# Patient Record
Sex: MALE | Race: BLACK OR AFRICAN AMERICAN | NOT HISPANIC OR LATINO | Employment: OTHER | URBAN - METROPOLITAN AREA
[De-identification: names, ages, dates, MRNs, and addresses within clinical notes are randomized per-mention and may not be internally consistent; named-entity substitution may affect disease eponyms.]

---

## 2017-03-09 ENCOUNTER — HOSPITAL ENCOUNTER (EMERGENCY)
Facility: OTHER | Age: 72
Discharge: HOME | End: 2017-03-09
Attending: EMERGENCY MEDICINE
Payer: MEDICARE

## 2017-03-09 VITALS
SYSTOLIC BLOOD PRESSURE: 113 MMHG | RESPIRATION RATE: 15 BRPM | DIASTOLIC BLOOD PRESSURE: 81 MMHG | TEMPERATURE: 98 F | HEIGHT: 68 IN | OXYGEN SATURATION: 98 % | BODY MASS INDEX: 31.83 KG/M2 | WEIGHT: 210 LBS | HEART RATE: 98 BPM

## 2017-03-09 DIAGNOSIS — N17.9 ACUTE KIDNEY INJURY: Primary | ICD-10-CM

## 2017-03-09 DIAGNOSIS — N30.90 CYSTITIS: ICD-10-CM

## 2017-03-09 LAB
ALBUMIN SERPL BCP-MCNC: 3.7 G/DL
ALP SERPL-CCNC: 108 U/L
ALT SERPL W/O P-5'-P-CCNC: 24 U/L
ANION GAP SERPL CALC-SCNC: 12 MMOL/L
AST SERPL-CCNC: 18 U/L
BACTERIA #/AREA URNS HPF: ABNORMAL /HPF
BASOPHILS # BLD AUTO: 0.02 K/UL
BASOPHILS NFR BLD: 0.2 %
BILIRUB SERPL-MCNC: 0.4 MG/DL
BILIRUB UR QL STRIP: ABNORMAL
BUN SERPL-MCNC: 29 MG/DL
CALCIUM SERPL-MCNC: 9.8 MG/DL
CHLORIDE SERPL-SCNC: 103 MMOL/L
CLARITY UR: ABNORMAL
CO2 SERPL-SCNC: 24 MMOL/L
COLOR UR: YELLOW
CREAT SERPL-MCNC: 1.7 MG/DL
DIFFERENTIAL METHOD: ABNORMAL
EOSINOPHIL # BLD AUTO: 0.2 K/UL
EOSINOPHIL NFR BLD: 2.5 %
ERYTHROCYTE [DISTWIDTH] IN BLOOD BY AUTOMATED COUNT: 14.1 %
EST. GFR  (AFRICAN AMERICAN): 47 ML/MIN/1.73 M^2
EST. GFR  (NON AFRICAN AMERICAN): 40 ML/MIN/1.73 M^2
GLUCOSE SERPL-MCNC: 74 MG/DL
GLUCOSE UR QL STRIP: NEGATIVE
HCT VFR BLD AUTO: 35.8 %
HGB BLD-MCNC: 11.7 G/DL
HGB UR QL STRIP: ABNORMAL
HYALINE CASTS #/AREA URNS LPF: 8 /LPF
KETONES UR QL STRIP: ABNORMAL
LEUKOCYTE ESTERASE UR QL STRIP: ABNORMAL
LYMPHOCYTES # BLD AUTO: 2.2 K/UL
LYMPHOCYTES NFR BLD: 27.2 %
MCH RBC QN AUTO: 28.3 PG
MCHC RBC AUTO-ENTMCNC: 32.7 %
MCV RBC AUTO: 87 FL
MICROSCOPIC COMMENT: ABNORMAL
MONOCYTES # BLD AUTO: 0.5 K/UL
MONOCYTES NFR BLD: 6.3 %
NEUTROPHILS # BLD AUTO: 5.2 K/UL
NEUTROPHILS NFR BLD: 63.3 %
NITRITE UR QL STRIP: NEGATIVE
PH UR STRIP: 5 [PH] (ref 5–8)
PLATELET # BLD AUTO: 293 K/UL
PMV BLD AUTO: 9 FL
POCT GLUCOSE: 80 MG/DL (ref 70–110)
POTASSIUM SERPL-SCNC: 4.3 MMOL/L
PROT SERPL-MCNC: 7.5 G/DL
PROT UR QL STRIP: ABNORMAL
RBC # BLD AUTO: 4.14 M/UL
RBC #/AREA URNS HPF: >100 /HPF (ref 0–4)
SODIUM SERPL-SCNC: 139 MMOL/L
SP GR UR STRIP: >=1.03 (ref 1–1.03)
SQUAMOUS #/AREA URNS HPF: 8 /HPF
URN SPEC COLLECT METH UR: ABNORMAL
UROBILINOGEN UR STRIP-ACNC: 1 EU/DL
WBC # BLD AUTO: 8.13 K/UL
WBC #/AREA URNS HPF: 10 /HPF (ref 0–5)

## 2017-03-09 PROCEDURE — 96360 HYDRATION IV INFUSION INIT: CPT

## 2017-03-09 PROCEDURE — 87086 URINE CULTURE/COLONY COUNT: CPT

## 2017-03-09 PROCEDURE — 93010 ELECTROCARDIOGRAM REPORT: CPT | Mod: ,,, | Performed by: INTERNAL MEDICINE

## 2017-03-09 PROCEDURE — 81000 URINALYSIS NONAUTO W/SCOPE: CPT

## 2017-03-09 PROCEDURE — 93005 ELECTROCARDIOGRAM TRACING: CPT

## 2017-03-09 PROCEDURE — 99284 EMERGENCY DEPT VISIT MOD MDM: CPT | Mod: 25

## 2017-03-09 PROCEDURE — 87077 CULTURE AEROBIC IDENTIFY: CPT

## 2017-03-09 PROCEDURE — 87088 URINE BACTERIA CULTURE: CPT

## 2017-03-09 PROCEDURE — 82962 GLUCOSE BLOOD TEST: CPT

## 2017-03-09 PROCEDURE — 96361 HYDRATE IV INFUSION ADD-ON: CPT

## 2017-03-09 PROCEDURE — 80053 COMPREHEN METABOLIC PANEL: CPT

## 2017-03-09 PROCEDURE — 85025 COMPLETE CBC W/AUTO DIFF WBC: CPT

## 2017-03-09 PROCEDURE — 87186 SC STD MICRODIL/AGAR DIL: CPT

## 2017-03-09 PROCEDURE — 25000003 PHARM REV CODE 250: Performed by: EMERGENCY MEDICINE

## 2017-03-09 RX ORDER — SODIUM CHLORIDE 9 MG/ML
1000 INJECTION, SOLUTION INTRAVENOUS
Status: COMPLETED | OUTPATIENT
Start: 2017-03-09 | End: 2017-03-09

## 2017-03-09 RX ORDER — SULFAMETHOXAZOLE AND TRIMETHOPRIM 800; 160 MG/1; MG/1
1 TABLET ORAL 2 TIMES DAILY
Qty: 20 TABLET | Refills: 0 | Status: SHIPPED | OUTPATIENT
Start: 2017-03-09 | End: 2017-03-19

## 2017-03-09 RX ORDER — SULFAMETHOXAZOLE AND TRIMETHOPRIM 800; 160 MG/1; MG/1
1 TABLET ORAL 2 TIMES DAILY
Qty: 20 TABLET | Refills: 0 | Status: SHIPPED | OUTPATIENT
Start: 2017-03-09 | End: 2017-03-09

## 2017-03-09 RX ADMIN — SODIUM CHLORIDE 1000 ML: 0.9 INJECTION, SOLUTION INTRAVENOUS at 03:03

## 2017-03-09 NOTE — ED PROVIDER NOTES
"Encounter Date: 3/9/2017    SCRIBE #1 NOTE: I, Jordi Melody, am scribing for, and in the presence of, Dr. Alex.       History     Chief Complaint   Patient presents with    Dizziness     Patient c/o dizziness and photophobia for "awhile".  Patient denies fatigue, nausea, vomiting, SOB and CP.      Review of patient's allergies indicates:  No Known Allergies  HPI Comments: Time seen by provider: 2:48 PM    This is a 71 y.o. male who presents to the ED with a chief complaint of dizziness. The patient reports intermittent episode over the past two months. He reports it is worse with cough and bending over. He states that he feels well lying in bed currently. The patient denies any CP or SOB. He reports that he drank two shots of vodka today. Hx of DM and HTN reported. He states that his blood glucose has been well controlled. He states that he sees Dr. Juarez for his prostate cancer. The patient reports he is a former smoker. No known allergies reported.     The history is provided by the patient.     Past Medical History:   Diagnosis Date    Allergy     Cancer     prostate    Diabetes mellitus     Hypertension      Past Surgical History:   Procedure Laterality Date    EYE SURGERY Right     x9     History reviewed. No pertinent family history.  Social History   Substance Use Topics    Smoking status: Current Every Day Smoker     Packs/day: 1.50     Types: Cigarettes    Smokeless tobacco: None    Alcohol use 4.8 oz/week     8 Standard drinks or equivalent per week     Review of Systems   Constitutional: Negative for fever.   HENT: Negative for sore throat.    Respiratory: Negative for shortness of breath.    Cardiovascular: Negative for chest pain.   Gastrointestinal: Negative for nausea.   Genitourinary: Negative for dysuria.   Musculoskeletal: Negative for back pain.   Skin: Negative for rash.   Neurological: Positive for dizziness. Negative for weakness.   Hematological: Does not bruise/bleed easily. "       Physical Exam   Initial Vitals   BP Pulse Resp Temp SpO2   03/09/17 1348 03/09/17 1348 03/09/17 1348 03/09/17 1348 03/09/17 1348   91/54 107 15 98.1 °F (36.7 °C) 95 %     Physical Exam    Nursing note and vitals reviewed.  Constitutional: He appears well-developed and well-nourished. He is not diaphoretic. No distress.   HENT:   Head: Normocephalic and atraumatic.   Right Ear: External ear normal.   Left Ear: External ear normal.   Eyes: EOM are normal. Pupils are equal, round, and reactive to light. Right eye exhibits no discharge. Left eye exhibits no discharge.   Neck: Normal range of motion.   Cardiovascular: Regular rhythm and normal heart sounds. Tachycardia present.  Exam reveals no gallop and no friction rub.    No murmur heard.  Pulmonary/Chest: Breath sounds normal. No respiratory distress. He has no wheezes. He has no rhonchi. He has no rales.   Abdominal: Soft. There is no tenderness. There is no rebound and no guarding.   Musculoskeletal: Normal range of motion. He exhibits no edema or tenderness.   No lower extremity edema.   Neurological: He is alert and oriented to person, place, and time.   No focal neurological deficits. CN's intact.    Skin: Skin is warm and dry. No rash and no abscess noted. No erythema. No pallor.   Psychiatric: He has a normal mood and affect. His behavior is normal. Judgment and thought content normal.         ED Course   Procedures  Labs Reviewed   CBC W/ AUTO DIFFERENTIAL - Abnormal; Notable for the following:        Result Value    RBC 4.14 (*)     Hemoglobin 11.7 (*)     Hematocrit 35.8 (*)     MPV 9.0 (*)     All other components within normal limits   COMPREHENSIVE METABOLIC PANEL - Abnormal; Notable for the following:     BUN, Bld 29 (*)     Creatinine 1.7 (*)     eGFR if  47 (*)     eGFR if non  40 (*)     All other components within normal limits   URINALYSIS - Abnormal; Notable for the following:     Appearance, UA Cloudy (*)      Specific Gravity, UA >=1.030 (*)     Protein, UA 1+ (*)     Ketones, UA Trace (*)     Bilirubin (UA) 1+ (*)     Occult Blood UA 3+ (*)     Leukocytes, UA Trace (*)     All other components within normal limits   URINALYSIS MICROSCOPIC - Abnormal; Notable for the following:     RBC, UA >100 (*)     WBC, UA 10 (*)     Bacteria, UA Few (*)     Hyaline Casts, UA 8 (*)     All other components within normal limits   CULTURE, URINE   POCT GLUCOSE   POCT GLUCOSE MONITORING CONTINUOUS     EKG Readings: (Independently Interpreted)   Sinus tachycardia. Rate of 101. Normal QRS. No acute ST or T wave abnormalities.          Medical Decision Making:   ED Management:  71-year-old gentleman with dizziness.  Triage blood pressure was 90/60 but when he came to the room it was 117/80.  Mildly tachycardic.  We'll start IV fluids and concern of dehydration.  We'll check a urine to rule out urinary tract infection.  The patient otherwise has no symptoms.  His symptoms of dizziness have been going on intermittently for months he states likely due to his vodka use.  Patient is currently asymptomatic.    4:50 PM blood work reviewed.  Creatinine is 1.7.  His baseline is about 1.1.  Urinalysis is concerning for acute cystitis.  We'll start on antibiotics.  Patient's vital signs stabilized.  He is asymptomatic and I feel optimal that he can be discharged safely home.  He is receiving 2 L normal saline here in the emergency department.  He is instructed to follow-up with his urologist Dr. Linton for further evaluation and management.    Patient discharged home in stable condition. Diagnosis and treatment plan explained to patient. I have answered all questions and the patient is satisfied with the plan of care.            Scribe Attestation:   Scribe #1: I performed the above scribed service and the documentation accurately describes the services I performed. I attest to the accuracy of the note.    Attending Attestation:           Physician  Attestation for Scribe:  Physician Attestation Statement for Scribe #1: I, Dr. Alex, reviewed documentation, as scribed by Jordi Oliver in my presence, and it is both accurate and complete.                 ED Course     Clinical Impression:     1. Acute kidney injury    2. Cystitis                Prashant Alex, DO  03/09/17 9963

## 2017-03-09 NOTE — ED TRIAGE NOTES
"Pt reports to ED c/o intermittent dizziness x mult months with change of position, while on left side and admits episode of "vision changes" after taking a few shots of vodka. PMH of DM, HTN, high cholesterol. Pt admits compliance with all medications. Denies N/V/D, chest pain, SOB, and pain, numbness/tingling to lower extremities.   "

## 2017-03-09 NOTE — ED AVS SNAPSHOT
[facility]  [address]  [facility] [address]               Shady Prakash Jr.   3/9/2017  2:45 PM   ED    Description:  Male : 1945   Department:  [facility]           Your Care was Coordinated By:     Provider Role From To    Prashant Alex DO Attending Provider 17 1446 --      Reason for Visit     Dizziness           Diagnoses this Visit        Comments    Acute kidney injury    -  Primary     Cystitis           ED Disposition     None           To Do List           Follow-up Information     Follow up with Matteo Juarez MD In 1 week.    Specialty:  Urology    Contact information:    [address] [facility] [address]  [facility] [address]  [phone]         These Medications        Disp Refills Start End    sulfamethoxazole-trimethoprim 800-160mg (BACTRIM DS) 800-160 mg Tab 20 tablet 0 3/9/2017 3/19/2017    Take 1 tablet by mouth 2 (two) times daily. - Oral    Pharmacy: [facility] [address] [place] [address] Ph #: [phone]         [facility] On Call     [facility] -  Assistance  Registered nurses in the [facility] provide clinical advisement, health education, appointment booking, and other advisory services.  Call for this free service at [phone].             Medications           Message regarding Medications     Verify the changes and/or additions to your medication regime listed below are the same as discussed with your clinician today.  If any of these changes or additions are incorrect, please notify your healthcare provider.        START taking these NEW medications        Refills    sulfamethoxazole-trimethoprim 800-160mg (BACTRIM DS) 800-160 mg Tab 0    Sig: Take 1 tablet by mouth 2 (two) times daily.    Class: Print    Route: Oral      These medications were administered today        Dose Freq    0.9%  NaCl infusion 1,000 mL ED 1 Time    Sig:  "Inject 1,000 mLs into the vein ED 1 Time.    Class: Normal    Route: Intravenous           Verify that the below list of medications is an accurate representation of the medications you are currently taking.  If none reported, the list may be blank. If incorrect, please contact your healthcare provider. Carry this list with you in case of emergency.           Current Medications     aspirin (ECOTRIN) 81 MG EC tablet Take 81 mg by mouth once daily. States not taken in over 1 month as of 9/2/15    glimepiride (AMARYL) 2 MG tablet     lisinopril-hydrochlorothiazide (PRINZIDE,ZESTORETIC) 20-25 mg Tab     lovastatin (MEVACOR) 10 MG tablet     metformin (GLUCOPHAGE) 1000 MG tablet 1,000 mg daily with breakfast.     leuprolide (6 month) (ELIGARD) injection 45 mg Inject 45 mg into the skin every 6 (six) months.    leuprolide (ELIGARD) injection 45 mg Inject 45 mg into the skin every 6 (six) months.    LORATADINE/PSEUDOEPHEDRINE (ALLERGY RELIEF & NASAL DECONGE ORAL) Take by mouth.    naproxen sodium (ANAPROX) 220 MG tablet Take 220 mg by mouth as needed.    sulfamethoxazole-trimethoprim 800-160mg (BACTRIM DS) 800-160 mg Tab Take 1 tablet by mouth 2 (two) times daily.           Clinical Reference Information           Your Vitals Were     BP Pulse Temp Resp Height Weight    117/83 100 98.1 °F (36.7 °C) (Oral) 15 5' 8" (1.727 m) 95.3 kg (210 lb)    SpO2 BMI             98% 31.93 kg/m2         Allergies as of 3/9/2017     No Known Allergies      Immunizations Administered on Date of Encounter - 3/9/2017     None      ED Micro, Lab, POCT     Start Ordered       Status Ordering Provider    03/09/17 1656 03/09/17 1655  Urine culture  Add-on      Completed     03/09/17 1512 03/09/17 1511  CBC auto differential  STAT      Final result     03/09/17 1512 03/09/17 1511  Comprehensive metabolic panel  STAT      Final result     03/09/17 1512 03/09/17 1512  Urinalysis  STAT      Final result     03/09/17 1512 03/09/17 1512  Urinalysis " Microscopic  Once      Final result     03/09/17 1436 03/09/17 1436  POCT glucose  Once      Final result     03/09/17 1436 03/09/17 1435  POCT glucose  Once      Acknowledged       ED Imaging Orders     None      Discharge References/Attachments     KIDNEY INJURY, ACUTE, DISCHARGE INSTRUCTIONS FOR (ENGLISH)    BLADDER INFECTION, MALE (ADULT) (ENGLISH)      Your Scheduled Appointments     Apr 13, 2017  9:30 AM CDT   Established Patient Visit with Matteo Juarez MD   [facility] ([facility])    [address] [place] [id number]   [phone]              [facility] Sign-Up     Activating your [facility] account is as easy as 1-2-3!     1) Visit [email or web address], select Sign Up Now, enter this activation code and your date of birth, then select Next.  [email or web address]  Expires: 4/23/2017  5:00 PM      2) Create a username and password to use when you visit [facility] in the future and select a security question in case you lose your password and select Next.    3) Enter your e-mail address and click Sign Up!    Additional Information  If you have questions, please e-mail [email or web address] or call [phone] to talk to our [facility] staff. Remember, [facility] is NOT to be used for urgent needs. For medical emergencies, dial 911.          [facility] complies with applicable Federal civil rights laws and does not discriminate on the basis of race, color, national origin, age, disability, or sex.        Language Assistance Services     ATTENTION: Language assistance services are available, free of charge. Please call [phone].      ATENCIÓN: Si habla español, tiene a mott disposición servicios gratuitos de asistencia lingüística. Llame al [phone].     CHÚ Ý: N?u b?n nói Ti?ng Vi?t, có các d?ch v? h? tr? ngôn ng? mi?n phí dành cho b?n. G?i s? [phone].

## 2017-03-10 ENCOUNTER — TELEPHONE (OUTPATIENT)
Dept: UROLOGY | Facility: CLINIC | Age: 72
End: 2017-03-10

## 2017-03-10 NOTE — TELEPHONE ENCOUNTER
Spoke with pt he states that he was seen in the E.D on 3/9/17 because he had some blood in his urine. Pt was offer an earlier appointment but he stated that he wanted to keep his schedule appointment. Pt states that he just wanted to inform you of such and that he was treated with an abx.

## 2017-03-10 NOTE — TELEPHONE ENCOUNTER
----- Message from Mala Urbano sent at 3/10/2017  8:29 AM CST -----  Contact: Laura Wilson (Spouse)  _x  1st Request  _  2nd Request  _  3rd Request        Who: Laura Wilson (Spouse)    Why: Patient spouse would like a call back says patient was seen in the er for blood in his urine and she would like to discuss with clinical staff. Please give patient's spouse a call back at your earliest convenience. Thanks!    What Number to Call Back:  [phone]    When to Expect a call back: (Before the end of the day)   -- if the call is after 12:00, the call back will be tomorrow.

## 2017-03-11 LAB — BACTERIA UR CULT: NORMAL

## 2017-04-13 ENCOUNTER — OFFICE VISIT (OUTPATIENT)
Dept: UROLOGY | Facility: CLINIC | Age: 72
End: 2017-04-13
Attending: UROLOGY
Payer: MEDICARE

## 2017-04-13 ENCOUNTER — LAB VISIT (OUTPATIENT)
Dept: LAB | Facility: OTHER | Age: 72
End: 2017-04-13
Attending: UROLOGY
Payer: MEDICARE

## 2017-04-13 VITALS
DIASTOLIC BLOOD PRESSURE: 77 MMHG | HEART RATE: 75 BPM | HEIGHT: 68 IN | SYSTOLIC BLOOD PRESSURE: 132 MMHG | WEIGHT: 216.06 LBS | BODY MASS INDEX: 32.74 KG/M2 | RESPIRATION RATE: 18 BRPM

## 2017-04-13 DIAGNOSIS — C61 PROSTATE CANCER: ICD-10-CM

## 2017-04-13 DIAGNOSIS — C61 PROSTATE CANCER: Primary | ICD-10-CM

## 2017-04-13 LAB — TESTOST SERPL-MCNC: 13 NG/DL

## 2017-04-13 PROCEDURE — 99999 PR PBB SHADOW E&M-EST. PATIENT-LVL III: CPT | Mod: PBBFAC,,, | Performed by: UROLOGY

## 2017-04-13 PROCEDURE — 1159F MED LIST DOCD IN RCRD: CPT | Mod: S$GLB,,, | Performed by: UROLOGY

## 2017-04-13 PROCEDURE — 36415 COLL VENOUS BLD VENIPUNCTURE: CPT

## 2017-04-13 PROCEDURE — 84403 ASSAY OF TOTAL TESTOSTERONE: CPT

## 2017-04-13 PROCEDURE — 81002 URINALYSIS NONAUTO W/O SCOPE: CPT | Mod: S$GLB,,, | Performed by: UROLOGY

## 2017-04-13 PROCEDURE — 1160F RVW MEDS BY RX/DR IN RCRD: CPT | Mod: S$GLB,,, | Performed by: UROLOGY

## 2017-04-13 PROCEDURE — 1126F AMNT PAIN NOTED NONE PRSNT: CPT | Mod: S$GLB,,, | Performed by: UROLOGY

## 2017-04-13 PROCEDURE — 96402 CHEMO HORMON ANTINEOPL SQ/IM: CPT | Mod: S$GLB,,, | Performed by: UROLOGY

## 2017-04-13 PROCEDURE — 1157F ADVNC CARE PLAN IN RCRD: CPT | Mod: S$GLB,,, | Performed by: UROLOGY

## 2017-04-13 PROCEDURE — 99213 OFFICE O/P EST LOW 20 MIN: CPT | Mod: 25,S$GLB,, | Performed by: UROLOGY

## 2017-04-13 NOTE — PROGRESS NOTES
"Subjective:      Shady Prakash Jr. is a 72 y.o. male who returns today regarding his metastatic prostate cancer.  He is s/p TRUS/bx on 8/7/15 w/ high volume marta 9 (5+4), PSA 25.  He had negative metastatic workup.  At time of planned RALP on 9/10/15 he was noted to have BN invasion on cysto and therefore only PLND was done, which confirmed metastatic disease.  He was started on ADT 9/17/15 w/ firmagon followed by Eligard on 10/15/15.      Here today for FU and Eligard. No c/o. Denies bone pain and weight changes. No voiding concerns.    Labs drawn this AM - results pending.    The following portions of the patient's history were reviewed and updated as appropriate: allergies, current medications, past family history, past medical history, past social history, past surgical history and problem list.    Review of Systems  A comprehensive multipoint review of systems was negative except as otherwise stated in the HPI.     Objective:   Vitals:   /77 (BP Location: Left arm, Patient Position: Sitting, BP Method: Automatic)  Pulse 75  Resp 18  Ht 5' 8" (1.727 m)  Wt 98 kg (216 lb 0.8 oz)  BMI 32.85 kg/m2    Physical Exam   General: alert and oriented, no acute distress  Respiratory: Symmetric expansion, non-labored breathing  Neuro: no gross deficits  Psych: normal judgment and insight, normal mood/affect and non-anxious    Lab Review   Urinalysis demonstrates negative for all components    Component PSA DIAGNOSTIC Testosterone, Total   Latest Ref Rng 0.00-4.00 ng/mL 195.0-1138.0 ng/dL   10/7/2016 0.89    4/8/2016 0.73 13 (L)   1/4/2016 1.5 16 (L)   10/9/2015 6.6 (H)    7/7/2015 25.2 (H)        Assessment and Plan:   Prostate cancer 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

## 2017-04-17 LAB
BILIRUB SERPL-MCNC: ABNORMAL MG/DL
BLOOD URINE, POC: 250
COLOR, POC UA: YELLOW
GLUCOSE UR QL STRIP: ABNORMAL
KETONES UR QL STRIP: ABNORMAL
LEUKOCYTE ESTERASE URINE, POC: ABNORMAL
NITRITE, POC UA: ABNORMAL
PH, POC UA: 6
PROTEIN, POC: ABNORMAL
SPECIFIC GRAVITY, POC UA: 1
UROBILINOGEN, POC UA: ABNORMAL

## 2017-06-28 ENCOUNTER — TELEPHONE (OUTPATIENT)
Dept: UROLOGY | Facility: CLINIC | Age: 72
End: 2017-06-28

## 2017-06-28 ENCOUNTER — HOSPITAL ENCOUNTER (EMERGENCY)
Facility: OTHER | Age: 72
Discharge: HOME | End: 2017-06-28
Attending: EMERGENCY MEDICINE
Payer: MEDICARE

## 2017-06-28 VITALS
WEIGHT: 201 LBS | RESPIRATION RATE: 16 BRPM | TEMPERATURE: 99 F | SYSTOLIC BLOOD PRESSURE: 126 MMHG | DIASTOLIC BLOOD PRESSURE: 75 MMHG | BODY MASS INDEX: 30.46 KG/M2 | OXYGEN SATURATION: 98 % | HEART RATE: 82 BPM | HEIGHT: 68 IN

## 2017-06-28 DIAGNOSIS — R31.9 HEMATURIA: Primary | ICD-10-CM

## 2017-06-28 LAB
ALBUMIN SERPL BCP-MCNC: 3.9 G/DL
ALP SERPL-CCNC: 189 U/L
ALT SERPL W/O P-5'-P-CCNC: 14 U/L
ANION GAP SERPL CALC-SCNC: 11 MMOL/L
AST SERPL-CCNC: 18 U/L
BACTERIA #/AREA URNS HPF: ABNORMAL /HPF
BASOPHILS # BLD AUTO: 0.01 K/UL
BASOPHILS NFR BLD: 0.2 %
BILIRUB SERPL-MCNC: 0.2 MG/DL
BILIRUB UR QL STRIP: ABNORMAL
BUN SERPL-MCNC: 31 MG/DL
CALCIUM SERPL-MCNC: 9.4 MG/DL
CHLORIDE SERPL-SCNC: 103 MMOL/L
CK SERPL-CCNC: 208 U/L
CLARITY UR: ABNORMAL
CO2 SERPL-SCNC: 24 MMOL/L
COLOR UR: ABNORMAL
CREAT SERPL-MCNC: 1.5 MG/DL
DIFFERENTIAL METHOD: ABNORMAL
EOSINOPHIL # BLD AUTO: 0.2 K/UL
EOSINOPHIL NFR BLD: 2.7 %
ERYTHROCYTE [DISTWIDTH] IN BLOOD BY AUTOMATED COUNT: 13.6 %
EST. GFR  (AFRICAN AMERICAN): 54 ML/MIN/1.73 M^2
EST. GFR  (NON AFRICAN AMERICAN): 46 ML/MIN/1.73 M^2
GLUCOSE SERPL-MCNC: 77 MG/DL
GLUCOSE UR QL STRIP: NEGATIVE
HCT VFR BLD AUTO: 36.5 %
HGB BLD-MCNC: 11.8 G/DL
HGB UR QL STRIP: ABNORMAL
HYALINE CASTS #/AREA URNS LPF: 1 /LPF
KETONES UR QL STRIP: ABNORMAL
LEUKOCYTE ESTERASE UR QL STRIP: NEGATIVE
LYMPHOCYTES # BLD AUTO: 1.7 K/UL
LYMPHOCYTES NFR BLD: 29.9 %
MCH RBC QN AUTO: 28.3 PG
MCHC RBC AUTO-ENTMCNC: 32.3 %
MCV RBC AUTO: 88 FL
MICROSCOPIC COMMENT: ABNORMAL
MONOCYTES # BLD AUTO: 0.3 K/UL
MONOCYTES NFR BLD: 6 %
NEUTROPHILS # BLD AUTO: 3.4 K/UL
NEUTROPHILS NFR BLD: 61 %
NITRITE UR QL STRIP: NEGATIVE
PH UR STRIP: 5 [PH] (ref 5–8)
PLATELET # BLD AUTO: 240 K/UL
PMV BLD AUTO: 9 FL
POTASSIUM SERPL-SCNC: 5 MMOL/L
PROT SERPL-MCNC: 7.6 G/DL
PROT UR QL STRIP: ABNORMAL
RBC # BLD AUTO: 4.17 M/UL
RBC #/AREA URNS HPF: >100 /HPF (ref 0–4)
SODIUM SERPL-SCNC: 138 MMOL/L
SP GR UR STRIP: >=1.03 (ref 1–1.03)
SQUAMOUS #/AREA URNS HPF: 2 /HPF
URN SPEC COLLECT METH UR: ABNORMAL
UROBILINOGEN UR STRIP-ACNC: 1 EU/DL
WBC # BLD AUTO: 5.62 K/UL
WBC #/AREA URNS HPF: 5 /HPF (ref 0–5)

## 2017-06-28 PROCEDURE — 96360 HYDRATION IV INFUSION INIT: CPT

## 2017-06-28 PROCEDURE — 80053 COMPREHEN METABOLIC PANEL: CPT

## 2017-06-28 PROCEDURE — 81000 URINALYSIS NONAUTO W/SCOPE: CPT

## 2017-06-28 PROCEDURE — 25000003 PHARM REV CODE 250: Performed by: PHYSICIAN ASSISTANT

## 2017-06-28 PROCEDURE — 99284 EMERGENCY DEPT VISIT MOD MDM: CPT | Mod: 25

## 2017-06-28 PROCEDURE — 82550 ASSAY OF CK (CPK): CPT

## 2017-06-28 PROCEDURE — 85025 COMPLETE CBC W/AUTO DIFF WBC: CPT

## 2017-06-28 PROCEDURE — 87086 URINE CULTURE/COLONY COUNT: CPT

## 2017-06-28 RX ORDER — SODIUM CHLORIDE 9 MG/ML
1000 INJECTION, SOLUTION INTRAVENOUS
Status: COMPLETED | OUTPATIENT
Start: 2017-06-28 | End: 2017-06-28

## 2017-06-28 RX ADMIN — SODIUM CHLORIDE 1000 ML: 0.9 INJECTION, SOLUTION INTRAVENOUS at 02:06

## 2017-06-28 NOTE — TELEPHONE ENCOUNTER
----- Message from Mala Gama sent at 6/28/2017  4:11 PM CDT -----  Contact: Vanessa Elias daughter  x_  1st Request  _  2nd Request  _  3rd Request        Who: Vanessa Elias daughter    Why: Patient daughter is calling to schedule her father a follow up appt from the ED today as soon as possible.     What Number to Call Back: [phone]    When to Expect a call back: (Before the end of the day)   -- if call after 3:00 call back will be tomorrow.

## 2017-06-28 NOTE — ED PROVIDER NOTES
Encounter Date: 6/28/2017       History     Chief Complaint   Patient presents with    Hematuria     Pt had one episode of bloody urine yesterday and then today pt describes urine as tea colored. Denies pain. Hx prostate CA.      Patient is a 72 y.o. male with a past medical history of prostate cancer, hypertension, diabetes, presenting to the emergency department with complaints of hematuria.  The patient reports that yesterday afternoon he was doing a lot of work in the yard.  He states that when he used the restroom he has noticed a large amount of hematuria.  He states that later that evening the hematuria had resolved.  He denies any dysuria, fever, chills, or pain.  He reports that today after doing more yard work, he noticed his urine was brown in color.  He states he has not had any pain, but is concerned that his symptoms may be due to his cancer.  He admits that he has seen Dr. Juarez in the past, and is due to follow up in approximately 6 months. He denies pain, shortness of breath, chest pain, abdominal pain, nausea, vomiting or diarrhea.       The history is provided by the patient.     Review of patient's allergies indicates:  No Known Allergies  Past Medical History:   Diagnosis Date    Allergy     Cancer     prostate    Diabetes mellitus     Hypertension      Past Surgical History:   Procedure Laterality Date    EYE SURGERY Right     x9     History reviewed. No pertinent family history.  Social History   Substance Use Topics    Smoking status: Former Smoker     Packs/day: 1.50     Types: Cigarettes     Quit date: 11/13/2016    Smokeless tobacco: Former User    Alcohol use Yes      Comment: a shot of vodka sometimes     Review of Systems   Constitutional: Negative for activity change, chills, fatigue and fever.   HENT: Negative for congestion, rhinorrhea and sore throat.    Eyes: Negative for photophobia and visual disturbance.   Respiratory: Negative for cough and shortness of breath.     Cardiovascular: Negative for chest pain.   Gastrointestinal: Negative for abdominal pain, diarrhea, nausea and vomiting.   Genitourinary: Positive for hematuria. Negative for dysuria and urgency.   Musculoskeletal: Negative for back pain, myalgias and neck pain.   Skin: Negative for color change and wound.   Neurological: Negative for weakness and headaches.   Psychiatric/Behavioral: Negative for agitation and confusion.       Physical Exam     Initial Vitals [06/28/17 1318]   BP Pulse Resp Temp SpO2   108/61 106 16 97.6 °F (36.4 °C) 97 %      MAP       76.67         Physical Exam    Nursing note and vitals reviewed.  Constitutional: Vital signs are normal. He appears well-developed and well-nourished. He is not diaphoretic. He is cooperative.  Non-toxic appearance. He does not have a sickly appearance. He does not appear ill. No distress.   Well appearing, -American male accompanied by his wife in the emergency department.  He speaking clear and full sentences.  He is in no acute distress.   HENT:   Head: Normocephalic and atraumatic.   Right Ear: External ear normal.   Left Ear: External ear normal.   Nose: Nose normal.   Mouth/Throat: Oropharynx is clear and moist.   Eyes: Conjunctivae and EOM are normal.   Neck: Normal range of motion. Neck supple.   Cardiovascular: Regular rhythm and normal heart sounds. Tachycardia present.    Pulmonary/Chest: Breath sounds normal. No respiratory distress. He has no wheezes.   Abdominal: Soft. Bowel sounds are normal. He exhibits no distension. There is no tenderness. There is no rebound, no guarding and no CVA tenderness.   Musculoskeletal: Normal range of motion.   Neurological: He is alert and oriented to person, place, and time. No cranial nerve deficit or sensory deficit. GCS eye subscore is 4. GCS verbal subscore is 5. GCS motor subscore is 6.   Skin: Skin is warm.   Psychiatric: He has a normal mood and affect. His behavior is normal. Judgment and thought  content normal.         ED Course   Procedures  Labs Reviewed   URINALYSIS - Abnormal; Notable for the following:        Result Value    Color, UA Brown (*)     Appearance, UA Cloudy (*)     Specific Gravity, UA >=1.030 (*)     Protein, UA 2+ (*)     Ketones, UA Trace (*)     Bilirubin (UA) 1+ (*)     Occult Blood UA 3+ (*)     All other components within normal limits   URINALYSIS MICROSCOPIC - Abnormal; Notable for the following:     RBC, UA >100 (*)     Bacteria, UA Moderate (*)     All other components within normal limits   CBC W/ AUTO DIFFERENTIAL - Abnormal; Notable for the following:     RBC 4.17 (*)     Hemoglobin 11.8 (*)     Hematocrit 36.5 (*)     MPV 9.0 (*)     All other components within normal limits   COMPREHENSIVE METABOLIC PANEL - Abnormal; Notable for the following:     BUN, Bld 31 (*)     Creatinine 1.5 (*)     Alkaline Phosphatase 189 (*)     eGFR if  54 (*)     eGFR if non  46 (*)     All other components within normal limits   CK - Abnormal; Notable for the following:      (*)     All other components within normal limits             Medical Decision Making:   History:   Old Medical Records: I decided to obtain old medical records.  Old Records Summarized: other records.       <> Summary of Records: Reviewed medical record in Epic including recent office visit with Dr. Juarez on 4/13/17.  Patient has known metastatic prostate cancer with TRUS/bx on 8/7/15   Initial Assessment:   Urgent evaluation of a 9-year-old male past medical history of hypertension, diabetes, prostate cancer, presenting to the emergency department with complaints of hematuria.  Patient is afebrile, nontoxic appearing, hemodynamically stable.  Physical exam reveals mild tachycardia, lungs are clear to auscultation bilaterally.  Abdomen is soft and nontender. Reviewed medical record as noted above.  Will plan to obtain UA, basic blood work including CPK, administer fluids and  reassess.  Clinical Tests:   Lab Tests: Ordered and Reviewed  The following lab test(s) were unremarkable: CBC, CMP, UPT and Urinalysis       <> Summary of Lab: CPK  ED Management:  UA shows 2+ protein, trace ketone, 3+ blood, over 100 RBC, moderate bacteria. BC shows no leukocytosis, H&H is 11.8/36.5.  CMP shows no acute electrolyte abnormality, BUN/creatinine are 31/1.5. This is increased from most recent values on 3/9/17 at 29/1.7. CPK is minimally elevated at 208.  Will plan to discuss the patient with Dr. Juarez, who is his primary urologist.  3:44 PM discussed the case with Dr. Juarez.  He advises no further treatment or evaluation is warranted at this time.  Recommend close follow-up in clinic.  Patient is stable for discharge home. The patient was instructed to follow up with a primary care provider in 2 days or to return to the emergency department for worsening symptoms. The treatment plan was discussed with the patient who demonstrated understanding and comfort with plan. The patient's history, physical exam, and plan of care was discussed with and agreed upon with my supervising physician.    Other:   I have discussed this case with another health care provider.       <> Summary of the Discussion: Dr. Thompson, Dr. Juarez  This note was created using Dragon Medical Dictation. There may be typographical errors secondary to dictation.                    ED Course     Clinical Impression:     1. Hematuria         Disposition:   Disposition: Discharged  Condition: Stable                        Chanda Swan PA-C  06/28/17 155

## 2017-06-28 NOTE — ED TRIAGE NOTES
Pt reports once instance of hematuria yesterday, dark red blood, no clots, no more instances since.  Reports straining and heavy work the day before.  Denies dysuria, polyuria.  Denies any pain at this time.

## 2017-06-28 NOTE — TELEPHONE ENCOUNTER
----- Message from Rick Sanchez sent at 6/28/2017  3:22 PM CDT -----  Contact: David with [facility] ED  x_ 1st Request   _ 2nd Request   _ 3rd Request     Who: AJIT LAUGHLIN JR. [[id number]]    Why: Sarah PINTO is requesting a call back in reference an ED consult.  Patient is in RWR1 at [facility] ED.  Patient has blood in urine.    What Number to Call Back: [phone]    When to Expect a call back: (Before the end of the day)   -- if call after 3:00 call back will be tomorrow.

## 2017-06-29 LAB — BACTERIA UR CULT: NO GROWTH

## 2017-06-29 NOTE — TELEPHONE ENCOUNTER
----- Message from Matteo Juarez MD sent at 6/29/2017  8:48 AM CDT -----  Contact: David with [facility] ED  I spoke to ED yesterday. Can you make sure he has an appointment to see me next week? Thanks.      ----- Message -----  From: Sarah Landin MA  Sent: 6/28/2017   3:34 PM  To: Matteo Juarez MD    PLEASE ADVISE.  ----- Message -----  From: Rick Sanchez  Sent: 6/28/2017   3:22 PM  To: Larry Felipe Staff    x_ 1st Request   _ 2nd Request   _ 3rd Request     Who: AJIT LAUGHLIN JR. [[id number]]    Why: Sarah PINTO is requesting a call back in reference an ED consult.  Patient is in RWR1 at [facility] ED.  Patient has blood in urine.    What Number to Call Back: [phone]    When to Expect a call back: (Before the end of the day)   -- if call after 3:00 call back will be tomorrow.

## 2017-07-03 ENCOUNTER — LAB VISIT (OUTPATIENT)
Dept: LAB | Facility: OTHER | Age: 72
End: 2017-07-03
Attending: UROLOGY
Payer: MEDICARE

## 2017-07-03 DIAGNOSIS — C61 PROSTATE CANCER: ICD-10-CM

## 2017-07-03 LAB
COMPLEXED PSA SERPL-MCNC: 5 NG/ML
TESTOST SERPL-MCNC: 20 NG/DL

## 2017-07-03 PROCEDURE — 84403 ASSAY OF TOTAL TESTOSTERONE: CPT

## 2017-07-03 PROCEDURE — 84153 ASSAY OF PSA TOTAL: CPT

## 2017-07-03 PROCEDURE — 36415 COLL VENOUS BLD VENIPUNCTURE: CPT

## 2017-07-20 ENCOUNTER — OFFICE VISIT (OUTPATIENT)
Dept: UROLOGY | Facility: CLINIC | Age: 72
End: 2017-07-20
Attending: UROLOGY
Payer: MEDICARE

## 2017-07-20 VITALS
HEART RATE: 94 BPM | WEIGHT: 198 LBS | BODY MASS INDEX: 30.1 KG/M2 | DIASTOLIC BLOOD PRESSURE: 75 MMHG | SYSTOLIC BLOOD PRESSURE: 130 MMHG

## 2017-07-20 DIAGNOSIS — R97.20 ELEVATED PSA: ICD-10-CM

## 2017-07-20 DIAGNOSIS — R31.0 GROSS HEMATURIA: ICD-10-CM

## 2017-07-20 DIAGNOSIS — C77.5 PROSTATE CANCER METASTATIC TO INTRAPELVIC LYMPH NODE: Primary | ICD-10-CM

## 2017-07-20 DIAGNOSIS — C61 PROSTATE CANCER METASTATIC TO INTRAPELVIC LYMPH NODE: Primary | ICD-10-CM

## 2017-07-20 LAB
BILIRUB SERPL-MCNC: ABNORMAL MG/DL
BLOOD URINE, POC: ABNORMAL
COLOR, POC UA: ABNORMAL
GLUCOSE UR QL STRIP: NORMAL
KETONES UR QL STRIP: ABNORMAL
LEUKOCYTE ESTERASE URINE, POC: ABNORMAL
NITRITE, POC UA: ABNORMAL
PH, POC UA: 5
PROTEIN, POC: ABNORMAL
SPECIFIC GRAVITY, POC UA: 1
UROBILINOGEN, POC UA: NORMAL

## 2017-07-20 PROCEDURE — 99214 OFFICE O/P EST MOD 30 MIN: CPT | Mod: 25,S$GLB,, | Performed by: UROLOGY

## 2017-07-20 PROCEDURE — 81002 URINALYSIS NONAUTO W/O SCOPE: CPT | Mod: S$GLB,,, | Performed by: UROLOGY

## 2017-07-20 PROCEDURE — 1159F MED LIST DOCD IN RCRD: CPT | Mod: S$GLB,,, | Performed by: UROLOGY

## 2017-07-20 PROCEDURE — 99999 PR PBB SHADOW E&M-EST. PATIENT-LVL III: CPT | Mod: PBBFAC,,, | Performed by: UROLOGY

## 2017-07-20 PROCEDURE — 1126F AMNT PAIN NOTED NONE PRSNT: CPT | Mod: S$GLB,,, | Performed by: UROLOGY

## 2017-07-20 RX ORDER — BICALUTAMIDE 50 MG/1
50 TABLET, FILM COATED ORAL DAILY
Qty: 30 TABLET | Refills: 11 | Status: SHIPPED | OUTPATIENT
Start: 2017-07-20 | End: 2017-11-16

## 2017-07-20 RX ORDER — LOVASTATIN 20 MG/1
TABLET ORAL
COMMUNITY
Start: 2017-05-30

## 2017-07-20 NOTE — PROGRESS NOTES
Subjective:      Shady Prakash Jr. is a 72 y.o. male who returns today regarding his metastatic prostate cancer.      He is s/p TRUS/bx on 8/7/15 w/ high volume marta 9 (5+4), PSA 25.  He had negative metastatic workup.  At time of planned RALP on 9/10/15 he was noted to have BN invasion on cysto and therefore only PLND was done, which confirmed metastatic disease.  He was started on ADT 9/17/15 w/ firmagon followed by Eligard on 10/15/15.      Received last Eligard 4/13/17.    Seen in ER earlier this month with gross hematuria, which has been intermittent in interim. Negative UTI workup per ER. No other c/o today.    The following portions of the patient's history were reviewed and updated as appropriate: allergies, current medications, past family history, past medical history, past social history, past surgical history and problem list.    Review of Systems  A comprehensive multipoint review of systems was negative except as otherwise stated in the HPI.     Objective:   Vitals:   /75   Pulse 94   Wt 89.8 kg (197 lb 15.6 oz)   BMI 30.10 kg/m²     Physical Exam   General: alert and oriented, no acute distress  Respiratory: Symmetric expansion, non-labored breathing  Neuro: no gross deficits  Psych: normal judgment and insight, normal mood/affect and non-anxious    Lab Review   Urinalysis demonstrates + for RBCs    Component PSA DIAGNOSTIC Testosterone, Total   Latest Ref Rng 0.00-4.00 ng/mL 195.0-1138.0 ng/dL   7/3/2017 5.0 20 (L)   10/7/2016 0.89    4/8/2016 0.73 13 (L)   1/4/2016 1.5 16 (L)   10/9/2015 6.6 (H)    7/7/2015 25.2 (H)        Assessment and Plan:   Prostate cancer metastatic to intrapelvic lymph node  -- 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

## 2017-07-25 ENCOUNTER — HOSPITAL ENCOUNTER (OUTPATIENT)
Dept: RADIOLOGY | Facility: OTHER | Age: 72
Discharge: HOME | End: 2017-07-25
Attending: UROLOGY
Payer: MEDICARE

## 2017-07-25 DIAGNOSIS — C61 PROSTATE CANCER METASTATIC TO INTRAPELVIC LYMPH NODE: ICD-10-CM

## 2017-07-25 DIAGNOSIS — R31.0 GROSS HEMATURIA: ICD-10-CM

## 2017-07-25 DIAGNOSIS — R97.20 ELEVATED PSA: ICD-10-CM

## 2017-07-25 DIAGNOSIS — C77.5 PROSTATE CANCER METASTATIC TO INTRAPELVIC LYMPH NODE: ICD-10-CM

## 2017-07-25 PROCEDURE — 25500020 PHARM REV CODE 255: Performed by: UROLOGY

## 2017-07-25 PROCEDURE — 74178 CT ABD&PLV WO CNTR FLWD CNTR: CPT | Mod: TC

## 2017-07-25 PROCEDURE — 78306 BONE IMAGING WHOLE BODY: CPT | Mod: 26,,, | Performed by: RADIOLOGY

## 2017-07-25 PROCEDURE — 74178 CT ABD&PLV WO CNTR FLWD CNTR: CPT | Mod: 26,,, | Performed by: RADIOLOGY

## 2017-07-25 PROCEDURE — A9503 TC99M MEDRONATE: HCPCS

## 2017-07-25 RX ADMIN — IOHEXOL 125 ML: 350 INJECTION, SOLUTION INTRAVENOUS at 08:07

## 2017-08-01 ENCOUNTER — TELEPHONE (OUTPATIENT)
Dept: UROLOGY | Facility: CLINIC | Age: 72
End: 2017-08-01

## 2017-08-01 NOTE — TELEPHONE ENCOUNTER
Attempt to contact  staff on today unable to contact anyone per call center at [facility]. A message was left by [facility] call  that an call attempt was made by I in regarding pts refl that was sent by . Pt isn't schedule to be seen as of yet.

## 2017-08-01 NOTE — TELEPHONE ENCOUNTER
----- Message from Matteo Juarez MD sent at 8/1/2017 11:07 AM CDT -----  Regarding: Oncology referral  I placed a referral on 7/20 to med-onc for this patient but it does not look like they have scheduled it. Can we FU with them and try to get something set up? Thanks.

## 2017-08-02 ENCOUNTER — TELEPHONE (OUTPATIENT)
Dept: HEMATOLOGY/ONCOLOGY | Facility: CLINIC | Age: 72
End: 2017-08-02

## 2017-08-02 NOTE — TELEPHONE ENCOUNTER
----- Message from Sarah Fallon RN sent at 8/2/2017  2:50 PM CDT -----  Contact: [facility]   Good afternoon,    This patient is scheduled for Dr. Melendez on 9/1 @ [facility] (1st available). I also pt the patient on the waitlist if an earlier appt is available and will speak to Dr. Melendez once she returns next week.    Sarah Fallon  [phone]  ----- Message -----  From: Terell Kern  Sent: 8/1/2017   4:21 PM  To: Sarah Fallon RN    Would like a call regarding status of patient being scheduled for Prostate cancer metastatic to intrapelvic lymph node    Referral is in the system. Please call Dr. Juarez staff regarding this matter as soon as possible.     Call ext [phone]

## 2017-08-02 NOTE — TELEPHONE ENCOUNTER
Spoke with pt in regarding to get pt to be scheduled for 8/3 for cysto. Pt refused and stated he would like to keep his scheduled appointment date and time.

## 2017-08-07 ENCOUNTER — PROCEDURE VISIT (OUTPATIENT)
Dept: UROLOGY | Facility: CLINIC | Age: 72
End: 2017-08-07
Attending: UROLOGY
Payer: MEDICARE

## 2017-08-07 VITALS
WEIGHT: 198.5 LBS | DIASTOLIC BLOOD PRESSURE: 65 MMHG | HEIGHT: 68 IN | HEART RATE: 94 BPM | SYSTOLIC BLOOD PRESSURE: 122 MMHG | BODY MASS INDEX: 30.08 KG/M2

## 2017-08-07 DIAGNOSIS — R31.0 GROSS HEMATURIA: ICD-10-CM

## 2017-08-07 LAB
BILIRUB SERPL-MCNC: ABNORMAL MG/DL
BLOOD URINE, POC: ABNORMAL
COLOR, POC UA: ABNORMAL
GLUCOSE UR QL STRIP: NORMAL
KETONES UR QL STRIP: ABNORMAL
LEUKOCYTE ESTERASE URINE, POC: ABNORMAL
NITRITE, POC UA: ABNORMAL
PH, POC UA: 5
PROTEIN, POC: 30
SPECIFIC GRAVITY, POC UA: 1.02
UROBILINOGEN, POC UA: NORMAL

## 2017-08-07 PROCEDURE — 81002 URINALYSIS NONAUTO W/O SCOPE: CPT | Mod: S$GLB,,, | Performed by: UROLOGY

## 2017-08-07 PROCEDURE — 52000 CYSTOURETHROSCOPY: CPT | Mod: S$GLB,,, | Performed by: UROLOGY

## 2017-08-07 RX ORDER — LIDOCAINE HYDROCHLORIDE 20 MG/ML
JELLY TOPICAL
Status: COMPLETED | OUTPATIENT
Start: 2017-08-07 | End: 2017-08-07

## 2017-08-07 RX ORDER — CIPROFLOXACIN 500 MG/1
500 TABLET ORAL
Status: COMPLETED | OUTPATIENT
Start: 2017-08-07 | End: 2017-08-07

## 2017-08-07 RX ADMIN — CIPROFLOXACIN 500 MG: 500 TABLET ORAL at 11:08

## 2017-08-07 RX ADMIN — LIDOCAINE HYDROCHLORIDE 5 ML: 20 JELLY TOPICAL at 11:08

## 2017-08-07 NOTE — PROCEDURES
Cystoscopy  Date/Time: 8/7/2017 10:55 AM  Performed by: MARTHA MIRELES  Authorized by: MARTHA MIRELES     Consent Done?:  Yes (Written)  Indications: hematuria    Indications comment:  Prostate cancer  Position:  Supine  Anesthesia:  Lidocaine jelly  Preparation: Patient was prepped and draped in usual sterile fashion      Scope type:  Flexible cystoscope  External exam normal: Yes    Urethra normal: No         Urethral Internal Findings:  Stricture (bulbar urethra - easily dilated with scope)  Prostate normal: No (Irregular nodular intravesical prostatic growth c/w known prostate cancer)  Bladder neck normal: Bladder neck normal   Bladder normal: Yes (No tumors, lesions, or stones noted.  Normal bilateral UOs.)      Patient tolerance:  Patient tolerated the procedure well with no immediate complications      Imaging  CT Urogram and bone scan reviewed. No other tumors or stones on CT. New metastatic bone lesions identified on both CT and bone scan (ilium, rib, scapula).    Impression:   Gross hematuria, likely 2/2 prostate cancer  Metastatic prostate cancer, castration resistant, with new bone metastasis    Plan:  -- Keep consult with Dr. Melendez scheduled for 9/1  -- Continue Eligard and casodex  -- FU w/ me as scheduled in October

## 2017-09-01 ENCOUNTER — HOSPITAL ENCOUNTER (OUTPATIENT)
Dept: RADIOLOGY | Facility: OTHER | Age: 72
Discharge: HOME | End: 2017-09-01
Attending: INTERNAL MEDICINE
Payer: MEDICARE

## 2017-09-01 ENCOUNTER — INITIAL CONSULT (OUTPATIENT)
Dept: HEMATOLOGY/ONCOLOGY | Facility: CLINIC | Age: 72
End: 2017-09-01
Attending: UROLOGY
Payer: MEDICARE

## 2017-09-01 VITALS
DIASTOLIC BLOOD PRESSURE: 80 MMHG | OXYGEN SATURATION: 96 % | SYSTOLIC BLOOD PRESSURE: 126 MMHG | WEIGHT: 201.75 LBS | RESPIRATION RATE: 20 BRPM | BODY MASS INDEX: 31.66 KG/M2 | HEIGHT: 67 IN | TEMPERATURE: 99 F | HEART RATE: 94 BPM

## 2017-09-01 DIAGNOSIS — C61 PROSTATE CANCER METASTATIC TO INTRAPELVIC LYMPH NODE: ICD-10-CM

## 2017-09-01 DIAGNOSIS — C77.5 PROSTATE CANCER METASTATIC TO INTRAPELVIC LYMPH NODE: Primary | ICD-10-CM

## 2017-09-01 DIAGNOSIS — C61 PROSTATE CANCER METASTATIC TO INTRAPELVIC LYMPH NODE: Primary | ICD-10-CM

## 2017-09-01 DIAGNOSIS — C77.5 PROSTATE CANCER METASTATIC TO INTRAPELVIC LYMPH NODE: ICD-10-CM

## 2017-09-01 PROCEDURE — 1126F AMNT PAIN NOTED NONE PRSNT: CPT | Mod: S$GLB,,, | Performed by: INTERNAL MEDICINE

## 2017-09-01 PROCEDURE — 73521 X-RAY EXAM HIPS BI 2 VIEWS: CPT | Mod: 26,,, | Performed by: RADIOLOGY

## 2017-09-01 PROCEDURE — 73521 X-RAY EXAM HIPS BI 2 VIEWS: CPT | Mod: TC

## 2017-09-01 PROCEDURE — 73552 X-RAY EXAM OF FEMUR 2/>: CPT | Mod: 26,RT,, | Performed by: RADIOLOGY

## 2017-09-01 PROCEDURE — 73552 X-RAY EXAM OF FEMUR 2/>: CPT | Mod: TC,RT

## 2017-09-01 PROCEDURE — 3008F BODY MASS INDEX DOCD: CPT | Mod: S$GLB,,, | Performed by: INTERNAL MEDICINE

## 2017-09-01 PROCEDURE — 99205 OFFICE O/P NEW HI 60 MIN: CPT | Mod: S$GLB,,, | Performed by: INTERNAL MEDICINE

## 2017-09-01 PROCEDURE — 1159F MED LIST DOCD IN RCRD: CPT | Mod: S$GLB,,, | Performed by: INTERNAL MEDICINE

## 2017-09-01 PROCEDURE — 99999 PR PBB SHADOW E&M-EST. PATIENT-LVL III: CPT | Mod: PBBFAC,,, | Performed by: INTERNAL MEDICINE

## 2017-09-01 RX ORDER — FERROUS SULFATE, DRIED 160(50) MG
1 TABLET, EXTENDED RELEASE ORAL 2 TIMES DAILY
Qty: 180 TABLET | Refills: 3 | Status: SHIPPED | OUTPATIENT
Start: 2017-09-01 | End: 2019-12-09

## 2017-09-01 NOTE — PROGRESS NOTES
Subjective:       Patient ID: Shady Prakash Jr. is a 72 y.o. male.    Chief Complaint: Consult and Prostate Cancer    HPI     Shady Prakash Jr. is a 72 y.o. male who returns today regarding his metastatic prostate cancer.       He is s/p TRUS/bx on 8/7/15 w/ high volume marta 9 (5+4), PSA 25.  He had negative metastatic workup.  At time of planned RALP on 9/10/15 he was noted to have BN invasion on cysto and therefore only PLND was done, which confirmed metastatic disease.  He was started on ADT 9/17/15 w/ firmagon followed by Eligard on 10/15/15.    Received last Eligard 4/13/17.  Started Casodex 7/20/17     Patient states doing well and denies pain.  Daughter and wife disagree regarding pain- right upper leg pain they report though patient denies.    He denies urinary symptoms.  Seen in ER earlier this month with gross hematuria, which has been intermittent in interim. Negative UTI workup per ER. No other c/o today.     7/25/17 Bone scan:  Increased region of radiopharmaceutical uptake focally within the right posterior ilium, right approximate seventh rib as well the inferior left scapula which are concerning for enlarging osseous metastases. There is additional area of focal uptake involving the superior medial margin of the left scapula and left pubic tubercle region concerning for additional osseous metastases. Clinical correlation advised    7/25/17 CT urogram:  Interval development of sclerotic foci involving the right iliac bone and left superior pubic ramus likely representing sclerotic bone metastases in this patient with history of prostate carcinoma.  Mildly enlarged prostate gland.  Right lower pole renal cyst.  Persistent pleural thickening within the base of the right hemithorax with associated atelectatic changes.  Mild bronchiectatic changes are also noted.  These are stable when compared to the prior examination.  Small foci of early arterial enhancement within the liver likely representing  benign hepatic hemangiomas.  These are unchanged when compared to the prior examination.  Small hiatal hernia.  Colonic diverticulosis without evidence for acute diverticulitis.    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   HENT: Positive for dental problem. Negative for congestion, hearing loss, mouth sores, nosebleeds, postnasal drip, sinus pressure, sore throat and trouble swallowing.    Eyes: Positive for visual disturbance.   Respiratory: Negative for cough (rare dry), shortness of breath and wheezing.    Cardiovascular: Negative for chest pain, palpitations and leg swelling.   Gastrointestinal: Negative for abdominal distention, abdominal pain, blood in stool, constipation, diarrhea, nausea and vomiting.        No GERD   Endocrine:        Hot flashes   Genitourinary: Positive for hematuria (rare, noted last month). Negative for decreased urine volume, difficulty urinating, frequency and urgency.   Musculoskeletal: Positive for arthralgias (chronic arthritis- right knee). Negative for back pain, gait problem, joint swelling, neck pain and neck stiffness.   Neurological: Negative for dizziness, weakness, light-headedness, numbness and headaches.   Hematological: Negative for adenopathy. Does not bruise/bleed easily.   Psychiatric/Behavioral: Negative for dysphoric mood and sleep disturbance. The patient is not nervous/anxious.        Objective:      Physical Exam   Constitutional: He is oriented to person, place, and time. He appears well-developed and  well-nourished.   HENT:   Head: Normocephalic and atraumatic.   Right Ear: External ear normal.   Left Ear: External ear normal.   Nose: Nose normal.   Mouth/Throat: Oropharynx is clear and moist. No oropharyngeal exudate.   Eyes: Conjunctivae and EOM are normal. Pupils are equal, round, and reactive to light. Left eye exhibits no discharge. No scleral icterus.   Neck: Normal range of motion. Neck supple. No tracheal deviation present. No thyromegaly present.   Cardiovascular: Normal rate, regular rhythm, normal heart sounds and intact distal pulses.  Exam reveals no gallop and no friction rub.    No murmur heard.  Pulmonary/Chest: Effort normal and breath sounds normal. No respiratory distress. He has no wheezes. He has no rales. He exhibits no tenderness.   Abdominal: Soft. Bowel sounds are normal. He exhibits no distension and no mass. There is no tenderness. There is no rebound and no guarding.   Musculoskeletal: He exhibits no edema or tenderness.   Lymphadenopathy:     He has no cervical adenopathy.   Neurological: He is oriented to person, place, and time. He has normal reflexes. He displays normal reflexes. No cranial nerve deficit. He exhibits normal muscle tone. Coordination normal.   Skin: Skin is warm and dry. No rash noted. He is not diaphoretic. No erythema. No pallor.   Psychiatric: He has a normal mood and affect. His behavior is normal. Judgment and thought content normal.   Nursing note and vitals reviewed.      Assessment:       1. Prostate cancer metastatic to intrapelvic lymph node        Plan:     1. Recommend repeat PSA today to determine impact of Casodex addition  We discussed hormone sensitive versus refractory disease    Right femur xrays today with family report of pain  Discussed role of Xgeva and potentially XRT         Distress Screening Results: Psychosocial Distress screening score of Distress Score: 0 noted and reviewed. No intervention indicated.     Addendum  PSA up- we discussed  recheck in 4 weeks and if remains elevated we will review Taxotere data  xrays reviewed with family

## 2017-09-27 ENCOUNTER — LAB VISIT (OUTPATIENT)
Dept: LAB | Facility: OTHER | Age: 72
End: 2017-09-27
Attending: INTERNAL MEDICINE
Payer: MEDICARE

## 2017-09-27 DIAGNOSIS — C77.5 PROSTATE CANCER METASTATIC TO INTRAPELVIC LYMPH NODE: ICD-10-CM

## 2017-09-27 DIAGNOSIS — C61 PROSTATE CANCER METASTATIC TO INTRAPELVIC LYMPH NODE: ICD-10-CM

## 2017-09-27 LAB — COMPLEXED PSA SERPL-MCNC: 13.7 NG/ML

## 2017-09-27 PROCEDURE — 36415 COLL VENOUS BLD VENIPUNCTURE: CPT

## 2017-09-27 PROCEDURE — 84153 ASSAY OF PSA TOTAL: CPT

## 2017-09-29 ENCOUNTER — OFFICE VISIT (OUTPATIENT)
Dept: HEMATOLOGY/ONCOLOGY | Facility: CLINIC | Age: 72
End: 2017-09-29
Attending: INTERNAL MEDICINE
Payer: MEDICARE

## 2017-09-29 VITALS
HEART RATE: 105 BPM | BODY MASS INDEX: 31.97 KG/M2 | OXYGEN SATURATION: 95 % | WEIGHT: 201.25 LBS | RESPIRATION RATE: 20 BRPM | SYSTOLIC BLOOD PRESSURE: 136 MMHG | TEMPERATURE: 98 F | DIASTOLIC BLOOD PRESSURE: 72 MMHG

## 2017-09-29 DIAGNOSIS — C61 PROSTATE CANCER METASTATIC TO BONE: ICD-10-CM

## 2017-09-29 DIAGNOSIS — C61 PROSTATE CANCER METASTATIC TO INTRAPELVIC LYMPH NODE: Primary | ICD-10-CM

## 2017-09-29 DIAGNOSIS — C79.51 PROSTATE CANCER METASTATIC TO BONE: ICD-10-CM

## 2017-09-29 DIAGNOSIS — C77.5 PROSTATE CANCER METASTATIC TO INTRAPELVIC LYMPH NODE: Primary | ICD-10-CM

## 2017-09-29 PROCEDURE — 99999 PR PBB SHADOW E&M-EST. PATIENT-LVL III: CPT | Mod: PBBFAC,,, | Performed by: INTERNAL MEDICINE

## 2017-09-29 PROCEDURE — 1126F AMNT PAIN NOTED NONE PRSNT: CPT | Mod: S$GLB,,, | Performed by: INTERNAL MEDICINE

## 2017-09-29 PROCEDURE — 3008F BODY MASS INDEX DOCD: CPT | Mod: S$GLB,,, | Performed by: INTERNAL MEDICINE

## 2017-09-29 PROCEDURE — 99214 OFFICE O/P EST MOD 30 MIN: CPT | Mod: S$GLB,,, | Performed by: INTERNAL MEDICINE

## 2017-09-29 PROCEDURE — 1159F MED LIST DOCD IN RCRD: CPT | Mod: S$GLB,,, | Performed by: INTERNAL MEDICINE

## 2017-09-29 RX ORDER — DEXAMETHASONE 4 MG/1
TABLET ORAL
Qty: 30 TABLET | Refills: 0 | Status: SHIPPED | OUTPATIENT
Start: 2017-09-29 | End: 2018-02-21

## 2017-09-29 NOTE — Clinical Note
- start Taxotere next week (he has paper info to review this weekend- can consent with me or Dr. Echavarria depending on when he comes in for treatment) - RTC 3 weeks later with labs EP and chemo and Xgeva

## 2017-09-29 NOTE — PROGRESS NOTES
Shady Prakash Jr. is a 72 y.o. male who returns today regarding his metastatic prostate cancer.    In the interval he had plain xrays of bone pain areas and a repeat PSA  He had been started on Casodex in 7/2017 but we now have 2 confirmed PSA rises since initiation supporting that he is hormonally resistant    We discussed with him and his family the ECO[employer] trial of early Taxotere exposure- we described that in this study ADT and chemotherapy were initiated together- his hormonal therapy has already started but we plan to use 3-6 cycles and extrapolate from this data.    Oncology history:  He is s/p TRUS/bx on 8/7/15 w/ high volume marta 9 (5+4), PSA 25.  He had negative metastatic workup.  At time of planned RALP on 9/10/15 he was noted to have BN invasion on cysto and therefore only PLND was done, which confirmed metastatic disease.  He was started on ADT 9/17/15 w/ firmagon followed by Eligard on 10/15/15.    Received last Eligard 4/13/17.  Started Casodex 7/20/17 7/25/17 Bone scan:  Increased region of radiopharmaceutical uptake focally within the right posterior ilium, right approximate seventh rib as well the inferior left scapula which are concerning for enlarging osseous metastases. There is additional area of focal uptake involving the superior medial margin of the left scapula and left pubic tubercle region concerning for additional osseous metastases. Clinical correlation advised     7/25/17 CT urogram:  Interval development of sclerotic foci involving the right iliac bone and left superior pubic ramus likely representing sclerotic bone metastases in this patient with history of prostate carcinoma.  Mildly enlarged prostate gland.  Right lower pole renal cyst.  Persistent pleural thickening within the base of the right hemithorax with associated atelectatic changes.  Mild bronchiectatic changes are also noted.  These are stable when compared to the prior examination.  Small foci of early arterial  enhancement within the liver likely representing benign hepatic hemangiomas.  These are unchanged when compared to the prior examination.  Small hiatal hernia.  Colonic diverticulosis without evidence for acute diverticulitis.    9/1/17 xrays:  Right femur 2 views.  Compared to recent bone scan July 2017.  There is sclerotic increased density in the right superior acetabulum extending into the ileum.  Femur appears intact.  Vascular calcifications are noted.  Impression sclerotic focus right acetabulum and ilium consistent with metastatic disease.    Bilateral hips to include pelvis.  A sclerotic increased density in the right superior acetabulum and ilium again noted.  Subtle increased sclerosis in the left symphysis certainly better seen on bone scan.  Degenerative changes at the hips and lower lumbar spine.  No acute fracture seen.  Impression sclerotic foci as above consistent with known metastatic disease.    Plans:  Initiate Taxotere  Referral to Rad Onc for right femur    25 minutes total  Information provided on Taxotere- side effects reviewed and need for premeds with steroid discussed  We will initiate Xgeva post 1st cycle    We discussed that his disease is not curable and treatment options available at this time to potentially extend life

## 2017-09-29 NOTE — PROGRESS NOTES
Subjective:       Patient ID: Shady Prakash Jr. is a 72 y.o. male.    Chief Complaint: Follow-up    HPI         Review of Systems    Objective:      Physical Exam    Assessment:       1. Prostate cancer metastatic to intrapelvic lymph node        Plan:           1. Recommend repeat PSA today to determine impact of Casodex addition  We discussed hormone sensitive versus refractory disease     Right femur xrays today with family report of pain  Discussed role of Xgeva and potentially XRT           Distress Screening Results: Psychosocial Distress screening score of Distress Score: 0 noted and reviewed. No intervention indicated.      Addendum  PSA up- we discussed recheck in 4 weeks and if remains elevated we will review Taxotere data  xrays reviewed with family    ***

## 2017-10-02 ENCOUNTER — APPOINTMENT (OUTPATIENT)
Dept: RADIATION THERAPY | Facility: HOSPITAL | Age: 72
End: 2017-10-02
Attending: RADIOLOGY
Payer: MEDICARE

## 2017-10-05 ENCOUNTER — TELEPHONE (OUTPATIENT)
Dept: HEMATOLOGY/ONCOLOGY | Facility: CLINIC | Age: 72
End: 2017-10-05

## 2017-10-05 NOTE — TELEPHONE ENCOUNTER
Returned call to pt daughter.   Daughter calling to see how father should take decadron for chemo.   Pt daughter instructed.   Daughter verbalized understanding.       ----- Message from Milady Hamilton sent at 10/5/2017  2:44 PM CDT -----  Contact: Vanessa Wilson (Daughter)  X   1st Request  _  2nd Request  _  3rd Request        Who: Vanessa Wilson (Daughter)    Why: Requesting a call back in regards to medication the pt was advised to take a pill prior to starting chemo. Pt's daughter needs clarity on when to start the pill.    What Number to Call Back: [phone]    When to Expect a call back: (Within 24 hours)    Please return the call at earliest convenience. Thanks!

## 2017-10-06 ENCOUNTER — INFUSION (OUTPATIENT)
Dept: INFUSION THERAPY | Facility: HOSPITAL | Age: 72
End: 2017-10-06
Attending: INTERNAL MEDICINE
Payer: MEDICARE

## 2017-10-06 ENCOUNTER — OFFICE VISIT (OUTPATIENT)
Dept: HEMATOLOGY/ONCOLOGY | Facility: CLINIC | Age: 72
End: 2017-10-06
Attending: INTERNAL MEDICINE
Payer: MEDICARE

## 2017-10-06 VITALS
WEIGHT: 201.25 LBS | HEART RATE: 88 BPM | BODY MASS INDEX: 30.5 KG/M2 | TEMPERATURE: 98 F | DIASTOLIC BLOOD PRESSURE: 80 MMHG | HEIGHT: 68 IN | RESPIRATION RATE: 20 BRPM | SYSTOLIC BLOOD PRESSURE: 130 MMHG

## 2017-10-06 VITALS
OXYGEN SATURATION: 97 % | RESPIRATION RATE: 16 BRPM | TEMPERATURE: 97 F | SYSTOLIC BLOOD PRESSURE: 129 MMHG | WEIGHT: 201.25 LBS | BODY MASS INDEX: 30.5 KG/M2 | HEART RATE: 92 BPM | DIASTOLIC BLOOD PRESSURE: 70 MMHG | HEIGHT: 68 IN

## 2017-10-06 DIAGNOSIS — C61 PROSTATE CANCER METASTATIC TO INTRAPELVIC LYMPH NODE: ICD-10-CM

## 2017-10-06 DIAGNOSIS — C79.51 PROSTATE CANCER METASTATIC TO BONE: Primary | ICD-10-CM

## 2017-10-06 DIAGNOSIS — C61 PROSTATE CANCER METASTATIC TO BONE: Primary | ICD-10-CM

## 2017-10-06 DIAGNOSIS — C77.5 PROSTATE CANCER METASTATIC TO INTRAPELVIC LYMPH NODE: ICD-10-CM

## 2017-10-06 PROCEDURE — 96367 TX/PROPH/DG ADDL SEQ IV INF: CPT

## 2017-10-06 PROCEDURE — 96413 CHEMO IV INFUSION 1 HR: CPT

## 2017-10-06 PROCEDURE — 99214 OFFICE O/P EST MOD 30 MIN: CPT | Mod: 25,S$GLB,, | Performed by: INTERNAL MEDICINE

## 2017-10-06 PROCEDURE — 63600175 PHARM REV CODE 636 W HCPCS: Performed by: INTERNAL MEDICINE

## 2017-10-06 PROCEDURE — 25000003 PHARM REV CODE 250: Performed by: INTERNAL MEDICINE

## 2017-10-06 PROCEDURE — 99999 PR PBB SHADOW E&M-EST. PATIENT-LVL III: CPT | Mod: PBBFAC,,, | Performed by: INTERNAL MEDICINE

## 2017-10-06 RX ORDER — HEPARIN 100 UNIT/ML
500 SYRINGE INTRAVENOUS
Status: CANCELLED | OUTPATIENT
Start: 2017-10-06

## 2017-10-06 RX ORDER — SODIUM CHLORIDE 0.9 % (FLUSH) 0.9 %
10 SYRINGE (ML) INJECTION
Status: CANCELLED | OUTPATIENT
Start: 2017-10-06

## 2017-10-06 RX ADMIN — SODIUM CHLORIDE: 0.9 INJECTION, SOLUTION INTRAVENOUS at 09:10

## 2017-10-06 RX ADMIN — DOCETAXEL 155 MG: 10 INJECTION, SOLUTION INTRAVENOUS at 10:10

## 2017-10-06 RX ADMIN — Medication 20 MG: at 10:10

## 2017-10-06 NOTE — PLAN OF CARE
Problem: Chemotherapy Effects (Adult)  Goal: Signs and Symptoms of Listed Potential Problems Will be Absent, Minimized or Managed (Chemotherapy Effects)  Signs and symptoms of listed potential problems will be absent, minimized or managed by discharge/transition of care (reference Chemotherapy Effects (Adult) CPG).   Outcome: Ongoing (interventions implemented as appropriate)  Patient ambulated to area Independently aaox3 to receive Day 1 cycle 1 Taxotere infusion today, with NADN, accompanied by wife, chemo education provided, chemo packet given, plan of care discussed, answered questions and concerns, provided comfort . Assessment per documentation

## 2017-10-06 NOTE — PROGRESS NOTES
Presents with wife and daughter to consent for chemotherapy.   They have reviewed paperwork on Taxotere and we discussed drug, goals of therapy and potential side effects.  He has taken his steroid pre-medication and is monitoring his blood glucose.  He is aware to call us for any questions and issues following chemotherapy    25 minutes total reviewing and answering questions.

## 2017-10-06 NOTE — PLAN OF CARE
Problem: Patient Care Overview  Goal: Plan of Care Review  Outcome: Ongoing (interventions implemented as appropriate)  Patient tolerated taxotere infusion well today without reactions or complaints noted, discharged to home, ambulated from area independently accompanied by wife, AVS given, RTC in 3 weeks

## 2017-10-11 ENCOUNTER — INITIAL CONSULT (OUTPATIENT)
Dept: RADIATION ONCOLOGY | Facility: CLINIC | Age: 72
End: 2017-10-11
Attending: INTERNAL MEDICINE
Payer: MEDICARE

## 2017-10-11 VITALS
DIASTOLIC BLOOD PRESSURE: 71 MMHG | HEIGHT: 68 IN | BODY MASS INDEX: 30.23 KG/M2 | RESPIRATION RATE: 16 BRPM | WEIGHT: 199.5 LBS | SYSTOLIC BLOOD PRESSURE: 105 MMHG | HEART RATE: 107 BPM

## 2017-10-11 DIAGNOSIS — C79.51 PROSTATE CANCER METASTATIC TO BONE: Primary | ICD-10-CM

## 2017-10-11 DIAGNOSIS — C61 PROSTATE CANCER METASTATIC TO BONE: Primary | ICD-10-CM

## 2017-10-11 PROCEDURE — 99203 OFFICE O/P NEW LOW 30 MIN: CPT | Mod: S$GLB,,, | Performed by: RADIOLOGY

## 2017-10-11 PROCEDURE — 99999 PR PBB SHADOW E&M-EST. PATIENT-LVL III: CPT | Mod: PBBFAC,,, | Performed by: RADIOLOGY

## 2017-10-11 NOTE — PROGRESS NOTES
HISTORY OF PRESENT ILLNESS:   This patient presents for discussion of palliative radiotherapy for metastatic prostate cancer.     Mr. Prakash's history dates back to August of 2015 when biopsies of his prostate revealed high volume [place] 9 (5+4) disease.  His PSA at that time was 25.  Metastatic work up was negative and the patient was planned for RALP but cysto revealed bladder neck invasion.  The patient began hormonal therapy at that time.  He has continued on Eligard since that time.  Casodex was added in July of this year due to his PSA increasing to 5.0 ng/ml from 0.89 ng/ml in October of 2016.  Unfortunately his PSA has continued to increase.  His most recently PSA on 9/27/17 returned at 13.7 ng/ml.  Bone scan in July of 2017 revealed Increased region of uptake focally within the right posterior ilium, right approximate seventh rib as well the inferior left scapula which are concerning for enlarging osseous metastases. There is additional area of focal uptake involving the superior medial margin of the left scapula and left pubic tubercle region consistent with metastatic disease.  The patient began to note increased pain in the Rt. pelvic region.  He began chemotherapy with Taxotere last week.  Currently referred to our department for consideration of palliative radiotherapy.  Today, the patient states he continues to note pain in the Rt. pelvic region with radiation distally to the Rt. thigh.     REVIEW OF SYSTEMS:   Review of Systems   Constitutional: Negative for chills, diaphoresis, fever and weight loss.   Respiratory: Negative for cough, hemoptysis and shortness of breath.    Cardiovascular: Negative for chest pain and leg swelling.   Genitourinary: Negative for dysuria, frequency and urgency.   Musculoskeletal: Negative for back pain, falls and joint pain.         PAST MEDICAL HISTORY:  Past Medical History:   Diagnosis Date    Allergy     Arthritis     Cancer     prostate    Diabetes mellitus  "    Hyperlipidemia     Hypertension     Prostate cancer        PAST SURGICAL HISTORY:  Past Surgical History:   Procedure Laterality Date    EYE SURGERY Right     x9    LYMPH NODE DISSECTION         ALLERGIES:   Review of patient's allergies indicates:  No Known Allergies    MEDICATIONS:  Current Outpatient Prescriptions   Medication Sig    ACETAMINOPHEN (TYLENOL ARTHRITIS ORAL) Take by mouth.    aspirin (ECOTRIN) 81 MG EC tablet Take 81 mg by mouth once daily. States not taken in over 1 month as of 9/2/15    bicalutamide (CASODEX) 50 MG Tab Take 1 tablet (50 mg total) by mouth once daily.    calcium-vitamin D3 (OYSTER SHELL CALCIUM-VIT D3) 500 mg(1,250mg) -200 unit per tablet Take 1 tablet by mouth 2 (two) times daily.    dexamethasone (DECADRON) 4 MG Tab Take 1 pill at lunch and 1 pill at dinner the day before chemotherapy. Take 1 pill at breakfast the morning of chemotherapy.    glimepiride (AMARYL) 2 MG tablet Take 2 mg by mouth 2 (two) times daily.     lisinopril-hydrochlorothiazide (PRINZIDE,ZESTORETIC) 20-25 mg Tab Take 1 tablet by mouth once daily.     lovastatin (MEVACOR) 20 MG tablet     metformin (GLUCOPHAGE) 1000 MG tablet Take 1,000 mg by mouth 2 (two) times daily.      Current Facility-Administered Medications   Medication    leuprolide (6 month) (ELIGARD) injection 45 mg    leuprolide (ELIGARD) injection 45 mg       SOCIAL HISTORY:  Social History     Social History    Marital status:      Spouse name: N/A    Number of children: N/A    Years of education: N/A     Occupational History    Not on file.     Social History Main Topics    Smoking status: Former Smoker     Packs/day: 4.00     Years: 60.00     Types: Cigarettes, Cigars     Start date: 9/1/1957     Quit date: 11/13/2016    Smokeless tobacco: Never Used    Alcohol use Yes      Comment: Highballs every other day     Drug use:      Types: "Crack" cocaine      Comment: Used to use cocaine in past     Sexual " activity: Not Currently     Partners: Female     Other Topics Concern    Not on file     Social History Narrative    No narrative on file       FAMILY HISTORY:  Family History   Problem Relation Age of Onset    Hypertension Father     Hypertension Mother     Diabetes Sister     Heart disease Brother     Diabetes Maternal Aunt     Cancer Maternal Uncle     Cancer Maternal Grandfather     No Known Problems Paternal Grandmother     No Known Problems Paternal Grandfather     Prostate cancer Brother     Diabetes Sister     Diabetes Sister     Diabetes Sister     Heart disease Brother          PHYSICAL EXAMINATION:  Vitals:    10/11/17 1445   BP: 105/71   Pulse: 107   Resp:      Physical Exam   Constitutional: He is well-developed, well-nourished, and in no distress.   HENT:   Head: Normocephalic and atraumatic.   Eyes: EOM are normal. Pupils are equal, round, and reactive to light.   Neck: Normal range of motion. Neck supple.   Abdominal: Soft. He exhibits no distension.   Musculoskeletal:        Right hip: He exhibits bony tenderness.     Radiology - I personally reviewed the images from his most recent bone scan with the patient and his family.     ASSESSMENT/PLAN:  Stage IV adenocarcinoma of the prostate.    ECO    I discussed the rational for consideration of palliative radiotherapy to the sclerotic lesion in the Rt. pelvis.  Discussed the procedures, risks and benefits of therapy.  Discussed the acute and long term side effects.  Recommended 30 Gy in 10 fractions to the pelvis.  The patient was agreeable to proceed with therapy.  Will plan simulation with treatment to begin thereafter.     Psychosocial Distress screening score of Distress Score: 0 noted and reviewed. No intervention indicated.    I spent approximately 30 minutes reviewing the available records and evaluating the patient, out of which over 50% of the time was spent face to face with the patient in counseling and coordinating this  patient's care.

## 2017-10-17 ENCOUNTER — HOSPITAL ENCOUNTER (OUTPATIENT)
Dept: RADIATION THERAPY | Facility: HOSPITAL | Age: 72
Discharge: HOME | End: 2017-10-17
Attending: RADIOLOGY
Payer: MEDICARE

## 2017-10-17 ENCOUNTER — OFFICE VISIT (OUTPATIENT)
Dept: UROLOGY | Facility: CLINIC | Age: 72
End: 2017-10-17
Attending: UROLOGY
Payer: MEDICARE

## 2017-10-17 VITALS
SYSTOLIC BLOOD PRESSURE: 113 MMHG | HEIGHT: 68 IN | DIASTOLIC BLOOD PRESSURE: 71 MMHG | WEIGHT: 196.13 LBS | BODY MASS INDEX: 29.72 KG/M2 | HEART RATE: 89 BPM

## 2017-10-17 DIAGNOSIS — C79.51 PROSTATE CANCER METASTATIC TO BONE: ICD-10-CM

## 2017-10-17 DIAGNOSIS — N35.914 ANTERIOR URETHRAL STRICTURE: ICD-10-CM

## 2017-10-17 DIAGNOSIS — C61 PROSTATE CANCER METASTATIC TO BONE: ICD-10-CM

## 2017-10-17 DIAGNOSIS — C61 PROSTATE CANCER METASTATIC TO INTRAPELVIC LYMPH NODE: Primary | ICD-10-CM

## 2017-10-17 DIAGNOSIS — C77.5 PROSTATE CANCER METASTATIC TO INTRAPELVIC LYMPH NODE: Primary | ICD-10-CM

## 2017-10-17 DIAGNOSIS — R97.20 ELEVATED PSA: ICD-10-CM

## 2017-10-17 PROCEDURE — 77290 THER RAD SIMULAJ FIELD CPLX: CPT | Mod: TC | Performed by: RADIOLOGY

## 2017-10-17 PROCEDURE — 77290 THER RAD SIMULAJ FIELD CPLX: CPT | Mod: 26,,, | Performed by: RADIOLOGY

## 2017-10-17 PROCEDURE — 77014 HC CT GUIDANCE RADIATION THERAPY FLDS PLACEMENT: CPT | Mod: TC | Performed by: RADIOLOGY

## 2017-10-17 PROCEDURE — 99214 OFFICE O/P EST MOD 30 MIN: CPT | Mod: S$GLB,,, | Performed by: UROLOGY

## 2017-10-17 PROCEDURE — 77263 THER RADIOLOGY TX PLNG CPLX: CPT | Mod: ,,, | Performed by: RADIOLOGY

## 2017-10-17 PROCEDURE — 77334 RADIATION TREATMENT AID(S): CPT | Mod: TC | Performed by: RADIOLOGY

## 2017-10-17 PROCEDURE — 77334 RADIATION TREATMENT AID(S): CPT | Mod: 26,,, | Performed by: RADIOLOGY

## 2017-10-17 NOTE — PROGRESS NOTES
"Subjective:      Shady Prakash Jr. is a 72 y.o. male who returns today regarding his metastatic prostate cancer.      He is here today for 6 mos FU and Eligard. Since last injection he has had persistent rise in PSA despite addition of Casodex. He started chemo per Dr. Melendez (Taxotere) earlier this month and is also scheduled for palliative radiation to the pelvis later this month.    Today he states he is doing well. Pain in his BLE improved since 1st round of chemo, which was well tolerated.    He has no voiding c/o today    Prostate Cancer History  He is s/p TRUS/bx on 8/7/15 w/ high volume mart[place] 9 (5+4), PSA 25.  He had negative metastatic workup.  At time of planned RALP on 9/10/15 he was noted to have BN invasion on cysto and therefore only PLND was done, which confirmed metastatic disease.  He was started on ADT 9/17/15 w/ firmagon followed by Eligard on 10/15/15.  Received last Eligard 4/13/17.    The following portions of the patient's history were reviewed and updated as appropriate: allergies, current medications, past family history, past medical history, past social history, past surgical history and problem list.    Review of Systems  A comprehensive multipoint review of systems was negative except as otherwise stated in the HPI.     Objective:   Vitals:   /71 (BP Location: Right arm, Patient Position: Sitting, BP Method: Large (Automatic))   Pulse 89   Ht 5' 8" (1.727 m)   Wt 89 kg (196 lb 1.6 oz)   BMI 29.82 kg/m²     Physical Exam   General: alert and oriented, no acute distress  Respiratory: Symmetric expansion, non-labored breathing  Neuro: no gross deficits  Psych: normal judgment and insight, normal mood/affect and non-anxious    Lab Review   Urinalysis demonstrates + for RBCs    Component PSA DIAGNOSTIC Testosterone, Total   Latest Ref Rng 0.00-4.00 ng/mL 195.0-1138.0 ng/dL   9/27/2017 13.7    9/1/2017 9.4    7/3/2017 5.0 20 (L)   10/7/2016 0.89    4/8/2016 0.73 13 (L)   1/4/2016 " 1.5 16 (L)   10/9/2015 6.6 (H)    7/7/2015 25.2 (H)        Assessment and Plan:   Prostate cancer metastatic to intrapelvic lymph node and bones  -- 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 Melendez  -- Palliative XRT per Dr. Jain  -- FU PSA per Dr. Melendez  -- FU 6 mos for next Eligard    Anterior urethral stricture, unspecified stricture type  -- S/p cysto dilation in OR at time of PLND in September 2015  -- Voiding well now - will monitor

## 2017-10-19 PROCEDURE — 77334 RADIATION TREATMENT AID(S): CPT | Mod: 26,,, | Performed by: RADIOLOGY

## 2017-10-19 PROCEDURE — 77295 3-D RADIOTHERAPY PLAN: CPT | Mod: TC | Performed by: RADIOLOGY

## 2017-10-19 PROCEDURE — 77334 RADIATION TREATMENT AID(S): CPT | Mod: TC | Performed by: RADIOLOGY

## 2017-10-19 PROCEDURE — 77295 3-D RADIOTHERAPY PLAN: CPT | Mod: 26,,, | Performed by: RADIOLOGY

## 2017-10-19 PROCEDURE — 77300 RADIATION THERAPY DOSE PLAN: CPT | Mod: TC | Performed by: RADIOLOGY

## 2017-10-19 PROCEDURE — 77300 RADIATION THERAPY DOSE PLAN: CPT | Mod: 26,,, | Performed by: RADIOLOGY

## 2017-10-23 PROCEDURE — 77412 RADIATION TX DELIVERY LVL 3: CPT | Performed by: RADIOLOGY

## 2017-10-23 PROCEDURE — 77417 THER RADIOLOGY PORT IMAGE(S): CPT | Performed by: RADIOLOGY

## 2017-10-24 ENCOUNTER — OFFICE VISIT (OUTPATIENT)
Dept: HEMATOLOGY/ONCOLOGY | Facility: CLINIC | Age: 72
End: 2017-10-24
Attending: INTERNAL MEDICINE
Payer: MEDICARE

## 2017-10-24 VITALS
OXYGEN SATURATION: 97 % | HEART RATE: 100 BPM | RESPIRATION RATE: 20 BRPM | WEIGHT: 200.19 LBS | BODY MASS INDEX: 30.44 KG/M2 | TEMPERATURE: 99 F | SYSTOLIC BLOOD PRESSURE: 103 MMHG | DIASTOLIC BLOOD PRESSURE: 67 MMHG

## 2017-10-24 DIAGNOSIS — C79.51 PROSTATE CANCER METASTATIC TO BONE: Primary | ICD-10-CM

## 2017-10-24 DIAGNOSIS — C61 PROSTATE CANCER METASTATIC TO BONE: Primary | ICD-10-CM

## 2017-10-24 PROCEDURE — 77412 RADIATION TX DELIVERY LVL 3: CPT | Performed by: RADIOLOGY

## 2017-10-24 PROCEDURE — 99214 OFFICE O/P EST MOD 30 MIN: CPT | Mod: S$GLB,,, | Performed by: INTERNAL MEDICINE

## 2017-10-24 PROCEDURE — 99999 PR PBB SHADOW E&M-EST. PATIENT-LVL III: CPT | Mod: PBBFAC,,, | Performed by: INTERNAL MEDICINE

## 2017-10-25 ENCOUNTER — DOCUMENTATION ONLY (OUTPATIENT)
Dept: RADIATION ONCOLOGY | Facility: CLINIC | Age: 72
End: 2017-10-25

## 2017-10-25 PROCEDURE — 77412 RADIATION TX DELIVERY LVL 3: CPT | Performed by: RADIOLOGY

## 2017-10-26 PROCEDURE — 77412 RADIATION TX DELIVERY LVL 3: CPT | Performed by: RADIOLOGY

## 2017-10-26 RX ORDER — SODIUM CHLORIDE 0.9 % (FLUSH) 0.9 %
10 SYRINGE (ML) INJECTION
Status: CANCELLED | OUTPATIENT
Start: 2017-10-26

## 2017-10-26 RX ORDER — HEPARIN 100 UNIT/ML
500 SYRINGE INTRAVENOUS
Status: CANCELLED | OUTPATIENT
Start: 2017-10-26

## 2017-10-26 NOTE — PROGRESS NOTES
Subjective:       Patient ID: Shady Prakash Jr. is a 72 y.o. male.    Chief Complaint: Follow-up    HPI     Returns for follow-up prior to cycle # 2 of Taxotere  States doing well  Reports tolerated cycle # 1 well and denies side effects. No nausea or vomiting. Appetite normal. Good energy level.  Reports pain better.    Shady Prakash Jr. is a 72 y.o. male who returns today regarding his metastatic prostate cancer.    He had been started on Casodex in 7/2017 and we had 2 confirmed PSA rises since initiation supporting that he is hormonally resistant  We discussed with him and his family the ECOG trial of early Taxotere exposure- we described that in this study ADT and chemotherapy were initiated together- his hormonal therapy has already started but we plan to use 3-6 cycles and extrapolate from this data.     Oncology History:  He is s/p TRUS/bx on 8/7/15 w/ high volume marta 9 (5+4), PSA 25.  He had negative metastatic workup.  At time of planned RALP on 9/10/15 he was noted to have BN invasion on cysto and therefore only PLND was done, which confirmed metastatic disease.  He was started on ADT 9/17/15 w/ firmagon followed by Eligard on 10/15/15.    Received last Eligard 4/13/17.  Started Casodex 7/20/17 7/25/17 Bone scan:  Increased region of radiopharmaceutical uptake focally within the right posterior ilium, right approximate seventh rib as well the inferior left scapula which are concerning for enlarging osseous metastases. There is additional area of focal uptake involving the superior medial margin of the left scapula and left pubic tubercle region concerning for additional osseous metastases. Clinical correlation advised     7/25/17 CT urogram:  Interval development of sclerotic foci involving the right iliac bone and left superior pubic ramus likely representing sclerotic bone metastases in this patient with history of prostate carcinoma.  Mildly enlarged prostate gland.  Right lower pole renal  cyst.  Persistent pleural thickening within the base of the right hemithorax with associated atelectatic changes.  Mild bronchiectatic changes are also noted.  These are stable when compared to the prior examination.  Small foci of early arterial enhancement within the liver likely representing benign hepatic hemangiomas.  These are unchanged when compared to the prior examination.  Small hiatal hernia.  Colonic diverticulosis without evidence for acute diverticulitis.     9/1/17 xrays:  Right femur 2 views.  Compared to recent bone scan July 2017.  There is sclerotic increased density in the right superior acetabulum extending into the ileum.  Femur appears intact.  Vascular calcifications are noted.  Impression sclerotic focus right acetabulum and ilium consistent with metastatic disease.     Bilateral hips to include pelvis.  A sclerotic increased density in the right superior acetabulum and ilium again noted.  Subtle increased sclerosis in the left symphysis certainly better seen on bone scan.  Degenerative changes at the hips and lower lumbar spine.  No acute fracture seen.  Impression sclerotic foci as above consistent with known metastatic disease.    Review of Systems   Constitutional: Negative for activity change, appetite change, chills, fatigue (wife states he gets tired sometimes, but he denies), fever and unexpected weight change.   HENT: Positive for dental problem. Negative for congestion, hearing loss, mouth sores, nosebleeds, postnasal drip, sinus pressure, sore throat and trouble swallowing.    Eyes: Negative for visual disturbance.   Respiratory: Negative for cough (rare dry), shortness of breath and wheezing.    Cardiovascular: Negative for chest pain, palpitations and leg swelling.   Gastrointestinal: Negative for abdominal distention, abdominal pain, blood in stool, constipation, diarrhea, nausea and vomiting.        No GERD   Endocrine:        Hot flashes   Genitourinary: Negative for  decreased urine volume, difficulty urinating, frequency, hematuria and urgency.   Musculoskeletal: Negative for arthralgias (better), back pain, gait problem, joint swelling, neck pain and neck stiffness.   Neurological: Negative for dizziness, weakness, light-headedness, numbness and headaches.   Hematological: Negative for adenopathy. Does not bruise/bleed easily.   Psychiatric/Behavioral: Negative for dysphoric mood and sleep disturbance. The patient is not nervous/anxious.        Objective:      Physical Exam   Constitutional: He is oriented to person, place, and time. He appears well-developed and well-nourished. No distress.   Presents with wife, 2 daughters, and 2 of his grandchildren   HENT:   Head: Normocephalic and atraumatic.   Right Ear: External ear normal.   Left Ear: External ear normal.   Nose: Nose normal.   Mouth/Throat: Oropharynx is clear and moist. No oropharyngeal exudate.   Eyes: Conjunctivae and EOM are normal. Pupils are equal, round, and reactive to light. Left eye exhibits no discharge. No scleral icterus.   Neck: Normal range of motion. Neck supple. No tracheal deviation present. No thyromegaly present.   Cardiovascular: Normal rate, regular rhythm, normal heart sounds and intact distal pulses.  Exam reveals no gallop and no friction rub.    No murmur heard.  Pulmonary/Chest: Effort normal and breath sounds normal. No respiratory distress. He has no wheezes. He has no rales. He exhibits no tenderness.   Abdominal: Soft. Bowel sounds are normal. He exhibits no distension and no mass. There is no tenderness. There is no rebound and no guarding.   Musculoskeletal: Normal range of motion. He exhibits no edema, tenderness or deformity.   Lymphadenopathy:     He has no cervical adenopathy.   Neurological: He is alert and oriented to person, place, and time. He has normal reflexes. He displays normal reflexes. No cranial nerve deficit. He exhibits normal muscle tone. Coordination normal.   Skin:  Skin is warm and dry. No rash noted. He is not diaphoretic. No erythema. No pallor.   Psychiatric: He has a normal mood and affect. His behavior is normal. Judgment and thought content normal.   Nursing note and vitals reviewed.    Labs- reviewed  Assessment:       1. Prostate cancer metastatic to bone        Plan:     1. Continue Taxotere  Labs adequate to proceed  Knows to call for any issues  RTC 3 weeks with labs, EP, and chemotherapy.

## 2017-10-26 NOTE — PLAN OF CARE
Problem: Patient Care Overview  Goal: Plan of Care Review  Outcome: Ongoing (interventions implemented as appropriate)  Day 3 of radiation to the rt hip denies pain

## 2017-10-27 ENCOUNTER — INFUSION (OUTPATIENT)
Dept: INFUSION THERAPY | Facility: HOSPITAL | Age: 72
End: 2017-10-27
Attending: INTERNAL MEDICINE
Payer: MEDICARE

## 2017-10-27 VITALS
HEIGHT: 69 IN | BODY MASS INDEX: 29.65 KG/M2 | TEMPERATURE: 98 F | OXYGEN SATURATION: 98 % | RESPIRATION RATE: 17 BRPM | SYSTOLIC BLOOD PRESSURE: 139 MMHG | DIASTOLIC BLOOD PRESSURE: 81 MMHG | HEART RATE: 81 BPM | WEIGHT: 200.19 LBS

## 2017-10-27 DIAGNOSIS — C77.5 PROSTATE CANCER METASTATIC TO INTRAPELVIC LYMPH NODE: ICD-10-CM

## 2017-10-27 DIAGNOSIS — C61 PROSTATE CANCER METASTATIC TO INTRAPELVIC LYMPH NODE: ICD-10-CM

## 2017-10-27 DIAGNOSIS — C61 PROSTATE CANCER METASTATIC TO BONE: Primary | ICD-10-CM

## 2017-10-27 DIAGNOSIS — C79.51 PROSTATE CANCER METASTATIC TO BONE: Primary | ICD-10-CM

## 2017-10-27 PROCEDURE — 77336 RADIATION PHYSICS CONSULT: CPT | Performed by: RADIOLOGY

## 2017-10-27 PROCEDURE — 77412 RADIATION TX DELIVERY LVL 3: CPT | Performed by: RADIOLOGY

## 2017-10-27 PROCEDURE — 96367 TX/PROPH/DG ADDL SEQ IV INF: CPT

## 2017-10-27 PROCEDURE — 25000003 PHARM REV CODE 250: Performed by: INTERNAL MEDICINE

## 2017-10-27 PROCEDURE — 63600175 PHARM REV CODE 636 W HCPCS: Performed by: INTERNAL MEDICINE

## 2017-10-27 PROCEDURE — 96413 CHEMO IV INFUSION 1 HR: CPT

## 2017-10-27 RX ADMIN — Medication 20 MG: at 09:10

## 2017-10-27 RX ADMIN — DOCETAXEL 155 MG: 10 INJECTION, SOLUTION INTRAVENOUS at 09:10

## 2017-10-27 RX ADMIN — SODIUM CHLORIDE: 0.9 INJECTION, SOLUTION INTRAVENOUS at 09:10

## 2017-10-27 NOTE — PLAN OF CARE
Problem: Patient Care Overview  Goal: Plan of Care Review  Patient tolerated day 1 cycle 2 of Taxotere infusion well today without reactions or complaints noted, denies any problems at this time, AVS given, discharged , ambulated from area independently, accompanied by 2 family members

## 2017-10-29 ENCOUNTER — NURSE TRIAGE (OUTPATIENT)
Dept: ADMINISTRATIVE | Facility: CLINIC | Age: 72
End: 2017-10-29

## 2017-10-29 NOTE — TELEPHONE ENCOUNTER
"    Answer Assessment - Initial Assessment Questions  1. DESCRIPTION: "Describe your dizziness."      Light headed   2. LIGHTHEADED: "Do you feel lightheaded?" (e.g., somewhat faint, woozy, weak upon standing)      When standing  3. VERTIGO: "Do you feel like either you or the room is spinning or tilting?" (i.e. vertigo)      no  4. SEVERITY: "How bad is it?"  "Do you feel like you are going to faint?" "Can you stand and walk?"    - MILD - walking normally    - MODERATE - interferes with normal activities (e.g., work, school)     - SEVERE - unable to stand, requires support to walk, feels like passing out now.       Mild-moderate  5. ONSET:  "When did the dizziness begin?"      This afternoon  6. AGGRAVATING FACTORS: "Does anything make it worse?" (e.g., standing, change in head position)      standing  7. HEART RATE: "Can you tell me your heart rate?" "How many beats in 15 seconds?"  (Note: not all patients can do this)        115  8. CAUSE: "What do you think is causing the dizziness?"      Didn't drink much water today  9. RECURRENT SYMPTOM: "Have you had dizziness before?" If so, ask: "When was the last time?" "What happened that time?"      no  10. OTHER SYMPTOMS: "Do you have any other symptoms?" (e.g., fever, chest pain, vomiting, diarrhea, bleeding)      no  11. PREGNANCY: "Is there any chance you are pregnant?" "When was your last menstrual period?"      n/a    Protocols used: [facility]    BP 95/64 . Hadn't drank much fluid this morning. Accepts care advice and he is lying on the sofa, feet elevated and drinking water now. BS is 167. Daughter advised to monitor and call back if she has any other questions. She was instructed to call his pcp to see if they want to adjust dosage of bp medication. Patient's daughter verbalized understanding.   "

## 2017-10-29 NOTE — TELEPHONE ENCOUNTER
Reason for Disposition   [1] MODERATE dizziness (e.g., interferes with normal activities) AND [2] has NOT been evaluated by physician for this  (EXCEPTION: dizziness caused by heat exposure, sudden standing, or poor fluid intake)    Protocols used: [facility] DIZZINESS-[facility]

## 2017-10-30 ENCOUNTER — TELEPHONE (OUTPATIENT)
Dept: HEMATOLOGY/ONCOLOGY | Facility: CLINIC | Age: 72
End: 2017-10-30

## 2017-10-30 PROCEDURE — 77412 RADIATION TX DELIVERY LVL 3: CPT | Performed by: RADIOLOGY

## 2017-10-30 PROCEDURE — 77417 THER RADIOLOGY PORT IMAGE(S): CPT | Performed by: RADIOLOGY

## 2017-10-30 NOTE — TELEPHONE ENCOUNTER
Returned call to pt wife.   Pt wife following up from earlier phone call to inform nurse that pt is having symptomatic low blood pressure.   Pt advised to go to ER.   Pt wife verbalized understanding.       ----- Message from Mallory Max sent at 10/30/2017  2:28 PM CDT -----  Contact: Pt wife Laura  Pt wife calling stating that pt has been dizzy and his pressure has been low.     Pt call back number [phone]

## 2017-10-30 NOTE — TELEPHONE ENCOUNTER
Returned call to pt daughter.   Daughter stated that pt's BP was 95/64 and complained of dizzness yesterday and they spoke with on call nurse who advised to stay hydrated and recheck in an hour.   BP was rechecked 1 hr later and was 103/75.   Pt rechecked BP this morning and was 96/70 pain level a 1 or 2 (pain level was higher through the night and pain pill helped this morning).   BP was 89/62 and heart rate 109---pt will continue to drink fluids as not complaining of dizziness at this time.    Pt will call back at 2pm and inform nurse of BP.           ----- Message from David Munoz sent at 10/30/2017 11:10 AM CDT -----  Contact: DaughterLupillo  Pt's blood pressure is low and now experiencing pain in both shoulders and arms.    Contact::[phone] or [phone]

## 2017-10-31 PROCEDURE — 77412 RADIATION TX DELIVERY LVL 3: CPT | Performed by: RADIOLOGY

## 2017-11-01 ENCOUNTER — APPOINTMENT (OUTPATIENT)
Dept: RADIATION THERAPY | Facility: HOSPITAL | Age: 72
End: 2017-11-01
Attending: RADIOLOGY
Payer: MEDICARE

## 2017-11-01 ENCOUNTER — HOSPITAL ENCOUNTER (OUTPATIENT)
Dept: RADIATION THERAPY | Facility: HOSPITAL | Age: 72
Discharge: HOME | End: 2017-11-01
Attending: RADIOLOGY
Payer: MEDICARE

## 2017-11-01 PROCEDURE — 77412 RADIATION TX DELIVERY LVL 3: CPT | Performed by: RADIOLOGY

## 2017-11-02 ENCOUNTER — DOCUMENTATION ONLY (OUTPATIENT)
Dept: RADIATION ONCOLOGY | Facility: CLINIC | Age: 72
End: 2017-11-02

## 2017-11-02 PROCEDURE — 77412 RADIATION TX DELIVERY LVL 3: CPT | Performed by: RADIOLOGY

## 2017-11-02 NOTE — PLAN OF CARE
Problem: Patient Care Overview  Goal: Plan of Care Review  Outcome: Ongoing (interventions implemented as appropriate)  Day 9 of radiation treatment. State he has loose bowels at times. Down one pound , /61, HR 89. States he is ready for treatment to be over. Denies N&V. Denies Pain.

## 2017-11-03 ENCOUNTER — DOCUMENTATION ONLY (OUTPATIENT)
Dept: RADIATION ONCOLOGY | Facility: CLINIC | Age: 72
End: 2017-11-03

## 2017-11-03 PROCEDURE — 77336 RADIATION PHYSICS CONSULT: CPT | Performed by: RADIOLOGY

## 2017-11-03 PROCEDURE — 77412 RADIATION TX DELIVERY LVL 3: CPT | Performed by: RADIOLOGY

## 2017-11-09 NOTE — PLAN OF CARE
Problem: Patient Care Overview  Goal: Plan of Care Review  Outcome: Outcome(s) achieved Date Met: 11/09/17  Radiation to the pelvis completed on 11/03/17  F/u appt made

## 2017-11-15 ENCOUNTER — LAB VISIT (OUTPATIENT)
Dept: LAB | Facility: OTHER | Age: 72
End: 2017-11-15
Attending: INTERNAL MEDICINE
Payer: MEDICARE

## 2017-11-15 DIAGNOSIS — C61 PROSTATE CANCER METASTATIC TO BONE: ICD-10-CM

## 2017-11-15 DIAGNOSIS — C79.51 PROSTATE CANCER METASTATIC TO BONE: ICD-10-CM

## 2017-11-15 LAB
ALBUMIN SERPL BCP-MCNC: 3.2 G/DL
ALP SERPL-CCNC: 79 U/L
ALT SERPL W/O P-5'-P-CCNC: 13 U/L
ANION GAP SERPL CALC-SCNC: 7 MMOL/L
AST SERPL-CCNC: 14 U/L
BASOPHILS # BLD AUTO: 0.01 K/UL
BASOPHILS NFR BLD: 0.2 %
BILIRUB SERPL-MCNC: 0.3 MG/DL
BUN SERPL-MCNC: 10 MG/DL
CALCIUM SERPL-MCNC: 8.7 MG/DL
CHLORIDE SERPL-SCNC: 109 MMOL/L
CO2 SERPL-SCNC: 25 MMOL/L
CREAT SERPL-MCNC: 1.3 MG/DL
DIFFERENTIAL METHOD: ABNORMAL
EOSINOPHIL # BLD AUTO: 0 K/UL
EOSINOPHIL NFR BLD: 0.2 %
ERYTHROCYTE [DISTWIDTH] IN BLOOD BY AUTOMATED COUNT: 15.3 %
EST. GFR  (AFRICAN AMERICAN): >60 ML/MIN/1.73 M^2
EST. GFR  (NON AFRICAN AMERICAN): 55 ML/MIN/1.73 M^2
GLUCOSE SERPL-MCNC: 205 MG/DL
HCT VFR BLD AUTO: 29.5 %
HGB BLD-MCNC: 9.1 G/DL
LYMPHOCYTES # BLD AUTO: 1.1 K/UL
LYMPHOCYTES NFR BLD: 24.1 %
MCH RBC QN AUTO: 27 PG
MCHC RBC AUTO-ENTMCNC: 30.8 G/DL
MCV RBC AUTO: 88 FL
MONOCYTES # BLD AUTO: 0.2 K/UL
MONOCYTES NFR BLD: 4.5 %
NEUTROPHILS # BLD AUTO: 3.3 K/UL
NEUTROPHILS NFR BLD: 71 %
PLATELET # BLD AUTO: 184 K/UL
PMV BLD AUTO: 8.4 FL
POTASSIUM SERPL-SCNC: 4.2 MMOL/L
PROT SERPL-MCNC: 6.2 G/DL
RBC # BLD AUTO: 3.37 M/UL
SODIUM SERPL-SCNC: 141 MMOL/L
WBC # BLD AUTO: 4.64 K/UL

## 2017-11-15 PROCEDURE — 80053 COMPREHEN METABOLIC PANEL: CPT

## 2017-11-15 PROCEDURE — 85025 COMPLETE CBC W/AUTO DIFF WBC: CPT

## 2017-11-15 PROCEDURE — 36415 COLL VENOUS BLD VENIPUNCTURE: CPT

## 2017-11-16 ENCOUNTER — OFFICE VISIT (OUTPATIENT)
Dept: HEMATOLOGY/ONCOLOGY | Facility: CLINIC | Age: 72
End: 2017-11-16
Payer: MEDICARE

## 2017-11-16 VITALS
DIASTOLIC BLOOD PRESSURE: 73 MMHG | HEIGHT: 68 IN | TEMPERATURE: 98 F | SYSTOLIC BLOOD PRESSURE: 147 MMHG | WEIGHT: 212.75 LBS | HEART RATE: 71 BPM | RESPIRATION RATE: 18 BRPM | BODY MASS INDEX: 32.24 KG/M2

## 2017-11-16 DIAGNOSIS — Z51.11 ENCOUNTER FOR ANTINEOPLASTIC CHEMOTHERAPY: ICD-10-CM

## 2017-11-16 DIAGNOSIS — I10 ESSENTIAL HYPERTENSION: ICD-10-CM

## 2017-11-16 DIAGNOSIS — C61 PROSTATE CANCER METASTATIC TO INTRAPELVIC LYMPH NODE: ICD-10-CM

## 2017-11-16 DIAGNOSIS — C77.5 PROSTATE CANCER METASTATIC TO INTRAPELVIC LYMPH NODE: ICD-10-CM

## 2017-11-16 DIAGNOSIS — C61 PROSTATE CANCER METASTATIC TO BONE: Primary | ICD-10-CM

## 2017-11-16 DIAGNOSIS — E11.8 TYPE 2 DIABETES MELLITUS WITH COMPLICATION, WITHOUT LONG-TERM CURRENT USE OF INSULIN: ICD-10-CM

## 2017-11-16 DIAGNOSIS — C79.51 PROSTATE CANCER METASTATIC TO BONE: Primary | ICD-10-CM

## 2017-11-16 PROBLEM — E11.9 TYPE 2 DIABETES MELLITUS: Status: ACTIVE | Noted: 2017-11-16

## 2017-11-16 PROCEDURE — 99999 PR PBB SHADOW E&M-EST. PATIENT-LVL III: CPT | Mod: PBBFAC,,, | Performed by: INTERNAL MEDICINE

## 2017-11-16 PROCEDURE — 99215 OFFICE O/P EST HI 40 MIN: CPT | Mod: S$GLB,,, | Performed by: INTERNAL MEDICINE

## 2017-11-16 RX ORDER — HEPARIN 100 UNIT/ML
500 SYRINGE INTRAVENOUS
Status: CANCELLED | OUTPATIENT
Start: 2017-11-16

## 2017-11-16 RX ORDER — SODIUM CHLORIDE 0.9 % (FLUSH) 0.9 %
10 SYRINGE (ML) INJECTION
Status: CANCELLED | OUTPATIENT
Start: 2017-11-16

## 2017-11-16 NOTE — PATIENT INSTRUCTIONS
1. Please see a dentist and let him know that we plan to give you Xgeva. We need a clearance from your dentist before we can give you the medication.   2. Continue with Lupron every 6 months.   3. Stop Casodex.

## 2017-11-16 NOTE — PROGRESS NOTES
PROGRESS NOTE    Subjective:       Patient ID: Shady Prakash Jr. is a 72 y.o. male.    Chief Complaint: follow up for metastatic castrate resistant prostate cancer    Interval History:    Mr. Prakash returns for follow up prior to cycle 3 of docetaxel. Tolerated the previous 2 doses well with no significant side effects. He completed a 10-day radiation to the right pelvis last Friday. He presented to ER with dehydration on 10/31. His lisinopril-HCTZ was stopped at that time. He noticed b/l LE swelling two nights ago, which improved after elevating his legs. He restarted lisinopril-HCTZ last night and notices improvement in his LE edema. Denies other complaints. No pain.     He had been started on Casodex in 7/2017 and we had 2 confirmed PSA rises since initiation supporting that he is hormonally resistant  We discussed with him and his family the ECOG trial of early Taxotere exposure- we described that in this study ADT and chemotherapy were initiated together- his hormonal therapy has already started but we plan to use 3-6 cycles and extrapolate from this data.     Oncology History:  He is s/p TRUS/bx on 8/7/15 w/ high volume marta 9 (5+4), PSA 25.  He had negative metastatic workup.  At time of planned RALP on 9/10/15 he was noted to have BN invasion on cysto and therefore only PLND was done, which confirmed metastatic disease.  He was started on ADT 9/17/15 w/ firmagon followed by Eligard on 10/15/15.    Received last Eligard 4/13/17.  Started Casodex 7/20/17  Started docetaxel 10/6/17  Last Lupron 10/17/17     7/25/17 Bone scan:  Increased region of radiopharmaceutical uptake focally within the right posterior ilium, right approximate seventh rib as well the inferior left scapula which are concerning for enlarging osseous metastases. There is additional area of focal uptake involving the superior medial margin of the left scapula and left pubic tubercle  region concerning for additional osseous metastases. Clinical correlation advised     7/25/17 CT urogram:  Interval development of sclerotic foci involving the right iliac bone and left superior pubic ramus likely representing sclerotic bone metastases in this patient with history of prostate carcinoma.  Mildly enlarged prostate gland.  Right lower pole renal cyst.  Persistent pleural thickening within the base of the right hemithorax with associated atelectatic changes.  Mild bronchiectatic changes are also noted.  These are stable when compared to the prior examination.  Small foci of early arterial enhancement within the liver likely representing benign hepatic hemangiomas.  These are unchanged when compared to the prior examination.  Small hiatal hernia.  Colonic diverticulosis without evidence for acute diverticulitis.     9/1/17 xrays:  Right femur 2 views.  Compared to recent bone scan July 2017.  There is sclerotic increased density in the right superior acetabulum extending into the ileum.  Femur appears intact.  Vascular calcifications are noted.  Impression sclerotic focus right acetabulum and ilium consistent with metastatic disease.     Bilateral hips to include pelvis.  A sclerotic increased density in the right superior acetabulum and ilium again noted.  Subtle increased sclerosis in the left symphysis certainly better seen on bone scan.  Degenerative changes at the hips and lower lumbar spine.  No acute fracture seen.  Impression sclerotic foci as above consistent with known metastatic disease.       ROS:   A ten-point system review is obtained and negative for what was stated in the Interval History    Physical Examination:   Vital signs reviewed.   Gen: well hydrated, well developed, in no acute distress.  HEENT: normocephalic, anicteric sclerae, PERRLA, EOMI, oropharynx clear  Neck: supple, no JVD, thyromegaly, cervical or supraclavicular LAD  Lungs: CTAB, no wheezes or rales  Heart: RRR, no  M/R/G  Abdomen: soft, no tenderness, non-distended, no hepatosplenomegaly, mass, or hernia. BS present  Ext: no clubbing, cyanosis, or edema  Neuro: alert and oriented x 4, no focal neuro deficit  Skin: no rash, erythema, open wound or ulcers  Psych: pleasant and appropriate mood and affect      Objective:     Laboratory Data:  CBC, CMP reviewed. Unremarkable.     Assessment/Plan:     1. Prostate cancer metastatic to bone    2. Prostate cancer metastatic to intrapelvic lymph node    3. Encounter for antineoplastic chemotherapy    4. Type 2 diabetes mellitus with complication, without long-term current use of insulin    5. Essential hypertension      - Tolerating chemo well. cycle 3 of docetaxel tomorrow.   - Stop casodex for now since he is already getting chemo. Can restart after he completes chemo  - Discussed with patient that we would like to use Xgeva for bony metastases in castrate resistant prostate cancer. He needs to see a dentist and get clearance before we start. He said he is going to make an appointment with a dentist.   -  today. Continue with lisinopril-HCTZ. If b/l leg swelling doesn't improve after resuming HCTZ can consider dex for docetaxel-related edema. Although his b/l leg swelling now is most likely from cessation of HCTZ.  - RTC in 3 weeks for office visit and cycle 4 of docetaxel.     Electronically signed by Marlene Echavarria

## 2017-11-17 ENCOUNTER — INFUSION (OUTPATIENT)
Dept: INFUSION THERAPY | Facility: HOSPITAL | Age: 72
End: 2017-11-17
Attending: INTERNAL MEDICINE
Payer: MEDICARE

## 2017-11-17 VITALS
TEMPERATURE: 98 F | BODY MASS INDEX: 31.5 KG/M2 | WEIGHT: 207.88 LBS | HEART RATE: 57 BPM | DIASTOLIC BLOOD PRESSURE: 84 MMHG | OXYGEN SATURATION: 97 % | HEIGHT: 68 IN | RESPIRATION RATE: 17 BRPM | SYSTOLIC BLOOD PRESSURE: 152 MMHG

## 2017-11-17 DIAGNOSIS — C61 PROSTATE CANCER METASTATIC TO BONE: Primary | ICD-10-CM

## 2017-11-17 DIAGNOSIS — C79.51 PROSTATE CANCER METASTATIC TO BONE: Primary | ICD-10-CM

## 2017-11-17 DIAGNOSIS — C77.5 PROSTATE CANCER METASTATIC TO INTRAPELVIC LYMPH NODE: ICD-10-CM

## 2017-11-17 DIAGNOSIS — C61 PROSTATE CANCER METASTATIC TO INTRAPELVIC LYMPH NODE: ICD-10-CM

## 2017-11-17 PROCEDURE — 25000003 PHARM REV CODE 250: Performed by: INTERNAL MEDICINE

## 2017-11-17 PROCEDURE — 63600175 PHARM REV CODE 636 W HCPCS: Performed by: INTERNAL MEDICINE

## 2017-11-17 PROCEDURE — 96413 CHEMO IV INFUSION 1 HR: CPT

## 2017-11-17 PROCEDURE — 96367 TX/PROPH/DG ADDL SEQ IV INF: CPT

## 2017-11-17 RX ORDER — HEPARIN 100 UNIT/ML
500 SYRINGE INTRAVENOUS
Status: DISCONTINUED | OUTPATIENT
Start: 2017-11-17 | End: 2017-11-17 | Stop reason: HOSPADM

## 2017-11-17 RX ADMIN — DOCETAXEL 160 MG: 10 INJECTION, SOLUTION INTRAVENOUS at 09:11

## 2017-11-17 RX ADMIN — SODIUM CHLORIDE: 0.9 INJECTION, SOLUTION INTRAVENOUS at 09:11

## 2017-11-17 RX ADMIN — Medication 20 MG: at 09:11

## 2017-11-17 NOTE — NURSING
1100- patient tolerated Taxotere infusion well today without reactions or complaints noted,   Discharged instructions explained, AVS given, RTC in 3 weeks, ambulated from area without distress noted accompanied by wife

## 2017-12-05 ENCOUNTER — LAB VISIT (OUTPATIENT)
Dept: LAB | Facility: OTHER | Age: 72
End: 2017-12-05
Attending: INTERNAL MEDICINE
Payer: MEDICARE

## 2017-12-05 ENCOUNTER — OFFICE VISIT (OUTPATIENT)
Dept: HEMATOLOGY/ONCOLOGY | Facility: CLINIC | Age: 72
End: 2017-12-05
Attending: INTERNAL MEDICINE
Payer: MEDICARE

## 2017-12-05 VITALS
RESPIRATION RATE: 18 BRPM | HEART RATE: 100 BPM | BODY MASS INDEX: 30.03 KG/M2 | OXYGEN SATURATION: 100 % | WEIGHT: 197.56 LBS | DIASTOLIC BLOOD PRESSURE: 84 MMHG | TEMPERATURE: 96 F | SYSTOLIC BLOOD PRESSURE: 132 MMHG

## 2017-12-05 DIAGNOSIS — Z51.11 ENCOUNTER FOR ANTINEOPLASTIC CHEMOTHERAPY: ICD-10-CM

## 2017-12-05 DIAGNOSIS — E11.8 TYPE 2 DIABETES MELLITUS WITH COMPLICATION, WITHOUT LONG-TERM CURRENT USE OF INSULIN: ICD-10-CM

## 2017-12-05 DIAGNOSIS — C61 PROSTATE CANCER METASTATIC TO BONE: Primary | ICD-10-CM

## 2017-12-05 DIAGNOSIS — C79.51 PROSTATE CANCER METASTATIC TO BONE: ICD-10-CM

## 2017-12-05 DIAGNOSIS — C61 PROSTATE CANCER METASTATIC TO BONE: ICD-10-CM

## 2017-12-05 DIAGNOSIS — C79.51 PROSTATE CANCER METASTATIC TO BONE: Primary | ICD-10-CM

## 2017-12-05 DIAGNOSIS — I10 ESSENTIAL HYPERTENSION: ICD-10-CM

## 2017-12-05 LAB
ALBUMIN SERPL BCP-MCNC: 3.6 G/DL
ALP SERPL-CCNC: 66 U/L
ALT SERPL W/O P-5'-P-CCNC: 14 U/L
ANION GAP SERPL CALC-SCNC: 5 MMOL/L
AST SERPL-CCNC: 18 U/L
BASOPHILS # BLD AUTO: 0.02 K/UL
BASOPHILS NFR BLD: 0.3 %
BILIRUB SERPL-MCNC: 0.3 MG/DL
BUN SERPL-MCNC: 19 MG/DL
CALCIUM SERPL-MCNC: 9.3 MG/DL
CHLORIDE SERPL-SCNC: 104 MMOL/L
CO2 SERPL-SCNC: 28 MMOL/L
COMPLEXED PSA SERPL-MCNC: 1.3 NG/ML
CREAT SERPL-MCNC: 1.1 MG/DL
DIFFERENTIAL METHOD: ABNORMAL
EOSINOPHIL # BLD AUTO: 0 K/UL
EOSINOPHIL NFR BLD: 0.2 %
ERYTHROCYTE [DISTWIDTH] IN BLOOD BY AUTOMATED COUNT: 15.8 %
EST. GFR  (AFRICAN AMERICAN): >60 ML/MIN/1.73 M^2
EST. GFR  (NON AFRICAN AMERICAN): >60 ML/MIN/1.73 M^2
GLUCOSE SERPL-MCNC: 97 MG/DL
HCT VFR BLD AUTO: 32.1 %
HGB BLD-MCNC: 9.9 G/DL
LYMPHOCYTES # BLD AUTO: 1.5 K/UL
LYMPHOCYTES NFR BLD: 23.5 %
MCH RBC QN AUTO: 26.2 PG
MCHC RBC AUTO-ENTMCNC: 30.8 G/DL
MCV RBC AUTO: 85 FL
MONOCYTES # BLD AUTO: 0.4 K/UL
MONOCYTES NFR BLD: 6.6 %
NEUTROPHILS # BLD AUTO: 4.3 K/UL
NEUTROPHILS NFR BLD: 68.8 %
PLATELET # BLD AUTO: 293 K/UL
PMV BLD AUTO: 8.8 FL
POTASSIUM SERPL-SCNC: 4.3 MMOL/L
PROT SERPL-MCNC: 7 G/DL
RBC # BLD AUTO: 3.78 M/UL
SODIUM SERPL-SCNC: 137 MMOL/L
WBC # BLD AUTO: 6.2 K/UL

## 2017-12-05 PROCEDURE — 85025 COMPLETE CBC W/AUTO DIFF WBC: CPT

## 2017-12-05 PROCEDURE — 99214 OFFICE O/P EST MOD 30 MIN: CPT | Mod: 25,S$GLB,, | Performed by: INTERNAL MEDICINE

## 2017-12-05 PROCEDURE — 36415 COLL VENOUS BLD VENIPUNCTURE: CPT

## 2017-12-05 PROCEDURE — 99999 PR PBB SHADOW E&M-EST. PATIENT-LVL III: CPT | Mod: PBBFAC,,, | Performed by: INTERNAL MEDICINE

## 2017-12-05 PROCEDURE — 84153 ASSAY OF PSA TOTAL: CPT

## 2017-12-05 PROCEDURE — 80053 COMPREHEN METABOLIC PANEL: CPT

## 2017-12-05 RX ORDER — LISINOPRIL 20 MG/1
20 TABLET ORAL DAILY
COMMUNITY
End: 2018-02-21 | Stop reason: SDUPTHER

## 2017-12-05 NOTE — PROGRESS NOTES
Subjective:       Patient ID: Shady Prakash Jr. is a 72 y.o. male.    Chief Complaint: No chief complaint on file.    HPI     Mr. Prakash returns for follow up prior to cycle 4 of docetaxel. PSA pending.  He had been started on Casodex in 7/2017 and we had 2 confirmed PSA rises since initiation supporting that he was hormonally resistant.  We discussed with him and his family the ECOG trial of early Taxotere exposure- we described that in this study ADT and chemotherapy were initiated together- his hormonal therapy has already started but we plan to use 3-6 cycles and extrapolate from this data.     Reports tolerating chemotherapy well.  Good energy level.  Eating well.  Denies pain complaints.  No current neuropathy.    Oncology History:  He is s/p TRUS/bx on 8/7/15 w/ high volume marta 9 (5+4), PSA 25.  He had negative metastatic workup.  At time of planned RALP on 9/10/15 he was noted to have BN invasion on cysto and therefore only PLND was done, which confirmed metastatic disease.  He was started on ADT 9/17/15 w/ firmagon followed by Eligard on 10/15/15.    Received last Eligard 4/13/17.  Started Casodex 7/20/17  Started docetaxel 10/6/17  Last Lupron 10/17/17     7/25/17 Bone scan:  Increased region of radiopharmaceutical uptake focally within the right posterior ilium, right approximate seventh rib as well the inferior left scapula which are concerning for enlarging osseous metastases. There is additional area of focal uptake involving the superior medial margin of the left scapula and left pubic tubercle region concerning for additional osseous metastases. Clinical correlation advised     7/25/17 CT urogram:  Interval development of sclerotic foci involving the right iliac bone and left superior pubic ramus likely representing sclerotic bone metastases in this patient with history of prostate carcinoma.  Mildly enlarged prostate gland.  Right lower pole renal cyst.  Persistent pleural thickening within the  base of the right hemithorax with associated atelectatic changes.  Mild bronchiectatic changes are also noted.  These are stable when compared to the prior examination.  Small foci of early arterial enhancement within the liver likely representing benign hepatic hemangiomas.  These are unchanged when compared to the prior examination.  Small hiatal hernia.  Colonic diverticulosis without evidence for acute diverticulitis.     9/1/17 xrays:  Right femur 2 views.  Compared to recent bone scan July 2017.  There is sclerotic increased density in the right superior acetabulum extending into the ileum.  Femur appears intact.  Vascular calcifications are noted.  Impression sclerotic focus right acetabulum and ilium consistent with metastatic disease.     Bilateral hips to include pelvis.  A sclerotic increased density in the right superior acetabulum and ilium again noted.  Subtle increased sclerosis in the left symphysis certainly better seen on bone scan.  Degenerative changes at the hips and lower lumbar spine.  No acute fracture seen.  Impression sclerotic foci as above consistent with known metastatic disease    Review of Systems   Constitutional: Negative for activity change, appetite change, chills, fatigue (wife states he gets tired sometimes, but he denies), fever and unexpected weight change.   HENT: Positive for dental problem. Negative for congestion, hearing loss, mouth sores, nosebleeds, postnasal drip, sinus pressure, sore throat and trouble swallowing.    Eyes: Negative for visual disturbance.   Respiratory: Negative for cough, chest tightness, shortness of breath and wheezing.    Cardiovascular: Negative for chest pain, palpitations and leg swelling.   Gastrointestinal: Negative for abdominal distention, abdominal pain, blood in stool, constipation, diarrhea, nausea and vomiting.        No GERD   Endocrine:        Hot flashes   Genitourinary: Negative for decreased urine volume, difficulty urinating,  frequency, hematuria and urgency.   Musculoskeletal: Negative for arthralgias (better), back pain, gait problem, joint swelling, neck pain and neck stiffness.   Neurological: Negative for dizziness, weakness, light-headedness, numbness and headaches.   Hematological: Negative for adenopathy. Does not bruise/bleed easily.   Psychiatric/Behavioral: Negative for dysphoric mood and sleep disturbance. The patient is not nervous/anxious.        Objective:       ECOG SCORE    0 - Fully active-able to carry on all pre-disease performance without restriction       Physical Exam   Constitutional: He is oriented to person, place, and time. He appears well-developed and well-nourished. No distress.   Presents with wife   HENT:   Head: Normocephalic and atraumatic.   Right Ear: External ear normal.   Left Ear: External ear normal.   Nose: Nose normal.   Mouth/Throat: Oropharynx is clear and moist. No oropharyngeal exudate.   Eyes: Conjunctivae and EOM are normal. Pupils are equal, round, and reactive to light. Left eye exhibits no discharge. No scleral icterus.   Neck: Normal range of motion. Neck supple. No tracheal deviation present. No thyromegaly present.   Cardiovascular: Normal rate, regular rhythm, normal heart sounds and intact distal pulses.  Exam reveals no gallop and no friction rub.    No murmur heard.  Pulmonary/Chest: Effort normal and breath sounds normal. No respiratory distress. He has no wheezes. He has no rales. He exhibits no tenderness.   Abdominal: Soft. Bowel sounds are normal. He exhibits no distension and no mass. There is no tenderness. There is no rebound and no guarding.   Musculoskeletal: Normal range of motion. He exhibits no edema, tenderness or deformity.   Lymphadenopathy:     He has no cervical adenopathy.   Neurological: He is alert and oriented to person, place, and time. He has normal reflexes. He displays normal reflexes. No cranial nerve deficit. He exhibits normal muscle tone. Coordination  normal.   Skin: Skin is warm and dry. No rash noted. He is not diaphoretic. No erythema. No pallor.   Psychiatric: He has a normal mood and affect. His behavior is normal. Judgment and thought content normal.   Nursing note and vitals reviewed.    lABS- REVIEWED  Assessment:       1. Prostate cancer metastatic to bone    2. Encounter for antineoplastic chemotherapy    3. Essential hypertension    4. Type 2 diabetes mellitus with complication, without long-term current use of insulin        Plan:     1-2. Proceed with cycle # 4 - with response we will hold and re-attempt hormonal therapy (see above)  Xgeva Friday as well  RTC 6-8 weeks with PSA  3. Improved control  4. Monitoring        Distress Screening Results: Psychosocial Distress screening score of Distress Score: 0 noted and reviewed. No intervention indicated.

## 2017-12-07 RX ORDER — HEPARIN 100 UNIT/ML
500 SYRINGE INTRAVENOUS
Status: CANCELLED | OUTPATIENT
Start: 2017-12-07

## 2017-12-07 RX ORDER — SODIUM CHLORIDE 0.9 % (FLUSH) 0.9 %
10 SYRINGE (ML) INJECTION
Status: CANCELLED | OUTPATIENT
Start: 2017-12-07

## 2017-12-08 ENCOUNTER — INFUSION (OUTPATIENT)
Dept: INFUSION THERAPY | Facility: HOSPITAL | Age: 72
End: 2017-12-08
Attending: INTERNAL MEDICINE
Payer: MEDICARE

## 2017-12-08 VITALS
TEMPERATURE: 97 F | WEIGHT: 202.19 LBS | HEIGHT: 68 IN | RESPIRATION RATE: 18 BRPM | HEART RATE: 89 BPM | BODY MASS INDEX: 30.64 KG/M2 | OXYGEN SATURATION: 98 % | DIASTOLIC BLOOD PRESSURE: 79 MMHG | SYSTOLIC BLOOD PRESSURE: 138 MMHG

## 2017-12-08 DIAGNOSIS — C61 PROSTATE CANCER METASTATIC TO BONE: Primary | ICD-10-CM

## 2017-12-08 DIAGNOSIS — C61 PROSTATE CANCER METASTATIC TO INTRAPELVIC LYMPH NODE: ICD-10-CM

## 2017-12-08 DIAGNOSIS — C79.51 PROSTATE CANCER METASTATIC TO BONE: Primary | ICD-10-CM

## 2017-12-08 DIAGNOSIS — C77.5 PROSTATE CANCER METASTATIC TO INTRAPELVIC LYMPH NODE: ICD-10-CM

## 2017-12-08 DIAGNOSIS — C61 PROSTATE CA: Primary | ICD-10-CM

## 2017-12-08 PROCEDURE — 25000003 PHARM REV CODE 250: Performed by: INTERNAL MEDICINE

## 2017-12-08 PROCEDURE — 96413 CHEMO IV INFUSION 1 HR: CPT

## 2017-12-08 PROCEDURE — 96401 CHEMO ANTI-NEOPL SQ/IM: CPT

## 2017-12-08 PROCEDURE — 63600175 PHARM REV CODE 636 W HCPCS: Performed by: INTERNAL MEDICINE

## 2017-12-08 PROCEDURE — 96367 TX/PROPH/DG ADDL SEQ IV INF: CPT

## 2017-12-08 RX ORDER — HEPARIN 100 UNIT/ML
500 SYRINGE INTRAVENOUS
Status: DISCONTINUED | OUTPATIENT
Start: 2017-12-08 | End: 2017-12-08 | Stop reason: HOSPADM

## 2017-12-08 RX ORDER — SODIUM CHLORIDE 0.9 % (FLUSH) 0.9 %
10 SYRINGE (ML) INJECTION
Status: DISCONTINUED | OUTPATIENT
Start: 2017-12-08 | End: 2017-12-08 | Stop reason: HOSPADM

## 2017-12-08 RX ADMIN — DOCETAXEL 160 MG: 10 INJECTION, SOLUTION INTRAVENOUS at 10:12

## 2017-12-08 RX ADMIN — SODIUM CHLORIDE: 0.9 INJECTION, SOLUTION INTRAVENOUS at 10:12

## 2017-12-08 RX ADMIN — DENOSUMAB 120 MG: 120 INJECTION SUBCUTANEOUS at 11:12

## 2017-12-08 RX ADMIN — Medication 20 MG: at 10:12

## 2017-12-08 NOTE — PLAN OF CARE
Problem: Patient Care Overview  Goal: Plan of Care Review  Outcome: Ongoing (interventions implemented as appropriate)  taxotere and xgeva administered, tolerated well. Patient and wife educated regarding importance of taking home medications. D/C'd home.

## 2017-12-11 DIAGNOSIS — Z51.11 ENCOUNTER FOR CHEMOTHERAPY MANAGEMENT: Primary | ICD-10-CM

## 2017-12-27 ENCOUNTER — LAB VISIT (OUTPATIENT)
Dept: LAB | Facility: OTHER | Age: 72
End: 2017-12-27
Attending: INTERNAL MEDICINE
Payer: MEDICARE

## 2017-12-27 DIAGNOSIS — C79.51 PROSTATE CANCER METASTATIC TO BONE: ICD-10-CM

## 2017-12-27 DIAGNOSIS — C77.5 PROSTATE CANCER METASTATIC TO INTRAPELVIC LYMPH NODE: ICD-10-CM

## 2017-12-27 DIAGNOSIS — C61 PROSTATE CANCER METASTATIC TO BONE: ICD-10-CM

## 2017-12-27 DIAGNOSIS — C61 PROSTATE CANCER METASTATIC TO INTRAPELVIC LYMPH NODE: ICD-10-CM

## 2017-12-27 LAB
ALBUMIN SERPL BCP-MCNC: 3.4 G/DL
ALP SERPL-CCNC: 59 U/L
ALT SERPL W/O P-5'-P-CCNC: 13 U/L
ANION GAP SERPL CALC-SCNC: 11 MMOL/L
AST SERPL-CCNC: 17 U/L
BASOPHILS # BLD AUTO: 0.02 K/UL
BASOPHILS NFR BLD: 0.4 %
BILIRUB SERPL-MCNC: 0.4 MG/DL
BUN SERPL-MCNC: 13 MG/DL
CALCIUM SERPL-MCNC: 9.3 MG/DL
CHLORIDE SERPL-SCNC: 108 MMOL/L
CO2 SERPL-SCNC: 25 MMOL/L
CREAT SERPL-MCNC: 1 MG/DL
DIFFERENTIAL METHOD: ABNORMAL
EOSINOPHIL # BLD AUTO: 0 K/UL
EOSINOPHIL NFR BLD: 0.2 %
ERYTHROCYTE [DISTWIDTH] IN BLOOD BY AUTOMATED COUNT: 17.9 %
EST. GFR  (AFRICAN AMERICAN): >60 ML/MIN/1.73 M^2
EST. GFR  (NON AFRICAN AMERICAN): >60 ML/MIN/1.73 M^2
GLUCOSE SERPL-MCNC: 93 MG/DL
HCT VFR BLD AUTO: 31.1 %
HGB BLD-MCNC: 9.6 G/DL
LYMPHOCYTES # BLD AUTO: 1.1 K/UL
LYMPHOCYTES NFR BLD: 20.7 %
MCH RBC QN AUTO: 25.7 PG
MCHC RBC AUTO-ENTMCNC: 30.9 G/DL
MCV RBC AUTO: 83 FL
MONOCYTES # BLD AUTO: 0.4 K/UL
MONOCYTES NFR BLD: 7.6 %
NEUTROPHILS # BLD AUTO: 3.6 K/UL
NEUTROPHILS NFR BLD: 70.9 %
PLATELET # BLD AUTO: 297 K/UL
PMV BLD AUTO: 8.5 FL
POTASSIUM SERPL-SCNC: 3.9 MMOL/L
PROT SERPL-MCNC: 6.8 G/DL
RBC # BLD AUTO: 3.73 M/UL
SODIUM SERPL-SCNC: 144 MMOL/L
WBC # BLD AUTO: 5.12 K/UL

## 2017-12-27 PROCEDURE — 85025 COMPLETE CBC W/AUTO DIFF WBC: CPT

## 2017-12-27 PROCEDURE — 36415 COLL VENOUS BLD VENIPUNCTURE: CPT

## 2017-12-27 PROCEDURE — 80053 COMPREHEN METABOLIC PANEL: CPT

## 2018-01-08 ENCOUNTER — LAB VISIT (OUTPATIENT)
Dept: LAB | Facility: OTHER | Age: 73
End: 2018-01-08
Attending: INTERNAL MEDICINE
Payer: MEDICARE

## 2018-01-08 DIAGNOSIS — C61 PROSTATE CA: ICD-10-CM

## 2018-01-08 LAB
PROSTATE SPECIFIC ANTIGEN, TOTAL: 0.92 NG/ML
PSA FREE MFR SERPL: 22.83 %
PSA FREE SERPL-MCNC: 0.21 NG/ML

## 2018-01-08 PROCEDURE — 84154 ASSAY OF PSA FREE: CPT

## 2018-01-08 PROCEDURE — 36415 COLL VENOUS BLD VENIPUNCTURE: CPT

## 2018-01-09 ENCOUNTER — OFFICE VISIT (OUTPATIENT)
Dept: HEMATOLOGY/ONCOLOGY | Facility: CLINIC | Age: 73
End: 2018-01-09
Attending: INTERNAL MEDICINE
Payer: MEDICARE

## 2018-01-09 VITALS
RESPIRATION RATE: 18 BRPM | BODY MASS INDEX: 32.05 KG/M2 | SYSTOLIC BLOOD PRESSURE: 173 MMHG | OXYGEN SATURATION: 98 % | DIASTOLIC BLOOD PRESSURE: 87 MMHG | TEMPERATURE: 96 F | HEART RATE: 88 BPM | WEIGHT: 210.75 LBS

## 2018-01-09 DIAGNOSIS — C61 PROSTATE CANCER METASTATIC TO BONE: Primary | ICD-10-CM

## 2018-01-09 DIAGNOSIS — C61 PROSTATE CANCER METASTATIC TO INTRAPELVIC LYMPH NODE: ICD-10-CM

## 2018-01-09 DIAGNOSIS — C79.51 PROSTATE CANCER METASTATIC TO BONE: Primary | ICD-10-CM

## 2018-01-09 DIAGNOSIS — I10 ESSENTIAL HYPERTENSION: ICD-10-CM

## 2018-01-09 DIAGNOSIS — C77.5 PROSTATE CANCER METASTATIC TO INTRAPELVIC LYMPH NODE: ICD-10-CM

## 2018-01-09 PROCEDURE — 99999 PR PBB SHADOW E&M-EST. PATIENT-LVL III: CPT | Mod: PBBFAC,,, | Performed by: INTERNAL MEDICINE

## 2018-01-09 PROCEDURE — 99214 OFFICE O/P EST MOD 30 MIN: CPT | Mod: S$GLB,,, | Performed by: INTERNAL MEDICINE

## 2018-01-09 RX ORDER — BICALUTAMIDE 50 MG/1
50 TABLET, FILM COATED ORAL DAILY
Qty: 30 TABLET | Refills: 2 | Status: SHIPPED | OUTPATIENT
Start: 2018-01-09 | End: 2018-07-17

## 2018-01-09 RX ORDER — LISINOPRIL 10 MG/1
TABLET ORAL
COMMUNITY
Start: 2017-12-22 | End: 2018-02-21 | Stop reason: SDUPTHER

## 2018-01-09 NOTE — PROGRESS NOTES
Subjective:       Patient ID: Shady Prakash Jr. is a 72 y.o. male.    Chief Complaint: No chief complaint on file.    HPI     Returns for follow-up back on hormonal therapy.   He completed Docetaxel as below amd had a PSA response    He had been started on Casodex in 7/2017 and we had 2 confirmed PSA rises since initiation supporting that he was hormonally resistant by 10/17.  We discussed with him and his family the ECOG trial of early Taxotere exposure- we described that in this study ADT and chemotherapy were initiated together- his hormonal therapy has already started but we plan to use 3-6 cycles and extrapolate from this data.     Doing well- only concern is sexual dysfunction.  Good energy level.  Eating well.  Denies pain complaints.  No current neuropathy.     Oncology History:  He is s/p TRUS/bx on 8/7/15 w/ high volume marta 9 (5+4), PSA 25.  He had negative metastatic workup.  At time of planned RALP on 9/10/15 he was noted to have BN invasion on cysto and therefore only PLND was done, which confirmed metastatic disease.  He was started on ADT 9/17/15 w/ firmagon followed by Eligard on 10/15/15.    Received last Eligard 4/13/17.  Started Casodex 7/20/17  Started docetaxel 10/6/17  Last Lupron 10/17/17     7/25/17 Bone scan:  Increased region of radiopharmaceutical uptake focally within the right posterior ilium, right approximate seventh rib as well the inferior left scapula which are concerning for enlarging osseous metastases. There is additional area of focal uptake involving the superior medial margin of the left scapula and left pubic tubercle region concerning for additional osseous metastases. Clinical correlation advised     7/25/17 CT urogram:  Interval development of sclerotic foci involving the right iliac bone and left superior pubic ramus likely representing sclerotic bone metastases in this patient with history of prostate carcinoma.  Mildly enlarged prostate gland.  Right lower pole  renal cyst.  Persistent pleural thickening within the base of the right hemithorax with associated atelectatic changes.  Mild bronchiectatic changes are also noted.  These are stable when compared to the prior examination.  Small foci of early arterial enhancement within the liver likely representing benign hepatic hemangiomas.  These are unchanged when compared to the prior examination.  Small hiatal hernia.  Colonic diverticulosis without evidence for acute diverticulitis.     9/1/17 xrays:  Right femur 2 views.  Compared to recent bone scan July 2017.  There is sclerotic increased density in the right superior acetabulum extending into the ileum.  Femur appears intact.  Vascular calcifications are noted.  Impression sclerotic focus right acetabulum and ilium consistent with metastatic disease.     Bilateral hips to include pelvis.  A sclerotic increased density in the right superior acetabulum and ilium again noted.  Subtle increased sclerosis in the left symphysis certainly better seen on bone scan.  Degenerative changes at the hips and lower lumbar spine.  No acute fracture seen.  Impression sclerotic foci as above consistent with known metastatic disease    Review of Systems   Constitutional: Negative for activity change, appetite change, chills, fatigue (wife states he gets tired sometimes, but he denies), fever and unexpected weight change.   HENT: Positive for dental problem. Negative for congestion, hearing loss, mouth sores, nosebleeds, postnasal drip, sinus pressure, sore throat and trouble swallowing.    Eyes: Negative for visual disturbance.   Respiratory: Negative for cough, chest tightness, shortness of breath and wheezing.    Cardiovascular: Negative for chest pain, palpitations and leg swelling.   Gastrointestinal: Negative for abdominal distention, abdominal pain, blood in stool, constipation, diarrhea, nausea and vomiting.        No GERD   Endocrine:        Hot flashes   Genitourinary: Negative  for decreased urine volume, difficulty urinating, frequency, hematuria and urgency.        Sexual dysfunction   Musculoskeletal: Negative for arthralgias (better), back pain, gait problem, joint swelling, neck pain and neck stiffness.   Neurological: Negative for dizziness, weakness, light-headedness, numbness and headaches.   Hematological: Negative for adenopathy. Does not bruise/bleed easily.   Psychiatric/Behavioral: Negative for dysphoric mood and sleep disturbance. The patient is not nervous/anxious.        Objective:      Physical Exam   Constitutional: He is oriented to person, place, and time. He appears well-developed and well-nourished. No distress.   Presents with wife   HENT:   Head: Normocephalic and atraumatic.   Right Ear: External ear normal.   Left Ear: External ear normal.   Nose: Nose normal.   Mouth/Throat: Oropharynx is clear and moist. No oropharyngeal exudate.   Eyes: Conjunctivae and EOM are normal. Pupils are equal, round, and reactive to light. Left eye exhibits no discharge. No scleral icterus.   Neck: Normal range of motion. Neck supple. No tracheal deviation present. No thyromegaly present.   Cardiovascular: Normal rate, regular rhythm, normal heart sounds and intact distal pulses.  Exam reveals no gallop and no friction rub.    No murmur heard.  Pulmonary/Chest: Effort normal and breath sounds normal. No respiratory distress. He has no wheezes. He has no rales. He exhibits no tenderness.   Abdominal: Soft. Bowel sounds are normal. He exhibits no distension and no mass. There is no tenderness. There is no rebound and no guarding.   Musculoskeletal: Normal range of motion. He exhibits no edema, tenderness or deformity.   Lymphadenopathy:     He has no cervical adenopathy.   Neurological: He is alert and oriented to person, place, and time. He has normal reflexes. He displays normal reflexes. No cranial nerve deficit. He exhibits normal muscle tone. Coordination normal.   Skin: Skin is  warm and dry. No rash noted. He is not diaphoretic. No erythema. No pallor.   Psychiatric: He has a normal mood and affect. His behavior is normal. Judgment and thought content normal.   Nursing note and vitals reviewed.    Labs- reviewed  Assessment:       1. Prostate cancer metastatic to bone    2. Prostate cancer metastatic to intrapelvic lymph node    3. Essential hypertension        Plan:     1-2. PSA response with chemotherapy  Will reatempt hormonal therapy as only brief exposure and change to Xtandi if PSA rises  Eligard due in March  Continue Xgeva  PSA continues to decline  3. Will follow-up with PCP    Referral made for sexual dysfunction        Distress Screening Results: Psychosocial Distress screening score of Distress Score: 0 noted and reviewed. No intervention indicated.

## 2018-01-09 NOTE — Clinical Note
- Xgeva due  - Repeat Xgeva in 4 weeks, cmp prior - RTC 8 weeks, cbc,cmp, psa prior and EP and Xgeva and Eligard - referral to Fran Umaña- urology- sexual dysfunction

## 2018-02-16 ENCOUNTER — INFUSION (OUTPATIENT)
Dept: INFUSION THERAPY | Facility: OTHER | Age: 73
End: 2018-02-16
Attending: INTERNAL MEDICINE
Payer: MEDICARE

## 2018-02-16 VITALS
WEIGHT: 191.13 LBS | HEART RATE: 80 BPM | TEMPERATURE: 96 F | HEIGHT: 68 IN | SYSTOLIC BLOOD PRESSURE: 116 MMHG | RESPIRATION RATE: 18 BRPM | BODY MASS INDEX: 28.97 KG/M2 | DIASTOLIC BLOOD PRESSURE: 61 MMHG | OXYGEN SATURATION: 99 %

## 2018-02-16 DIAGNOSIS — C61 PROSTATE CANCER METASTATIC TO BONE: Primary | ICD-10-CM

## 2018-02-16 DIAGNOSIS — C79.51 PROSTATE CANCER METASTATIC TO BONE: Primary | ICD-10-CM

## 2018-02-16 PROCEDURE — 96401 CHEMO ANTI-NEOPL SQ/IM: CPT

## 2018-02-16 PROCEDURE — 63600175 PHARM REV CODE 636 W HCPCS: Mod: JG | Performed by: INTERNAL MEDICINE

## 2018-02-16 PROCEDURE — 96372 THER/PROPH/DIAG INJ SC/IM: CPT

## 2018-02-16 RX ADMIN — DENOSUMAB 120 MG: 120 INJECTION SUBCUTANEOUS at 10:02

## 2018-02-21 ENCOUNTER — OFFICE VISIT (OUTPATIENT)
Dept: UROLOGY | Facility: CLINIC | Age: 73
End: 2018-02-21
Payer: MEDICARE

## 2018-02-21 VITALS
HEART RATE: 103 BPM | BODY MASS INDEX: 28.8 KG/M2 | DIASTOLIC BLOOD PRESSURE: 74 MMHG | SYSTOLIC BLOOD PRESSURE: 107 MMHG | WEIGHT: 190.06 LBS | HEIGHT: 68 IN

## 2018-02-21 DIAGNOSIS — C79.51 PROSTATE CANCER METASTATIC TO BONE: Primary | ICD-10-CM

## 2018-02-21 DIAGNOSIS — C61 PROSTATE CANCER METASTATIC TO BONE: Primary | ICD-10-CM

## 2018-02-21 DIAGNOSIS — N52.01 ERECTILE DYSFUNCTION DUE TO ARTERIAL INSUFFICIENCY: ICD-10-CM

## 2018-02-21 PROCEDURE — 1159F MED LIST DOCD IN RCRD: CPT | Mod: S$GLB,,, | Performed by: UROLOGY

## 2018-02-21 PROCEDURE — 99999 PR PBB SHADOW E&M-EST. PATIENT-LVL III: CPT | Mod: PBBFAC,,, | Performed by: UROLOGY

## 2018-02-21 PROCEDURE — 3008F BODY MASS INDEX DOCD: CPT | Mod: S$GLB,,, | Performed by: UROLOGY

## 2018-02-21 PROCEDURE — 1126F AMNT PAIN NOTED NONE PRSNT: CPT | Mod: S$GLB,,, | Performed by: UROLOGY

## 2018-02-21 PROCEDURE — 99214 OFFICE O/P EST MOD 30 MIN: CPT | Mod: S$GLB,,, | Performed by: UROLOGY

## 2018-02-21 RX ORDER — PAPAVERINE HYDROCHLORIDE 30 MG/ML
INJECTION INTRAMUSCULAR; INTRAVENOUS
Qty: 5 ML | Refills: 0 | Status: SHIPPED | OUTPATIENT
Start: 2018-02-21

## 2018-02-21 NOTE — PATIENT INSTRUCTIONS
Penile Self-Injection Procedure  Self-injection is a good option for many men with erectile dysfunction (ED). A tiny needle is used to inject medication into the penis. This helps your penis get hard and stay that way long enough for sex. And sex and orgasm will feel as good as always. You may be nervous about doing self-injection at first. But with practice, it will get easier. Your healthcare provider will show you how to do self-injection the first time. The simple steps are outlined on this sheet.  Preparing for Injection  · Wash your hands well with soap and water.  · Prepare the medication (if needed).  · Sit or  a comfortable position in a warm, well-lit room. If you need to, sit or  front of a mirror.  · Find an injection site on one side of your penis, in a place with no visible veins. (Dont inject into the top, bottom, or head of the penis.)  · Clean the injection site with an alcohol swab. Grasp the head of your penis firmly with your thumb and forefinger (dont just pinch the skin). Stretch the penis so the skin on the shaft is taut.  Injecting the Medication  · Rest your penis against your inner thigh and pull it gently toward your knee. Dont twist or rotate it. This way youll be sure to inject the medication into the spot you chose and cleaned before.  · Hold the syringe between your thumb and fingers, like youre holding a pen. Rest your forearm on your thigh for support.  · Insert the needle at a 90-degree angle (perpendicular) to the shaft. Do this quickly to reduce discomfort. (The needle should go in easily. If it doesnt, stop right away.)  · Move your thumb to the plunger. Press down to inject the medication, counting to 5.  · Remove the needle and dispose of it safely.     The injection site can be in any part of the shaded area.     Insert the needle straight into the penis.    Gaining an Erection  · Apply pressure to the injection site for a few minutes. This prevents  swelling and bruising and helps spread the medication.   · Stand up. This may help your erection develop. Foreplay often helps, too.  · Your penis should become firm within 10-20 minutes. The erection will last long enough for sex, and maybe longer.  Call Your Doctor If You Have:   · An erection that lasts longer than 3-4  hours  · Bleeding or bruising  · Severe pain  · Scarring or curvature of the penis       © 9878-9523 [facility], [address] [place] [address], [place] [address]. All rights reserved. This information is not intended as a substitute for professional medical care. Always follow your healthcare professional's instructions.

## 2018-02-21 NOTE — PROGRESS NOTES
CHIEF COMPLAINT:    Mr. Prakash is a 72 y.o. male presenting for a consultation at the request of Dr. Melendez. Patient presents with ED.    PRESENTING ILLNESS:    Shady Prakash Jr. is a 72 y.o. male with a history of metastatic prostate cancer that is hormone resistant.  Currently on maximal androgen blockade and is s/p taxotere.  He c/o ED.  This has been present for > 1 year.        REVIEW OF SYSTEMS:    Shady Prakash Jr. denies headache, blurred vision, fever, nausea, vomiting, chills, abdominal pain, bleeding per rectum, cough, SOB, recent loss of consciousness, recent mental status changes, seizures, dizziness, or upper or lower extremity weakness.    JEAN  1. 1  2. 1  3. 1  4. 1  5. 3      PATIENT HISTORY:    Past Medical History:   Diagnosis Date    Allergy     Arthritis     Cancer     prostate    Diabetes mellitus     Hyperlipidemia     Hypertension     Prostate cancer        Past Surgical History:   Procedure Laterality Date    EYE SURGERY Right     x9    LYMPH NODE DISSECTION         Family History   Problem Relation Age of Onset    Hypertension Father     Hypertension Mother     Diabetes Sister     Heart disease Brother     Diabetes Maternal Aunt     Cancer Maternal Uncle     Cancer Maternal Grandfather     No Known Problems Paternal Grandmother     No Known Problems Paternal Grandfather     Prostate cancer Brother     Diabetes Sister     Diabetes Sister     Diabetes Sister     Heart disease Brother        Social History     Social History    Marital status:      Spouse name: N/A    Number of children: N/A    Years of education: N/A     Occupational History    Not on file.     Social History Main Topics    Smoking status: Former Smoker     Packs/day: 4.00     Years: 60.00     Types: Cigarettes, Cigars     Start date: 9/1/1957     Quit date: 11/13/2016    Smokeless tobacco: Never Used    Alcohol use 1.8 oz/week     3 Shots of liquor per week      Comment: daily    Drug  "use: Yes     Types: "Crack" cocaine      Comment: Used to use cocaine in past     Sexual activity: Not Currently     Partners: Female     Other Topics Concern    Not on file     Social History Narrative    No narrative on file       Allergies:  Patient has no known allergies.    Medications:    Current Outpatient Prescriptions:     ACETAMINOPHEN (TYLENOL ARTHRITIS ORAL), Take by mouth., Disp: , Rfl:     aspirin (ECOTRIN) 81 MG EC tablet, Take 81 mg by mouth once daily. States not taken in over 1 month as of 9/2/15, Disp: , Rfl:     bicalutamide (CASODEX) 50 MG Tab, Take 1 tablet (50 mg total) by mouth once daily., Disp: 30 tablet, Rfl: 2    calcium-vitamin D3 (OYSTER SHELL CALCIUM-VIT D3) 500 mg(1,250mg) -200 unit per tablet, Take 1 tablet by mouth 2 (two) times daily., Disp: 180 tablet, Rfl: 3    glimepiride (AMARYL) 2 MG tablet, Take 2 mg by mouth 2 (two) times daily. , Disp: , Rfl:     lisinopril-hydrochlorothiazide (PRINZIDE,ZESTORETIC) 20-25 mg Tab, Take 1 tablet by mouth once daily. , Disp: , Rfl:     lovastatin (MEVACOR) 20 MG tablet, , Disp: , Rfl:     metformin (GLUCOPHAGE) 1000 MG tablet, Take 1,000 mg by mouth 2 (two) times daily. , Disp: , Rfl:     Current Facility-Administered Medications:     leuprolide (6 month) (ELIGARD) injection 45 mg, 45 mg, Subcutaneous, Q6 Months, Matteo Juarez MD, 45 mg at 04/13/17 1035    leuprolide (ELIGARD) injection 45 mg, 45 mg, Subcutaneous, Q6 Months, Matteo Juarez MD, 45 mg at 04/14/16 0907    PHYSICAL EXAMINATION:    The patient generally appears in good health, is appropriately interactive, and is in no apparent distress.     Eyes: anicteric sclerae, moist conjunctivae; no lid-lag; PERRLA     HENT: Atraumatic; oropharynx clear with moist mucous membranes and no mucosal ulcerations;normal hard and soft palate.  No evidence of lymphadenopathy.    Neck: Trachea midline.  No thyromegaly.    Musculoskeletal: No abnormal gait.    Skin: No " lesions.    Mental: Cooperative with normal affect.  Is oriented to time, place, and person.    Neuro: Grossly intact.    Chest: Normal inspiratory effort.   No accessory muscles.  No audible wheezes.  Respirations symmetric on inspiration and expiration.    Heart: Regular rhythm.      Abdomen:  Soft, non-tender. No masses or organomegaly. Bladder is not palpable. No evidence of flank discomfort. No evidence of inguinal hernia.    Extremities: No clubbing, cyanosis, or edema      LABS:      Lab Results   Component Value Date    PSADIAG 1.3 12/05/2017    PSADIAG 13.7 (H) 09/27/2017    PSADIAG 9.4 (H) 09/01/2017    PSATOTAL 0.92 01/08/2018    PSAFREE 0.21 01/08/2018    PSAFREEPCT 22.83 01/08/2018       IMPRESSION:    Encounter Diagnoses   Name Primary?    Prostate cancer metastatic to bone Yes         PLAN:    1. Discussed options for his ED.  He'd like [facility]. Side effects discussed.  A new Rx was given.  Will set up for teaching.  2. I spent 25 minutes with the patient of which more than half was spent in direct consultation with the patient in regards to our treatment and plan.     3. RTC 3 months to see an CHARLEEN.    Copy to:

## 2018-03-05 NOTE — PROGRESS NOTES
Subjective:       Patient ID: Shady Prakash Jr. is a 72 y.o. male.    Chief Complaint: No chief complaint on file.    HPI     Returns for follow-up back on hormonal therapy.   He completed Docetaxel as below amd had a PSA response     He had been started on Casodex in 7/2017 and we had 2 confirmed PSA rises since initiation supporting that he was hormonally resistant by 10/17.  We discussed with him and his family the ECOG trial of early Taxotere exposure- we described that in this study ADT and chemotherapy were initiated together- his hormonal therapy has already started but we plan to use 3-6 cycles and extrapolate from this data.     Doing well- only concern is sexual dysfunction. However declined recommendations offered by Dr. Umaña.  Good energy level.  Eating well.  Denies pain complaints.  No current neuropathy.     Oncology History:  He is s/p TRUS/bx on 8/7/15 w/ high volume marta 9 (5+4), PSA 25.  He had negative metastatic workup.  At time of planned RALP on 9/10/15 he was noted to have BN invasion on cysto and therefore only PLND was done, which confirmed metastatic disease.  He was started on ADT 9/17/15 w/ firmagon followed by Eligard on 10/15/15.    Received last Eligard 4/13/17.  Started Casodex 7/20/17  Started docetaxel 10/6/17  Last Lupron 10/17/17     7/25/17 Bone scan:  Increased region of radiopharmaceutical uptake focally within the right posterior ilium, right approximate seventh rib as well the inferior left scapula which are concerning for enlarging osseous metastases. There is additional area of focal uptake involving the superior medial margin of the left scapula and left pubic tubercle region concerning for additional osseous metastases. Clinical correlation advised     7/25/17 CT urogram:  Interval development of sclerotic foci involving the right iliac bone and left superior pubic ramus likely representing sclerotic bone metastases in this patient with history of prostate  carcinoma.  Mildly enlarged prostate gland.  Right lower pole renal cyst.  Persistent pleural thickening within the base of the right hemithorax with associated atelectatic changes.  Mild bronchiectatic changes are also noted.  These are stable when compared to the prior examination.  Small foci of early arterial enhancement within the liver likely representing benign hepatic hemangiomas.  These are unchanged when compared to the prior examination.  Small hiatal hernia.  Colonic diverticulosis without evidence for acute diverticulitis.     9/1/17 xrays:  Right femur 2 views.  Compared to recent bone scan July 2017.  There is sclerotic increased density in the right superior acetabulum extending into the ileum.  Femur appears intact.  Vascular calcifications are noted.  Impression sclerotic focus right acetabulum and ilium consistent with metastatic disease.     Bilateral hips to include pelvis.  A sclerotic increased density in the right superior acetabulum and ilium again noted.  Subtle increased sclerosis in the left symphysis certainly better seen on bone scan.  Degenerative changes at the hips and lower lumbar spine.  No acute fracture seen.  Impression sclerotic foci as above consistent with known metastatic disease     Review of Systems   Constitutional: Negative for activity change, appetite change, chills, fatigue (wife states he gets tired sometimes, but he denies), fever and unexpected weight change.   HENT: Positive for dental problem. Negative for congestion, hearing loss, mouth sores, nosebleeds, postnasal drip, sinus pressure, sore throat and trouble swallowing.    Eyes: Negative for visual disturbance.   Respiratory: Negative for cough, chest tightness, shortness of breath and wheezing.    Cardiovascular: Negative for chest pain, palpitations and leg swelling.   Gastrointestinal: Negative for abdominal distention, abdominal pain, blood in stool, constipation, diarrhea, nausea and vomiting.        No  GERD   Endocrine:        Hot flashes   Genitourinary: Negative for decreased urine volume, difficulty urinating, frequency, hematuria and urgency.        Sexual dysfunction   Musculoskeletal: Negative for arthralgias (better), back pain, gait problem, joint swelling, neck pain and neck stiffness.   Neurological: Negative for dizziness, weakness, light-headedness, numbness and headaches.   Hematological: Negative for adenopathy. Does not bruise/bleed easily.   Psychiatric/Behavioral: Negative for dysphoric mood and sleep disturbance. The patient is not nervous/anxious.        Objective:      Physical Exam   Constitutional: He is oriented to person, place, and time. He appears well-developed and well-nourished. No distress.   Presents with wife   HENT:   Head: Normocephalic and atraumatic.   Right Ear: External ear normal.   Left Ear: External ear normal.   Nose: Nose normal.   Mouth/Throat: Oropharynx is clear and moist. No oropharyngeal exudate.   Eyes: Conjunctivae and EOM are normal. Pupils are equal, round, and reactive to light. Left eye exhibits no discharge. No scleral icterus.   Neck: Normal range of motion. Neck supple. No tracheal deviation present. No thyromegaly present.   Cardiovascular: Normal rate, regular rhythm, normal heart sounds and intact distal pulses.  Exam reveals no gallop and no friction rub.    No murmur heard.  Pulmonary/Chest: Effort normal and breath sounds normal. No respiratory distress. He has no wheezes. He has no rales. He exhibits no tenderness.   Abdominal: Soft. Bowel sounds are normal. He exhibits no distension and no mass. There is no tenderness. There is no rebound and no guarding.   Musculoskeletal: Normal range of motion. He exhibits no edema, tenderness or deformity.   Lymphadenopathy:     He has no cervical adenopathy.   Neurological: He is alert and oriented to person, place, and time. He has normal reflexes. He displays normal reflexes. No cranial nerve deficit. He  exhibits normal muscle tone. Coordination normal.   Skin: Skin is warm and dry. No rash noted. He is not diaphoretic. No erythema. No pallor.   Psychiatric: He has a normal mood and affect. His behavior is normal. Judgment and thought content normal.   Nursing note and vitals reviewed.      Assessment:       1. Prostate cancer metastatic to intrapelvic lymph node    2. Prostate cancer metastatic to bone        Plan:       1-2. PSA response with chemotherapy  Back on hormonal therapy as only brief exposure and change to Xtandi if PSA continues to rise  Eligard due in March  PSA slightly up but not meeting doubling criteria

## 2018-03-06 ENCOUNTER — LAB VISIT (OUTPATIENT)
Dept: LAB | Facility: OTHER | Age: 73
End: 2018-03-06
Attending: INTERNAL MEDICINE
Payer: MEDICARE

## 2018-03-06 ENCOUNTER — OFFICE VISIT (OUTPATIENT)
Dept: HEMATOLOGY/ONCOLOGY | Facility: CLINIC | Age: 73
End: 2018-03-06
Attending: INTERNAL MEDICINE
Payer: MEDICARE

## 2018-03-06 VITALS
WEIGHT: 193.56 LBS | DIASTOLIC BLOOD PRESSURE: 75 MMHG | HEART RATE: 84 BPM | HEIGHT: 69 IN | OXYGEN SATURATION: 100 % | SYSTOLIC BLOOD PRESSURE: 125 MMHG | TEMPERATURE: 98 F | RESPIRATION RATE: 16 BRPM | BODY MASS INDEX: 28.67 KG/M2

## 2018-03-06 DIAGNOSIS — C61 PROSTATE CANCER METASTATIC TO INTRAPELVIC LYMPH NODE: Primary | ICD-10-CM

## 2018-03-06 DIAGNOSIS — C61 PROSTATE CANCER METASTATIC TO BONE: ICD-10-CM

## 2018-03-06 DIAGNOSIS — C79.51 PROSTATE CANCER METASTATIC TO BONE: ICD-10-CM

## 2018-03-06 DIAGNOSIS — C77.5 PROSTATE CANCER METASTATIC TO INTRAPELVIC LYMPH NODE: Primary | ICD-10-CM

## 2018-03-06 DIAGNOSIS — Z51.11 ENCOUNTER FOR CHEMOTHERAPY MANAGEMENT: ICD-10-CM

## 2018-03-06 LAB
ALBUMIN SERPL BCP-MCNC: 3.9 G/DL
ALP SERPL-CCNC: 50 U/L
ALT SERPL W/O P-5'-P-CCNC: 13 U/L
ANION GAP SERPL CALC-SCNC: 10 MMOL/L
AST SERPL-CCNC: 14 U/L
BILIRUB SERPL-MCNC: 0.2 MG/DL
BUN SERPL-MCNC: 23 MG/DL
CALCIUM SERPL-MCNC: 9.3 MG/DL
CHLORIDE SERPL-SCNC: 102 MMOL/L
CO2 SERPL-SCNC: 26 MMOL/L
COMPLEXED PSA SERPL-MCNC: 2.7 NG/ML
CREAT SERPL-MCNC: 1.3 MG/DL
ERYTHROCYTE [DISTWIDTH] IN BLOOD BY AUTOMATED COUNT: 19.4 %
EST. GFR  (AFRICAN AMERICAN): >60 ML/MIN/1.73 M^2
EST. GFR  (NON AFRICAN AMERICAN): 55 ML/MIN/1.73 M^2
GLUCOSE SERPL-MCNC: 100 MG/DL
HCT VFR BLD AUTO: 33.8 %
HGB BLD-MCNC: 10.5 G/DL
MCH RBC QN AUTO: 25.4 PG
MCHC RBC AUTO-ENTMCNC: 31.1 G/DL
MCV RBC AUTO: 82 FL
NEUTROPHILS # BLD AUTO: 3.6 K/UL
PLATELET # BLD AUTO: 204 K/UL
PMV BLD AUTO: 8.6 FL
POTASSIUM SERPL-SCNC: 4.3 MMOL/L
PROT SERPL-MCNC: 7.2 G/DL
RBC # BLD AUTO: 4.13 M/UL
SODIUM SERPL-SCNC: 138 MMOL/L
WBC # BLD AUTO: 5.28 K/UL

## 2018-03-06 PROCEDURE — 99214 OFFICE O/P EST MOD 30 MIN: CPT | Mod: S$GLB,,, | Performed by: INTERNAL MEDICINE

## 2018-03-06 PROCEDURE — 99999 PR PBB SHADOW E&M-EST. PATIENT-LVL III: CPT | Mod: PBBFAC,,, | Performed by: INTERNAL MEDICINE

## 2018-03-06 PROCEDURE — 3078F DIAST BP <80 MM HG: CPT | Mod: S$GLB,,, | Performed by: INTERNAL MEDICINE

## 2018-03-06 PROCEDURE — 3074F SYST BP LT 130 MM HG: CPT | Mod: S$GLB,,, | Performed by: INTERNAL MEDICINE

## 2018-03-06 PROCEDURE — 84153 ASSAY OF PSA TOTAL: CPT

## 2018-03-06 PROCEDURE — 36415 COLL VENOUS BLD VENIPUNCTURE: CPT

## 2018-03-06 PROCEDURE — 85027 COMPLETE CBC AUTOMATED: CPT

## 2018-03-06 PROCEDURE — 80053 COMPREHEN METABOLIC PANEL: CPT

## 2018-03-16 ENCOUNTER — INFUSION (OUTPATIENT)
Dept: INFUSION THERAPY | Facility: OTHER | Age: 73
End: 2018-03-16
Attending: INTERNAL MEDICINE
Payer: MEDICARE

## 2018-03-16 VITALS
DIASTOLIC BLOOD PRESSURE: 61 MMHG | HEIGHT: 69 IN | SYSTOLIC BLOOD PRESSURE: 120 MMHG | BODY MASS INDEX: 28.24 KG/M2 | OXYGEN SATURATION: 98 % | RESPIRATION RATE: 16 BRPM | TEMPERATURE: 99 F | HEART RATE: 97 BPM | WEIGHT: 190.69 LBS

## 2018-03-16 DIAGNOSIS — C79.51 PROSTATE CANCER METASTATIC TO BONE: Primary | ICD-10-CM

## 2018-03-16 DIAGNOSIS — C61 PROSTATE CANCER METASTATIC TO BONE: Primary | ICD-10-CM

## 2018-03-16 PROCEDURE — 63600175 PHARM REV CODE 636 W HCPCS: Mod: JG | Performed by: INTERNAL MEDICINE

## 2018-03-16 PROCEDURE — 96372 THER/PROPH/DIAG INJ SC/IM: CPT

## 2018-03-16 RX ADMIN — DENOSUMAB 120 MG: 120 INJECTION SUBCUTANEOUS at 09:03

## 2018-03-16 NOTE — PLAN OF CARE
Problem: Patient Care Overview  Goal: Plan of Care Review  Outcome: Ongoing (interventions implemented as appropriate)  Ca 9.3. Patient states taking Ca and Vit. D supplements. Patient received Xgeva injection today. Tolerated well, no complaints. VSS. AVS given.

## 2018-04-25 NOTE — PLAN OF CARE
Assessment:  1  Sprain of hand, unspecified laterality, initial encounter       There is no problem list on file for this patient  Plan       activity as tolerated follow up on a as needed basis if he has any increase in pain he is going to come back and we will get a repeat x-ray sometimes we can miss occult fractures on the initial x-ray and then a week later that fracture will show up            Subjective:     Patient ID:    Chief Complaint:Judi Mishra 23 y o  male      HPI    Patient comes in today with regards to Left wrist   The patient reports that the pain is in the  Dorsal aspect of the hand and has been going on for  3 days  The pain is rated at2 at its best and8 at its worst   The pain is described as  throbbing  It is worsened with  Squeezing or making a fist, and is made better with  Not sure  The patient has taken  nothing for treatment  The following portions of the patient's history were reviewed and updated as appropriate: allergies, current medications, past family history, past social history, past surgical history and problem list         Social History     Social History    Marital status: Single     Spouse name: N/A    Number of children: N/A    Years of education: N/A     Occupational History    Not on file  Social History Main Topics    Smoking status: Never Smoker    Smokeless tobacco: Never Used    Alcohol use No    Drug use: No    Sexual activity: Not on file     Other Topics Concern    Not on file     Social History Narrative    No narrative on file     History reviewed  No pertinent past medical history  Past Surgical History:   Procedure Laterality Date    HAND FRACTURE REPAIR Right      No Known Allergies  No current outpatient prescriptions on file prior to visit  No current facility-administered medications on file prior to visit  Objective:    Review of Systems   Constitutional: Negative  HENT: Negative  Eyes: Negative  Problem: Patient Care Overview  Goal: Plan of Care Review  Outcome: Ongoing (interventions implemented as appropriate)  Pt. Arrived @ 10:15 for 2nd Xgeva injection, VSS, Ambulated to unit unassisted, accompanied by wife, discharged from unit @ 10:25, plan of care reviewed, Pt instructed to contact MD with any questions or concerns. He verbalized understanding        Respiratory: Negative  Cardiovascular: Negative  Gastrointestinal: Negative  Negative for vomiting  Genitourinary: Negative  Musculoskeletal:        Please refer to HPI   Skin: Negative  Neurological: Negative  Hematological: Negative  Psychiatric/Behavioral: Negative  All other systems reviewed and are negative  Right Hand Exam   Right hand exam is normal     Range of Motion   The patient has normal right wrist ROM  Left Hand Exam     Tenderness   The patient is experiencing tenderness in the dorsal area  Range of Motion   The patient has normal left wrist ROM  Muscle Strength   Wrist Extension: 5/5   Wrist Flexion: 5/5   :  5/5     Other   Erythema: absent  Sensation: normal  Pulse: present    Comments:   Patient is pinpoint tender in between the middle metacarpal and the ring metacarpal no pain with palpation at the base of the metacarpals nor over the capitate lunate or scaphoid  There is no tenderness over the distal radius either no ecchymosis at this time mild swelling over the dorsum of the hand            Physical Exam   Constitutional: He is oriented to person, place, and time  He appears well-developed  HENT:   Head: Normocephalic  Eyes: Pupils are equal, round, and reactive to light  Neck: Neck supple  Cardiovascular: Intact distal pulses  Pulmonary/Chest: Effort normal    Abdominal: He exhibits no distension  Neurological: He is alert and oriented to person, place, and time  Skin: Skin is warm  Psychiatric: He has a normal mood and affect  Nursing note and vitals reviewed  Procedures  No Procedures performed today    I have personally reviewed pertinent films in PACS and my interpretation is There is a small avulsion fracture off the radial styloid of which the patient is not symptomatic        Portions of the record may have been created with voice recognition software   Occasional wrong word or "sound a like" substitutions may have occurred due to the inherent limitations of voice recognition software   Read the chart carefully and recognize, using context, where substitutions have occurred

## 2018-05-03 ENCOUNTER — HOSPITAL ENCOUNTER (EMERGENCY)
Facility: OTHER | Age: 73
Discharge: HOME | End: 2018-05-04
Attending: EMERGENCY MEDICINE
Payer: MEDICARE

## 2018-05-03 DIAGNOSIS — N18.9 CHRONIC RENAL IMPAIRMENT, UNSPECIFIED CKD STAGE: ICD-10-CM

## 2018-05-03 DIAGNOSIS — I95.89 OTHER SPECIFIED HYPOTENSION: Primary | ICD-10-CM

## 2018-05-03 DIAGNOSIS — I95.9 HYPOTENSION: ICD-10-CM

## 2018-05-03 DIAGNOSIS — R55 NEAR SYNCOPE: ICD-10-CM

## 2018-05-03 DIAGNOSIS — R00.0 TACHYCARDIA: ICD-10-CM

## 2018-05-03 DIAGNOSIS — D64.9 ANEMIA, UNSPECIFIED TYPE: ICD-10-CM

## 2018-05-03 LAB
ALBUMIN SERPL BCP-MCNC: 4.1 G/DL
ALP SERPL-CCNC: 56 U/L
ALT SERPL W/O P-5'-P-CCNC: 12 U/L
ANION GAP SERPL CALC-SCNC: 16 MMOL/L
AST SERPL-CCNC: 14 U/L
BACTERIA #/AREA URNS HPF: ABNORMAL /HPF
BASOPHILS # BLD AUTO: 0.02 K/UL
BASOPHILS NFR BLD: 0.3 %
BILIRUB SERPL-MCNC: 0.2 MG/DL
BILIRUB UR QL STRIP: ABNORMAL
BUN SERPL-MCNC: 21 MG/DL
CALCIUM SERPL-MCNC: 10.2 MG/DL
CHLORIDE SERPL-SCNC: 103 MMOL/L
CLARITY UR: CLEAR
CO2 SERPL-SCNC: 22 MMOL/L
COLOR UR: YELLOW
CREAT SERPL-MCNC: 1.7 MG/DL
D DIMER PPP IA.FEU-MCNC: 0.89 MG/L FEU
DIFFERENTIAL METHOD: ABNORMAL
EOSINOPHIL # BLD AUTO: 0.2 K/UL
EOSINOPHIL NFR BLD: 3.7 %
ERYTHROCYTE [DISTWIDTH] IN BLOOD BY AUTOMATED COUNT: 19 %
EST. GFR  (AFRICAN AMERICAN): 46 ML/MIN/1.73 M^2
EST. GFR  (NON AFRICAN AMERICAN): 40 ML/MIN/1.73 M^2
GLUCOSE SERPL-MCNC: 147 MG/DL
GLUCOSE UR QL STRIP: NEGATIVE
HCT VFR BLD AUTO: 37.8 %
HGB BLD-MCNC: 11.8 G/DL
HGB UR QL STRIP: ABNORMAL
HYALINE CASTS #/AREA URNS LPF: 7 /LPF
KETONES UR QL STRIP: ABNORMAL
LEUKOCYTE ESTERASE UR QL STRIP: NEGATIVE
LYMPHOCYTES # BLD AUTO: 1.5 K/UL
LYMPHOCYTES NFR BLD: 26.3 %
MCH RBC QN AUTO: 26.8 PG
MCHC RBC AUTO-ENTMCNC: 31.2 G/DL
MCV RBC AUTO: 86 FL
MICROSCOPIC COMMENT: ABNORMAL
MONOCYTES # BLD AUTO: 0.3 K/UL
MONOCYTES NFR BLD: 5.9 %
NEUTROPHILS # BLD AUTO: 3.7 K/UL
NEUTROPHILS NFR BLD: 63.8 %
NITRITE UR QL STRIP: NEGATIVE
PH UR STRIP: 5 [PH] (ref 5–8)
PLATELET # BLD AUTO: 264 K/UL
PMV BLD AUTO: 8.4 FL
POTASSIUM SERPL-SCNC: 4 MMOL/L
PROT SERPL-MCNC: 7.8 G/DL
PROT UR QL STRIP: ABNORMAL
RBC # BLD AUTO: 4.4 M/UL
RBC #/AREA URNS HPF: 0 /HPF (ref 0–4)
SODIUM SERPL-SCNC: 141 MMOL/L
SP GR UR STRIP: >=1.03 (ref 1–1.03)
TROPONIN I SERPL DL<=0.01 NG/ML-MCNC: <0.006 NG/ML
URN SPEC COLLECT METH UR: ABNORMAL
UROBILINOGEN UR STRIP-ACNC: 1 EU/DL
WBC # BLD AUTO: 5.75 K/UL
WBC #/AREA URNS HPF: 2 /HPF (ref 0–5)

## 2018-05-03 PROCEDURE — 84484 ASSAY OF TROPONIN QUANT: CPT

## 2018-05-03 PROCEDURE — 96361 HYDRATE IV INFUSION ADD-ON: CPT

## 2018-05-03 PROCEDURE — 96360 HYDRATION IV INFUSION INIT: CPT | Mod: 59

## 2018-05-03 PROCEDURE — 85379 FIBRIN DEGRADATION QUANT: CPT

## 2018-05-03 PROCEDURE — 99284 EMERGENCY DEPT VISIT MOD MDM: CPT | Mod: 25

## 2018-05-03 PROCEDURE — 81000 URINALYSIS NONAUTO W/SCOPE: CPT

## 2018-05-03 PROCEDURE — 80053 COMPREHEN METABOLIC PANEL: CPT

## 2018-05-03 PROCEDURE — 25000003 PHARM REV CODE 250: Performed by: EMERGENCY MEDICINE

## 2018-05-03 PROCEDURE — 93010 ELECTROCARDIOGRAM REPORT: CPT | Mod: ,,, | Performed by: INTERNAL MEDICINE

## 2018-05-03 PROCEDURE — 85025 COMPLETE CBC W/AUTO DIFF WBC: CPT

## 2018-05-03 RX ADMIN — SODIUM CHLORIDE 1000 ML: 0.9 INJECTION, SOLUTION INTRAVENOUS at 08:05

## 2018-05-04 VITALS
HEIGHT: 68 IN | TEMPERATURE: 98 F | WEIGHT: 191 LBS | OXYGEN SATURATION: 98 % | BODY MASS INDEX: 28.95 KG/M2 | RESPIRATION RATE: 18 BRPM | DIASTOLIC BLOOD PRESSURE: 68 MMHG | SYSTOLIC BLOOD PRESSURE: 137 MMHG | HEART RATE: 90 BPM

## 2018-05-04 PROCEDURE — 25000003 PHARM REV CODE 250: Performed by: EMERGENCY MEDICINE

## 2018-05-04 RX ADMIN — SODIUM CHLORIDE 1000 ML: 0.9 INJECTION, SOLUTION INTRAVENOUS at 12:05

## 2018-05-04 NOTE — ED PROVIDER NOTES
"Encounter Date: 5/3/2018    SCRIBE #1 NOTE: I, Tamica Wilkins, am scribing for, and in the presence of, Dr. Thompson.       History     Chief Complaint   Patient presents with    Hypotension     low bp reading at home, possible syncope after standing     Time seen by provider: 8:13 PM    This is a 73 y.o. male, with history including DM, HTN, HLD, and prostate CA, who presents with complaint of near-syncopal episode which occurred prior to arrival. The patient reports sudden onset of feeling weak and light-headed when he stood up while inside his house. He states that "I just went down." Per spouse, she states that she saw the patient fall to the ground and that he was sitting up by the time she walked over to him. Patient denies LOC or head injury during fall. He denies any chest pain, chest pressure, shortness of breath, nausea, vomiting, or visual disturbance prior to near-syncopal episode. He reports eating breakfast around 10AM this morning. Patient also admits to chronic alcohol use and states that he consumed about eight shots of vodka today. In review of systems, he reports intermittent abdominal pain at baseline. He currently states that he feels "alright." He reports compliance to daily medications. He also notes that completed chemotherapy and radiation treatments two months ago.       The history is provided by the patient and the spouse.     Review of patient's allergies indicates:  No Known Allergies  Past Medical History:   Diagnosis Date    Allergy     Arthritis     Cancer     prostate    Diabetes mellitus     Hyperlipidemia     Hypertension     Prostate cancer      Past Surgical History:   Procedure Laterality Date    EYE SURGERY Right     x9    LYMPH NODE DISSECTION       Family History   Problem Relation Age of Onset    Hypertension Father     Hypertension Mother     Diabetes Sister     Heart disease Brother     Diabetes Maternal Aunt     Cancer Maternal Uncle     Cancer Maternal " Grandfather     No Known Problems Paternal Grandmother     No Known Problems Paternal Grandfather     Prostate cancer Brother     Diabetes Sister     Diabetes Sister     Diabetes Sister     Heart disease Brother      Social History   Substance Use Topics    Smoking status: Former Smoker     Packs/day: 4.00     Years: 60.00     Types: Cigarettes, Cigars     Start date: 9/1/1957     Quit date: 11/13/2016    Smokeless tobacco: Never Used    Alcohol use 1.8 oz/week     3 Shots of liquor per week      Comment: daily     Review of Systems   Constitutional: Negative for chills and fever.   HENT: Negative for sore throat.    Eyes: Negative for visual disturbance.   Respiratory: Negative for chest tightness and shortness of breath.    Cardiovascular: Negative for chest pain.   Gastrointestinal: Positive for abdominal pain (intermittent, chronic). Negative for constipation, diarrhea, nausea and vomiting.   Genitourinary: Negative for dysuria.   Musculoskeletal: Negative for back pain.   Skin: Negative for rash and wound.   Neurological: Positive for weakness and light-headedness. Negative for syncope and numbness.        Positive for near-syncopal episode.   Hematological: Does not bruise/bleed easily.       Physical Exam     Initial Vitals [05/03/18 1838]   BP Pulse Resp Temp SpO2   (!) 97/56 106 18 98.3 °F (36.8 °C) 97 %      MAP       69.67         Physical Exam    Nursing note and vitals reviewed.  Constitutional: He appears well-developed and well-nourished. He is not diaphoretic. No distress.   HENT:   Head: Normocephalic and atraumatic.   Mouth/Throat: Oropharynx is clear and moist.   Oropharynx is clear and intact.  Moist mucous membranes.   Eyes: EOM are normal. Pupils are equal, round, and reactive to light. No scleral icterus.   Neck: Normal range of motion. Neck supple.   Cardiovascular: Normal rate, regular rhythm and normal heart sounds. Exam reveals no gallop and no friction rub.    No murmur  heard.  Normal S1, S2. No murmurs, no rubs, no gallops.    Pulmonary/Chest: Breath sounds normal. No respiratory distress. He has no wheezes. He has no rhonchi. He has no rales.   Clear to ascultation bilaterally.   Abdominal: Soft. Bowel sounds are normal. He exhibits no distension. There is no tenderness. There is no rebound and no guarding.   Musculoskeletal: Normal range of motion. He exhibits no edema or tenderness.   Neurological: He is alert and oriented to person, place, and time. He has normal strength. No cranial nerve deficit or sensory deficit.   Skin: Skin is warm and dry. Capillary refill takes less than 2 seconds. No rash and no abscess noted. No erythema. No pallor.   Warm and dry. No skin tenting, rashes, or lesions.    Psychiatric: He has a normal mood and affect. His behavior is normal. Judgment and thought content normal.         ED Course   Procedures  Labs Reviewed   CBC W/ AUTO DIFFERENTIAL - Abnormal; Notable for the following:        Result Value    RBC 4.40 (*)     Hemoglobin 11.8 (*)     Hematocrit 37.8 (*)     MCH 26.8 (*)     MCHC 31.2 (*)     RDW 19.0 (*)     MPV 8.4 (*)     All other components within normal limits   COMPREHENSIVE METABOLIC PANEL - Abnormal; Notable for the following:     CO2 22 (*)     Glucose 147 (*)     Creatinine 1.7 (*)     eGFR if  46 (*)     eGFR if non  40 (*)     All other components within normal limits   URINALYSIS - Abnormal; Notable for the following:     Specific Gravity, UA >=1.030 (*)     Protein, UA 1+ (*)     Ketones, UA Trace (*)     Bilirubin (UA) 1+ (*)     Occult Blood UA Trace (*)     All other components within normal limits   D DIMER, QUANTITATIVE - Abnormal; Notable for the following:     D-Dimer 0.89 (*)     All other components within normal limits   URINALYSIS MICROSCOPIC - Abnormal; Notable for the following:     Hyaline Casts, UA 7 (*)     All other components within normal limits   TROPONIN I     EKG  Readings: (Independently Interpreted)   Initial Reading: No STEMI.   Normal sinus rhythm at a rate of 99 bpm. No ischemic changes. No STEMI.     Imaging Results          NM Lung Ventilation Perfusion Imaging (Final result)  Result time 05/04/18 00:22:18    Final result by Jose Kirby MD (05/04/18 00:22:18)                 Impression:      Low probability V/Q scan.      Electronically signed by: Jose Kirby MD  Date:    05/04/2018  Time:    00:22             Narrative:    EXAMINATION:  NM LUNG VENTILATION AND PERFUSION IMAGING    CLINICAL HISTORY:  Ca history, hypotension, tachycardic, elevated ddimer;    TECHNIQUE:  31.3 mCi of Tc-99m-DTPA were placed in the nebulizer. Following the inhalation Tc-99m-DTPA in aerosol and the subsequent IV administration of 5.0 mCi of Tc-99m-MAA, multiple images of the thorax were obtained in various projections.    COMPARISON:  Chest x-ray dated 05/03/2018    FINDINGS:  There is retention of radiotracer activity within the left central airways.  However, there remains normal ventilation of both lung fields.    There is normal perfusion to both lung fields.  No ventilation perfusion mismatch is identified.  There is an area of photopenia involving the right lateral hemithorax in keeping with prior areas of chronic changes.                               X-Ray Chest PA And Lateral (Final result)  Result time 05/03/18 21:15:00    Final result by Rolan Brennan MD (05/03/18 21:15:00)                 Impression:      Stable chronic findings in the right rubio thorax.  No detrimental change when compared with 10/30/2017.      Electronically signed by: Rolan Brennan MD  Date:    05/03/2018  Time:    21:15             Narrative:    EXAMINATION:  XR CHEST PA AND LATERAL    CLINICAL HISTORY:  Near Syncope;    TECHNIQUE:  Frontal and lateral views of the chest were performed.    COMPARISON:  10/30/2017.    FINDINGS:  Cardiac silhouette is not enlarged.  Mildly tortuous thoracic aorta.   Calcified hilar lymph nodes suggestive of prior granulomatous disease.  Stable pleural thickening in the right rubio thorax.  Stable patchy opacity at the right lung base, as seen on prior chest radiograph and chest CT in 2015.  No left pleural effusion.  No new focal consolidation.  No pneumothorax.  No detrimental change in lung aeration.                                X-Rays:   Independently Interpreted Readings:   Chest X-Ray: No acute findings visualized.     Medical Decision Making:   Independently Interpreted Test(s):   I have ordered and independently interpreted X-rays - see prior notes.  I have ordered and independently interpreted EKG Reading(s) - see prior notes  Clinical Tests:   Lab Tests: Reviewed and Ordered  Radiological Study: Ordered and Reviewed  Medical Tests: Ordered and Reviewed            Scribe Attestation:   Scribe #1: I performed the above scribed service and the documentation accurately describes the services I performed. I attest to the accuracy of the note.    Attending Attestation:           Physician Attestation for Scribe:  Physician Attestation Statement for Scribe #1: I, Dr. Thompson, reviewed documentation, as scribed by Tamica Wilkins in my presence, and it is both accurate and complete.         Attending ED Notes:   Emergent evaluation of 73-year-old male with complaint of a near syncopal episode after drinking 8 shots of vodka today.  Patient is afebrile, nontoxic-appearing with stable vital signs.  Patient no vastly intact without focal neurologic deficits.PERRLA, EOMI.  Strength 5 of 5 throughout.  Two point discrimination is intact throughout.  Sensation intact to light touch throughout.  Reflexes 2+ throughout.  Good finger to nose task ability. Negative pronator drift.  No papilledema.  No meningeal signs.  No elevation of white blood cell count.  H&H 11.8 and 37.8.  No acute findings on CMP except for creatinine 1.7.  D-dimer 0.89.  Urinary analysis reveals protein, ketones,  bilirubin and trace blood.  Ventilation/perfusion scan reveals low probability for pulmonary embolus.  No acute findings on chest x-ray.  EKG reveals sinus rhythm with rate of 99.  The patient is extensively counseled on his diagnosis and treatment including all diagnostic, laboratory and physical exam findings.  The patient discharged good condition and directed follow-up with his PCP and cardiology in the next 24-48 hours.             Clinical Impression:     1. Other specified hypotension    2. Hypotension    3. Near syncope    4. Tachycardia    5. Anemia, unspecified type    6. Chronic renal impairment, unspecified CKD stage                               Aman Gorman MD  05/12/18 1287

## 2018-05-23 ENCOUNTER — TELEPHONE (OUTPATIENT)
Dept: UROLOGY | Facility: CLINIC | Age: 73
End: 2018-05-23

## 2018-05-23 NOTE — TELEPHONE ENCOUNTER
I spoke to wife and reviewed ADT log.  Pt has not had Eligard 45mg since 10/17/17.  It doesn't look like a follow appt was scheduled.  He was due on in April, so he is one month late.  Can I schedule a nurse visit, and is it okay to give Eligard a month late?

## 2018-05-23 NOTE — TELEPHONE ENCOUNTER
----- Message from Margarita Lao sent at 5/23/2018 10:32 AM CDT -----  Contact: pt's wife andra [phone]            Name of Who is Calling: pt's wife andra [phone]      What is the request in detail: pt wants to know why the doctor quit giving him his shots for cancer. Call the wife      Can the clinic reply by [facility]: no      What Number to [facility] Back [facility]: pt's wife andra [phone]

## 2018-05-24 NOTE — TELEPHONE ENCOUNTER
I spoke to pt.  They do not want to come in until Tuesday.  I scheduled appt with you, since you had not seen him in over 6 months.  If you want me to turn him into a nurse visit and just give Eligard, let me know.  Thanks.

## 2018-05-29 ENCOUNTER — OFFICE VISIT (OUTPATIENT)
Dept: UROLOGY | Facility: CLINIC | Age: 73
End: 2018-05-29
Attending: UROLOGY
Payer: MEDICARE

## 2018-05-29 VITALS
SYSTOLIC BLOOD PRESSURE: 99 MMHG | DIASTOLIC BLOOD PRESSURE: 69 MMHG | WEIGHT: 190.94 LBS | HEART RATE: 91 BPM | HEIGHT: 68 IN | BODY MASS INDEX: 28.94 KG/M2

## 2018-05-29 DIAGNOSIS — C61 PROSTATE CANCER: Primary | ICD-10-CM

## 2018-05-29 PROCEDURE — 3074F SYST BP LT 130 MM HG: CPT | Mod: CPTII,S$GLB,, | Performed by: UROLOGY

## 2018-05-29 PROCEDURE — 96402 CHEMO HORMON ANTINEOPL SQ/IM: CPT | Mod: S$GLB,,, | Performed by: UROLOGY

## 2018-05-29 PROCEDURE — 99214 OFFICE O/P EST MOD 30 MIN: CPT | Mod: 25,S$GLB,, | Performed by: UROLOGY

## 2018-05-29 PROCEDURE — 3078F DIAST BP <80 MM HG: CPT | Mod: CPTII,S$GLB,, | Performed by: UROLOGY

## 2018-05-29 RX ORDER — SILDENAFIL CITRATE 20 MG/1
20 TABLET ORAL SEE ADMIN INSTRUCTIONS
Qty: 40 TABLET | Refills: 11 | Status: SHIPPED | OUTPATIENT
Start: 2018-05-29 | End: 2019-05-29

## 2018-05-29 NOTE — PROGRESS NOTES
"Subjective:      Shady Prakash Jr. is a 73 y.o. male who returns today regarding his metastatic prostate cancer.      He is here today for 6 mos FU and Eligard. Full history below.    Completed taxotere for rising PSA despite complete androgen blockade. PSA responded well; has increased some but, per Dr. Melendez, soes not meet doubling criteria. He also completed palliative pelvic radiation in November.    Saw Dr. Umaña for ED in February but opted against [facility]. He has never tried oral ED medication.    He has no voiding c/o today    Prostate Cancer History  He is s/p TRUS/bx on 8/7/15 w/ high volume marta 9 (5+4), PSA 25.  He had negative metastatic workup.  At time of planned RALP on 9/10/15 he was noted to have BN invasion on cysto and therefore only PLND was done, which confirmed metastatic disease.  He was started on ADT 9/17/15 w/ firmagon followed by Eligard on 10/15/15.  Received last Eligard 10/17/17. Casodex added for rising PSA, which persisted. Completed pelvic radiation November 2017. Completed taxotere     The following portions of the patient's history were reviewed and updated as appropriate: allergies, current medications, past family history, past medical history, past social history, past surgical history and problem list.    Review of Systems  A comprehensive multipoint review of systems was negative except as otherwise stated in the HPI.     Objective:   Vitals:   BP 99/69 (BP Location: Left arm, Patient Position: Sitting, BP Method: Large (Automatic))   Pulse 91   Ht 5' 8" (1.727 m)   Wt 86.6 kg (190 lb 14.7 oz)   BMI 29.03 kg/m²     Physical Exam   General: alert and oriented, no acute distress  Respiratory: Symmetric expansion, non-labored breathing  Neuro: no gross deficits  Psych: normal judgment and insight, normal mood/affect and non-anxious    Lab Review   Urinalysis demonstrates + for RBCs    Component PSA DIAGNOSTIC   Latest Ref Rng & Units 0.00 - 4.00 ng/mL   3/6/2018 2.7 "   12/5/2017 1.3   1/8/2018 0.92   9/27/2017 13.7 (H)   9/1/2017 9.4 (H)   7/3/2017 5.0 (H)   10/7/2016 0.89   4/8/2016 0.73   1/4/2016 1.5   10/9/2015 6.6 (H)   7/7/2015 25.2 (H)     Assessment and Plan:   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 Melendez as scheduled next month  -- FU PSA per Dr. Melendez  -- FU 6 mos for next Eligard    ED  -- Trial generic sildenafil    Anterior urethral stricture, unspecified stricture type  -- S/p cysto dilation in OR at time of PLND in September 2015  -- Voiding well now - will monitor

## 2018-06-18 ENCOUNTER — LAB VISIT (OUTPATIENT)
Dept: LAB | Facility: OTHER | Age: 73
End: 2018-06-18
Attending: INTERNAL MEDICINE
Payer: MEDICARE

## 2018-06-18 DIAGNOSIS — C79.51 PROSTATE CANCER METASTATIC TO BONE: ICD-10-CM

## 2018-06-18 DIAGNOSIS — C61 PROSTATE CANCER METASTATIC TO INTRAPELVIC LYMPH NODE: ICD-10-CM

## 2018-06-18 DIAGNOSIS — C77.5 PROSTATE CANCER METASTATIC TO INTRAPELVIC LYMPH NODE: ICD-10-CM

## 2018-06-18 DIAGNOSIS — C61 PROSTATE CANCER METASTATIC TO BONE: ICD-10-CM

## 2018-06-18 LAB
ALBUMIN SERPL BCP-MCNC: 3.8 G/DL
ALBUMIN SERPL BCP-MCNC: 3.8 G/DL
ALP SERPL-CCNC: 68 U/L
ALP SERPL-CCNC: 68 U/L
ALT SERPL W/O P-5'-P-CCNC: 13 U/L
ALT SERPL W/O P-5'-P-CCNC: 13 U/L
ANION GAP SERPL CALC-SCNC: 10 MMOL/L
ANION GAP SERPL CALC-SCNC: 10 MMOL/L
AST SERPL-CCNC: 12 U/L
AST SERPL-CCNC: 12 U/L
BILIRUB SERPL-MCNC: 0.2 MG/DL
BILIRUB SERPL-MCNC: 0.2 MG/DL
BUN SERPL-MCNC: 13 MG/DL
BUN SERPL-MCNC: 13 MG/DL
CALCIUM SERPL-MCNC: 10 MG/DL
CALCIUM SERPL-MCNC: 10 MG/DL
CHLORIDE SERPL-SCNC: 105 MMOL/L
CHLORIDE SERPL-SCNC: 105 MMOL/L
CO2 SERPL-SCNC: 25 MMOL/L
CO2 SERPL-SCNC: 25 MMOL/L
COMPLEXED PSA SERPL-MCNC: 7.3 NG/ML
CREAT SERPL-MCNC: 1.2 MG/DL
CREAT SERPL-MCNC: 1.2 MG/DL
ERYTHROCYTE [DISTWIDTH] IN BLOOD BY AUTOMATED COUNT: 14.4 %
EST. GFR  (AFRICAN AMERICAN): >60 ML/MIN/1.73 M^2
EST. GFR  (AFRICAN AMERICAN): >60 ML/MIN/1.73 M^2
EST. GFR  (NON AFRICAN AMERICAN): >60 ML/MIN/1.73 M^2
EST. GFR  (NON AFRICAN AMERICAN): >60 ML/MIN/1.73 M^2
GLUCOSE SERPL-MCNC: 192 MG/DL
GLUCOSE SERPL-MCNC: 192 MG/DL
HCT VFR BLD AUTO: 34.2 %
HGB BLD-MCNC: 10.8 G/DL
MCH RBC QN AUTO: 28 PG
MCHC RBC AUTO-ENTMCNC: 31.6 G/DL
MCV RBC AUTO: 89 FL
NEUTROPHILS # BLD AUTO: 3.2 K/UL
PLATELET # BLD AUTO: 252 K/UL
PMV BLD AUTO: 8.6 FL
POTASSIUM SERPL-SCNC: 4 MMOL/L
POTASSIUM SERPL-SCNC: 4 MMOL/L
PROT SERPL-MCNC: 7.3 G/DL
PROT SERPL-MCNC: 7.3 G/DL
RBC # BLD AUTO: 3.86 M/UL
SODIUM SERPL-SCNC: 140 MMOL/L
SODIUM SERPL-SCNC: 140 MMOL/L
WBC # BLD AUTO: 4.57 K/UL

## 2018-06-18 PROCEDURE — 84153 ASSAY OF PSA TOTAL: CPT

## 2018-06-18 PROCEDURE — 80053 COMPREHEN METABOLIC PANEL: CPT

## 2018-06-18 PROCEDURE — 85027 COMPLETE CBC AUTOMATED: CPT

## 2018-06-18 PROCEDURE — 36415 COLL VENOUS BLD VENIPUNCTURE: CPT

## 2018-06-19 ENCOUNTER — OFFICE VISIT (OUTPATIENT)
Dept: HEMATOLOGY/ONCOLOGY | Facility: CLINIC | Age: 73
End: 2018-06-19
Attending: INTERNAL MEDICINE
Payer: MEDICARE

## 2018-06-19 VITALS
OXYGEN SATURATION: 97 % | RESPIRATION RATE: 16 BRPM | HEART RATE: 94 BPM | DIASTOLIC BLOOD PRESSURE: 70 MMHG | WEIGHT: 188.69 LBS | SYSTOLIC BLOOD PRESSURE: 115 MMHG | HEIGHT: 68 IN | TEMPERATURE: 98 F | BODY MASS INDEX: 28.6 KG/M2

## 2018-06-19 DIAGNOSIS — C61 PROSTATE CANCER METASTATIC TO INTRAPELVIC LYMPH NODE: Primary | ICD-10-CM

## 2018-06-19 DIAGNOSIS — C77.5 PROSTATE CANCER METASTATIC TO INTRAPELVIC LYMPH NODE: Primary | ICD-10-CM

## 2018-06-19 DIAGNOSIS — C79.51 PROSTATE CANCER METASTATIC TO BONE: ICD-10-CM

## 2018-06-19 DIAGNOSIS — R11.15 NON-INTRACTABLE CYCLICAL VOMITING WITH NAUSEA: ICD-10-CM

## 2018-06-19 DIAGNOSIS — C61 PROSTATE CANCER METASTATIC TO BONE: ICD-10-CM

## 2018-06-19 PROCEDURE — 3074F SYST BP LT 130 MM HG: CPT | Mod: CPTII,S$GLB,, | Performed by: INTERNAL MEDICINE

## 2018-06-19 PROCEDURE — 99999 PR PBB SHADOW E&M-EST. PATIENT-LVL III: CPT | Mod: PBBFAC,,, | Performed by: INTERNAL MEDICINE

## 2018-06-19 PROCEDURE — 99214 OFFICE O/P EST MOD 30 MIN: CPT | Mod: S$GLB,,, | Performed by: INTERNAL MEDICINE

## 2018-06-19 PROCEDURE — 3078F DIAST BP <80 MM HG: CPT | Mod: CPTII,S$GLB,, | Performed by: INTERNAL MEDICINE

## 2018-06-19 RX ORDER — PROMETHAZINE HYDROCHLORIDE 25 MG/1
25 TABLET ORAL EVERY 6 HOURS PRN
Qty: 30 TABLET | Refills: 0 | Status: SHIPPED | OUTPATIENT
Start: 2018-06-19

## 2018-06-19 NOTE — PROGRESS NOTES
Subjective:       Patient ID: Shady Prakash Jr. is a 73 y.o. male.    Chief Complaint: No chief complaint on file.    HPI     Of note he went to the ER in the interval for a syncopal episode and fall- he attributed to taking his medications including diabetic medications on an empty stomach and taking a diuretic despite being out in heat and not hydrating well.  BP was low and he resoonded to IVFs.    Returns for follow-up back on hormonal therapy.   Notes today n/v and abdominal pain- states pain near known umbilical hernia area  + stools   Declines ER  He completed Docetaxel as below and had a PSA response     He had been started on Casodex in 7/2017 and we had 2 confirmed PSA rises since initiation supporting that he was hormonally resistant by 10/17.  We discussed with him and his family the ECO[employer] trial of early Taxotere exposure- we described that in this study ADT and chemotherapy were initiated together- his hormonal therapy has already started but we plan to use 3-6 cycles and extrapolate from this data.     Doing well- only concern is sexual dysfunction. However declined recommendations offered by Dr. Umaña.  Good energy level.  Eating well.  Denies pain complaints.  No current neuropathy.     Oncology History:  He is s/p TRUS/bx on 8/7/15 w/ high volume marta 9 (5+4), PSA 25.  He had negative metastatic workup.  At time of planned RALP on 9/10/15 he was noted to have BN invasion on cysto and therefore only PLND was done, which confirmed metastatic disease.  He was started on ADT 9/17/15 w/ firmagon followed by Eligard on 10/15/15.    Received last Eligard 4/13/17.  Started Casodex 7/20/17  Started docetaxel 10/6/17  Last Lupron 10/17/17     7/25/17 Bone scan:  Increased region of radiopharmaceutical uptake focally within the right posterior ilium, right approximate seventh rib as well the inferior left scapula which are concerning for enlarging osseous metastases. There is additional area of focal  uptake involving the superior medial margin of the left scapula and left pubic tubercle region concerning for additional osseous metastases. Clinical correlation advised     7/25/17 CT urogram:  Interval development of sclerotic foci involving the right iliac bone and left superior pubic ramus likely representing sclerotic bone metastases in this patient with history of prostate carcinoma.  Mildly enlarged prostate gland.  Right lower pole renal cyst.  Persistent pleural thickening within the base of the right hemithorax with associated atelectatic changes.  Mild bronchiectatic changes are also noted.  These are stable when compared to the prior examination.  Small foci of early arterial enhancement within the liver likely representing benign hepatic hemangiomas.  These are unchanged when compared to the prior examination.  Small hiatal hernia.  Colonic diverticulosis without evidence for acute diverticulitis.     9/1/17 xrays:  Right femur 2 views.  Compared to recent bone scan July 2017.  There is sclerotic increased density in the right superior acetabulum extending into the ileum.  Femur appears intact.  Vascular calcifications are noted.  Impression sclerotic focus right acetabulum and ilium consistent with metastatic disease.     Bilateral hips to include pelvis.  A sclerotic increased density in the right superior acetabulum and ilium again noted.  Subtle increased sclerosis in the left symphysis certainly better seen on bone scan.  Degenerative changes at the hips and lower lumbar spine.  No acute fracture seen.  Impression sclerotic foci as above consistent with known metastatic disease    Review of Systems   Constitutional: Negative for activity change, appetite change, chills, fatigue (wife states he gets tired sometimes, but he denies), fever and unexpected weight change.   HENT: Positive for dental problem. Negative for congestion, hearing loss, mouth sores, nosebleeds, postnasal drip, sinus pressure,  sore throat and trouble swallowing.    Eyes: Negative for visual disturbance.   Respiratory: Negative for cough, chest tightness, shortness of breath and wheezing.    Cardiovascular: Negative for chest pain, palpitations and leg swelling.   Gastrointestinal: Positive for abdominal pain and nausea. Negative for abdominal distention, blood in stool, constipation, diarrhea and vomiting.        No GERD   Endocrine:        Hot flashes   Genitourinary: Negative for decreased urine volume, difficulty urinating, frequency, hematuria and urgency.        Sexual dysfunction   Musculoskeletal: Positive for arthralgias (better). Negative for back pain, gait problem, joint swelling, neck pain and neck stiffness.   Neurological: Negative for dizziness, weakness, light-headedness, numbness and headaches.   Hematological: Negative for adenopathy. Does not bruise/bleed easily.   Psychiatric/Behavioral: Negative for dysphoric mood and sleep disturbance. The patient is not nervous/anxious.        Objective:      Physical Exam   Constitutional: He is oriented to person, place, and time. He appears well-developed and well-nourished. No distress.   Presents with wife   HENT:   Head: Normocephalic and atraumatic.   Right Ear: External ear normal.   Left Ear: External ear normal.   Nose: Nose normal.   Mouth/Throat: Oropharynx is clear and moist. No oropharyngeal exudate.   Eyes: Conjunctivae and EOM are normal. Pupils are equal, round, and reactive to light. Left eye exhibits no discharge. No scleral icterus.   Neck: Normal range of motion. Neck supple. No tracheal deviation present. No thyromegaly present.   Cardiovascular: Normal rate, regular rhythm, normal heart sounds and intact distal pulses.  Exam reveals no gallop and no friction rub.    No murmur heard.  Pulmonary/Chest: Effort normal and breath sounds normal. No respiratory distress. He has no wheezes. He has no rales. He exhibits no tenderness.   Abdominal: Soft. Bowel sounds  are normal. He exhibits no distension and no mass. There is no tenderness. There is no rebound and no guarding.   Musculoskeletal: Normal range of motion. He exhibits no edema, tenderness or deformity.   Lymphadenopathy:     He has no cervical adenopathy.   Neurological: He is alert and oriented to person, place, and time. He has normal reflexes. He displays normal reflexes. No cranial nerve deficit. He exhibits normal muscle tone. Coordination normal.   Skin: Skin is warm and dry. No rash noted. He is not diaphoretic. No erythema. No pallor.   Psychiatric: He has a normal mood and affect. His behavior is normal. Judgment and thought content normal.   Nursing note and vitals reviewed.    Labs- reviewed  Assessment:       1. Prostate cancer metastatic to intrapelvic lymph node    2. Prostate cancer metastatic to bone        Plan:     1-2. Scan asap  CT scans and bone scan  Declined ER  RTC post

## 2018-06-20 ENCOUNTER — HOSPITAL ENCOUNTER (OUTPATIENT)
Dept: RADIOLOGY | Facility: OTHER | Age: 73
Discharge: HOME | End: 2018-06-20
Attending: INTERNAL MEDICINE
Payer: MEDICARE

## 2018-06-20 DIAGNOSIS — C79.51 PROSTATE CANCER METASTATIC TO BONE: ICD-10-CM

## 2018-06-20 DIAGNOSIS — C61 PROSTATE CANCER METASTATIC TO BONE: ICD-10-CM

## 2018-06-20 PROCEDURE — 78306 BONE IMAGING WHOLE BODY: CPT | Mod: 26,,, | Performed by: RADIOLOGY

## 2018-06-20 PROCEDURE — A9503 TC99M MEDRONATE: HCPCS

## 2018-06-21 ENCOUNTER — HOSPITAL ENCOUNTER (OUTPATIENT)
Dept: RADIOLOGY | Facility: OTHER | Age: 73
Discharge: HOME | End: 2018-06-21
Attending: INTERNAL MEDICINE
Payer: MEDICARE

## 2018-06-21 DIAGNOSIS — C77.5 PROSTATE CANCER METASTATIC TO INTRAPELVIC LYMPH NODE: ICD-10-CM

## 2018-06-21 DIAGNOSIS — C61 PROSTATE CANCER METASTATIC TO INTRAPELVIC LYMPH NODE: ICD-10-CM

## 2018-06-21 PROCEDURE — 74177 CT ABD & PELVIS W/CONTRAST: CPT | Mod: 26,,, | Performed by: RADIOLOGY

## 2018-06-21 PROCEDURE — 74177 CT ABD & PELVIS W/CONTRAST: CPT | Mod: TC

## 2018-06-21 PROCEDURE — 25500020 PHARM REV CODE 255: Performed by: INTERNAL MEDICINE

## 2018-06-21 RX ADMIN — IOHEXOL 30 ML: 350 INJECTION, SOLUTION INTRAVENOUS at 09:06

## 2018-06-21 RX ADMIN — IOHEXOL 100 ML: 350 INJECTION, SOLUTION INTRAVENOUS at 09:06

## 2018-06-22 ENCOUNTER — NURSE TRIAGE (OUTPATIENT)
Dept: ADMINISTRATIVE | Facility: CLINIC | Age: 73
End: 2018-06-22

## 2018-06-22 NOTE — TELEPHONE ENCOUNTER
Reason for Disposition   Blood glucose  mg/dl (3.5 -13 mmol/l)    Protocols used:  DIABETES - HIGH BLOOD SUGAR-A-OH    Daughter is calling to report a blood sugar of 235 this AM.  States is off Metformin due to recent CT Scan. No other symptoms to report.  Please contact  Shady to advise.

## 2018-06-26 ENCOUNTER — LAB VISIT (OUTPATIENT)
Dept: LAB | Facility: OTHER | Age: 73
End: 2018-06-26
Attending: INTERNAL MEDICINE
Payer: MEDICARE

## 2018-06-26 ENCOUNTER — OFFICE VISIT (OUTPATIENT)
Dept: HEMATOLOGY/ONCOLOGY | Facility: CLINIC | Age: 73
End: 2018-06-26
Attending: INTERNAL MEDICINE
Payer: MEDICARE

## 2018-06-26 VITALS
RESPIRATION RATE: 18 BRPM | WEIGHT: 183.88 LBS | HEART RATE: 97 BPM | DIASTOLIC BLOOD PRESSURE: 76 MMHG | BODY MASS INDEX: 27.96 KG/M2 | SYSTOLIC BLOOD PRESSURE: 110 MMHG | OXYGEN SATURATION: 100 % | TEMPERATURE: 97 F

## 2018-06-26 DIAGNOSIS — R97.8 OTHER ABNORMAL TUMOR MARKERS: ICD-10-CM

## 2018-06-26 DIAGNOSIS — J69.0 ASPIRATION PNEUMONIA OF RIGHT LUNG, UNSPECIFIED ASPIRATION PNEUMONIA TYPE, UNSPECIFIED PART OF LUNG: ICD-10-CM

## 2018-06-26 DIAGNOSIS — K86.9 PANCREATIC LESION: Primary | ICD-10-CM

## 2018-06-26 DIAGNOSIS — K86.9 PANCREATIC LESION: ICD-10-CM

## 2018-06-26 LAB — CANCER AG19-9 SERPL-ACNC: <2 U/ML

## 2018-06-26 PROCEDURE — 36415 COLL VENOUS BLD VENIPUNCTURE: CPT

## 2018-06-26 PROCEDURE — 99214 OFFICE O/P EST MOD 30 MIN: CPT | Mod: S$GLB,,, | Performed by: INTERNAL MEDICINE

## 2018-06-26 PROCEDURE — 99999 PR PBB SHADOW E&M-EST. PATIENT-LVL III: CPT | Mod: PBBFAC,,, | Performed by: INTERNAL MEDICINE

## 2018-06-26 PROCEDURE — 86301 IMMUNOASSAY TUMOR CA 19-9: CPT

## 2018-06-26 PROCEDURE — 3074F SYST BP LT 130 MM HG: CPT | Mod: CPTII,S$GLB,, | Performed by: INTERNAL MEDICINE

## 2018-06-26 PROCEDURE — 3078F DIAST BP <80 MM HG: CPT | Mod: CPTII,S$GLB,, | Performed by: INTERNAL MEDICINE

## 2018-06-26 RX ORDER — AMOXICILLIN AND CLAVULANATE POTASSIUM 875; 125 MG/1; MG/1
1 TABLET, FILM COATED ORAL 2 TIMES DAILY
Qty: 14 TABLET | Refills: 0 | Status: SHIPPED | OUTPATIENT
Start: 2018-06-26 | End: 2018-07-17 | Stop reason: ALTCHOICE

## 2018-06-26 RX ORDER — PREDNISONE 5 MG/1
5 TABLET ORAL DAILY
Qty: 10 TABLET | Refills: 0 | Status: SHIPPED | OUTPATIENT
Start: 2018-06-26 | End: 2018-07-06

## 2018-06-26 NOTE — PROGRESS NOTES
Presents to discuss scan results  We discussed below results at length  He has progression of his prostate cancer confirmed radiographically and by PSA  With recent concern for aspiration with nausea vomiting events we will start an antibiotic with RLL consolidation finding- thise area was previously smaller and felt to be part of a chronic process per radiology- we will pursue further work-up if needed  He has a pancreatic lesion which is an unlikely area for prostate cancer spread- I will send a CA 19-9 and discuss area with Dr. Boyd  We discussed prostate cancer progression and options including Zytiga and Prednisone vs Taxotere again     > 25 minute discussion with patient and family  We will follow-up on further procedures and imaging and lab results    Scan results:  6/20/18 Bone scan:  FINDINGS:  Two lesions in the left scapula, stable.  Elongated lesion in the right mid rib posteriorly, stable.  Prior exam demonstrated a large right iliac lesion, which no longer exhibits increased uptake.  Left pubic lesion, stable.  There is also now a small lesion in the left iliac bone. Two additional new small foci in the mid thoracic vertebra.  Both kidneys and the bladder appear unremarkable.  Impression:  Findings consistent with osseous metastatic disease as above, noting 3 new small lesions.    6/21/18 CT scan abdomen and pelvis:  FINDINGS:  Lower chest: There has been interval increase in size in the focal consolidation which is pleural based in the posterior aspect of the right lower lobe.  Today's exam measures 5.1 cm maximum diameter.  Previous exam is measure approximately 3.3 cm.  This is associated with pleural thickening that extends laterally.  There is a small focus of cylinder all coal bronchiectasis in the medial left lower lobe.  The remaining aspects of the lung parenchyma that are visualized show no abnormalities.  The visualized heart and pericardium are normal  Liver: There are 3 hyperenhancing  foci along the periphery of the right and left lobes of the liver which are stable from 2015.  The largest measures 1.1 cm in maximum diameter.  Although not characteristic on this exam, previous exam in 2015 shows findings most characteristic of hepatic hemangioma.  The remaining liver parenchyma as well as the hepatic and portal veins are patent.  Gallbladder: Normal.  Pancreas: There is been interval development of an ill-defined hypodense focus within the proximal body of the pancreas measuring 2.1 cm x 1.8 cm.  Spleen: Calcified hemangiomas present.  Otherwise normal.  Adrenal glands: Normal.  Genitourinary: Stable simple cyst in the lower pole of the right kidney measuring 1.6 cm.  The left kidney and bilateral ureters are normal.  The bladder is partially distended and shows wall thickening on the non dependent surface of the bladder.  This is not significantly changed in appearance.  Gastrointestinal: Stomach and small intestines are normal.  Colonic diverticulosis is present without evidence of diverticulitis.  Prostate: Enlarged and nodular causing mass effect on the bladder.  Miscellaneous: There is no intra-abdominal or pelvic free fluid, free air or lymphadenopathy.  The abdominal aorta tapers normally.  Bilateral fat containing inguinal hernias present.  Osseous and soft tissue structures: Sclerotic metastases involving the right iliac bone and left superior pubic ramus are identified.  The small focus in the left iliac bone seen on yesterday's bone scan is not well seen on this exam.  The soft tissues are within normal limits.  Impression:  1. Interval increase in size in the pleural base, right lower lobe consolidation.  2. Hyperenhancing foci likely represent hemangiomas.  They have been stable since 2015.  Is on the 2015 exam where they demonstrate characteristics most compatible with hemangioma.  3. Bladder wall thickening is not significantly changed.  This may be related to outlet obstruction  from the nodular prostate.  4. Interval development of ill-defined hypodense focus measuring 2.1 cm within the proximal body of the pancreas.  This may also be a focus of metastatic disease although is uncommon for the prostate to metastasized to the pancreas.  A dedicated pancreatic CT may be beneficial in further characterizing this lesion.

## 2018-06-27 ENCOUNTER — TELEPHONE (OUTPATIENT)
Dept: GASTROENTEROLOGY | Facility: CLINIC | Age: 73
End: 2018-06-27

## 2018-06-27 DIAGNOSIS — K86.89 PANCREATIC MASS: Primary | ICD-10-CM

## 2018-06-27 NOTE — TELEPHONE ENCOUNTER
----- Message from Yvon Boyd MD sent at 6/27/2018 10:03 AM CDT -----  Please schedule an EUS for pancreas lesion and let Dr Melendez office know the date.  Thanks  Yvon Boyd MD  ----- Message -----  From: Maday Melendez MD  Sent: 6/26/2018   5:13 PM  To: Yvon Boyd MD    Can you look at recent CT of abdomen? He has a pancreatic body mass  He has known metastatic prostate cancer but this is an atypical area of spread.  I sent a  - do you recommend any further testing or work-up?  Thanks  Maday

## 2018-06-28 ENCOUNTER — TELEPHONE (OUTPATIENT)
Dept: ENDOSCOPY | Facility: HOSPITAL | Age: 73
End: 2018-06-28

## 2018-06-28 ENCOUNTER — TELEPHONE (OUTPATIENT)
Dept: GASTROENTEROLOGY | Facility: CLINIC | Age: 73
End: 2018-06-28

## 2018-06-28 ENCOUNTER — TELEPHONE (OUTPATIENT)
Dept: HEMATOLOGY/ONCOLOGY | Facility: CLINIC | Age: 73
End: 2018-06-28

## 2018-06-28 NOTE — TELEPHONE ENCOUNTER
----- Message from Yuni Lopez sent at 6/28/2018 11:36 AM CDT -----  Contact: kameron                 Name of Who is Calling: daughter       What is the request in detail:  Calling to discuss with staff  in regards to his appointment       Can the clinic reply by [facility]: no       What Number to Call Back if not in [facility]: [phone]

## 2018-06-28 NOTE — TELEPHONE ENCOUNTER
Message fwd to MD.         ----- Message from Sherrie Joyce sent at 6/28/2018  8:18 AM CDT -----  Contact: pt            Name of Who is Calling: pt      What is the request in detail: test results      Can the clinic reply by [facility]: no      What Number to Call Back if not in [facility]: [phone]

## 2018-06-28 NOTE — TELEPHONE ENCOUNTER
Spoke with patient's wife. EUS scheduled for 7/5 at 10:30a. Reviewed prep instructions. Ms Prakash verbalized understanding.

## 2018-07-05 ENCOUNTER — HOSPITAL ENCOUNTER (OUTPATIENT)
Facility: HOSPITAL | Age: 73
Discharge: HOME | End: 2018-07-05
Attending: INTERNAL MEDICINE | Admitting: INTERNAL MEDICINE
Payer: MEDICARE

## 2018-07-05 ENCOUNTER — ANESTHESIA (OUTPATIENT)
Dept: ENDOSCOPY | Facility: HOSPITAL | Age: 73
End: 2018-07-05
Payer: MEDICARE

## 2018-07-05 ENCOUNTER — ANESTHESIA EVENT (OUTPATIENT)
Dept: ENDOSCOPY | Facility: HOSPITAL | Age: 73
End: 2018-07-05
Payer: MEDICARE

## 2018-07-05 ENCOUNTER — NURSE TRIAGE (OUTPATIENT)
Dept: ADMINISTRATIVE | Facility: CLINIC | Age: 73
End: 2018-07-05

## 2018-07-05 ENCOUNTER — SURGERY (OUTPATIENT)
Age: 73
End: 2018-07-05

## 2018-07-05 VITALS
BODY MASS INDEX: 29.55 KG/M2 | RESPIRATION RATE: 18 BRPM | TEMPERATURE: 99 F | HEART RATE: 92 BPM | DIASTOLIC BLOOD PRESSURE: 71 MMHG | WEIGHT: 195 LBS | HEIGHT: 68 IN | SYSTOLIC BLOOD PRESSURE: 107 MMHG | OXYGEN SATURATION: 98 %

## 2018-07-05 DIAGNOSIS — K86.89 MASS OF PANCREAS: ICD-10-CM

## 2018-07-05 LAB
GLUCOSE SERPL-MCNC: 158 MG/DL (ref 70–110)
POCT GLUCOSE: 130 MG/DL (ref 70–110)
POCT GLUCOSE: 158 MG/DL (ref 70–110)

## 2018-07-05 PROCEDURE — 88305 TISSUE EXAM BY PATHOLOGIST: CPT | Performed by: PATHOLOGY

## 2018-07-05 PROCEDURE — 82962 GLUCOSE BLOOD TEST: CPT | Performed by: INTERNAL MEDICINE

## 2018-07-05 PROCEDURE — 37000009 HC ANESTHESIA EA ADD 15 MINS: Performed by: INTERNAL MEDICINE

## 2018-07-05 PROCEDURE — 43242 EGD US FINE NEEDLE BX/ASPIR: CPT | Mod: ,,, | Performed by: INTERNAL MEDICINE

## 2018-07-05 PROCEDURE — 88172 CYTP DX EVAL FNA 1ST EA SITE: CPT | Mod: 26,,, | Performed by: PATHOLOGY

## 2018-07-05 PROCEDURE — 37000008 HC ANESTHESIA 1ST 15 MINUTES: Performed by: INTERNAL MEDICINE

## 2018-07-05 PROCEDURE — 88173 CYTOPATH EVAL FNA REPORT: CPT | Mod: 26,,, | Performed by: PATHOLOGY

## 2018-07-05 PROCEDURE — D9220A PRA ANESTHESIA: Mod: CRNA,,, | Performed by: NURSE ANESTHETIST, CERTIFIED REGISTERED

## 2018-07-05 PROCEDURE — D9220A PRA ANESTHESIA: Mod: ANES,,, | Performed by: ANESTHESIOLOGY

## 2018-07-05 PROCEDURE — 25000003 PHARM REV CODE 250: Performed by: INTERNAL MEDICINE

## 2018-07-05 PROCEDURE — 43242 EGD US FINE NEEDLE BX/ASPIR: CPT | Performed by: INTERNAL MEDICINE

## 2018-07-05 PROCEDURE — 88342 IMHCHEM/IMCYTCHM 1ST ANTB: CPT | Mod: 26,,, | Performed by: PATHOLOGY

## 2018-07-05 PROCEDURE — 27202059 HC NEEDLE, FNA (ANY): Performed by: INTERNAL MEDICINE

## 2018-07-05 PROCEDURE — 63600175 PHARM REV CODE 636 W HCPCS: Performed by: NURSE ANESTHETIST, CERTIFIED REGISTERED

## 2018-07-05 PROCEDURE — 88341 IMHCHEM/IMCYTCHM EA ADD ANTB: CPT | Performed by: PATHOLOGY

## 2018-07-05 RX ORDER — ONDANSETRON 2 MG/ML
INJECTION INTRAMUSCULAR; INTRAVENOUS
Status: DISCONTINUED | OUTPATIENT
Start: 2018-07-05 | End: 2018-07-05

## 2018-07-05 RX ORDER — PROPOFOL 10 MG/ML
VIAL (ML) INTRAVENOUS
Status: DISCONTINUED | OUTPATIENT
Start: 2018-07-05 | End: 2018-07-05

## 2018-07-05 RX ORDER — PROPOFOL 10 MG/ML
VIAL (ML) INTRAVENOUS CONTINUOUS PRN
Status: DISCONTINUED | OUTPATIENT
Start: 2018-07-05 | End: 2018-07-05

## 2018-07-05 RX ORDER — LIDOCAINE HCL/PF 100 MG/5ML
SYRINGE (ML) INTRAVENOUS
Status: DISCONTINUED | OUTPATIENT
Start: 2018-07-05 | End: 2018-07-05

## 2018-07-05 RX ORDER — SODIUM CHLORIDE 9 MG/ML
INJECTION, SOLUTION INTRAVENOUS CONTINUOUS
Status: DISCONTINUED | OUTPATIENT
Start: 2018-07-05 | End: 2018-07-05 | Stop reason: HOSPADM

## 2018-07-05 RX ORDER — FENTANYL CITRATE 50 UG/ML
INJECTION, SOLUTION INTRAMUSCULAR; INTRAVENOUS
Status: DISCONTINUED | OUTPATIENT
Start: 2018-07-05 | End: 2018-07-05

## 2018-07-05 RX ADMIN — SODIUM CHLORIDE: 0.9 INJECTION, SOLUTION INTRAVENOUS at 11:07

## 2018-07-05 RX ADMIN — SODIUM CHLORIDE: 0.9 INJECTION, SOLUTION INTRAVENOUS at 10:07

## 2018-07-05 RX ADMIN — FENTANYL CITRATE 50 MCG: 50 INJECTION, SOLUTION INTRAMUSCULAR; INTRAVENOUS at 10:07

## 2018-07-05 RX ADMIN — FENTANYL CITRATE 25 MCG: 50 INJECTION, SOLUTION INTRAMUSCULAR; INTRAVENOUS at 10:07

## 2018-07-05 RX ADMIN — PROPOFOL 40 MG: 10 INJECTION, EMULSION INTRAVENOUS at 10:07

## 2018-07-05 RX ADMIN — LIDOCAINE HYDROCHLORIDE 50 MG: 20 INJECTION, SOLUTION INTRAVENOUS at 10:07

## 2018-07-05 RX ADMIN — PROPOFOL 200 MCG/KG/MIN: 10 INJECTION, EMULSION INTRAVENOUS at 10:07

## 2018-07-05 RX ADMIN — ONDANSETRON 4 MG: 2 INJECTION INTRAMUSCULAR; INTRAVENOUS at 10:07

## 2018-07-05 NOTE — ANESTHESIA RELEASE NOTE
"Anesthesia Release from PACU Note    Patient: Shady Prakash Jr.    Procedure(s) Performed: Procedure(s) (LRB):  ULTRASOUND, ENDOSCOPIC, UPPER GI TRACT (N/A)    Anesthesia type: general    Post pain: Adequate analgesia    Post assessment: no apparent anesthetic complications, tolerated procedure well and no evidence of recall    Last Vitals:   Visit Vitals  BP (!) 97/55   Pulse 91   Temp 36.8 °C (98.3 °F) (Skin)   Resp 16   Ht 5' 8" (1.727 m)   Wt 88.5 kg (195 lb)   SpO2 97%   BMI 29.65 kg/m²       Post vital signs: stable    Level of consciousness: awake, alert  and oriented    Nausea/Vomiting: no nausea/no vomiting    Complications: none    Airway Patency: patent    Respiratory: unassisted    Cardiovascular: stable and blood pressure at baseline    Hydration: euvolemic  "

## 2018-07-05 NOTE — H&P
"    Short Stay Endoscopy History and Physical    PCP - Hiram Coronado MD  Referring Physician - Maday Melendez MD  [address] [place] [address]    Procedure - eus  ASA - per anesthesia  Mallampati - per anesthesia  History of Anesthesia problems - no  Family history Anesthesia problems -  no   Plan of anesthesia - General    HPI:  This is a 73 y.o. male here for evaluation of: pancreas lesion    Reflux - no  Dysphagia - no  Abdominal pain - no  Diarrhea - no    ROS:  Constitutional: No fevers, chills, No weight loss  CV: No chest pain  Pulm: No cough, No shortness of breath  Ophtho: No vision changes  GI: see HPI  Derm: No rash    Medical History:  has a past medical history of Allergy; Arthritis; Cancer; Diabetes mellitus; Hyperlipidemia; Hypertension; and Prostate cancer.    Surgical History:  has a past surgical history that includes Eye surgery (Right) and Lymph node dissection.    Family History: family history includes Cancer in his maternal grandfather and maternal uncle; Diabetes in his maternal aunt, sister, sister, sister, and sister; Heart disease in his brother and brother; Hypertension in his father and mother; No Known Problems in his paternal grandfather and paternal grandmother; Prostate cancer in his brother..    Social History:  reports that he quit smoking about 19 months ago. His smoking use included Cigarettes and Cigars. He started smoking about 60 years ago. He has a 240.00 pack-year smoking history. He has never used smokeless tobacco. He reports that he drinks about 1.8 oz of alcohol per week . He reports that he uses drugs, including "Crack" cocaine.    Review of patient's allergies indicates:  No Known Allergies    Medications:   Facility-Administered Medications Prior to Admission   Medication Dose Route Frequency Provider Last Rate Last Dose    leuprolide (6 month) (ELIGARD) injection 45 mg  45 mg Subcutaneous Q6 Months Matteo Juarez MD   45 mg at 05/29/18 0913 "    [START ON 11/7/2018] leuprolide (6 month) (ELIGARD) injection 45 mg  45 mg Subcutaneous Q6 Months Matteo Juarez MD        leuprolide (ELIGARD) injection 45 mg  45 mg Subcutaneous Q6 Months Matteo Juarez MD   45 mg at 04/14/16 0907     Prescriptions Prior to Admission   Medication Sig Dispense Refill Last Dose    ACETAMINOPHEN (TYLENOL ARTHRITIS ORAL) Take by mouth.   7/4/2018 at Unknown time    amoxicillin-clavulanate 875-125mg (AUGMENTIN) 875-125 mg per tablet Take 1 tablet by mouth 2 (two) times daily. 14 tablet 0 7/4/2018 at Unknown time    aspirin (ECOTRIN) 81 MG EC tablet Take 81 mg by mouth once daily. States not taken in over 1 month as of 9/2/15   7/4/2018 at Unknown time    bicalutamide (CASODEX) 50 MG Tab Take 1 tablet (50 mg total) by mouth once daily. 30 tablet 2 7/4/2018 at Unknown time    calcium-vitamin D3 (OYSTER SHELL CALCIUM-VIT D3) 500 mg(1,250mg) -200 unit per tablet Take 1 tablet by mouth 2 (two) times daily. 180 tablet 3 7/4/2018 at Unknown time    glimepiride (AMARYL) 2 MG tablet Take 2 mg by mouth 2 (two) times daily.    7/4/2018 at Unknown time    lisinopril-hydrochlorothiazide (PRINZIDE,ZESTORETIC) 20-25 mg Tab Take 1 tablet by mouth once daily.    7/4/2018 at Unknown time    lovastatin (MEVACOR) 20 MG tablet    7/4/2018 at Unknown time    metformin (GLUCOPHAGE) 1000 MG tablet Take 1,000 mg by mouth 2 (two) times daily.    7/4/2018 at Unknown time    papaverine 30 mg/mL injection Add Phentolamine 1 mg/cc  Add PGE1 10 mcg/cc    SIG:  Bring to office for test dose in physician office 5 mL 0 Past Week at Unknown time    predniSONE (DELTASONE) 5 MG tablet Take 1 tablet (5 mg total) by mouth once daily. for 10 days 10 tablet 0 Past Week at Unknown time    promethazine (PHENERGAN) 25 MG tablet Take 1 tablet (25 mg total) by mouth every 6 (six) hours as needed for Nausea. 30 tablet 0 Past Week at Unknown time    sildenafil (REVATIO) 20 mg Tab Take 1 tablet (20 mg total)  by mouth As instructed. Take 2-5 tablets as needed. 40 tablet 11 7/4/2018 at Unknown time       Physical Exam:    Vital Signs:   Vitals:    07/05/18 1012   BP: 113/76   Pulse: 94   Resp: 16   Temp: 97 °F (36.1 °C)       General Appearance: Well appearing in no acute distress  Eyes:    No scleral icterus  ENT: Neck supple  Lungs: CTA anteriorly  Heart:  Regular rate  Abdomen: Soft, non tender, non distended with normal bowel sounds.  Extremities: No edema  Skin: No rash    Labs:  Lab Results   Component Value Date    WBC 4.57 06/18/2018    HGB 10.8 (L) 06/18/2018    HCT 34.2 (L) 06/18/2018     06/18/2018    ALT 13 06/18/2018    ALT 13 06/18/2018    AST 12 06/18/2018    AST 12 06/18/2018     06/18/2018     06/18/2018    K 4.0 06/18/2018    K 4.0 06/18/2018     06/18/2018     06/18/2018    CREATININE 1.2 06/18/2018    CREATININE 1.2 06/18/2018    BUN 13 06/18/2018    BUN 13 06/18/2018    CO2 25 06/18/2018    CO2 25 06/18/2018    HGBA1C 6.3 (H) 09/10/2015       I have explained the risks and benefits of this endoscopic procedure to the patient including but not limited to bleeding, inflammation, infection, perforation, and death.      Wiliam Ayoub MD

## 2018-07-05 NOTE — TRANSFER OF CARE
"Anesthesia Transfer of Care Note    Patient: Shady Prakash Jr.    Procedure(s) Performed: Procedure(s) (LRB):  ULTRASOUND, ENDOSCOPIC, UPPER GI TRACT (N/A)    Patient location: PACU    Anesthesia Type: general    Transport from OR: Transported from OR on room air with adequate spontaneous ventilation    Post pain: adequate analgesia    Post assessment: no apparent anesthetic complications    Post vital signs: stable    Level of consciousness: awake, alert and oriented    Complications: none    Transfer of care protocol was followed      Last vitals:   Visit Vitals  /76 (BP Location: Left arm, Patient Position: Lying)   Pulse 94   Temp 36.1 °C (97 °F) (Temporal)   Resp 16   Ht 5' 8" (1.727 m)   Wt 88.5 kg (195 lb)   SpO2 99%   BMI 29.65 kg/m²     "

## 2018-07-05 NOTE — DISCHARGE INSTRUCTIONS
Endoscopic Ultrasound (EUS)    An endoscopic ultrasound (EUS) is a test to look at the inside of your gastrointestinal (GI) tract. It's commonly used to look for cancers or growths in the esophagus, stomach, pancreas, liver, and rectum. It can help to stage cancer (see how advanced a cancer is). EUS may also be used to help diagnose certain diseases or to drain cysts or abscesses.  What is EUS?  EUS shows both ultrasound images and live video of the GI tract. During the test, a flexible tube called an endoscope (scope) is used. At the end of the scope is a tiny video camera and light. The video camera sends live images to a monitor. The scope also contains a very small ultrasound device. This uses sound waves to create images and send them to a monitor.  A needle is passed through the scope. The needle can be used take a small sample of tissue for testing. This is called a biopsy. The needle can be used to take a sample of fluid. This is called fine-needle aspiration (FNA).  Risks and possible complications of EUS  Risks and possible complications include the following:  · Bleeding  · Infection  · A perforation (hole) in the digestive tract   · Risks of sedation or anesthesia   Before the test  Be prepared prior to the test:  · Tell your healthcare provider what medicine you take. This includes vitamins, herbs, and over-the-counter medicine. It also includes any blood thinners, such as warfarin, clopidogrel, ibuprofen, or daily aspirin. Ask your healthcare provider if you need to stop taking some or all of them before the test.  · You may be prescribed antibiotics to take before or after the test. This depends on the area being studied and what is done during the test. These medicines help prevent infection.  · Carefully follow the instructions for preparing for the test to make sure results are accurate. Instructions may include:  ¨ If youre having an EUS of the upper GI tract (esophagus, stomach, duodenum,  pancreas, liver):  § Do not eat or drink for 6 hours before the test.  ¨ If youre having an EUS of the lower GI tract (rectum):  § Before the test, do bowel prep as instructed to clean your rectum of stool. This may involve a clear liquid diet and using a laxative (liquid or pills) the night before the test. Or it may mean doing one or more enemas the morning of the test.  § Do not eat or drink for 6 hours before the test.  · Be sure to arrive on time at the facility. Bring your identification and health insurance card. Leave valuables at home. If you have them, bring X-rays or other test results with you.  Let the healthcare provider know  For your safety, tell the healthcare provider if you:  · Take insulin. Your dose may need to be changed on the day of your test.  · Are allergic to latex.  · Have any other allergies.  · Are taking blood thinners.   During the test  An endoscopic ultrasound usually takes place in a hospital. The procedure itself may take 1 to 2 hours. You will likely go home soon afterward. During the test:  · You lie on your left side on an exam table.  · An intravenous (IV) line will be put into a vein in your arm or hand. This line supplies fluids and medicines. To keep you comfortable during the test, you will be given a sedative medicine. This medicine prevents discomfort and will make you sleepy.  · If you are having an EUS of the upper GI tract, local anesthetic may be sprayed in your throat. This will help you be more comfortable as the healthcare provider inserts the scope. The healthcare provider then gently puts the flexible scope into your mouth or nose and down your throat.  · If youre having an EUS of the lower GI tract, the healthcare provider gently puts the flexible scope into your anus.  · During the test, the scope sends live video and ultrasound images from inside your body to nearby monitors. These are used to examine your GI tract. Specialized procedures, such as drainage,  are done as needed.  · The healthcare provider may discuss the results with you soon after the test. Biopsy results take several  days.  · In most cases, you can go home within a few hours of the test. When you leave the facility, have an adult family member or friend drive you, even if you don't feel that sleepy.  After the test  Here is what to expect after the test:  · You may feel tired from the sedative. This should wear off by the end of the day.  · If you had an upper digestive endoscopy, your throat may feel sore for a day or two. Over-the-counter sore throat lozenges and spray should help.  · You can eat and drink normally as soon as the test is done.  When to call the healthcare provider  Call your healthcare provider if you notice any of the following:  · Fever of 100.4°F (38.0°C) or higher, or as advised by your healthcare provider  · Shortness of breath  · Vomiting blood, blood in stool, or black stools  · Coughing or hoarse voice that wont go away   Date Last Reviewed: 7/1/2016 © 2000-2017 [employer] [address] [place] [address]. All rights reserved. This information is not intended as a substitute for professional medical care. Always follow your healthcare professional's instructions.        Recovery After Procedural Sedation (Adult)  You have been given medicine by vein to make you sleep during your surgery. This may have included both a pain medicine and sleeping medicine. Most of the effects have worn off. But you may still have some drowsiness for the next 6 to 8 hours.  Home care  Follow these guidelines when you get home:  · For the next 8 hours, you should be watched by a responsible adult. This person should make sure your condition is not getting worse.  · Don't drink any alcohol for the next 24 hours.  · Don't drive, operate dangerous machinery, or make important business or personal decisions during the next 24 hours.  Note: Your healthcare provider may tell  you not to take any medicine by mouth for pain or sleep in the next 4 hours. These medicines may react with the medicines you were given in the hospital. This could cause a much stronger response than usual.  Follow-up care  Follow up with your healthcare provider if you are not alert and back to your usual level of activity within 12 hours.  When to seek medical advice  Call your healthcare provider right away if any of these occur:  · Drowsiness gets worse  · Weakness or dizziness gets worse  · Repeated vomiting  · You can't be awakened   Date Last Reviewed: 10/18/2016  © 7921-5369 [employer] [address] [place] [address]. All rights reserved. This information is not intended as a substitute for professional medical care. Always follow your healthcare professional's instructions.    PATIENT INSTRUCTIONS  POST-ANESTHESIA    IMMEDIATELY FOLLOWING SURGERY:  Do not drive or operate machinery for the first twenty four hours after surgery.  Do not make any important decisions for twenty four hours after surgery or while taking narcotic pain medications or sedatives.  If you develop intractable nausea and vomiting or a severe headache please notify your doctor immediately.    FOLLOW-UP:  Please make an appointment with your surgeon as instructed. You do not need to follow up with anesthesia unless specifically instructed to do so.    WOUND CARE INSTRUCTIONS (if applicable):  Keep a dry clean dressing on the anesthesia/puncture wound site if there is drainage.  Once the wound has quit draining you may leave it open to air.  Generally you should leave the bandage intact for twenty four hours unless there is drainage.  If the epidural site drains for more than 36-48 hours please call the anesthesia department.    QUESTIONS?:  Please feel free to call your physician or the hospital  if you have any questions, and they will be happy to assist you.       [facility] Anesthesia  [facility]  [address] [facility]  [phone]

## 2018-07-05 NOTE — ANESTHESIA PREPROCEDURE EVALUATION
07/05/2018  Shady Prakash Jr. is a 73 y.o., male.    Anesthesia Evaluation         Review of Systems      Physical Exam  General:  Well nourished    Airway/Jaw/Neck:  Airway Findings: Mouth Opening: Normal Tongue: Normal  General Airway Assessment: Adult  Mallampati: II  Improves to II with phonation.  TM Distance: Normal, at least 6 cm     Eyes/Ears/Nose:  Eyes/Ears/Nose Findings:    Dental:  Dental Findings: Periodontal disease, Severe   Chest/Lungs:  Chest/Lungs Findings: Clear to auscultation     Heart/Vascular:  Heart Findings: Rate: Normal  Rhythm: Regular Rhythm        Mental Status:  Mental Status Findings:  Cooperative         Anesthesia Plan  Type of Anesthesia, risks & benefits discussed:  Anesthesia Type:  general  Patient's Preference:   Intra-op Monitoring Plan: standard ASA monitors  Intra-op Monitoring Plan Comments:   Post Op Pain Control Plan: multimodal analgesia  Post Op Pain Control Plan Comments:   Induction:   IV  Beta Blocker:  Patient is not currently on a Beta-Blocker (No further documentation required).       Informed Consent: Patient understands risks and agrees with Anesthesia plan.  Questions answered. Anesthesia consent signed with patient.  ASA Score: 3     Day of Surgery Review of History & Physical:    H&P update referred to the surgeon.         Ready For Surgery From Anesthesia Perspective.

## 2018-07-05 NOTE — PLAN OF CARE
Patient and family state they are ready to be discharged. Instructions and report given to patient and family. Both verbalize understanding. Patient tolerating po liquids with no difficulty. Patient states pain is at a tolerable level for them. Anesthesia consent and surgical consent in chart upon patient's discharge from [facility].

## 2018-07-05 NOTE — ANESTHESIA POSTPROCEDURE EVALUATION
"Anesthesia Post Evaluation    Patient: Shady Prakash Jr.    Procedure(s) Performed: Procedure(s) (LRB):  ULTRASOUND, ENDOSCOPIC, UPPER GI TRACT (N/A)    Final Anesthesia Type: general  Patient location during evaluation: [facility]  Patient participation: Yes- Able to Participate  Level of consciousness: awake and alert and oriented  Post-procedure vital signs: reviewed and stable  Pain management: adequate  Airway patency: patent  PONV status at discharge: No PONV  Anesthetic complications: no      Cardiovascular status: blood pressure returned to baseline and hemodynamically stable  Respiratory status: unassisted and spontaneous ventilation  Hydration status: euvolemic  Follow-up not needed.        Visit Vitals  BP (!) 97/55   Pulse 91   Temp 36.8 °C (98.3 °F) (Skin)   Resp 16   Ht 5' 8" (1.727 m)   Wt 88.5 kg (195 lb)   SpO2 97%   BMI 29.65 kg/m²       Pain/Patricia Score: Pain Assessment Performed: Yes (7/5/2018 11:00 AM)  Presence of Pain: denies (7/5/2018 11:00 AM)      "

## 2018-07-06 NOTE — TELEPHONE ENCOUNTER
"    Reason for Disposition   Chest pain   [1] Chest pain lasts > 5 minutes AND [2] age > 50    Answer Assessment - Initial Assessment Questions  1. SYMPTOM: "What's the main symptom you're concerned about?" (e.g., pain, fever, vomiting)      Chest pain, right in the center, right underneath his breast bone, and epigastric area.  2. ONSET: "When did ________  start?"      Before he left the hospital   3. SURGERY: "What surgery was performed?"      EGD with biopsy of pancrease  4. DATE of SURGERY: "When was surgery performed?"       This am  5. ANESTHESIA: " What type of anesthesia did you have?" (e.g., general, spinal, epidural, local)      Conscious sedation  6. PAIN: "Is there any pain?" If so, ask: "How bad is it?"  (Scale 1-10; or mild, moderate, severe)      Intermittent chest pain, rates it 1/10  7. FEVER: "Do you have a fever?" If so, ask: "What is your temperature, how was it measured, and when did it start?"      no  8. VOMITING: "Is there any vomiting?" If yes, ask: "How many times?"      Vomited x 1, just a tiny amount several hours ago  9. BLEEDING: "Is there any bleeding?" If so, ask: "How much?" and "Where?"      no  10. OTHER SYMPTOMS: "Do you have any other symptoms?" (e.g., drainage from wound, painful urination, constipation)        *No Answer*  Current /74, HR 99    Answer Assessment - Initial Assessment Questions  1. LOCATION: "Where does it hurt?"        Middle of the chest, under the sternum, and epigastric area  2. RADIATION: "Does the pain go anywhere else?" (e.g., into neck, jaw, arms, back)      no  3. ONSET: "When did the chest pain begin?" (Minutes, hours or days)       Just after EGd this am  4. PATTERN "Does the pain come and go, or has it been constant since it started?"  "Does it get worse with exertion?"       intermittent  5. DURATION: "How long does it last" (e.g., seconds, minutes, hours)      Several minutes  6. SEVERITY: "How bad is the pain?"  (e.g., Scale 1-10; mild, " "moderate, or severe)     - MILD (1-3): doesn't interfere with normal activities      - MODERATE (4-7): interferes with normal activities or awakens from sleep     - SEVERE (8-10): excruciating pain, unable to do any normal activities        Pt had a difficult time with this question, but eventually rated it 1/10 ( im unsure if he truly understood the question)  7. CARDIAC RISK FACTORS: "Do you have any history of heart problems or risk factors for heart disease?" (e.g., prior heart attack, angina; high blood pressure, diabetes, being overweight, high cholesterol, smoking, or strong family history of heart disease)      Htn, diabetes, hyperlipidemia  8. PULMONARY RISK FACTORS: "Do you have any history of lung disease?"  (e.g., blood clots in lung, asthma, emphysema, birth control pills)      no  9. CAUSE: "What do you think is causing the chest pain?"      Did not assess  10. OTHER SYMPTOMS: "Do you have any other symptoms?" (e.g., dizziness, nausea, vomiting, sweating, fever, difficulty breathing, cough)        no  11. PREGNANCY: "Is there any chance you are pregnant?" "When was your last menstrual period?"        Na  Heart rate 99, bp 147/74    Protocols used: ST POST-OP SYMPTOMS AND [id number], ST CHEST PAIN-A-AH      "

## 2018-07-10 ENCOUNTER — OFFICE VISIT (OUTPATIENT)
Dept: HEMATOLOGY/ONCOLOGY | Facility: CLINIC | Age: 73
End: 2018-07-10
Payer: MEDICARE

## 2018-07-10 ENCOUNTER — INFUSION (OUTPATIENT)
Dept: INFUSION THERAPY | Facility: OTHER | Age: 73
End: 2018-07-10
Attending: INTERNAL MEDICINE
Payer: MEDICARE

## 2018-07-10 VITALS
OXYGEN SATURATION: 97 % | TEMPERATURE: 99 F | SYSTOLIC BLOOD PRESSURE: 97 MMHG | BODY MASS INDEX: 27.33 KG/M2 | DIASTOLIC BLOOD PRESSURE: 58 MMHG | RESPIRATION RATE: 18 BRPM | WEIGHT: 180.31 LBS | HEART RATE: 104 BPM | HEIGHT: 68 IN

## 2018-07-10 DIAGNOSIS — C79.51 PROSTATE CANCER METASTATIC TO BONE: Primary | ICD-10-CM

## 2018-07-10 DIAGNOSIS — C61 PROSTATE CANCER METASTATIC TO BONE: Primary | ICD-10-CM

## 2018-07-10 DIAGNOSIS — I95.9 HYPOTENSION, UNSPECIFIED HYPOTENSION TYPE: ICD-10-CM

## 2018-07-10 DIAGNOSIS — C77.5 PROSTATE CANCER METASTATIC TO INTRAPELVIC LYMPH NODE: ICD-10-CM

## 2018-07-10 DIAGNOSIS — R11.2 INTRACTABLE VOMITING WITH NAUSEA, UNSPECIFIED VOMITING TYPE: ICD-10-CM

## 2018-07-10 DIAGNOSIS — N17.9 AKI (ACUTE KIDNEY INJURY): ICD-10-CM

## 2018-07-10 DIAGNOSIS — K86.9 PANCREATIC LESION: ICD-10-CM

## 2018-07-10 DIAGNOSIS — C61 PROSTATE CANCER METASTATIC TO INTRAPELVIC LYMPH NODE: ICD-10-CM

## 2018-07-10 PROCEDURE — 96360 HYDRATION IV INFUSION INIT: CPT

## 2018-07-10 PROCEDURE — 3078F DIAST BP <80 MM HG: CPT | Mod: CPTII,S$GLB,, | Performed by: INTERNAL MEDICINE

## 2018-07-10 PROCEDURE — 99999 PR PBB SHADOW E&M-EST. PATIENT-LVL III: CPT | Mod: PBBFAC,,, | Performed by: INTERNAL MEDICINE

## 2018-07-10 PROCEDURE — 3074F SYST BP LT 130 MM HG: CPT | Mod: CPTII,S$GLB,, | Performed by: INTERNAL MEDICINE

## 2018-07-10 PROCEDURE — 25000003 PHARM REV CODE 250: Performed by: INTERNAL MEDICINE

## 2018-07-10 PROCEDURE — 99214 OFFICE O/P EST MOD 30 MIN: CPT | Mod: S$GLB,,, | Performed by: INTERNAL MEDICINE

## 2018-07-10 RX ORDER — DRONABINOL 2.5 MG/1
2.5 CAPSULE ORAL
Qty: 60 CAPSULE | Refills: 2 | Status: SHIPPED | OUTPATIENT
Start: 2018-07-10 | End: 2018-07-10 | Stop reason: SDUPTHER

## 2018-07-10 RX ORDER — DRONABINOL 2.5 MG/1
2.5 CAPSULE ORAL
Qty: 60 CAPSULE | Refills: 2 | Status: SHIPPED | OUTPATIENT
Start: 2018-07-10

## 2018-07-10 RX ADMIN — SODIUM CHLORIDE: 0.9 INJECTION, SOLUTION INTRAVENOUS at 02:07

## 2018-07-10 NOTE — PLAN OF CARE
Problem: Patient Care Overview  Goal: Plan of Care Review  Outcome: Ongoing (interventions implemented as appropriate)  IVF administered, patient tolerated well. VSS, NAD. D/C'd home with family.

## 2018-07-10 NOTE — PROGRESS NOTES
"Subjective:       Patient ID: Shady Prakash Jr. is a 73 y.o. male.     Chief Complaint: follow up for prostate cancer and pancreatic lesion     HPI      Mr. Prakash returns today for follow up. 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 Has been progressing recently with rising PSA and 3 new bone mets on bone scan. CT abdomen showed a new 2.1 cm pancreatic mass. He underwent EUS biopsy on 7/5/18:  "A round mass was identified in the pancreatic body. The mass was isoechoic. The mass measured 25 mm by 25 mm in maximal cross-sectional diameter. The endosonographic borders were poorly-defined. There was sonographic evidence suggesting invasion into the splenic artery (manifested by significant compression of vessel). An intact interface was seen between the mass and the adjacent structures suggesting a lack of invasion."    This mass was biopsied. Pathology pending.    CA 19-9<2    He then developed N/V and went to the ER. Given zofran and phenergan, which are not helping much. Felt lightheaded this morning when cutting the grass. +anorexia. Lost 10 lbs over 2 weeks.        Oncology History:  He is s/p TRUS/bx on 8/7/15 w/ high volume marta 9 (5+4), PSA 25.  He had negative metastatic workup.  At time of planned RALP on 9/10/15 he was noted to have BN invasion on cysto and therefore only PLND was done, which confirmed metastatic disease.  He was started on ADT 9/17/15 w/ firmagon followed by Eligard on 10/15/15.    Received last Eligard 45 mg 5/29/18.  Started Casodex 7/20/17  Started docetaxel 10/6/17-Dec 2017, s/p 4 cycles  Last Lupron 10/17/17        6/21/18 CT scan abdomen and pelvis:  FINDINGS:  Lower chest: There has been interval increase in size in the focal consolidation which is pleural based in the posterior aspect of the right lower lobe.  Today's exam measures 5.1 cm maximum diameter.  Previous exam is measure approximately 3.3 cm.  This is associated with pleural thickening that " extends laterally.  There is a small focus of cylinder all coal bronchiectasis in the medial left lower lobe.  The remaining aspects of the lung parenchyma that are visualized show no abnormalities.  The visualized heart and pericardium are normal  Liver: There are 3 hyperenhancing foci along the periphery of the right and left lobes of the liver which are stable from 2015.  The largest measures 1.1 cm in maximum diameter.  Although not characteristic on this exam, previous exam in 2015 shows findings most characteristic of hepatic hemangioma.  The remaining liver parenchyma as well as the hepatic and portal veins are patent.  Gallbladder: Normal.  Pancreas: There is been interval development of an ill-defined hypodense focus within the proximal body of the pancreas measuring 2.1 cm x 1.8 cm.  Spleen: Calcified hemangiomas present.  Otherwise normal.  Adrenal glands: Normal.  Genitourinary: Stable simple cyst in the lower pole of the right kidney measuring 1.6 cm.  The left kidney and bilateral ureters are normal.  The bladder is partially distended and shows wall thickening on the non dependent surface of the bladder.  This is not significantly changed in appearance.  Gastrointestinal: Stomach and small intestines are normal.  Colonic diverticulosis is present without evidence of diverticulitis.  Prostate: Enlarged and nodular causing mass effect on the bladder.  Miscellaneous: There is no intra-abdominal or pelvic free fluid, free air or lymphadenopathy.  The abdominal aorta tapers normally.  Bilateral fat containing inguinal hernias present.  Osseous and soft tissue structures: Sclerotic metastases involving the right iliac bone and left superior pubic ramus are identified.  The small focus in the left iliac bone seen on yesterday's bone scan is not well seen on this exam.  The soft tissues are within normal limits.  Impression:  1. Interval increase in size in the pleural base, right lower lobe  consolidation.  2. Hyperenhancing foci likely represent hemangiomas.  They have been stable since 2015.  Is on the 2015 exam where they demonstrate characteristics most compatible with hemangioma.  3. Bladder wall thickening is not significantly changed.  This may be related to outlet obstruction from the nodular prostate.  4. Interval development of ill-defined hypodense focus measuring 2.1 cm within the proximal body of the pancreas.  This may also be a focus of metastatic disease although is uncommon for the prostate to metastasized to the pancreas.  A dedicated pancreatic CT may be beneficial in further characterizing this lesion.    6/20/18 Bone scan:  FINDINGS:  Two lesions in the left scapula, stable.  Elongated lesion in the right mid rib posteriorly, stable.  Prior exam demonstrated a large right iliac lesion, which no longer exhibits increased uptake.  Left pubic lesion, stable.  There is also now a small lesion in the left iliac bone. Two additional new small foci in the mid thoracic vertebra.  Both kidneys and the bladder appear unremarkable.  Impression:  Findings consistent with osseous metastatic disease as above, noting 3 new small lesions.       Review of Systems   Constitutional: +10-lb weight loss over 2 weeks. Negative for activity change, appetite change, chills, fatigue, fever.   HENT: Positive for dental problem. Negative for congestion, hearing loss, mouth sores, nosebleeds, postnasal drip, sinus pressure, sore throat and trouble swallowing.    Eyes: Negative for visual disturbance.   Respiratory: Negative for cough, chest tightness, shortness of breath and wheezing.    Cardiovascular: Negative for chest pain, palpitations and leg swelling.   Gastrointestinal: +anorexia. Positive for abdominal pain and nausea, vomiting. Negative for abdominal distention, blood in stool, constipation, diarrhea and vomiting.        No GERD   Endocrine:        Hot flashes   Genitourinary: Negative for decreased  urine volume, difficulty urinating, frequency, hematuria and urgency.        Sexual dysfunction   Musculoskeletal: Negative for back pain, gait problem, joint swelling, neck pain and neck stiffness.   Neurological: +dizziness this morning. Negative for weakness, light-headedness, numbness and headaches.   Hematological: Negative for adenopathy. Does not bruise/bleed easily.   Psychiatric/Behavioral: Negative for dysphoric mood and sleep disturbance. The patient is not nervous/anxious.        Objective:   Physical Exam   Constitutional: He is oriented to person, place, and time. He appears well-developed and well-nourished. No distress.   Presents with wife   HENT:   Head: Normocephalic and atraumatic.   Right Ear: External ear normal.   Left Ear: External ear normal.   Nose: Nose normal.   Mouth/Throat: Oropharynx is clear and moist. No oropharyngeal exudate.   Eyes: Conjunctivae and EOM are normal. Pupils are equal, round, and reactive to light. Left eye exhibits no discharge. No scleral icterus.   Neck: Normal range of motion. Neck supple. No tracheal deviation present. No thyromegaly present.   Cardiovascular: Normal rate, regular rhythm, normal heart sounds and intact distal pulses.  Exam reveals no gallop and no friction rub.    No murmur heard.  Pulmonary/Chest: Effort normal and breath sounds normal. No respiratory distress. He has no wheezes. He has no rales. He exhibits no tenderness.   Abdominal: Soft. Bowel sounds are normal. He exhibits no distension and no mass. There is no tenderness. There is no rebound and no guarding.   Musculoskeletal: Normal range of motion. He exhibits no edema, tenderness or deformity.   Lymphadenopathy:     He has no cervical adenopathy.   Neurological: He is alert and oriented to person, place, and time. He has normal reflexes. He displays normal reflexes. No cranial nerve deficit. He exhibits normal muscle tone. Coordination normal.   Skin: Skin is warm and dry. No rash  noted. He is not diaphoretic. No erythema. No pallor.   Psychiatric: He has a normal mood and affect. His behavior is normal. Judgment and thought content normal.   Nursing note and vitals reviewed.    Labs- reviewed  Assessment/Plan:     1. Prostate cancer metastatic to bone    2. Prostate cancer metastatic to intrapelvic lymph node    3. Pancreatic lesion    4. Intractable vomiting with nausea, unspecified vomiting type    5. ISA (acute kidney injury)    6. Hypotension, unspecified hypotension type      1.2  - We need to wait on pancreatic mass biopsy result. If it shows prostate cancer, treatment options include enzalutamide, abiraterone, or docetaxel. He previously had good response to docetaxel and tolerated it well.     3.  - concerning. EUS showed a 2.5 cm mass with invasion into the splenic artery. If +pancreatic adenocarcinoma, will need pancreas protocol CT scan, CT chest, referral to surgery.     4.  - refractory to zofran and phenergan  - marinol 2.5 mg bid    5.6  - IVF today    F/U:  RTC next week to discuss biopsy result.

## 2018-07-13 ENCOUNTER — TELEPHONE (OUTPATIENT)
Dept: GASTROENTEROLOGY | Facility: HOSPITAL | Age: 73
End: 2018-07-13

## 2018-07-13 NOTE — TELEPHONE ENCOUNTER
The FNA revealed adenocarcinoma.  He has an appt with Dr Echavarria of hem/onc.  Next week. I will refer him on to surg onc here.

## 2018-07-15 ENCOUNTER — NURSE TRIAGE (OUTPATIENT)
Dept: ADMINISTRATIVE | Facility: CLINIC | Age: 73
End: 2018-07-15

## 2018-07-17 ENCOUNTER — OFFICE VISIT (OUTPATIENT)
Dept: HEMATOLOGY/ONCOLOGY | Facility: CLINIC | Age: 73
DRG: 330 | End: 2018-07-17
Payer: MEDICARE

## 2018-07-17 ENCOUNTER — INITIAL CONSULT (OUTPATIENT)
Dept: SURGERY | Facility: CLINIC | Age: 73
DRG: 330 | End: 2018-07-17
Payer: MEDICARE

## 2018-07-17 VITALS
SYSTOLIC BLOOD PRESSURE: 106 MMHG | BODY MASS INDEX: 27.45 KG/M2 | RESPIRATION RATE: 18 BRPM | WEIGHT: 180.56 LBS | HEART RATE: 103 BPM | TEMPERATURE: 97 F | DIASTOLIC BLOOD PRESSURE: 71 MMHG

## 2018-07-17 VITALS
HEIGHT: 68 IN | DIASTOLIC BLOOD PRESSURE: 70 MMHG | TEMPERATURE: 100 F | WEIGHT: 180.75 LBS | SYSTOLIC BLOOD PRESSURE: 114 MMHG | BODY MASS INDEX: 27.39 KG/M2 | RESPIRATION RATE: 18 BRPM | HEART RATE: 100 BPM | OXYGEN SATURATION: 96 %

## 2018-07-17 DIAGNOSIS — Z01.818 PRE-OP TESTING: ICD-10-CM

## 2018-07-17 DIAGNOSIS — C25.1 MALIGNANT NEOPLASM OF BODY OF PANCREAS: Primary | ICD-10-CM

## 2018-07-17 DIAGNOSIS — Z23 NEED FOR VACCINATION: ICD-10-CM

## 2018-07-17 DIAGNOSIS — C77.5 PROSTATE CANCER METASTATIC TO INTRAPELVIC LYMPH NODE: ICD-10-CM

## 2018-07-17 DIAGNOSIS — C25.9 MALIGNANT NEOPLASM OF PANCREAS, UNSPECIFIED LOCATION OF MALIGNANCY: ICD-10-CM

## 2018-07-17 DIAGNOSIS — G89.3 CANCER RELATED PAIN: ICD-10-CM

## 2018-07-17 DIAGNOSIS — C61 PROSTATE CANCER METASTATIC TO BONE: Primary | ICD-10-CM

## 2018-07-17 DIAGNOSIS — C61 PROSTATE CANCER METASTATIC TO INTRAPELVIC LYMPH NODE: ICD-10-CM

## 2018-07-17 DIAGNOSIS — Z23 NEED FOR VACCINE FOR DT (DIPHTHERIA-TETANUS): ICD-10-CM

## 2018-07-17 DIAGNOSIS — C79.51 PROSTATE CANCER METASTATIC TO BONE: Primary | ICD-10-CM

## 2018-07-17 DIAGNOSIS — Z23 VACCINE FOR DIPHTHERIA-TETANUS-PERTUSSIS, COMBINED: ICD-10-CM

## 2018-07-17 PROCEDURE — 99205 OFFICE O/P NEW HI 60 MIN: CPT | Mod: 57,S$GLB,, | Performed by: NURSE PRACTITIONER

## 2018-07-17 PROCEDURE — 99999 PR PBB SHADOW E&M-EST. PATIENT-LVL III: CPT | Mod: PBBFAC,,, | Performed by: INTERNAL MEDICINE

## 2018-07-17 PROCEDURE — 3074F SYST BP LT 130 MM HG: CPT | Mod: CPTII,S$GLB,, | Performed by: INTERNAL MEDICINE

## 2018-07-17 PROCEDURE — 99999 PR PBB SHADOW E&M-EST. PATIENT-LVL V: CPT | Mod: PBBFAC,,, | Performed by: NURSE PRACTITIONER

## 2018-07-17 PROCEDURE — 3078F DIAST BP <80 MM HG: CPT | Mod: CPTII,S$GLB,, | Performed by: NURSE PRACTITIONER

## 2018-07-17 PROCEDURE — 3074F SYST BP LT 130 MM HG: CPT | Mod: CPTII,S$GLB,, | Performed by: NURSE PRACTITIONER

## 2018-07-17 PROCEDURE — 99214 OFFICE O/P EST MOD 30 MIN: CPT | Mod: S$GLB,,, | Performed by: INTERNAL MEDICINE

## 2018-07-17 PROCEDURE — 3078F DIAST BP <80 MM HG: CPT | Mod: CPTII,S$GLB,, | Performed by: INTERNAL MEDICINE

## 2018-07-17 RX ORDER — OXYCODONE HYDROCHLORIDE 15 MG/1
15 TABLET ORAL EVERY 4 HOURS PRN
Qty: 90 TABLET | Refills: 0 | Status: ON HOLD | OUTPATIENT
Start: 2018-07-17 | End: 2018-07-25 | Stop reason: HOSPADM

## 2018-07-17 NOTE — PROGRESS NOTES
"Subjective:       Patient ID: Shady Prakash Jr. is a 73 y.o. male.     Chief Complaint: follow up for prostate cancer and pancreatic cancer     HPI      Mr. Prakash returns today for follow up. 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 Has been progressing recently with rising PSA and 3 new bone mets on bone scan. CT abdomen showed a new 2.1 cm pancreatic mass. He underwent EUS biopsy on 7/5/18:  "A round mass was identified in the pancreatic body. The mass was isoechoic. The mass measured 25 mm by 25 mm in maximal cross-sectional diameter. The endosonographic borders were poorly-defined. There was sonographic evidence suggesting invasion into the splenic artery (manifested by significant compression of vessel). An intact interface was seen between the mass and the adjacent structures suggesting a lack of invasion."     This mass was biopsied. Pathology showed adenocarcinoma, negative for PSA.     CA 19-9<2     He saw Dr. Fregoso this morning, who felt it is resectable pancreatic cancer. He is scheduled for CT C/A/P tomorrow and surgery on 8/8. He continues to have pain in the abdomen. Was given 8 tab of norco in the ED yesterday. Norco helps but only for a while.         Oncology History:  He is s/p TRUS/bx on 8/7/15 w/ high volume marta 9 (5+4), PSA 25.  He had negative metastatic workup.  At time of planned RALP on 9/10/15 he was noted to have BN invasion on cysto and therefore only PLND was done, which confirmed metastatic disease.  He was started on ADT 9/17/15 w/ firmagon followed by Eligard on 10/15/15.    Received last Eligard 45 mg 5/29/18. Next due in Nov 2018  Started Casodex 7/20/17  Started docetaxel 10/6/17-Dec 2017, s/p 4 cycles  Last Lupron 10/17/17        6/21/18 CT scan abdomen and pelvis:  FINDINGS:  Lower chest: There has been interval increase in size in the focal consolidation which is pleural based in the posterior aspect of the right lower lobe.  Today's exam measures " 5.1 cm maximum diameter.  Previous exam is measure approximately 3.3 cm.  This is associated with pleural thickening that extends laterally.  There is a small focus of cylinder all coal bronchiectasis in the medial left lower lobe.  The remaining aspects of the lung parenchyma that are visualized show no abnormalities.  The visualized heart and pericardium are normal  Liver: There are 3 hyperenhancing foci along the periphery of the right and left lobes of the liver which are stable from 2015.  The largest measures 1.1 cm in maximum diameter.  Although not characteristic on this exam, previous exam in 2015 shows findings most characteristic of hepatic hemangioma.  The remaining liver parenchyma as well as the hepatic and portal veins are patent.  Gallbladder: Normal.  Pancreas: There is been interval development of an ill-defined hypodense focus within the proximal body of the pancreas measuring 2.1 cm x 1.8 cm.  Spleen: Calcified hemangiomas present.  Otherwise normal.  Adrenal glands: Normal.  Genitourinary: Stable simple cyst in the lower pole of the right kidney measuring 1.6 cm.  The left kidney and bilateral ureters are normal.  The bladder is partially distended and shows wall thickening on the non dependent surface of the bladder.  This is not significantly changed in appearance.  Gastrointestinal: Stomach and small intestines are normal.  Colonic diverticulosis is present without evidence of diverticulitis.  Prostate: Enlarged and nodular causing mass effect on the bladder.  Miscellaneous: There is no intra-abdominal or pelvic free fluid, free air or lymphadenopathy.  The abdominal aorta tapers normally.  Bilateral fat containing inguinal hernias present.  Osseous and soft tissue structures: Sclerotic metastases involving the right iliac bone and left superior pubic ramus are identified.  The small focus in the left iliac bone seen on yesterday's bone scan is not well seen on this exam.  The soft tissues  are within normal limits.  Impression:  1. Interval increase in size in the pleural base, right lower lobe consolidation.  2. Hyperenhancing foci likely represent hemangiomas.  They have been stable since 2015.  Is on the 2015 exam where they demonstrate characteristics most compatible with hemangioma.  3. Bladder wall thickening is not significantly changed.  This may be related to outlet obstruction from the nodular prostate.  4. Interval development of ill-defined hypodense focus measuring 2.1 cm within the proximal body of the pancreas.  This may also be a focus of metastatic disease although is uncommon for the prostate to metastasized to the pancreas.  A dedicated pancreatic CT may be beneficial in further characterizing this lesion.     6/20/18 Bone scan:  FINDINGS:  Two lesions in the left scapula, stable.  Elongated lesion in the right mid rib posteriorly, stable.  Prior exam demonstrated a large right iliac lesion, which no longer exhibits increased uptake.  Left pubic lesion, stable.  There is also now a small lesion in the left iliac bone. Two additional new small foci in the mid thoracic vertebra.  Both kidneys and the bladder appear unremarkable.  Impression:  Findings consistent with osseous metastatic disease as above, noting 3 new small lesions.        Review of Systems   Constitutional: +10-lb weight loss over 2 weeks. Negative for activity change, appetite change, chills, fatigue, fever.   HENT: Positive for dental problem. Negative for congestion, hearing loss, mouth sores, nosebleeds, postnasal drip, sinus pressure, sore throat and trouble swallowing.    Eyes: Negative for visual disturbance.   Respiratory: Negative for cough, chest tightness, shortness of breath and wheezing.    Cardiovascular: Negative for chest pain, palpitations and leg swelling.   Gastrointestinal: +anorexia. Positive for abdominal pain. Negative for abdominal distention, blood in stool, constipation, diarrhea and vomiting.         No GERD   Endocrine:        Hot flashes   Genitourinary: Negative for decreased urine volume, difficulty urinating, frequency, hematuria and urgency.        Sexual dysfunction   Musculoskeletal: Negative for back pain, gait problem, joint swelling, neck pain and neck stiffness.   Neurological: +dizziness this morning. Negative for weakness, light-headedness, numbness and headaches.   Hematological: Negative for adenopathy. Does not bruise/bleed easily.   Psychiatric/Behavioral: Negative for dysphoric mood and sleep disturbance. The patient is not nervous/anxious.       Objective:   Physical Exam   Constitutional: He is oriented to person, place, and time. He appears well-developed and well-nourished. No distress.   Presents with wife   HENT:   Head: Normocephalic and atraumatic.   Right Ear: External ear normal.   Left Ear: External ear normal.   Nose: Nose normal.   Mouth/Throat: Oropharynx is clear and moist. No oropharyngeal exudate.   Eyes: Conjunctivae and EOM are normal. Pupils are equal, round, and reactive to light. Left eye exhibits no discharge. No scleral icterus.   Neck: Normal range of motion. Neck supple. No tracheal deviation present. No thyromegaly present.   Cardiovascular: Normal rate, regular rhythm, normal heart sounds and intact distal pulses.  Exam reveals no gallop and no friction rub.    No murmur heard.  Pulmonary/Chest: Effort normal and breath sounds normal. No respiratory distress. He has no wheezes. He has no rales. He exhibits no tenderness.   Abdominal: Soft. Bowel sounds are normal. He exhibits no distension and no mass. Mild tenderness in the right lower quadrant. There is no rebound and no guarding.   Musculoskeletal: Normal range of motion. He exhibits no edema, tenderness or deformity.   Lymphadenopathy:     He has no cervical adenopathy.   Neurological: He is alert and oriented to person, place, and time. He has normal reflexes. He displays normal reflexes. No cranial  nerve deficit. He exhibits normal muscle tone. Coordination normal.   Skin: Skin is warm and dry. No rash noted. He is not diaphoretic. No erythema. No pallor.   Psychiatric: He has a normal mood and affect. His behavior is normal. Judgment and thought content normal.   Nursing note and vitals reviewed.    Labs- reviewed  Assessment/Plan:      1. Prostate cancer metastatic to bone    2. Prostate cancer metastatic to intrapelvic lymph node    3. Malignant neoplasm of pancreas, unspecified location of malignancy    4. Cancer related pain        1.2  - Mr. Prakash is a 74 yo man with castration resistant prostate cancer with bone mets, and newly diagnosed resectable pancreatic cancer  - will start enzalutamide for prostate cancer. It is a hormone therapy and should not impede wound healing. Side effects discussed with patient including fatigue, muscle aches, peripheral edema, back pain, dizziness, headaches. He understands and would like to start the pills. Prescriptions sent to specialty pharmacy. Hand-outs given to patient  - after resection of pancreatic cancer, he may benefit [facility] testing on the pancreatic cancer, or [place] [id number] test from peripheral blood, to see if he carries BRCA or ALEXUS mutations. He has two cancers at the same time, and a strong family history of prostate cancer and breast cancer. If he does have BRCA or ALEXUS mutation, PARP inhibitors through compassionate use may be a treatment option for him     3.  - looks resectable on EUS and CT abd. Discussed with Dr. Fregoso. Will proceed with resection first followed by adjuvant chemotherapy.   - discussed with patient re the diagnosis again. We discussed that pancreatic cancer is an aggressive cancer that usually progresses very fast    4.  - oxycodone 15 mg q4h prn     F/U:  Depending on when his enzalutamide is approved, Dr. Melendez or I may see him prior to surgery (8/8). If not, he should return to office one month after surgery for  reevaluation.

## 2018-07-17 NOTE — PROGRESS NOTES
"History & Physical    SUBJECTIVE:     History of Present Illness:  Mr. Prakash is a 73 year old male referred by Dr. Echavarria for pancreatic body cancer.     He is being treated for metastatic prostate cancer which was diagnosed in 9/2015:    Attempted prostatectomy on 9/10/15:  "There was immediately noted to be recurrence of a nail caliber stricture within the penile urethra.  I was unable to bypass the stricture with the cystoscope.  The lumen was cannulated through   the scope with a guidewire.  The stricture was then dilated in sterile fashion using Cesar style dilators up to 18 Samoan.  The cystoscope was then able to bepassed through the stricture alongside the wire into the bladder.  The prostatic urethra  and bladder neck were carefully examined with positive invasion of the prostate cancer into the bladder neck identified.  This invasion approached very proximal to the ureteral orifices bilaterally.  After careful examination   and discussion, we determined that proceeding with prostatectomy would result in both high-risk of positive margins as well as damage to the ureteral orifices.    We therefore elected not to proceed with prostatectomy due to this high-risk."    Per Dr. Echavarria's note:  "Oncology History:  He is s/p TRUS/bx on 8/7/15 w/ high volume marta 9 (5+4), PSA 25.  He had negative metastatic workup.  At time of planned RALP on 9/10/15 he was noted to have BN invasion on cysto and therefore only PLND was done, which confirmed metastatic disease.  He was started on ADT 9/17/15 w/ firmagon followed by Eligard on 10/15/15.    Received last Eligard 45 mg 5/29/18. Next due in Nov 2018  Started Casodex 7/20/17  Started docetaxel 10/6/17-Dec 2017, s/p 4 cycles  Last Lupron 10/17/17        6/21/18 CT scan abdomen and pelvis:  FINDINGS:  Lower chest: There has been interval increase in size in the focal consolidation which is pleural based in the posterior aspect of the right lower lobe.  Today's exam measures " 5.1 cm maximum diameter.  Previous exam is measure approximately 3.3 cm.  This is associated with pleural thickening that extends laterally.  There is a small focus of cylinder all coal bronchiectasis in the medial left lower lobe.  The remaining aspects of the lung parenchyma that are visualized show no abnormalities.  The visualized heart and pericardium are normal  Liver: There are 3 hyperenhancing foci along the periphery of the right and left lobes of the liver which are stable from 2015.  The largest measures 1.1 cm in maximum diameter.  Although not characteristic on this exam, previous exam in 2015 shows findings most characteristic of hepatic hemangioma.  The remaining liver parenchyma as well as the hepatic and portal veins are patent.  Gallbladder: Normal.  Pancreas: There is been interval development of an ill-defined hypodense focus within the proximal body of the pancreas measuring 2.1 cm x 1.8 cm.  Spleen: Calcified hemangiomas present.  Otherwise normal.  Adrenal glands: Normal.  Genitourinary: Stable simple cyst in the lower pole of the right kidney measuring 1.6 cm.  The left kidney and bilateral ureters are normal.  The bladder is partially distended and shows wall thickening on the non dependent surface of the bladder.  This is not significantly changed in appearance.  Gastrointestinal: Stomach and small intestines are normal.  Colonic diverticulosis is present without evidence of diverticulitis.  Prostate: Enlarged and nodular causing mass effect on the bladder.  Miscellaneous: There is no intra-abdominal or pelvic free fluid, free air or lymphadenopathy.  The abdominal aorta tapers normally.  Bilateral fat containing inguinal hernias present.  Osseous and soft tissue structures: Sclerotic metastases involving the right iliac bone and left superior pubic ramus are identified.  The small focus in the left iliac bone seen on yesterday's bone scan is not well seen on this exam.  The soft tissues  are within normal limits.  Impression:  1. Interval increase in size in the pleural base, right lower lobe consolidation.  2. Hyperenhancing foci likely represent hemangiomas.  They have been stable since 2015.  Is on the 2015 exam where they demonstrate characteristics most compatible with hemangioma.  3. Bladder wall thickening is not significantly changed.  This may be related to outlet obstruction from the nodular prostate.  4. Interval development of ill-defined hypodense focus measuring 2.1 cm within the proximal body of the pancreas.  This may also be a focus of metastatic disease although is uncommon for the prostate to metastasized to the pancreas.  A dedicated pancreatic CT may be beneficial in further characterizing this lesion.     6/20/18 Bone scan:  FINDINGS:  Two lesions in the left scapula, stable.  Elongated lesion in the right mid rib posteriorly, stable.  Prior exam demonstrated a large right iliac lesion, which no longer exhibits increased uptake.  Left pubic lesion, stable.  There is also now a small lesion in the left iliac bone. Two additional new small foci in the mid thoracic vertebra.  Both kidneys and the bladder appear unremarkable.  Impression:  Findings consistent with osseous metastatic disease as above, noting 3 new small lesions.    7/5/18 EUS:  A round mass was identified in the pancreatic body. The mass was        isoechoic. The mass measured 25 mm by 25 mm in maximal        cross-sectional diameter. The endosonographic borders were        poorly-defined. There was sonographic evidence suggesting invasion        into the splenic artery (manifested by significant compression of        vessel).     FINAL PATHOLOGIC DIAGNOSIS  Pancreas, body, mass, EUS guided fine-needle aspiration:  - Positive for malignant cells, adenocarcinoma, see comment.  COMMENT: This case is reviewed by DrsOrlando Soto, YULY Ulrich, NIKKI Perales, and WILFRED Siu who agree with the above  diagnosis. Immunostains for  PSA and PSAP are negative. Appropriate positive controls are examined.     < 2.      He is feeling well today.  He does c/o RLQ intermittent pain which is worse with coughing.  No N/V/F/C.   He does report a decreased appetite and a 45 lb wt loss   He denies fatigue or epigastric abdominal pain or back pain.     Feels well otherwise.  Stays active.  ECOG 0.  Does a lot of yard work, etc.       Review of patient's allergies indicates:  No Known Allergies    Current Outpatient Prescriptions   Medication Sig Dispense Refill    aspirin (ECOTRIN) 81 MG EC tablet Take 81 mg by mouth once daily. States not taken in over 1 month as of 9/2/15      bicalutamide (CASODEX) 50 MG Tab Take 1 tablet (50 mg total) by mouth once daily. 30 tablet 2    calcium-vitamin D3 (OYSTER SHELL CALCIUM-VIT D3) 500 mg(1,250mg) -200 unit per tablet Take 1 tablet by mouth 2 (two) times daily. 180 tablet 3    docusate sodium (COLACE) 100 MG capsule Take 1 capsule (100 mg total) by mouth 2 (two) times daily. 30 capsule 0    glimepiride (AMARYL) 2 MG tablet Take 2 mg by mouth 2 (two) times daily.       HYDROcodone-acetaminophen (NORCO) 5-325 mg per tablet Take 1 tablet by mouth every 4 (four) hours as needed for Pain. 8 tablet 0    lovastatin (MEVACOR) 20 MG tablet       metformin (GLUCOPHAGE) 1000 MG tablet Take 1,000 mg by mouth 2 (two) times daily.       ondansetron (ZOFRAN-ODT) 4 MG TbDL Take 1 tablet (4 mg total) by mouth every 6 (six) hours as needed (nausea). 12 tablet 0    promethazine (PHENERGAN) 25 MG tablet Take 1 tablet (25 mg total) by mouth every 6 (six) hours as needed for Nausea. 30 tablet 0    ACETAMINOPHEN (TYLENOL ARTHRITIS ORAL) Take by mouth.      dronabinol (MARINOL) 2.5 MG capsule Take 1 capsule (2.5 mg total) by mouth 2 (two) times daily before meals. 60 capsule 2    lisinopril-hydrochlorothiazide (PRINZIDE,ZESTORETIC) 20-25 mg Tab Take 1 tablet by mouth once daily.       papaverine 30 mg/mL injection Add  Phentolamine 1 mg/cc  Add PGE1 10 mcg/cc    SIG:  Bring to office for test dose in physician office 5 mL 0    sildenafil (REVATIO) 20 mg Tab Take 1 tablet (20 mg total) by mouth As instructed. Take 2-5 tablets as needed. 40 tablet 11     Current Facility-Administered Medications   Medication Dose Route Frequency Provider Last Rate Last Dose    leuprolide (6 month) (ELIGARD) injection 45 mg  45 mg Subcutaneous Q6 Months Matteo Juarez MD   45 mg at 05/29/18 0913    [START ON 11/7/2018] leuprolide (6 month) (ELIGARD) injection 45 mg  45 mg Subcutaneous Q6 Months Matteo Juarez MD        leuprolide (ELIGARD) injection 45 mg  45 mg Subcutaneous Q6 Months Matteo Juarez MD   45 mg at 04/14/16 0907       Past Medical History:   Diagnosis Date    Allergy     Arthritis     Cancer     prostate    Diabetes mellitus     Hyperlipidemia     Hypertension     Prostate cancer      Past Surgical History:   Procedure Laterality Date    ENDOSCOPIC ULTRASOUND OF UPPER GASTROINTESTINAL TRACT N/A 7/5/2018    Procedure: ULTRASOUND, ENDOSCOPIC, UPPER GI TRACT;  Surgeon: Wiliam Ayoub MD;  Location: [facility] ([address] [place] [address]);  Service: Endoscopy;  Laterality: N/A;    EYE SURGERY Right     x9    LYMPH NODE DISSECTION      UPPER GASTROINTESTINAL ENDOSCOPY       Family History   Problem Relation Age of Onset    Hypertension Father     Hypertension Mother     Diabetes Sister     Heart disease Brother     Diabetes Maternal Aunt     Cancer Maternal Uncle     Cancer Maternal Grandfather     No Known Problems Paternal Grandmother     No Known Problems Paternal Grandfather     Prostate cancer Brother     Diabetes Sister     Diabetes Sister     Diabetes Sister     Heart disease Brother      Social History   Substance Use Topics    Smoking status: Former Smoker     Packs/day: 4.00     Years: 60.00     Types: Cigarettes, Cigars     Start date: 9/1/1957     Quit date: 11/13/2016    Smokeless tobacco: Never Used     Alcohol use No      Comment: pt stopped drinking 05/2018        Review of Systems:  Review of Systems   Constitutional: Positive for appetite change and unexpected weight change. Negative for activity change, chills, fatigue and fever.   HENT: Negative.    Eyes: Negative.    Respiratory: Negative.    Cardiovascular: Negative.    Gastrointestinal: Positive for abdominal pain.        Intermittent RLQ.   Genitourinary: Negative.    Musculoskeletal: Negative.    Skin: Negative.    Neurological: Negative.    Psychiatric/Behavioral: Negative.        OBJECTIVE:     Vital Signs (Most Recent)  Temp: 97.4 °F (36.3 °C) (07/17/18 0832)  Pulse: 103 (07/17/18 0832)  Resp: 18 (07/17/18 0832)  BP: 106/71 (07/17/18 0832)     81.9 kg (180 lb 8.9 oz)     Physical Exam:  Physical Exam   Constitutional: He is oriented to person, place, and time. He appears well-developed and well-nourished.   HENT:   Head: Normocephalic and atraumatic.   Eyes: Conjunctivae are normal.   Neck: Normal range of motion. Neck supple.   Cardiovascular: Normal rate, regular rhythm and normal heart sounds.    Pulmonary/Chest: Effort normal and breath sounds normal.   Abdominal: Soft. Bowel sounds are normal.   Musculoskeletal: Normal range of motion. He exhibits no edema.   Neurological: He is alert and oriented to person, place, and time.   Skin: Skin is warm and dry.   Psychiatric: He has a normal mood and affect.         ASSESSMENT/PLAN:     Pancreatic body cancer as above.  + splenic artery involvement only.  No LAD.  Resectable.  No definitive evidence of mets.   ? Lung mass.  Inflammatory?   Stable liver lesions most consistent with hemangiomas.   Prostate cancer as above with bone mets.  Followed by Dr. Echavarria.  Seeing her later today.     PLAN:  Tentatively plan a distal panc/splenectomy with Dr. Fregoso.  Pre op vaccines.    See Dr. Echavarria later today.  CT chest to eval lung lesion better, PPCT abd/pel.     Dr. Fregoso was available to personally meet and evaluate  today and did personally review his images and diagnostic test results.   He did personally speak with Dr. Echavarria who feels the prostate cancer can be well managed with medication.    Therefore she feels that proceeding with a potentially curative approach for the pancreas to include resection and adj tx is advised.  Her preference would be to resect as first stage of treatment.    Therefore since it appears resectable we have made plans for that.  Details of surgery, expected PO course and risks were discussed.    He will RTC after his updated images and if no evidence of mets at that point will obtain informed consent at that time.

## 2018-07-17 NOTE — LETTER
July 18, 2018      Wiliam Ayoub MD  [address] [place] [address]  [address] [place] [address]           [facility] - Gen Surg/Surg Onc  [facility]  [place] [address]  Phone: [phone]          Patient: Shady Prakash Jr.   MR Number: [id number]   YOB: 1945   Date of Visit: 7/17/2018       Dear Dr. Wiliam Ayoub:    Thank you for referring Shady Prakash to me for evaluation. Attached you will find relevant portions of my assessment and plan of care.    If you have questions, please do not hesitate to call me. I look forward to following Shady Prakash along with you.    Sincerely,    Ev North, NP    Enclosure  CC:  No Recipients    If you would like to receive this communication electronically, please contact [email or web address] or [phone] to request more information on [facility] access.    For providers and/or their staff who would like to refer a patient to [facility], please contact us through our one-stop-shop provider referral line, [facility], at [phone].    If you feel you have received this communication in error or would no longer like to receive these types of communications, please e-mail [email or web address]

## 2018-07-18 ENCOUNTER — SURGERY (OUTPATIENT)
Age: 73
End: 2018-07-18

## 2018-07-18 ENCOUNTER — HOSPITAL ENCOUNTER (INPATIENT)
Facility: HOSPITAL | Age: 73
LOS: 7 days | Discharge: HOME | DRG: 330 | End: 2018-07-25
Attending: EMERGENCY MEDICINE | Admitting: SURGERY
Payer: MEDICARE

## 2018-07-18 ENCOUNTER — ANESTHESIA (OUTPATIENT)
Dept: SURGERY | Facility: HOSPITAL | Age: 73
DRG: 330 | End: 2018-07-18
Payer: MEDICARE

## 2018-07-18 ENCOUNTER — ANESTHESIA EVENT (OUTPATIENT)
Dept: SURGERY | Facility: HOSPITAL | Age: 73
DRG: 330 | End: 2018-07-18
Payer: MEDICARE

## 2018-07-18 DIAGNOSIS — R10.9 ABDOMINAL PAIN: ICD-10-CM

## 2018-07-18 DIAGNOSIS — K35.209 ACUTE APPENDICITIS WITH GENERALIZED PERITONITIS: Primary | ICD-10-CM

## 2018-07-18 DIAGNOSIS — K37 APPENDICITIS: ICD-10-CM

## 2018-07-18 PROBLEM — K35.80 ACUTE APPENDICITIS: Status: ACTIVE | Noted: 2018-07-18

## 2018-07-18 LAB
ALBUMIN SERPL BCP-MCNC: 2.5 G/DL
ALBUMIN SERPL BCP-MCNC: 3.2 G/DL
ALP SERPL-CCNC: 112 U/L
ALP SERPL-CCNC: 90 U/L
ALT SERPL W/O P-5'-P-CCNC: 8 U/L
ALT SERPL W/O P-5'-P-CCNC: 9 U/L
AMYLASE SERPL-CCNC: 77 U/L
ANION GAP SERPL CALC-SCNC: 12 MMOL/L
ANION GAP SERPL CALC-SCNC: 8 MMOL/L
ANISOCYTOSIS BLD QL SMEAR: SLIGHT
AST SERPL-CCNC: 8 U/L
AST SERPL-CCNC: 9 U/L
BASOPHILS # BLD AUTO: 0.02 K/UL
BASOPHILS # BLD AUTO: 0.02 K/UL
BASOPHILS NFR BLD: 0.1 %
BASOPHILS NFR BLD: 0.3 %
BILIRUB SERPL-MCNC: 0.6 MG/DL
BILIRUB SERPL-MCNC: 1.2 MG/DL
BUN SERPL-MCNC: 14 MG/DL
BUN SERPL-MCNC: 14 MG/DL
CALCIUM SERPL-MCNC: 8 MG/DL
CALCIUM SERPL-MCNC: 9.3 MG/DL
CHLORIDE SERPL-SCNC: 102 MMOL/L
CHLORIDE SERPL-SCNC: 99 MMOL/L
CO2 SERPL-SCNC: 22 MMOL/L
CO2 SERPL-SCNC: 23 MMOL/L
CREAT SERPL-MCNC: 1 MG/DL
CREAT SERPL-MCNC: 1.1 MG/DL
DACRYOCYTES BLD QL SMEAR: ABNORMAL
DIFFERENTIAL METHOD: ABNORMAL
DIFFERENTIAL METHOD: ABNORMAL
EOSINOPHIL # BLD AUTO: 0 K/UL
EOSINOPHIL # BLD AUTO: 0.1 K/UL
EOSINOPHIL NFR BLD: 0 %
EOSINOPHIL NFR BLD: 0.5 %
ERYTHROCYTE [DISTWIDTH] IN BLOOD BY AUTOMATED COUNT: 13.4 %
ERYTHROCYTE [DISTWIDTH] IN BLOOD BY AUTOMATED COUNT: 13.8 %
EST. GFR  (AFRICAN AMERICAN): >60 ML/MIN/1.73 M^2
EST. GFR  (AFRICAN AMERICAN): >60 ML/MIN/1.73 M^2
EST. GFR  (NON AFRICAN AMERICAN): >60 ML/MIN/1.73 M^2
EST. GFR  (NON AFRICAN AMERICAN): >60 ML/MIN/1.73 M^2
ESTIMATED AVG GLUCOSE: 169 MG/DL
GIANT PLATELETS BLD QL SMEAR: PRESENT
GLUCOSE SERPL-MCNC: 200 MG/DL
GLUCOSE SERPL-MCNC: 239 MG/DL
HBA1C MFR BLD HPLC: 7.5 %
HCT VFR BLD AUTO: 32.2 %
HCT VFR BLD AUTO: 32.3 %
HGB BLD-MCNC: 10 G/DL
HGB BLD-MCNC: 9.7 G/DL
HYPOCHROMIA BLD QL SMEAR: ABNORMAL
IMM GRANULOCYTES # BLD AUTO: 0.01 K/UL
IMM GRANULOCYTES # BLD AUTO: 0.07 K/UL
IMM GRANULOCYTES NFR BLD AUTO: 0.1 %
IMM GRANULOCYTES NFR BLD AUTO: 0.5 %
LIPASE SERPL-CCNC: 103 U/L
LYMPHOCYTES # BLD AUTO: 0.4 K/UL
LYMPHOCYTES # BLD AUTO: 0.7 K/UL
LYMPHOCYTES NFR BLD: 4.6 %
LYMPHOCYTES NFR BLD: 5 %
MCH RBC QN AUTO: 26.7 PG
MCH RBC QN AUTO: 27.6 PG
MCHC RBC AUTO-ENTMCNC: 30 G/DL
MCHC RBC AUTO-ENTMCNC: 31.1 G/DL
MCV RBC AUTO: 89 FL
MCV RBC AUTO: 89 FL
MONOCYTES # BLD AUTO: 0.4 K/UL
MONOCYTES # BLD AUTO: 0.7 K/UL
MONOCYTES NFR BLD: 5.3 %
MONOCYTES NFR BLD: 5.5 %
NEUTROPHILS # BLD AUTO: 11.8 K/UL
NEUTROPHILS # BLD AUTO: 6.8 K/UL
NEUTROPHILS NFR BLD: 88.6 %
NEUTROPHILS NFR BLD: 89.5 %
NRBC BLD-RTO: 0 /100 WBC
NRBC BLD-RTO: 0 /100 WBC
OVALOCYTES BLD QL SMEAR: ABNORMAL
PLATELET # BLD AUTO: 403 K/UL
PLATELET # BLD AUTO: 442 K/UL
PLATELET BLD QL SMEAR: ABNORMAL
PMV BLD AUTO: 8.5 FL
PMV BLD AUTO: 8.6 FL
POCT GLUCOSE: 181 MG/DL (ref 70–110)
POCT GLUCOSE: 189 MG/DL (ref 70–110)
POCT GLUCOSE: 213 MG/DL (ref 70–110)
POIKILOCYTOSIS BLD QL SMEAR: SLIGHT
POLYCHROMASIA BLD QL SMEAR: ABNORMAL
POTASSIUM SERPL-SCNC: 4.4 MMOL/L
POTASSIUM SERPL-SCNC: 4.8 MMOL/L
PROT SERPL-MCNC: 6.2 G/DL
PROT SERPL-MCNC: 7.7 G/DL
RBC # BLD AUTO: 3.62 M/UL
RBC # BLD AUTO: 3.63 M/UL
SODIUM SERPL-SCNC: 132 MMOL/L
SODIUM SERPL-SCNC: 134 MMOL/L
WBC # BLD AUTO: 13.34 K/UL
WBC # BLD AUTO: 7.61 K/UL

## 2018-07-18 PROCEDURE — 83690 ASSAY OF LIPASE: CPT

## 2018-07-18 PROCEDURE — 11000001 HC ACUTE MED/SURG PRIVATE ROOM

## 2018-07-18 PROCEDURE — 0DBH0ZZ EXCISION OF CECUM, OPEN APPROACH: ICD-10-PCS | Performed by: SURGERY

## 2018-07-18 PROCEDURE — 88342 IMHCHEM/IMCYTCHM 1ST ANTB: CPT | Mod: 26,,, | Performed by: PATHOLOGY

## 2018-07-18 PROCEDURE — 88341 IMHCHEM/IMCYTCHM EA ADD ANTB: CPT | Mod: 26,,, | Performed by: PATHOLOGY

## 2018-07-18 PROCEDURE — 37000009 HC ANESTHESIA EA ADD 15 MINS: Performed by: SURGERY

## 2018-07-18 PROCEDURE — 0DTJ0ZZ RESECTION OF APPENDIX, OPEN APPROACH: ICD-10-PCS | Performed by: SURGERY

## 2018-07-18 PROCEDURE — 63600175 PHARM REV CODE 636 W HCPCS: Performed by: NURSE ANESTHETIST, CERTIFIED REGISTERED

## 2018-07-18 PROCEDURE — 96374 THER/PROPH/DIAG INJ IV PUSH: CPT

## 2018-07-18 PROCEDURE — 71000039 HC RECOVERY, EACH ADD'L HOUR: Performed by: SURGERY

## 2018-07-18 PROCEDURE — 37000008 HC ANESTHESIA 1ST 15 MINUTES: Performed by: SURGERY

## 2018-07-18 PROCEDURE — 49999 UNLISTED PX ABD PERTM&OMN: CPT | Mod: ,,, | Performed by: SURGERY

## 2018-07-18 PROCEDURE — 88305 TISSUE EXAM BY PATHOLOGIST: CPT | Mod: 26,,, | Performed by: PATHOLOGY

## 2018-07-18 PROCEDURE — 82150 ASSAY OF AMYLASE: CPT

## 2018-07-18 PROCEDURE — 27201423 OPTIME MED/SURG SUP & DEVICES STERILE SUPPLY: Performed by: SURGERY

## 2018-07-18 PROCEDURE — D9220A PRA ANESTHESIA: Mod: ANES,,, | Performed by: ANESTHESIOLOGY

## 2018-07-18 PROCEDURE — 63600175 PHARM REV CODE 636 W HCPCS: Performed by: EMERGENCY MEDICINE

## 2018-07-18 PROCEDURE — 80053 COMPREHEN METABOLIC PANEL: CPT | Mod: 91

## 2018-07-18 PROCEDURE — 25000003 PHARM REV CODE 250: Performed by: NURSE ANESTHETIST, CERTIFIED REGISTERED

## 2018-07-18 PROCEDURE — 63600175 PHARM REV CODE 636 W HCPCS

## 2018-07-18 PROCEDURE — 82962 GLUCOSE BLOOD TEST: CPT

## 2018-07-18 PROCEDURE — D9220A PRA ANESTHESIA: Mod: CRNA,,, | Performed by: NURSE ANESTHETIST, CERTIFIED REGISTERED

## 2018-07-18 PROCEDURE — S0030 INJECTION, METRONIDAZOLE: HCPCS | Performed by: STUDENT IN AN ORGANIZED HEALTH CARE EDUCATION/TRAINING PROGRAM

## 2018-07-18 PROCEDURE — 36000709 HC OR TIME LEV III EA ADD 15 MIN: Performed by: SURGERY

## 2018-07-18 PROCEDURE — 88305 TISSUE EXAM BY PATHOLOGIST: CPT | Performed by: PATHOLOGY

## 2018-07-18 PROCEDURE — 99285 EMERGENCY DEPT VISIT HI MDM: CPT | Mod: 25

## 2018-07-18 PROCEDURE — 27000221 HC OXYGEN, UP TO 24 HOURS

## 2018-07-18 PROCEDURE — 36415 COLL VENOUS BLD VENIPUNCTURE: CPT

## 2018-07-18 PROCEDURE — G0378 HOSPITAL OBSERVATION PER HR: HCPCS

## 2018-07-18 PROCEDURE — 0DBB0ZZ EXCISION OF ILEUM, OPEN APPROACH: ICD-10-PCS | Performed by: SURGERY

## 2018-07-18 PROCEDURE — 0DJD4ZZ INSPECTION OF LOWER INTESTINAL TRACT, PERCUTANEOUS ENDOSCOPIC APPROACH: ICD-10-PCS | Performed by: SURGERY

## 2018-07-18 PROCEDURE — 96361 HYDRATE IV INFUSION ADD-ON: CPT

## 2018-07-18 PROCEDURE — 93005 ELECTROCARDIOGRAM TRACING: CPT

## 2018-07-18 PROCEDURE — 25000003 PHARM REV CODE 250: Performed by: EMERGENCY MEDICINE

## 2018-07-18 PROCEDURE — 25000003 PHARM REV CODE 250: Performed by: STUDENT IN AN ORGANIZED HEALTH CARE EDUCATION/TRAINING PROGRAM

## 2018-07-18 PROCEDURE — 36000708 HC OR TIME LEV III 1ST 15 MIN: Performed by: SURGERY

## 2018-07-18 PROCEDURE — 99285 EMERGENCY DEPT VISIT HI MDM: CPT | Mod: ,,, | Performed by: EMERGENCY MEDICINE

## 2018-07-18 PROCEDURE — 94761 N-INVAS EAR/PLS OXIMETRY MLT: CPT

## 2018-07-18 PROCEDURE — 90715 TDAP VACCINE 7 YRS/> IM: CPT | Performed by: EMERGENCY MEDICINE

## 2018-07-18 PROCEDURE — 63600175 PHARM REV CODE 636 W HCPCS: Performed by: STUDENT IN AN ORGANIZED HEALTH CARE EDUCATION/TRAINING PROGRAM

## 2018-07-18 PROCEDURE — 99220 PR INITIAL OBSERVATION CARE,LEVL III: CPT | Mod: 57,,, | Performed by: SURGERY

## 2018-07-18 PROCEDURE — 44160 REMOVAL OF COLON: CPT | Mod: ,,, | Performed by: SURGERY

## 2018-07-18 PROCEDURE — 87040 BLOOD CULTURE FOR BACTERIA: CPT | Mod: 59

## 2018-07-18 PROCEDURE — 96375 TX/PRO/DX INJ NEW DRUG ADDON: CPT

## 2018-07-18 PROCEDURE — 82962 GLUCOSE BLOOD TEST: CPT | Performed by: SURGERY

## 2018-07-18 PROCEDURE — 80053 COMPREHEN METABOLIC PANEL: CPT

## 2018-07-18 PROCEDURE — 71000033 HC RECOVERY, INTIAL HOUR: Performed by: SURGERY

## 2018-07-18 PROCEDURE — 85025 COMPLETE CBC W/AUTO DIFF WBC: CPT | Mod: 91

## 2018-07-18 PROCEDURE — 88307 TISSUE EXAM BY PATHOLOGIST: CPT | Mod: 26,,, | Performed by: PATHOLOGY

## 2018-07-18 PROCEDURE — 0DBW4ZX EXCISION OF PERITONEUM, PERCUTANEOUS ENDOSCOPIC APPROACH, DIAGNOSTIC: ICD-10-PCS | Performed by: SURGERY

## 2018-07-18 PROCEDURE — 25500020 PHARM REV CODE 255: Performed by: EMERGENCY MEDICINE

## 2018-07-18 PROCEDURE — 90471 IMMUNIZATION ADMIN: CPT | Performed by: EMERGENCY MEDICINE

## 2018-07-18 PROCEDURE — 83036 HEMOGLOBIN GLYCOSYLATED A1C: CPT

## 2018-07-18 RX ORDER — ONDANSETRON 8 MG/1
8 TABLET, ORALLY DISINTEGRATING ORAL EVERY 8 HOURS PRN
Status: DISCONTINUED | OUTPATIENT
Start: 2018-07-18 | End: 2018-07-25 | Stop reason: HOSPADM

## 2018-07-18 RX ORDER — FENTANYL CITRATE 50 UG/ML
INJECTION, SOLUTION INTRAMUSCULAR; INTRAVENOUS
Status: DISCONTINUED | OUTPATIENT
Start: 2018-07-18 | End: 2018-07-18

## 2018-07-18 RX ORDER — ENOXAPARIN SODIUM 100 MG/ML
40 INJECTION SUBCUTANEOUS EVERY 24 HOURS
Status: DISCONTINUED | OUTPATIENT
Start: 2018-07-19 | End: 2018-07-25 | Stop reason: HOSPADM

## 2018-07-18 RX ORDER — ONDANSETRON 4 MG/1
4 TABLET, ORALLY DISINTEGRATING ORAL
Status: COMPLETED | OUTPATIENT
Start: 2018-07-18 | End: 2018-07-18

## 2018-07-18 RX ORDER — SODIUM CHLORIDE 0.9 % (FLUSH) 0.9 %
3 SYRINGE (ML) INJECTION
Status: DISCONTINUED | OUTPATIENT
Start: 2018-07-18 | End: 2018-07-25 | Stop reason: HOSPADM

## 2018-07-18 RX ORDER — NALOXONE HCL 0.4 MG/ML
0.02 VIAL (ML) INJECTION
Status: DISCONTINUED | OUTPATIENT
Start: 2018-07-18 | End: 2018-07-24

## 2018-07-18 RX ORDER — MORPHINE SULFATE 4 MG/ML
2 INJECTION, SOLUTION INTRAMUSCULAR; INTRAVENOUS
Status: COMPLETED | OUTPATIENT
Start: 2018-07-18 | End: 2018-07-18

## 2018-07-18 RX ORDER — HYDROMORPHONE HCL IN 0.9% NACL 6 MG/30 ML
PATIENT CONTROLLED ANALGESIA SYRINGE INTRAVENOUS CONTINUOUS
Status: DISCONTINUED | OUTPATIENT
Start: 2018-07-18 | End: 2018-07-24

## 2018-07-18 RX ORDER — SUCCINYLCHOLINE CHLORIDE 20 MG/ML
INJECTION INTRAMUSCULAR; INTRAVENOUS
Status: DISCONTINUED | OUTPATIENT
Start: 2018-07-18 | End: 2018-07-18

## 2018-07-18 RX ORDER — METRONIDAZOLE 500 MG/100ML
500 INJECTION, SOLUTION INTRAVENOUS
Status: DISCONTINUED | OUTPATIENT
Start: 2018-07-18 | End: 2018-07-19

## 2018-07-18 RX ORDER — RAMELTEON 8 MG/1
8 TABLET ORAL NIGHTLY PRN
Status: DISCONTINUED | OUTPATIENT
Start: 2018-07-18 | End: 2018-07-25 | Stop reason: HOSPADM

## 2018-07-18 RX ORDER — HYDROMORPHONE HYDROCHLORIDE 1 MG/ML
1 INJECTION, SOLUTION INTRAMUSCULAR; INTRAVENOUS; SUBCUTANEOUS EVERY 4 HOURS PRN
Status: DISCONTINUED | OUTPATIENT
Start: 2018-07-18 | End: 2018-07-18

## 2018-07-18 RX ORDER — HYDROMORPHONE HYDROCHLORIDE 1 MG/ML
INJECTION, SOLUTION INTRAMUSCULAR; INTRAVENOUS; SUBCUTANEOUS
Status: COMPLETED
Start: 2018-07-18 | End: 2018-07-18

## 2018-07-18 RX ORDER — MORPHINE SULFATE 4 MG/ML
4 INJECTION, SOLUTION INTRAMUSCULAR; INTRAVENOUS
Status: COMPLETED | OUTPATIENT
Start: 2018-07-18 | End: 2018-07-18

## 2018-07-18 RX ORDER — NEOSTIGMINE METHYLSULFATE 1 MG/ML
INJECTION, SOLUTION INTRAVENOUS
Status: DISCONTINUED | OUTPATIENT
Start: 2018-07-18 | End: 2018-07-18

## 2018-07-18 RX ORDER — GLUCAGON 1 MG
1 KIT INJECTION
Status: DISCONTINUED | OUTPATIENT
Start: 2018-07-18 | End: 2018-07-25 | Stop reason: HOSPADM

## 2018-07-18 RX ORDER — INSULIN ASPART 100 [IU]/ML
0-5 INJECTION, SOLUTION INTRAVENOUS; SUBCUTANEOUS EVERY 6 HOURS PRN
Status: DISCONTINUED | OUTPATIENT
Start: 2018-07-18 | End: 2018-07-25 | Stop reason: HOSPADM

## 2018-07-18 RX ORDER — SODIUM CHLORIDE, SODIUM LACTATE, POTASSIUM CHLORIDE, CALCIUM CHLORIDE 600; 310; 30; 20 MG/100ML; MG/100ML; MG/100ML; MG/100ML
INJECTION, SOLUTION INTRAVENOUS CONTINUOUS
Status: DISCONTINUED | OUTPATIENT
Start: 2018-07-18 | End: 2018-07-24

## 2018-07-18 RX ORDER — ROCURONIUM BROMIDE 10 MG/ML
INJECTION, SOLUTION INTRAVENOUS
Status: DISCONTINUED | OUTPATIENT
Start: 2018-07-18 | End: 2018-07-18

## 2018-07-18 RX ORDER — SODIUM CHLORIDE 9 MG/ML
INJECTION, SOLUTION INTRAVENOUS CONTINUOUS PRN
Status: DISCONTINUED | OUTPATIENT
Start: 2018-07-18 | End: 2018-07-18

## 2018-07-18 RX ORDER — ACETAMINOPHEN 325 MG/1
650 TABLET ORAL EVERY 8 HOURS PRN
Status: DISCONTINUED | OUTPATIENT
Start: 2018-07-18 | End: 2018-07-25 | Stop reason: HOSPADM

## 2018-07-18 RX ORDER — PROPOFOL 10 MG/ML
VIAL (ML) INTRAVENOUS
Status: DISCONTINUED | OUTPATIENT
Start: 2018-07-18 | End: 2018-07-18

## 2018-07-18 RX ORDER — LIDOCAINE HCL/PF 100 MG/5ML
SYRINGE (ML) INTRAVENOUS
Status: DISCONTINUED | OUTPATIENT
Start: 2018-07-18 | End: 2018-07-18

## 2018-07-18 RX ORDER — ONDANSETRON 2 MG/ML
INJECTION INTRAMUSCULAR; INTRAVENOUS
Status: DISCONTINUED | OUTPATIENT
Start: 2018-07-18 | End: 2018-07-18

## 2018-07-18 RX ORDER — MIDAZOLAM HYDROCHLORIDE 1 MG/ML
INJECTION, SOLUTION INTRAMUSCULAR; INTRAVENOUS
Status: DISCONTINUED | OUTPATIENT
Start: 2018-07-18 | End: 2018-07-18

## 2018-07-18 RX ORDER — GLYCOPYRROLATE 0.2 MG/ML
INJECTION INTRAMUSCULAR; INTRAVENOUS
Status: DISCONTINUED | OUTPATIENT
Start: 2018-07-18 | End: 2018-07-18

## 2018-07-18 RX ORDER — ACETAMINOPHEN 10 MG/ML
INJECTION, SOLUTION INTRAVENOUS
Status: DISCONTINUED | OUTPATIENT
Start: 2018-07-18 | End: 2018-07-18

## 2018-07-18 RX ORDER — DIPHENHYDRAMINE HYDROCHLORIDE 50 MG/ML
25 INJECTION INTRAMUSCULAR; INTRAVENOUS EVERY 4 HOURS PRN
Status: DISCONTINUED | OUTPATIENT
Start: 2018-07-18 | End: 2018-07-25 | Stop reason: HOSPADM

## 2018-07-18 RX ADMIN — FENTANYL CITRATE 50 MCG: 50 INJECTION, SOLUTION INTRAMUSCULAR; INTRAVENOUS at 03:07

## 2018-07-18 RX ADMIN — Medication: at 05:07

## 2018-07-18 RX ADMIN — GLYCOPYRROLATE 0.4 MG: 0.2 INJECTION, SOLUTION INTRAMUSCULAR; INTRAVENOUS at 05:07

## 2018-07-18 RX ADMIN — METRONIDAZOLE 500 MG: 500 INJECTION, SOLUTION INTRAVENOUS at 06:07

## 2018-07-18 RX ADMIN — SUCCINYLCHOLINE CHLORIDE 120 MG: 20 INJECTION, SOLUTION INTRAMUSCULAR; INTRAVENOUS at 03:07

## 2018-07-18 RX ADMIN — LIDOCAINE HYDROCHLORIDE 80 MG: 20 INJECTION, SOLUTION INTRAVENOUS at 03:07

## 2018-07-18 RX ADMIN — IOHEXOL 100 ML: 350 INJECTION, SOLUTION INTRAVENOUS at 11:07

## 2018-07-18 RX ADMIN — MORPHINE SULFATE 2 MG: 4 INJECTION INTRAVENOUS at 02:07

## 2018-07-18 RX ADMIN — ONDANSETRON 4 MG: 2 INJECTION INTRAMUSCULAR; INTRAVENOUS at 05:07

## 2018-07-18 RX ADMIN — INSULIN ASPART 2 UNITS: 100 INJECTION, SOLUTION INTRAVENOUS; SUBCUTANEOUS at 06:07

## 2018-07-18 RX ADMIN — NEOSTIGMINE METHYLSULFATE 4 MG: 1 INJECTION INTRAVENOUS at 05:07

## 2018-07-18 RX ADMIN — Medication 1 MG: at 06:07

## 2018-07-18 RX ADMIN — MORPHINE SULFATE 4 MG: 4 INJECTION INTRAVENOUS at 07:07

## 2018-07-18 RX ADMIN — ROCURONIUM BROMIDE 50 MG: 10 INJECTION, SOLUTION INTRAVENOUS at 04:07

## 2018-07-18 RX ADMIN — CLOSTRIDIUM TETANI TOXOID ANTIGEN (FORMALDEHYDE INACTIVATED), CORYNEBACTERIUM DIPHTHERIAE TOXOID ANTIGEN (FORMALDEHYDE INACTIVATED), BORDETELLA PERTUSSIS TOXOID ANTIGEN (GLUTARALDEHYDE INACTIVATED), BORDETELLA PERTUSSIS FILAMENTOUS HEMAGGLUTININ ANTIGEN (FORMALDEHYDE INACTIVATED), BORDETELLA PERTUSSIS PERTACTIN ANTIGEN, AND BORDETELLA PERTUSSIS FIMBRIAE 2/3 ANTIGEN 0.5 ML: 5; 2; 2.5; 5; 3; 5 INJECTION, SUSPENSION INTRAMUSCULAR at 07:07

## 2018-07-18 RX ADMIN — MIDAZOLAM HYDROCHLORIDE 1 MG: 1 INJECTION, SOLUTION INTRAMUSCULAR; INTRAVENOUS at 03:07

## 2018-07-18 RX ADMIN — PROPOFOL 120 MG: 10 INJECTION, EMULSION INTRAVENOUS at 03:07

## 2018-07-18 RX ADMIN — IOHEXOL 15 ML: 350 INJECTION, SOLUTION INTRAVENOUS at 09:07

## 2018-07-18 RX ADMIN — IOHEXOL 15 ML: 350 INJECTION, SOLUTION INTRAVENOUS at 08:07

## 2018-07-18 RX ADMIN — ACETAMINOPHEN 1000 MG: 10 INJECTION, SOLUTION INTRAVENOUS at 04:07

## 2018-07-18 RX ADMIN — SODIUM CHLORIDE: 0.9 INJECTION, SOLUTION INTRAVENOUS at 03:07

## 2018-07-18 RX ADMIN — ONDANSETRON 4 MG: 4 TABLET, ORALLY DISINTEGRATING ORAL at 09:07

## 2018-07-18 RX ADMIN — SODIUM CHLORIDE, SODIUM LACTATE, POTASSIUM CHLORIDE, AND CALCIUM CHLORIDE: 600; 310; 30; 20 INJECTION, SOLUTION INTRAVENOUS at 05:07

## 2018-07-18 RX ADMIN — SODIUM CHLORIDE, SODIUM GLUCONATE, SODIUM ACETATE, POTASSIUM CHLORIDE, MAGNESIUM CHLORIDE, SODIUM PHOSPHATE, DIBASIC, AND POTASSIUM PHOSPHATE: .53; .5; .37; .037; .03; .012; .00082 INJECTION, SOLUTION INTRAVENOUS at 05:07

## 2018-07-18 RX ADMIN — CEFTRIAXONE SODIUM 2 G: 2 INJECTION, POWDER, FOR SOLUTION INTRAMUSCULAR; INTRAVENOUS at 07:07

## 2018-07-18 RX ADMIN — SODIUM CHLORIDE 1000 ML: 0.9 INJECTION, SOLUTION INTRAVENOUS at 07:07

## 2018-07-18 RX ADMIN — ROCURONIUM BROMIDE 10 MG: 10 INJECTION, SOLUTION INTRAVENOUS at 05:07

## 2018-07-18 RX ADMIN — SODIUM CHLORIDE 3 G: 9 INJECTION, SOLUTION INTRAVENOUS at 02:07

## 2018-07-18 RX ADMIN — FENTANYL CITRATE 50 MCG: 50 INJECTION, SOLUTION INTRAMUSCULAR; INTRAVENOUS at 04:07

## 2018-07-18 RX ADMIN — HYDROMORPHONE HYDROCHLORIDE 1 MG: 1 INJECTION, SOLUTION INTRAMUSCULAR; INTRAVENOUS; SUBCUTANEOUS at 06:07

## 2018-07-18 RX ADMIN — FENTANYL CITRATE 25 MCG: 50 INJECTION, SOLUTION INTRAMUSCULAR; INTRAVENOUS at 05:07

## 2018-07-18 NOTE — CONSULTS
[facility]  General Surgery  Consult Note    Patient Name: Shady Prakash Jr.  MRN: [id number]  Code Status: No Order  Admission Date: 7/18/2018  Hospital Length of Stay: 0 days  Attending Physician: Deng Mena MD  Primary Care Provider: Hiram Coronado MD    Patient information was obtained from patient and ER records.     Inpatient consult to General surgery  Consult performed by: SOTO PRABHAKAR  Consult ordered by: DENG MENA  Reason for consult: appendicitis   Assessment/Recommendations: Lap appy today         Subjective:     Principal Problem: <principal problem not specified>    History of Present Illness: Shady Prakash Jr. is a 73 y.o. M with hx of metastatic prostate cancer s/p robotic prostatectomy (2015) and biopsy proven pancreatic adenocarcinoma who is scheduled to undergo a distal pancreatectomy and splenectomy on 8/10 with Dr Fregoso who presents to the ED today with a ~2wk history of vague RLQ pain which has worsened over the last couple days.  He endorses some constipation but denies diarrhea, nausea, vomiting, subjective fevers, chills.  In the ED he is mildly tachycardic (110) with a WBC of 13.  CT scan showed signs of acute appendicitis with associated possible small abscess.      Current Facility-Administered Medications on File Prior to Encounter   Medication    leuprolide (6 month) (ELIGARD) injection 45 mg    [START ON 11/7/2018] leuprolide (6 month) (ELIGARD) injection 45 mg    leuprolide (ELIGARD) injection 45 mg     Current Outpatient Prescriptions on File Prior to Encounter   Medication Sig    ACETAMINOPHEN (TYLENOL ARTHRITIS ORAL) Take by mouth.    aspirin (ECOTRIN) 81 MG EC tablet Take 81 mg by mouth once daily. States not taken in over 1 month as of 9/2/15    calcium-vitamin D3 (OYSTER SHELL CALCIUM-VIT D3) 500 mg(1,250mg) -200 unit per tablet Take 1 tablet by mouth 2 (two) times daily.    diphth,pertus,acell,,tetanus (BOOSTRIX)  2.5-8-5 Lf-mcg-Lf/0.5mL Susp Inject into the muscle.    docusate sodium (COLACE) 100 MG capsule Take 1 capsule (100 mg total) by mouth 2 (two) times daily.    dronabinol (MARINOL) 2.5 MG capsule Take 1 capsule (2.5 mg total) by mouth 2 (two) times daily before meals.    enzalutamide 40 mg Cap Take 4 capsules (160 mg) by mouth once daily.    glimepiride (AMARYL) 2 MG tablet Take 2 mg by mouth 2 (two) times daily.     lisinopril-hydrochlorothiazide (PRINZIDE,ZESTORETIC) 20-25 mg Tab Take 1 tablet by mouth once daily.     lovastatin (MEVACOR) 20 MG tablet     metformin (GLUCOPHAGE) 1000 MG tablet Take 1,000 mg by mouth 2 (two) times daily.     ondansetron (ZOFRAN-ODT) 4 MG TbDL Take 1 tablet (4 mg total) by mouth every 6 (six) hours as needed (nausea).    oxyCODONE (ROXICODONE) 15 MG Tab Take 1 tablet (15 mg total) by mouth every 4 (four) hours as needed for Pain.    papaverine 30 mg/mL injection Add Phentolamine 1 mg/cc  Add PGE1 10 mcg/cc    SIG:  Bring to office for test dose in physician office    promethazine (PHENERGAN) 25 MG tablet Take 1 tablet (25 mg total) by mouth every 6 (six) hours as needed for Nausea.    sildenafil (REVATIO) 20 mg Tab Take 1 tablet (20 mg total) by mouth As instructed. Take 2-5 tablets as needed.       Review of patient's allergies indicates:  No Known Allergies    Past Medical History:   Diagnosis Date    Allergy     Arthritis     Cancer     prostate    Diabetes mellitus     Hyperlipidemia     Hypertension     Prostate cancer      Past Surgical History:   Procedure Laterality Date    ENDOSCOPIC ULTRASOUND OF UPPER GASTROINTESTINAL TRACT N/A 7/5/2018    Procedure: ULTRASOUND, ENDOSCOPIC, UPPER GI TRACT;  Surgeon: Wiliam Ayoub MD;  [facility] ([address] [place] [address]);  Service: Endoscopy;  Laterality: N/A;    EYE SURGERY Right     x9    LYMPH NODE DISSECTION      UPPER GASTROINTESTINAL ENDOSCOPY       Family History     Problem Relation (Age of Onset)    Cancer Maternal  Uncle, Maternal Grandfather    Diabetes Sister, Maternal Aunt, Sister, Sister, Sister    Heart disease Brother, Brother    Hypertension Father, Mother    No Known Problems Paternal Grandmother, Paternal Grandfather    Prostate cancer Brother        Social History Main Topics    Smoking status: Former Smoker     Packs/day: 4.00     Years: 60.00     Types: Cigarettes, Cigars     Start date: 9/1/1957     Quit date: 11/13/2016    Smokeless tobacco: Never Used    Alcohol use No      Comment: pt stopped drinking 05/2018    Drug use: No      Comment: Used to use cocaine in past     Sexual activity: Not Currently     Partners: Female     Review of Systems   Constitutional: Negative for chills and fever.   Respiratory: Negative for shortness of breath.    Cardiovascular: Negative for chest pain and palpitations.   Gastrointestinal: Positive for abdominal pain. Negative for abdominal distention, blood in stool, constipation, diarrhea, nausea and vomiting.   Genitourinary: Negative for dysuria and flank pain.   Neurological: Negative for seizures, syncope and headaches.     Objective:     Vital Signs (Most Recent):  Temp: (!) 100.5 °F (38.1 °C) (07/18/18 1230)  Pulse: (!) 116 (07/18/18 1230)  Resp: 18 (07/18/18 1230)  BP: (!) 143/83 (07/18/18 1230)  SpO2: (!) 94 % (07/18/18 0557) Vital Signs (24h Range):  Temp:  [99.2 °F (37.3 °C)-100.5 °F (38.1 °C)] 100.5 °F (38.1 °C)  Pulse:  [100-116] 116  Resp:  [18] 18  SpO2:  [94 %-96 %] 94 %  BP: (114-143)/(70-89) 143/83     Weight: 82 kg (180 lb 12.4 oz)  Body mass index is 27.49 kg/m².    Physical Exam   Constitutional: He is oriented to person, place, and time. He appears well-developed and well-nourished. No distress.   Cardiovascular: Intact distal pulses.  Tachycardia present.    Pulmonary/Chest: Effort normal. No respiratory distress.   Abdominal:   Soft, ND, focally TTP in RLQ, previous robotic prostatectomy scars well healed    Neurological: He is alert and oriented to  person, place, and time.       Significant Labs:  CBC:   Recent Labs  Lab 07/18/18  0700   WBC 13.34*   RBC 3.63*   HGB 9.7*   HCT 32.3*   *   MCV 89   MCH 26.7*   MCHC 30.0*     CMP:   Recent Labs  Lab 07/18/18  0700   *   CALCIUM 9.3   ALBUMIN 3.2*   PROT 7.7   *   K 4.4   CO2 23   CL 99   BUN 14   CREATININE 1.1   ALKPHOS 112   ALT 9*   AST 9*   BILITOT 0.6       Significant Diagnostics:  CT Chest/Abdomen/Pelvis 7/18/18    Impression       Significant inflammatory change within the right lower quadrant with mural thickening of the terminal ileum and appendix, surrounding fat stranding, and free fluid.  These findings are concerning for acute appendicitis or ileitis.  Proximally, fecalization enteric contents within the ileum is suggestive delayed motility, no current bowel obstruction or perforation. A 3.7 x 1.8 cm focus of fluid demonstrates at least partially enhancing rim, concerning for developing abscess.    Right lower lobe consolidative opacity, unchanged since CT 06/21/2018.  Underlying mass lesion or infection cannot be excluded.    Right pleural thickening and fluid with pleural calcification.  The unilaterally of these findings suggest prior infection or hemothorax.  Superimposed acute process cannot be excluded.    Slight interval increase in size of an ill-defined pancreatic body mass.    Multiple small soft tissue foci scattered throughout the omentum, concerning for metastatic peritoneal implants.    Multiple osseous sclerotic foci involving the ribs and pelvis, compatible with sclerotic metastatic disease.    Retained contrast within the esophagus, predisposing this patient to aspiration.    Multiple additional findings as detailed above.         Assessment/Plan:     Acute appendicitis    Admit to surgery - Dr Pierce  NPO  IVF  Cont abx  PRN pain meds  To OR today for lap appy, all questions answered, consents signed          VTE Risk Mitigation     None          Thank you for  your consult.     Aman Mcfadden MD  General Surgery  [facility]     I have personally performed a detailed history and physical examination on this patient. My findings are summarized in the resident's note included in the record.  To the OR emergently

## 2018-07-18 NOTE — HPI
Shady Prakash Jr. is a 73 y.o. M with hx of metastatic prostate cancer s/p robotic prostatectomy (2015) and biopsy proven pancreatic adenocarcinoma who is scheduled to undergo a distal pancreatectomy and splenectomy on 8/10 with Dr Fregoso who presents to the ED today with a ~2wk history of vague RLQ pain which has worsened over the last couple days.  He endorses some constipation but denies diarrhea, nausea, vomiting, subjective fevers, chills.  In the ED he is mildly tachycardic (110) with a WBC of 13.  CT scan showed signs of acute appendicitis with associated possible small abscess.

## 2018-07-18 NOTE — ED PROVIDER NOTES
Encounter Date: 7/18/2018    SCRIBE #1 NOTE: I, Anton Lin, am scribing for, and in the presence of,  Dr. Enciso . I have scribed the following portions of the note - Other sections scribed: HPI, ROS, PE.   SCRIBE #2 NOTE: NIC, Luna Hooker, am scribing for, and in the presence of,  Dr. Enciso . I have scribed the following portions of the note - Other sections scribed: HPI ROS .     History     Chief Complaint   Patient presents with    Abdominal Pain     Patient reports RLQ abdominal pain for the past several days. Patient denies nausea, denies any other complaints. States that he recently had a pancreas scope.      Time patient was seen by the provider: 6:39 AM      The patient is a 73 y.o. male with co-morbidities including: pancreatic cancer, prostate cancer, HTN, HLD and DM who presents to the ED with a complaint of right lower abdominal pain for a couple days ago. The patient is actively being treated for pancreatic cancer and prostate cancer. He was given a prescription for oxycodone yesterday but states it is not helping his pain. He has scheduled follow up with radiology today and family reports a scheduled surgery on 9/8/18 for pancreatic cancer treatment. Pt denies having any appetite. He denies nausea, vomiting, diarrhea, no hematochezia, fever, chills, cough, or swelling in the legs. He reports medication compliance.       The history is provided by the patient, medical records and a relative.     Review of patient's allergies indicates:  No Known Allergies  Past Medical History:   Diagnosis Date    Allergy     Arthritis     Cancer     prostate    Diabetes mellitus     Hyperlipidemia     Hypertension     Prostate cancer      Past Surgical History:   Procedure Laterality Date    ENDOSCOPIC ULTRASOUND OF UPPER GASTROINTESTINAL TRACT N/A 7/5/2018    Procedure: ULTRASOUND, ENDOSCOPIC, UPPER GI TRACT;  Surgeon: Wiliam Ayoub MD;  Location: [facility] ([address] [place] [address]);  Service: Endoscopy;   Laterality: N/A;    EYE SURGERY Right     x9    LYMPH NODE DISSECTION      UPPER GASTROINTESTINAL ENDOSCOPY       Family History   Problem Relation Age of Onset    Hypertension Father     Hypertension Mother     Diabetes Sister     Heart disease Brother     Diabetes Maternal Aunt     Cancer Maternal Uncle     Cancer Maternal Grandfather     No Known Problems Paternal Grandmother     No Known Problems Paternal Grandfather     Prostate cancer Brother     Diabetes Sister     Diabetes Sister     Diabetes Sister     Heart disease Brother      Social History   Substance Use Topics    Smoking status: Former Smoker     Packs/day: 4.00     Years: 60.00     Types: Cigarettes, Cigars     Start date: 9/1/1957     Quit date: 11/13/2016    Smokeless tobacco: Never Used    Alcohol use No      Comment: pt stopped drinking 05/2018     Review of Systems   Constitutional: Negative for chills and fever.   Respiratory: Negative for cough.    Cardiovascular: Negative for leg swelling.   Gastrointestinal: Positive for abdominal pain. Negative for blood in stool, diarrhea, nausea and vomiting.   Neurological: Negative for speech difficulty.   All other systems reviewed and are negative.      Physical Exam     Initial Vitals [07/18/18 0557]   BP Pulse Resp Temp SpO2   129/89 110 18 99.2 °F (37.3 °C) (!) 94 %      MAP       --         Physical Exam    Nursing note and vitals reviewed.  Constitutional: He appears well-developed and well-nourished. No distress.   HENT:   Head: Normocephalic and atraumatic.   Oral pallor, somewhat edentulous    Eyes: EOM are normal. Pupils are equal, round, and reactive to light.   Pale conjunctiva   Neck: Normal range of motion. Neck supple.   Cardiovascular: Normal rate, regular rhythm, normal heart sounds and intact distal pulses.   Pulmonary/Chest: Breath sounds normal. No respiratory distress.   Abdominal: Soft. There is tenderness.   Diffuse    Musculoskeletal: Normal range of motion.  He exhibits no edema.   Neurological: He is alert and oriented to person, place, and time.   Skin: Skin is warm and dry.         ED Course   Procedures  Labs Reviewed   CBC W/ AUTO DIFFERENTIAL - Abnormal; Notable for the following:        Result Value    WBC 13.34 (*)     RBC 3.63 (*)     Hemoglobin 9.7 (*)     Hematocrit 32.3 (*)     MCH 26.7 (*)     MCHC 30.0 (*)     Platelets 442 (*)     MPV 8.5 (*)     Gran # (ANC) 11.8 (*)     Immature Grans (Abs) 0.07 (*)     Lymph # 0.7 (*)     Gran% 88.6 (*)     Lymph% 5.0 (*)     All other components within normal limits   COMPREHENSIVE METABOLIC PANEL - Abnormal; Notable for the following:     Sodium 134 (*)     Glucose 200 (*)     Albumin 3.2 (*)     AST 9 (*)     ALT 9 (*)     All other components within normal limits   LIPASE - Abnormal; Notable for the following:     Lipase 103 (*)     All other components within normal limits   CULTURE, BLOOD   CULTURE, BLOOD   AMYLASE   LACTIC ACID, PLASMA   HEMOGLOBIN A1C   POCT GLUCOSE MONITORING CONTINUOUS     EKG Readings: (Independently Interpreted)   Initial Reading: No STEMI. Rhythm: Sinus Tachycardia. Heart Rate: 107. Ectopy: No Ectopy. Conduction: Normal. ST Segments: Normal ST Segments. T Waves: Normal. Axis: Normal. Clinical Impression: Sinus Tachycardia       Imaging Results          CT Chest Abdomen Pelvis W W/O Contrast (XPD) (Final result)     Abnormal  Result time 07/18/18 12:57:00    Final result by Wiliam Key MD (07/18/18 12:57:00)                 Impression:      Significant inflammatory change within the right lower quadrant with mural thickening of the terminal ileum and appendix, surrounding fat stranding, and free fluid.  These findings are concerning for acute appendicitis or ileitis.  Proximally, fecalization enteric contents within the ileum is suggestive delayed motility, no current bowel obstruction or perforation. A 3.7 x 1.8 cm focus of fluid demonstrates at least partially enhancing rim, concerning  for developing abscess.    Right lower lobe consolidative opacity, unchanged since CT 06/21/2018.  Underlying mass lesion or infection cannot be excluded.    Right pleural thickening and fluid with pleural calcification.  The unilaterally of these findings suggest prior infection or hemothorax.  Superimposed acute process cannot be excluded.    Slight interval increase in size of an ill-defined pancreatic body mass.    Multiple small soft tissue foci scattered throughout the omentum, concerning for metastatic peritoneal implants.    Multiple osseous sclerotic foci involving the ribs and pelvis, compatible with sclerotic metastatic disease.    Retained contrast within the esophagus, predisposing this patient to aspiration.    Multiple additional findings as detailed above.    This report was flagged in Epic as abnormal.    COMMUNICATION  This critical result was discovered/received at 12:10 p.m. on 07/18/2018.  The critical information above was relayed directly by me by telephone to Dr. Enciso on 07/18/2018 at 12:26 p.m..    Electronically signed by resident: Gladys Reyes  Date:    07/18/2018  Time:    11:36    Electronically signed by: Wiliam Key MD  Date:    07/18/2018  Time:    12:57             Narrative:    EXAMINATION:  CT CHEST ABDOMEN PELVIS W W/O CONTRAST (XPD)    CLINICAL HISTORY:  pancreatic mass    TECHNIQUE:  Low dose axial images, sagittal and coronal reformations were obtained from the neck base to the pubic symphysis before and after the administration of 100 cc Omnipaque 350 intravenous contrast with pancreatic protocol.  30 mL of oral Omnipaque 350 was administered.    COMPARISON:  CT abdomen pelvis with contrast 06/21/2018, chest radiograph 07/05/2018    FINDINGS:  Base of Neck: No significant abnormality.    CHEST:    -Heart: Normal size.  There is small volume pericardial fluid, physiologic.    -Pulmonary vasculature: Pulmonary arteries distribute normally.  There are four pulmonary  veins.    -[place]/Mediastinum: There is right hilar and mediastinal calcification, compatible with calcified granulomas.    -Trachea and Proximal airways: Patent.    -Lungs/Pleura: The left lung is well expanded.  There is bilateral centrilobular emphysematous change.  A focus of consolidation within the posterior segment of the right lower lobe measures 5.0 cm, unchanged since 06/21/2018.  There is a right-sided pleural thickening, similar to the prior examination with small volume intervening fluid and pleural calcification.  Given the unilaterally of these findings, this may correlate with chronic infection or prior hemothorax.  Superimposed acute process cannot be excluded.  No pneumothorax.  No left-sided pleural effusion or thickening.    -Esophagus: Normal course and caliber, noting retained contrast within its course predisposing this patient to aspiration.    ABDOMEN:    - Liver: Normal in size and attenuation.  A 1.0 cm enhancing focus along the anterior margin of the left hepatic lobe become isodense on delayed imaging.  Similar ill-defined foci lie along the anterior margin of hepatic segment 4a and along the margin of hepatic segment 2.  These findings are similar to CT 06/21/2018 and likely represent hemangiomas as previously described.    - Gallbladder: No calcified gallstones.  No wall thickening or pericholecystic fluid.    - Bile Ducts: No intra or extrahepatic biliary ductal dilatation.    - Stomach/Duodenum: Unremarkable, noting that the stomach is filled with contrast.    - Spleen: There are multiple calcified splenic granulomas.    - Pancreas: A hypodense lesion within the pancreatic body with ill-defined borders measures approximately 2.0 x 2.9 cm,  increased in size since CT 02/21/2018.    - Adrenals: Unremarkable.    - Kidneys/ureters/urinary bladder: Normal in size and location, concentrating excreting contrast appropriately on delayed imaging.  No hydronephrosis or stones.  A 1.6 cm  hypodensity at the inferior right renal pole demonstrates attenuation compatible with a cyst.  The ureters appear normal in course and caliber without evidence of ureteral dilatation.  The urinary bladder is unremarkable.    - Retroperitoneum: No significant adenopathy.    PELVIS:    - Reproductive: Unremarkable.    - Other: No pelvic adenopathy or mass.    BOWEL/MESENTERY:    Colonic diverticula are present without findings to suggest diverticulitis.  The appendix is mildly dilated with wall thickening and significant surrounding inflammatory change and adjacent free fluid with additional free fluid in the pelvis.  No intraperitoneal free air.  These findings are most concerning for acute appendicitis without perforation.  A 3.7 x 1.8 cm focus of fluid demonstrates at least partially enhancing rim, concerning for developing abscess.  No bowel obstruction.  However, there is fecalization of bowel contents within the ileum proximal to the aforementioned inflammatory change, compatible with delayed motility.  Multiple small nodular densities scattered throughout the omental fat are concerning for peritoneal metastatic disease in this patient with a history of malignancy.    VASCULATURE: Left-sided aortic arch with 3 branch vessels.  No aneurysm.  There is mild aortic atherosclerosis.    BONES: No acute fracture.  There is degenerative change of the spine.  There are multiple regions of abnormal sclerosis within the visualized osseous structures including the lateral left 6th and 3rd ribs, the posterior right 6th rib, the left superior pubic ramus and right iliac bone, similar to the prior examination.  These findings are compatible with sclerotic metastasis.    EXTRATHORACIC/EXTRAPERITONEAL SOFT TISSUES: There are bilateral fat containing inguinal hernias.                                 Medical Decision Making:   History:   Old Medical Records: I decided to obtain old medical records.  Clinical Tests:   Lab Tests:  Reviewed and Ordered  Medical Tests: Ordered and Reviewed  ED Management:  ? Appendicitis vs rlq abscess. Will consult surgery.             Scribe Attestation:   Scribe #1: I performed the above scribed service and the documentation accurately describes the services I performed. I attest to the accuracy of the note.               Clinical Impression:   Diagnoses of Abdominal pain and Appendicitis were pertinent to this visit.                             Percy Enciso MD  07/18/18 2556

## 2018-07-18 NOTE — SUBJECTIVE & OBJECTIVE
Current Facility-Administered Medications on File Prior to Encounter   Medication    leuprolide (6 month) (ELIGARD) injection 45 mg    [START ON 11/7/2018] leuprolide (6 month) (ELIGARD) injection 45 mg    leuprolide (ELIGARD) injection 45 mg     Current Outpatient Prescriptions on File Prior to Encounter   Medication Sig    ACETAMINOPHEN (TYLENOL ARTHRITIS ORAL) Take by mouth.    aspirin (ECOTRIN) 81 MG EC tablet Take 81 mg by mouth once daily. States not taken in over 1 month as of 9/2/15    calcium-vitamin D3 (OYSTER SHELL CALCIUM-VIT D3) 500 mg(1,250mg) -200 unit per tablet Take 1 tablet by mouth 2 (two) times daily.    diphth,pertus,acell,,tetanus (BOOSTRIX) 2.5-8-5 Lf-mcg-Lf/0.5mL Susp Inject into the muscle.    docusate sodium (COLACE) 100 MG capsule Take 1 capsule (100 mg total) by mouth 2 (two) times daily.    dronabinol (MARINOL) 2.5 MG capsule Take 1 capsule (2.5 mg total) by mouth 2 (two) times daily before meals.    enzalutamide 40 mg Cap Take 4 capsules (160 mg) by mouth once daily.    glimepiride (AMARYL) 2 MG tablet Take 2 mg by mouth 2 (two) times daily.     lisinopril-hydrochlorothiazide (PRINZIDE,ZESTORETIC) 20-25 mg Tab Take 1 tablet by mouth once daily.     lovastatin (MEVACOR) 20 MG tablet     metformin (GLUCOPHAGE) 1000 MG tablet Take 1,000 mg by mouth 2 (two) times daily.     ondansetron (ZOFRAN-ODT) 4 MG TbDL Take 1 tablet (4 mg total) by mouth every 6 (six) hours as needed (nausea).    oxyCODONE (ROXICODONE) 15 MG Tab Take 1 tablet (15 mg total) by mouth every 4 (four) hours as needed for Pain.    papaverine 30 mg/mL injection Add Phentolamine 1 mg/cc  Add PGE1 10 mcg/cc    SIG:  Bring to office for test dose in physician office    promethazine (PHENERGAN) 25 MG tablet Take 1 tablet (25 mg total) by mouth every 6 (six) hours as needed for Nausea.    sildenafil (REVATIO) 20 mg Tab Take 1 tablet (20 mg total) by mouth As instructed. Take 2-5 tablets as needed.       Review  of patient's allergies indicates:  No Known Allergies    Past Medical History:   Diagnosis Date    Allergy     Arthritis     Cancer     prostate    Diabetes mellitus     Hyperlipidemia     Hypertension     Prostate cancer      Past Surgical History:   Procedure Laterality Date    ENDOSCOPIC ULTRASOUND OF UPPER GASTROINTESTINAL TRACT N/A 7/5/2018    Procedure: ULTRASOUND, ENDOSCOPIC, UPPER GI TRACT;  Surgeon: Wiliam Ayoub MD;  Location: [facility] ([address] [place] [address]);  Service: Endoscopy;  Laterality: N/A;    EYE SURGERY Right     x9    LYMPH NODE DISSECTION      UPPER GASTROINTESTINAL ENDOSCOPY       Family History     Problem Relation (Age of Onset)    Cancer Maternal Uncle, Maternal Grandfather    Diabetes Sister, Maternal Aunt, Sister, Sister, Sister    Heart disease Brother, Brother    Hypertension Father, Mother    No Known Problems Paternal Grandmother, Paternal Grandfather    Prostate cancer Brother        Social History Main Topics    Smoking status: Former Smoker     Packs/day: 4.00     Years: 60.00     Types: Cigarettes, Cigars     Start date: 9/1/1957     Quit date: 11/13/2016    Smokeless tobacco: Never Used    Alcohol use No      Comment: pt stopped drinking 05/2018    Drug use: No      Comment: Used to use cocaine in past     Sexual activity: Not Currently     Partners: Female     Review of Systems   Constitutional: Negative for chills and fever.   Respiratory: Negative for shortness of breath.    Cardiovascular: Negative for chest pain and palpitations.   Gastrointestinal: Positive for abdominal pain. Negative for abdominal distention, blood in stool, constipation, diarrhea, nausea and vomiting.   Genitourinary: Negative for dysuria and flank pain.   Neurological: Negative for seizures, syncope and headaches.     Objective:     Vital Signs (Most Recent):  Temp: (!) 100.5 °F (38.1 °C) (07/18/18 1230)  Pulse: (!) 116 (07/18/18 1230)  Resp: 18 (07/18/18 1230)  BP: (!) 143/83 (07/18/18 1230)  SpO2:  (!) 94 % (07/18/18 0557) Vital Signs (24h Range):  Temp:  [99.2 °F (37.3 °C)-100.5 °F (38.1 °C)] 100.5 °F (38.1 °C)  Pulse:  [100-116] 116  Resp:  [18] 18  SpO2:  [94 %-96 %] 94 %  BP: (114-143)/(70-89) 143/83     Weight: 82 kg (180 lb 12.4 oz)  Body mass index is 27.49 kg/m².    Physical Exam   Constitutional: He is oriented to person, place, and time. He appears well-developed and well-nourished. No distress.   Cardiovascular: Intact distal pulses.  Tachycardia present.    Pulmonary/Chest: Effort normal. No respiratory distress.   Abdominal:   Soft, ND, focally TTP in RLQ, previous robotic prostatectomy scars well healed    Neurological: He is alert and oriented to person, place, and time.       Significant Labs:  CBC:   Recent Labs  Lab 07/18/18  0700   WBC 13.34*   RBC 3.63*   HGB 9.7*   HCT 32.3*   *   MCV 89   MCH 26.7*   MCHC 30.0*     CMP:   Recent Labs  Lab 07/18/18  0700   *   CALCIUM 9.3   ALBUMIN 3.2*   PROT 7.7   *   K 4.4   CO2 23   CL 99   BUN 14   CREATININE 1.1   ALKPHOS 112   ALT 9*   AST 9*   BILITOT 0.6       Significant Diagnostics:  CT Chest/Abdomen/Pelvis 7/18/18    Impression       Significant inflammatory change within the right lower quadrant with mural thickening of the terminal ileum and appendix, surrounding fat stranding, and free fluid.  These findings are concerning for acute appendicitis or ileitis.  Proximally, fecalization enteric contents within the ileum is suggestive delayed motility, no current bowel obstruction or perforation. A 3.7 x 1.8 cm focus of fluid demonstrates at least partially enhancing rim, concerning for developing abscess.    Right lower lobe consolidative opacity, unchanged since CT 06/21/2018.  Underlying mass lesion or infection cannot be excluded.    Right pleural thickening and fluid with pleural calcification.  The unilaterally of these findings suggest prior infection or hemothorax.  Superimposed acute process cannot be  excluded.    Slight interval increase in size of an ill-defined pancreatic body mass.    Multiple small soft tissue foci scattered throughout the omentum, concerning for metastatic peritoneal implants.    Multiple osseous sclerotic foci involving the ribs and pelvis, compatible with sclerotic metastatic disease.    Retained contrast within the esophagus, predisposing this patient to aspiration.    Multiple additional findings as detailed above.

## 2018-07-18 NOTE — TRANSFER OF CARE
"Anesthesia Transfer of Care Note    Patient: Shady Prakash Jr.    Procedure(s) Performed: Procedure(s) (LRB):  Laparoscopy Converted to Laparotomy, Peritoneal Biopsy , Ileocecal Resection (N/A)    Patient location: PACU    Anesthesia Type: general    Transport from OR: Transported from OR on 6-10 L/min O2 by face mask with adequate spontaneous ventilation    Post pain: adequate analgesia    Post assessment: tolerated procedure well and no apparent anesthetic complications    Post vital signs: stable    Level of consciousness: sedated and responds to stimulation    Nausea/Vomiting: no nausea/vomiting    Complications: none    Transfer of care protocol was followed      Last vitals:   Visit Vitals  BP (!) 167/98 (BP Location: Right arm, Patient Position: Lying)   Pulse 96   Temp 36.6 °C (97.9 °F) (Temporal)   Resp 16   Ht 5' 8" (1.727 m)   Wt 82 kg (180 lb 12.4 oz)   SpO2 100%   BMI 27.49 kg/m²     "

## 2018-07-18 NOTE — BRIEF OP NOTE
[facility]  Surgery Department  Operative Note    SUMMARY     Date of Procedure: 7/18/2018     Procedure: Procedure(s) (LRB):  APPENDECTOMY, LAPAROSCOPIC (N/A)     Surgeon(s) and Role:     * Aman Mcfadden MD - Resident - Assisting     * Rolan Pierce MD - Primary        Pre-Operative Diagnosis: Abdominal pain [R10.9]    Post-Operative Diagnosis: Post-Op Diagnosis Codes:     * Abdominal pain [R10.9]    Anesthesia: General    Description of the findings of the procedure: Lap converted to open appy.  Widely metastatic disease likely from pancreatic primary.  Ileocecectomy preformed     Findings/Key Components: Widely metastatic disease.  Ileocecectomy.      Complications: No    Estimated Blood Loss (EBL): 100 mL           Implants: * No implants in log *    Specimens:   Specimen (12h ago through future)    Start     Ordered    07/18/18 1711  Specimen to Pathology - Surgery  Once     Comments:  1. Peritoneal Biopsy for permanent 2. Ileocecal Resection for Permanent      07/18/18 1728    07/18/18 1627  Specimen to Pathology - Surgery  Once,   Status:  Canceled     Comments:  1. Peritoneal Biopsy for Permanent 2. Appendix for Permanent      07/18/18 1650                  Condition: Good    Disposition: PACU - hemodynamically stable.    Attestation: I was present and scrubbed for the entire procedure.

## 2018-07-18 NOTE — ASSESSMENT & PLAN NOTE
Admit to surgery - Dr Pierce  NPO  IVF  Cont abx  PRN pain meds  To OR today for lap appy, all questions answered, consents signed

## 2018-07-18 NOTE — ED NOTES
Pt says every since they scoped him 7/5 and looked at his pancreas hes been having RLQ abdomal pain. Pt denies n/v. Pt says that he has been experiencing decreased appetite, and his bowels havent been as active as they normally are.

## 2018-07-18 NOTE — ED NOTES
Pt AAOx3..  Pt aware we continue to wait for test results and/or bed assignment and/or POC.   No complaints or concerns at this time.  Respirations E/U.  Will continue to monitor for updates or changes.

## 2018-07-19 LAB
ANION GAP SERPL CALC-SCNC: 9 MMOL/L
BASOPHILS # BLD AUTO: 0.01 K/UL
BASOPHILS NFR BLD: 0.1 %
BUN SERPL-MCNC: 14 MG/DL
CALCIUM SERPL-MCNC: 7.6 MG/DL
CHLORIDE SERPL-SCNC: 100 MMOL/L
CO2 SERPL-SCNC: 22 MMOL/L
CREAT SERPL-MCNC: 0.9 MG/DL
DIFFERENTIAL METHOD: ABNORMAL
EOSINOPHIL # BLD AUTO: 0 K/UL
EOSINOPHIL NFR BLD: 0 %
ERYTHROCYTE [DISTWIDTH] IN BLOOD BY AUTOMATED COUNT: 13.8 %
EST. GFR  (AFRICAN AMERICAN): >60 ML/MIN/1.73 M^2
EST. GFR  (NON AFRICAN AMERICAN): >60 ML/MIN/1.73 M^2
GLUCOSE SERPL-MCNC: 282 MG/DL
HCT VFR BLD AUTO: 29.5 %
HGB BLD-MCNC: 9.4 G/DL
IMM GRANULOCYTES # BLD AUTO: 0.02 K/UL
IMM GRANULOCYTES NFR BLD AUTO: 0.2 %
LYMPHOCYTES # BLD AUTO: 0.3 K/UL
LYMPHOCYTES NFR BLD: 3.8 %
MAGNESIUM SERPL-MCNC: 1.7 MG/DL
MCH RBC QN AUTO: 27.9 PG
MCHC RBC AUTO-ENTMCNC: 31.9 G/DL
MCV RBC AUTO: 88 FL
MONOCYTES # BLD AUTO: 0.5 K/UL
MONOCYTES NFR BLD: 5.1 %
NEUTROPHILS # BLD AUTO: 8.2 K/UL
NEUTROPHILS NFR BLD: 90.8 %
NRBC BLD-RTO: 0 /100 WBC
PHOSPHATE SERPL-MCNC: 2.7 MG/DL
PLATELET # BLD AUTO: 418 K/UL
PMV BLD AUTO: 8.6 FL
POCT GLUCOSE: 181 MG/DL (ref 70–110)
POCT GLUCOSE: 227 MG/DL (ref 70–110)
POCT GLUCOSE: 241 MG/DL (ref 70–110)
POCT GLUCOSE: 253 MG/DL (ref 70–110)
POTASSIUM SERPL-SCNC: 5 MMOL/L
RBC # BLD AUTO: 3.37 M/UL
SODIUM SERPL-SCNC: 131 MMOL/L
WBC # BLD AUTO: 8.98 K/UL

## 2018-07-19 PROCEDURE — 84100 ASSAY OF PHOSPHORUS: CPT

## 2018-07-19 PROCEDURE — 83735 ASSAY OF MAGNESIUM: CPT

## 2018-07-19 PROCEDURE — S0030 INJECTION, METRONIDAZOLE: HCPCS | Performed by: STUDENT IN AN ORGANIZED HEALTH CARE EDUCATION/TRAINING PROGRAM

## 2018-07-19 PROCEDURE — 85025 COMPLETE CBC W/AUTO DIFF WBC: CPT

## 2018-07-19 PROCEDURE — 63600175 PHARM REV CODE 636 W HCPCS: Performed by: STUDENT IN AN ORGANIZED HEALTH CARE EDUCATION/TRAINING PROGRAM

## 2018-07-19 PROCEDURE — 11000001 HC ACUTE MED/SURG PRIVATE ROOM

## 2018-07-19 PROCEDURE — 36415 COLL VENOUS BLD VENIPUNCTURE: CPT

## 2018-07-19 PROCEDURE — 80048 BASIC METABOLIC PNL TOTAL CA: CPT

## 2018-07-19 PROCEDURE — 25000003 PHARM REV CODE 250: Performed by: STUDENT IN AN ORGANIZED HEALTH CARE EDUCATION/TRAINING PROGRAM

## 2018-07-19 RX ADMIN — ENOXAPARIN SODIUM 40 MG: 100 INJECTION SUBCUTANEOUS at 09:07

## 2018-07-19 RX ADMIN — INSULIN ASPART 2 UNITS: 100 INJECTION, SOLUTION INTRAVENOUS; SUBCUTANEOUS at 09:07

## 2018-07-19 RX ADMIN — ONDANSETRON 8 MG: 8 TABLET, ORALLY DISINTEGRATING ORAL at 12:07

## 2018-07-19 RX ADMIN — INSULIN ASPART 1 UNITS: 100 INJECTION, SOLUTION INTRAVENOUS; SUBCUTANEOUS at 12:07

## 2018-07-19 RX ADMIN — INSULIN ASPART 3 UNITS: 100 INJECTION, SOLUTION INTRAVENOUS; SUBCUTANEOUS at 01:07

## 2018-07-19 RX ADMIN — Medication: at 10:07

## 2018-07-19 RX ADMIN — METRONIDAZOLE 500 MG: 500 INJECTION, SOLUTION INTRAVENOUS at 02:07

## 2018-07-19 NOTE — PLAN OF CARE
Patient lives in a 2 story house w/4 [place] w/railings w/Significant other (SO), & his family. SO, adult nephew, great nephew-child, & SO's brother are all at BS. Not medically stable for discharge;POD # 1: waiting for bowel function to return.     [facility] given to patient after informed about it;patient verbalized their understanding.        07/19/18 1330   Discharge Assessment   Assessment Type Discharge Planning Assessment   Confirmed/corrected address and phone number on facesheet? Yes   Assessment information obtained from? Patient;Medical Record;Other  (Significant other)   Expected Length of Stay (days) (TBD/? 4-5)   Communicated expected length of stay with patient/caregiver no  (Per MD)   Prior to hospitilization cognitive status: Alert/Oriented;No Deficits   Prior to hospitalization functional status: Independent   Current cognitive status: Alert/Oriented;No Deficits   Current Functional Status: Independent;Needs Assistance   Facility Arrived From: (N/A)   Lives With significant other;child(leidy), adult;grandchild(leidy)  (Son, daughter-in-law, 3 grandchildren)   Able to Return to Prior Arrangements yes   Is patient able to care for self after discharge? Yes   Who are your caregiver(s) and their phone number(s)? (Lacey Wilson Significant other     [phone]. Has many other family members who will help as needs: 1. SonIvonne no # given, 2. Vanessa Wilson Daughter [phone].  )   Patient's perception of discharge disposition home or selfcare;home health  (Needs TBD/PT/OT recs pending/? HH. )   Readmission Within The Last 30 Days no previous admission in last 30 days   Patient currently being followed by outpatient case management? No   Patient currently receives any other outside agency services? No   Equipment Currently Used at Home none   Do you have any problems affording any of your prescribed medications? No   Is the patient taking medications as prescribed? yes   Does the  patient have transportation home? Yes   Transportation Available car;family or friend will provide   Dialysis Name and Scheduled days (N/A)   Does the patient receive services at the [facility]? No   Discharge Plan A Home with family;Home Health   Discharge Plan B Group home;Home with family;Hospice/home  (Needs TBD/? HH vs ? Home hospice)   Patient/Family In Agreement With Plan unable to assess

## 2018-07-19 NOTE — ANESTHESIA POSTPROCEDURE EVALUATION
"Anesthesia Post Evaluation    Patient: Shady Prakash Jr.    Procedure(s) Performed: Procedure(s) (LRB):  Laparoscopy Converted to Laparotomy, Peritoneal Biopsy , Ileocecal Resection (N/A)    Final Anesthesia Type: general  Patient location during evaluation: PACU  Patient participation: Yes- Able to Participate  Level of consciousness: awake and alert  Post-procedure vital signs: reviewed and stable  Pain management: adequate  Airway patency: patent  PONV status at discharge: No PONV  Anesthetic complications: no      Cardiovascular status: blood pressure returned to baseline and hemodynamically stable  Respiratory status: unassisted, spontaneous ventilation and room air  Hydration status: euvolemic  Follow-up not needed.        Visit Vitals  /80   Pulse 110   Temp 37.1 °C (98.8 °F) (Oral)   Resp 16   Ht 5' 8" (1.727 m)   Wt 82 kg (180 lb 12.4 oz)   SpO2 99%   BMI 27.49 kg/m²       Pain/Patricia Score: Pain Assessment Performed: Yes (7/18/2018  7:30 PM)  Presence of Pain: complains of pain/discomfort (7/18/2018  7:30 PM)  Pain Rating Prior to Med Admin: 10 (7/18/2018  6:15 PM)  Patricia Score: 8 (7/18/2018  7:30 PM)      "

## 2018-07-19 NOTE — PLAN OF CARE
Problem: Patient Care Overview  Goal: Plan of Care Review  Outcome: Ongoing (interventions implemented as appropriate)  Plan of care reviewed with pt/family verbalized understanding, vss, patient remains NPO, no c/o of nausea/vomiting, mohr catheter intact with dark mya colored urine noted to urimeter bag, pain controlled with PCA pump, call-light within reach, will continue to monitor.

## 2018-07-19 NOTE — PLAN OF CARE
Plan of care discussed with patient. Cognitive limitation noted at this time (disoriented x3). Pain appears tolerable. No nausea observed. Attempted to contact patient's significant other and was unable to do so. Will send text message per system.

## 2018-07-19 NOTE — PLAN OF CARE
Problem: Patient Care Overview  Goal: Plan of Care Review  Outcome: Ongoing (interventions implemented as appropriate)  Plan of care reviewed the patient, a 73 year old male with the DX of appendicitis,,, admitted on the 18th of this month,,, had a Lap Appy converted to open,,, midline with island dressing,,, urinal at bedside,,, npo,,,, 2L O2 with nasal canula,,, PCA pump,, complained of nausea,, zofran given,,, accuchecks Q6,,, tachy,,,, LR at 125,,, fall risk,,, rested nicely thru the night,,, bed locked and low, call light in reach,,,,,,

## 2018-07-19 NOTE — ANESTHESIA RELEASE NOTE
"Anesthesia Release from PACU Note    Patient: Shady Prakash Jr.    Procedure(s) Performed: Procedure(s) (LRB):  Laparoscopy Converted to Laparotomy, Peritoneal Biopsy , Ileocecal Resection (N/A)    Anesthesia type: general    Post pain: Adequate analgesia    Post assessment: no apparent anesthetic complications    Last Vitals:   Visit Vitals  /80   Pulse 110   Temp 37.1 °C (98.8 °F) (Oral)   Resp 16   Ht 5' 8" (1.727 m)   Wt 82 kg (180 lb 12.4 oz)   SpO2 99%   BMI 27.49 kg/m²       Post vital signs: stable    Level of consciousness: awake, alert  and oriented    Nausea/Vomiting: no nausea/no vomiting    Complications: none    Airway Patency: patent    Respiratory: unassisted, spontaneous ventilation, room air    Cardiovascular: stable and blood pressure at baseline    Hydration: euvolemic  "

## 2018-07-19 NOTE — SUBJECTIVE & OBJECTIVE
Interval History:   Patient seen and examined, no acute events overnight  Pain well controlled with PCA  Denies N/V/F/BM  C/o inability to urinate  Tachycardic, afebrile    Medications:  Continuous Infusions:   hydromorphone in 0.9 % NaCl 6 mg/30 ml      lactated ringers 125 mL/hr at 07/18/18 1755     Scheduled Meds:   cefTRIAXone (ROCEPHIN) IVPB  2 g Intravenous Q24H    enoxaparin  40 mg Subcutaneous Daily    metronidazole  500 mg Intravenous Q8H     PRN Meds:acetaminophen, dextrose 50%, diphenhydrAMINE, glucagon (human recombinant), insulin aspart U-100, naloxone, ondansetron, promethazine (PHENERGAN) IVPB, ramelteon, sodium chloride 0.9%, sodium chloride 0.9%     Review of patient's allergies indicates:  No Known Allergies  Objective:     Vital Signs (Most Recent):  Temp: 99.4 °F (37.4 °C) (07/19/18 0450)  Pulse: 110 (07/19/18 0450)  Resp: 17 (07/19/18 0450)  BP: 124/78 (07/19/18 0450)  SpO2: 99 % (07/19/18 0450) Vital Signs (24h Range):  Temp:  [97 °F (36.1 °C)-100.5 °F (38.1 °C)] 99.4 °F (37.4 °C)  Pulse:  [] 110  Resp:  [13-20] 17  SpO2:  [91 %-100 %] 99 %  BP: (112-168)/(68-98) 124/78     Weight: 82 kg (180 lb 12.4 oz)  Body mass index is 27.49 kg/m².    Intake/Output - Last 3 Shifts       07/17 0700 - 07/18 0659 07/18 0700 - 07/19 0659 07/19 0700 - 07/20 0659    I.V. (mL/kg)  1635.4 (19.9)     IV Piggyback  150     Total Intake(mL/kg)  1785.4 (21.8)     Urine (mL/kg/hr)  180 (0.1)     Emesis/NG output  0 (0)     Blood  100 (0.1)     Total Output   280      Net   +1505.4             Urine Occurrence  0 x     Stool Occurrence  0 x     Emesis Occurrence  0 x           Physical Exam   Constitutional: He is oriented to person, place, and time. He appears well-developed and well-nourished. No distress.   Cardiovascular: Intact distal pulses.  Tachycardia present.    Pulmonary/Chest: Effort normal. No respiratory distress.   Abdominal:   Soft, ND, appropriate TTP  Incision - surgical dressing in place,  clean, dry and intact    Neurological: He is alert and oriented to person, place, and time.       Significant Labs:  CBC:   Recent Labs  Lab 07/19/18  0353   WBC 8.98   RBC 3.37*   HGB 9.4*   HCT 29.5*   *   MCV 88   MCH 27.9   MCHC 31.9*     BMP:   Recent Labs  Lab 07/19/18  0353   *   *   K 5.0      CO2 22*   BUN 14   CREATININE 0.9   CALCIUM 7.6*   MG 1.7     CMP:   Recent Labs  Lab 07/18/18 2132 07/19/18  0353   * 282*   CALCIUM 8.0* 7.6*   ALBUMIN 2.5*  --    PROT 6.2  --    * 131*   K 4.8 5.0   CO2 22* 22*    100   BUN 14 14   CREATININE 1.0 0.9   ALKPHOS 90  --    ALT 8*  --    AST 8*  --    BILITOT 1.2*  --      LFTs:   Recent Labs  Lab 07/18/18 2132   ALT 8*   AST 8*   ALKPHOS 90   BILITOT 1.2*   PROT 6.2   ALBUMIN 2.5*

## 2018-07-19 NOTE — ASSESSMENT & PLAN NOTE
72 yo male w widely metastatic disease likely from pancreatic primary, s/p Ileocecectomy 7/18/18    -clear liquid diet  -IVF LR 75  -d/c abx  -PCA for pain  -nausea meds PRN  -DVT prophylaxis  -PT/OT

## 2018-07-19 NOTE — NURSING TRANSFER
Nursing Transfer Note      7/18/2018     Transfer To: [address]    Transfer via stretcher    Transfer with 2L O2 per NC, IVF, IVPCA    Transported by PCT    Medicines sent: Rocephin IVPB, Insulin Aspart pen, Dilaudid IVPCA, IVF    Chart send with patient: Yes    Notified: significant other (via text) - did not answer phone call

## 2018-07-19 NOTE — PROGRESS NOTES
[facility]  General Surgery  Progress Note    Subjective:     History of Present Illness:  Shady Prakash Jr. is a 73 y.o. M with hx of metastatic prostate cancer s/p robotic prostatectomy (2015) and biopsy proven pancreatic adenocarcinoma who is scheduled to undergo a distal pancreatectomy and splenectomy on 8/10 with Dr Fregoso who presents to the ED today with a ~2wk history of vague RLQ pain which has worsened over the last couple days.  He endorses some constipation but denies diarrhea, nausea, vomiting, subjective fevers, chills.  In the ED he is mildly tachycardic (110) with a WBC of 13.  CT scan showed signs of acute appendicitis with associated possible small abscess.      Post-Op Info:  Procedure(s) (LRB):  Laparoscopy Converted to Laparotomy, Peritoneal Biopsy , Ileocecal Resection (N/A)   1 Day Post-Op     Interval History:   Patient seen and examined, no acute events overnight  Pain well controlled with PCA  Denies N/V/F/BM  C/o inability to urinate  Tachycardic, afebrile    Medications:  Continuous Infusions:   hydromorphone in 0.9 % NaCl 6 mg/30 ml      lactated ringers 125 mL/hr at 07/18/18 1755     Scheduled Meds:   cefTRIAXone (ROCEPHIN) IVPB  2 g Intravenous Q24H    enoxaparin  40 mg Subcutaneous Daily    metronidazole  500 mg Intravenous Q8H     PRN Meds:acetaminophen, dextrose 50%, diphenhydrAMINE, glucagon (human recombinant), insulin aspart U-100, naloxone, ondansetron, promethazine (PHENERGAN) IVPB, ramelteon, sodium chloride 0.9%, sodium chloride 0.9%     Review of patient's allergies indicates:  No Known Allergies  Objective:     Vital Signs (Most Recent):  Temp: 99.4 °F (37.4 °C) (07/19/18 0450)  Pulse: 110 (07/19/18 0450)  Resp: 17 (07/19/18 0450)  BP: 124/78 (07/19/18 0450)  SpO2: 99 % (07/19/18 0450) Vital Signs (24h Range):  Temp:  [97 °F (36.1 °C)-100.5 °F (38.1 °C)] 99.4 °F (37.4 °C)  Pulse:  [] 110  Resp:  [13-20] 17  SpO2:  [91 %-100 %] 99 %  BP:  (112-168)/(68-98) 124/78     Weight: 82 kg (180 lb 12.4 oz)  Body mass index is 27.49 kg/m².    Intake/Output - Last 3 Shifts       07/17 0700 - 07/18 0659 07/18 0700 - 07/19 0659 07/19 0700 - 07/20 0659    I.V. (mL/kg)  1635.4 (19.9)     IV Piggyback  150     Total Intake(mL/kg)  1785.4 (21.8)     Urine (mL/kg/hr)  180 (0.1)     Emesis/NG output  0 (0)     Blood  100 (0.1)     Total Output   280      Net   +1505.4             Urine Occurrence  0 x     Stool Occurrence  0 x     Emesis Occurrence  0 x           Physical Exam   Constitutional: He is oriented to person, place, and time. He appears well-developed and well-nourished. No distress.   Cardiovascular: Intact distal pulses.  Tachycardia present.    Pulmonary/Chest: Effort normal. No respiratory distress.   Abdominal:   Soft, ND, appropriate TTP  Incision - surgical dressing in place, clean, dry and intact    Neurological: He is alert and oriented to person, place, and time.       Significant Labs:  CBC:   Recent Labs  Lab 07/19/18  0353   WBC 8.98   RBC 3.37*   HGB 9.4*   HCT 29.5*   *   MCV 88   MCH 27.9   MCHC 31.9*     BMP:   Recent Labs  Lab 07/19/18  0353   *   *   K 5.0      CO2 22*   BUN 14   CREATININE 0.9   CALCIUM 7.6*   MG 1.7     CMP:   Recent Labs  Lab 07/18/18 2132 07/19/18  0353   * 282*   CALCIUM 8.0* 7.6*   ALBUMIN 2.5*  --    PROT 6.2  --    * 131*   K 4.8 5.0   CO2 22* 22*    100   BUN 14 14   CREATININE 1.0 0.9   ALKPHOS 90  --    ALT 8*  --    AST 8*  --    BILITOT 1.2*  --      LFTs:   Recent Labs  Lab 07/18/18 2132   ALT 8*   AST 8*   ALKPHOS 90   BILITOT 1.2*   PROT 6.2   ALBUMIN 2.5*     Assessment/Plan:     Acute appendicitis    72 yo male w widely metastatic disease likely from pancreatic primary, s/p Ileocecectomy  7/18/18    -clear liquid diet  -IVF LR 75  -d/c abx  -PCA for pain  -nausea meds PRN  -DVT prophylaxis  -PT/OT            Bernice Peter PA-C   [id number]  General  Surgery  [facility]

## 2018-07-20 LAB
ALBUMIN SERPL BCP-MCNC: 2.2 G/DL
ALP SERPL-CCNC: 91 U/L
ALT SERPL W/O P-5'-P-CCNC: 16 U/L
ANION GAP SERPL CALC-SCNC: 9 MMOL/L
AST SERPL-CCNC: 22 U/L
BASOPHILS # BLD AUTO: 0.01 K/UL
BASOPHILS NFR BLD: 0.1 %
BILIRUB SERPL-MCNC: 1.5 MG/DL
BUN SERPL-MCNC: 17 MG/DL
CALCIUM SERPL-MCNC: 8.2 MG/DL
CHLORIDE SERPL-SCNC: 102 MMOL/L
CO2 SERPL-SCNC: 25 MMOL/L
CREAT SERPL-MCNC: 1 MG/DL
DIFFERENTIAL METHOD: ABNORMAL
EOSINOPHIL # BLD AUTO: 0.1 K/UL
EOSINOPHIL NFR BLD: 0.9 %
ERYTHROCYTE [DISTWIDTH] IN BLOOD BY AUTOMATED COUNT: 13.8 %
EST. GFR  (AFRICAN AMERICAN): >60 ML/MIN/1.73 M^2
EST. GFR  (NON AFRICAN AMERICAN): >60 ML/MIN/1.73 M^2
GLUCOSE SERPL-MCNC: 145 MG/DL
HCT VFR BLD AUTO: 28.4 %
HGB BLD-MCNC: 8.7 G/DL
IMM GRANULOCYTES # BLD AUTO: 0.06 K/UL
IMM GRANULOCYTES NFR BLD AUTO: 0.6 %
LYMPHOCYTES # BLD AUTO: 0.6 K/UL
LYMPHOCYTES NFR BLD: 6.5 %
MAGNESIUM SERPL-MCNC: 2.2 MG/DL
MCH RBC QN AUTO: 27.3 PG
MCHC RBC AUTO-ENTMCNC: 30.6 G/DL
MCV RBC AUTO: 89 FL
MONOCYTES # BLD AUTO: 0.7 K/UL
MONOCYTES NFR BLD: 7 %
NEUTROPHILS # BLD AUTO: 8.3 K/UL
NEUTROPHILS NFR BLD: 84.9 %
NRBC BLD-RTO: 0 /100 WBC
PHOSPHATE SERPL-MCNC: 2 MG/DL
PLATELET # BLD AUTO: 450 K/UL
PMV BLD AUTO: 8.7 FL
POCT GLUCOSE: 156 MG/DL (ref 70–110)
POCT GLUCOSE: 162 MG/DL (ref 70–110)
POCT GLUCOSE: 191 MG/DL (ref 70–110)
POTASSIUM SERPL-SCNC: 4.6 MMOL/L
PROT SERPL-MCNC: 6.2 G/DL
RBC # BLD AUTO: 3.19 M/UL
SODIUM SERPL-SCNC: 136 MMOL/L
WBC # BLD AUTO: 9.81 K/UL

## 2018-07-20 PROCEDURE — 63600175 PHARM REV CODE 636 W HCPCS: Performed by: STUDENT IN AN ORGANIZED HEALTH CARE EDUCATION/TRAINING PROGRAM

## 2018-07-20 PROCEDURE — 80053 COMPREHEN METABOLIC PANEL: CPT

## 2018-07-20 PROCEDURE — 84100 ASSAY OF PHOSPHORUS: CPT

## 2018-07-20 PROCEDURE — 97161 PT EVAL LOW COMPLEX 20 MIN: CPT

## 2018-07-20 PROCEDURE — 83735 ASSAY OF MAGNESIUM: CPT

## 2018-07-20 PROCEDURE — 97116 GAIT TRAINING THERAPY: CPT

## 2018-07-20 PROCEDURE — 11000001 HC ACUTE MED/SURG PRIVATE ROOM

## 2018-07-20 PROCEDURE — 85025 COMPLETE CBC W/AUTO DIFF WBC: CPT

## 2018-07-20 PROCEDURE — 36415 COLL VENOUS BLD VENIPUNCTURE: CPT

## 2018-07-20 RX ORDER — TAMSULOSIN HYDROCHLORIDE 0.4 MG/1
0.4 CAPSULE ORAL DAILY
Status: DISCONTINUED | OUTPATIENT
Start: 2018-07-20 | End: 2018-07-25 | Stop reason: HOSPADM

## 2018-07-20 RX ADMIN — ENOXAPARIN SODIUM 40 MG: 100 INJECTION SUBCUTANEOUS at 05:07

## 2018-07-20 RX ADMIN — Medication: at 12:07

## 2018-07-20 RX ADMIN — Medication: at 02:07

## 2018-07-20 NOTE — SUBJECTIVE & OBJECTIVE
Interval History:   Patient seen and examined, no acute events overnight  Pain well controlled with PCA  Denies N/V/F/BM  Tachycardic, afebrile    Medications:  Continuous Infusions:   hydromorphone in 0.9 % NaCl 6 mg/30 ml      lactated ringers 75 mL/hr at 07/19/18 0816     Scheduled Meds:   enoxaparin  40 mg Subcutaneous Daily     PRN Meds:acetaminophen, dextrose 50%, diphenhydrAMINE, glucagon (human recombinant), insulin aspart U-100, naloxone, ondansetron, promethazine (PHENERGAN) IVPB, ramelteon, sodium chloride 0.9%, sodium chloride 0.9%     Review of patient's allergies indicates:  No Known Allergies  Objective:     Vital Signs (Most Recent):  Temp: 96.3 °F (35.7 °C) (07/20/18 0716)  Pulse: 102 (07/20/18 0716)  Resp: 18 (07/20/18 0716)  BP: 115/67 (07/20/18 0716)  SpO2: 98 % (07/20/18 0716) Vital Signs (24h Range):  Temp:  [96.3 °F (35.7 °C)-100.1 °F (37.8 °C)] 96.3 °F (35.7 °C)  Pulse:  [100-114] 102  Resp:  [16-18] 18  SpO2:  [97 %-99 %] 98 %  BP: (111-130)/(64-82) 115/67     Weight: 82 kg (180 lb 12.4 oz)  Body mass index is 27.49 kg/m².    Intake/Output - Last 3 Shifts       07/18 0700 - 07/19 0659 07/19 0700 - 07/20 0659 07/20 0700 - 07/21 0659    P.O.  0     I.V. (mL/kg) 1635.4 (19.9) 1630 (19.9)     IV Piggyback 150      Total Intake(mL/kg) 1785.4 (21.8) 1630 (19.9)     Urine (mL/kg/hr) 180 (0.1) 1050 (0.5)     Emesis/NG output 0 (0) 0 (0)     Stool  0 (0)     Blood 100 (0.1)      Total Output 280 1050      Net +1505.4 +580             Urine Occurrence 0 x 0 x     Stool Occurrence 0 x 0 x     Emesis Occurrence 0 x 0 x           Physical Exam   Constitutional: He is oriented to person, place, and time. He appears well-developed and well-nourished. No distress.   Cardiovascular: Intact distal pulses.  Tachycardia present.    Pulmonary/Chest: Effort normal. No respiratory distress.   Abdominal:   Soft, ND, appropriate TTP  Incision - surgical dressing in place, clean, dry and intact    Neurological: He  is alert and oriented to person, place, and time.       Significant Labs:  CBC:   Recent Labs  Lab 07/19/18 0353   WBC 8.98   RBC 3.37*   HGB 9.4*   HCT 29.5*   *   MCV 88   MCH 27.9   MCHC 31.9*     BMP:   Recent Labs  Lab 07/19/18 0353   *   *   K 5.0      CO2 22*   BUN 14   CREATININE 0.9   CALCIUM 7.6*   MG 1.7     CMP:   Recent Labs  Lab 07/18/18 2132 07/19/18  0353   * 282*   CALCIUM 8.0* 7.6*   ALBUMIN 2.5*  --    PROT 6.2  --    * 131*   K 4.8 5.0   CO2 22* 22*    100   BUN 14 14   CREATININE 1.0 0.9   ALKPHOS 90  --    ALT 8*  --    AST 8*  --    BILITOT 1.2*  --      LFTs:   Recent Labs  Lab 07/18/18 2132   ALT 8*   AST 8*   ALKPHOS 90   BILITOT 1.2*   PROT 6.2   ALBUMIN 2.5*

## 2018-07-20 NOTE — ASSESSMENT & PLAN NOTE
72 yo male w widely metastatic disease likely from pancreatic primary, s/p Ileocecectomy 7/18/18    -NPO  -IVF LR 75  -PCA for pain  -nausea meds PRN  -DVT prophylaxis  -f/u daily labs  -PT/OT

## 2018-07-20 NOTE — ANESTHESIA PREPROCEDURE EVALUATION
07/20/2018  Shady Prakash Jr. is a 73 y.o., male.    Anesthesia Evaluation    I have reviewed the Patient Summary Reports.     I have reviewed the Medications.     Review of Systems  Anesthesia Hx:  History of prior surgery of interest to airway management or planning:  Denies Personal Hx of Anesthesia complications.   Social:  Non-Smoker, No Alcohol Use    Hematology/Oncology:  Hematology Normal      Current/Recent Cancer. (mets prostate CA)   EENT/Dental:EENT/Dental Normal   Cardiovascular:   Exercise tolerance: poor Hypertension    Pulmonary:  Pulmonary Normal    Renal/:  Renal/ Normal     Hepatic/GI:   appy  Endorses recent N/V   Musculoskeletal:  Musculoskeletal Normal    Neurological:  Neurology Normal    Endocrine:   Diabetes, type 2    Dermatological:  Skin Normal    Psych:  Psychiatric Normal           Physical Exam  General:  Well nourished    Airway/Jaw/Neck:  Airway Findings: Mouth Opening: Normal Tongue: Normal  General Airway Assessment: Adult, Good  Mallampati: III  TM Distance: Normal, at least 6 cm     Eyes/Ears/Nose:  EYES/EARS/NOSE FINDINGS: Normal   Dental:  Dental Findings: In tact   Chest/Lungs:  Chest/Lungs Findings: Clear to auscultation, Normal Respiratory Rate     Heart/Vascular:  Heart Findings: Rate: Normal  Rhythm: Regular Rhythm  Sounds: Normal  Heart murmur: negative       Mental Status:  Mental Status Findings:  Cooperative, Alert and Oriented         Anesthesia Plan  Type of Anesthesia, risks & benefits discussed:  Anesthesia Type:  general  Patient's Preference:   Intra-op Monitoring Plan:   Intra-op Monitoring Plan Comments:   Post Op Pain Control Plan:   Post Op Pain Control Plan Comments:   Induction:   IV  Beta Blocker:  Patient is not currently on a Beta-Blocker (No further documentation required).       Informed Consent: Patient understands risks and agrees with  Anesthesia plan.  Questions answered. Anesthesia consent signed with patient.  ASA Score: 2     Day of Surgery Review of History & Physical:    H&P update referred to the surgeon.     Anesthesia Plan Notes:   73M HTN, DM2, mets prostate CA, appy for lap appy under GETA RSI        Ready For Surgery From Anesthesia Perspective.

## 2018-07-20 NOTE — PLAN OF CARE
Problem: Physical Therapy Goal  Goal: Physical Therapy Goal  PT goals until 7/25/18    1. Pt supine to sit with mod independent-not met  2. Pt sit to supine with mod independent-not met  3. Pt sit to stand with no AD with supervision-not met  4. Pt to perform gait 300ft with no AD with supervision.-not met  5. Pt to up/down 4 steps with L UE rail with SBA.-not met  6. Pt to perform B LE exs in sitting or supine x 20 reps to strengthen B LE to improve functional mobility.-not met    Outcome: Ongoing (interventions implemented as appropriate)  Pt's goals set and pt will benefit from skilled PT services to work towards improved functional mobility including: bed mobility, transfers, up/down steps, and gait.   Carolin Valdez, PT  7/20/2018

## 2018-07-20 NOTE — PT/OT/SLP EVAL
Physical Therapy Evaluation    Patient Name:  Shady Prakash Jr.   MRN:  [id number]    Recommendations:     Discharge Recommendations:  home   Discharge Equipment Recommendations: none   Barriers to discharge: Inaccessible home 4 [address]ISHA with L rail    Assessment:     Shady Prakash Jr. is a 73 y.o. male admitted with a medical diagnosis of Abdominal pain.  He presents with the following impairments/functional limitations:  impaired functional mobilty, gait instability . Pt can benefit from continued therapy to work towards up/down steps to be able to enter his home upon D/C.    Rehab Prognosis:  good; patient would benefit from acute skilled PT services to address these deficits and reach maximum level of function.      Recent Surgery: Procedure(s) (LRB):  Laparoscopy Converted to Laparotomy, Peritoneal Biopsy , Ileocecal Resection (N/A) 2 Days Post-Op    Plan:     During this hospitalization, patient to be seen 3 x/week to address the above listed problems via gait training, therapeutic activities, therapeutic exercises  · Plan of Care Expires:  08/19/18   Plan of Care Reviewed with: patient    Subjective     Communicated with nurse prior to session.  Patient found supine upon PT entry to room, agreeable to evaluation.    Pt states he has been through this many times and he knows what to do.  Patient comments/goals: to go home  Pain/Comfort:  · Pain Rating 1: 0/10  · Pain Rating Post-Intervention 1: 0/10    Patients cultural, spiritual, Zoroastrian conflicts given the current situation: none    Living Environment:  Pt lives in [address] home with 4 [address] with L rail. Pt's bedroom is on 1st floor. Pt lives with wife, son, daughter in law and grandchildren.  Prior to admission, patients level of function was independent.  Patient has the following equipment: none.   Upon discharge, patient will have assistance from wife and family.    Objective:     Patient found with: PCEA, peripheral IV     General Precautions: Standard, fall    Orthopedic Precautions:N/A   Braces: N/A     Exams:  · Cognitive Exam:  Patient is oriented to Person, Place, Time and Situation and follows 100% of multistep commands   · Postural Exam:  Patient presented with the following abnormalities:    · -       Rounded shoulders  · -       Forward head  · -       Abnormal trunk flexion  · Sensation:    · -       Intact  light/touch B LE  · RUE ROM: WFL  · LUE ROM: WFL  · RLE ROM: WFL  · RLE Strength: WFL  · LLE ROM: WFL  · LLE Strength: WFL    Functional Mobility:  · Bed Mobility:     · Rolling Left:  stand by assistance  · Supine to Sit: contact guard assistance  · Transfers:     · Sit to Stand:  contact guard assistance with no AD  · Gait: 200ft with IV pole support with SBA with verbal cues for upright posture     AM-PAC 6 CLICK MOBILITY  Total Score:18   Patient left up in chair with all lines intact, call button in reach and wife present.    GOALS:    Physical Therapy Goals        Problem: Physical Therapy Goal    Goal Priority Disciplines Outcome Goal Variances Interventions   Physical Therapy Goal     PT/OT, PT Ongoing (interventions implemented as appropriate)     Description:  PT goals until 7/25/18    1. Pt supine to sit with mod independent-not met  2. Pt sit to supine with mod independent-not met  3. Pt sit to stand with no AD with supervision-not met  4. Pt to perform gait 300ft with no AD with supervision.-not met  5. Pt to up/down 4 steps with L UE rail with SBA.-not met  6. Pt to perform B LE exs in sitting or supine x 20 reps to strengthen B LE to improve functional mobility.-not met                      History:     Past Medical History:   Diagnosis Date    Allergy     Arthritis     Cancer     prostate    Diabetes mellitus     Hyperlipidemia     Hypertension     Prostate cancer        Past Surgical History:   Procedure Laterality Date    ENDOSCOPIC ULTRASOUND OF UPPER GASTROINTESTINAL TRACT N/A 7/5/2018    Procedure: ULTRASOUND, ENDOSCOPIC, UPPER  GI TRACT;  Surgeon: Wiliam Ayoub MD;  Location: [facility] (2ND FLR);  Service: Endoscopy;  Laterality: N/A;    EYE SURGERY Right     x9    LAPAROSCOPIC APPENDECTOMY N/A 7/18/2018    Procedure: Laparoscopy Converted to Laparotomy, Peritoneal Biopsy , Ileocecal Resection;  Surgeon: Rolan Pierce MD;  Location: [facility] OR 2ND FLR;  Service: General;  Laterality: N/A;  converted to open @1633      LYMPH NODE DISSECTION      UPPER GASTROINTESTINAL ENDOSCOPY         Clinical Decision Making:     History  Co-morbidities and personal factors that may impact the plan of care Examination  Body Structures and Functions, activity limitations and participation restrictions that may impact the plan of care Clinical Presentation   Decision Making/ Complexity Score   Co-morbidities:   [] Time since onset of injury / illness / exacerbation  [] Status of current condition  []Patient's cognitive status and safety concerns    [] Multiple Medical Problems (see med hx)  Personal Factors:   [] Patient's age  [] Prior Level of function   [] Patient's home situation (environment and family support)  [] Patient's level of motivation  [] Expected progression of patient      HISTORY:(criteria)    [x] 03391 - no personal factors/history    [] 22539 - has 1-2 personal factor/comorbidity     [] 04357 - has >3 personal factor/comorbidity     Body Regions:  [] Objective examination findings  [] Head     []  Neck  [] Trunk   [] Upper Extremity  [] Lower Extremity    Body Systems:  [] For communication ability, affect, cognition, language, and learning style: the assessment of the ability to make needs known, consciousness, orientation (person, place, and time), expected emotional /behavioral responses, and learning preferences (eg, learning barriers, education  needs)  [x] For the neuromuscular system: a general assessment of gross coordinated movement (eg, balance, gait, locomotion, transfers, and transitions) and motor function  (motor  control and motor learning)  [] For the musculoskeletal system: the assessment of gross symmetry, gross range of motion, gross strength, height, and weight  [] For the integumentary system: the assessment of pliability(texture), presence of scar formation, skin color, and skin integrity  [] For cardiovascular/pulmonary system: the assessment of heart rate, respiratory rate, blood pressure, and edema     Activity limitations:    [] Patient's cognitive status and saf ety concerns          [] Status of current condition      [] Weight bearing restriction  [] Cardiopulmunary Restriction    Participation Restrictions:   [] Goals and goal agreement with the patient     [] Rehab potential (prognosis) and probable outcome      Examination of Body System: (criteria)    [x] 66410 - addressing 1-2 elements    [] 18825 - addressing a total of 3 or more elements     [] 67316 -  Addressing a total of 4 or more elements         Clinical Presentation: (criteria)  Stable - 10202     On examination of body system using standardized tests and measures patient presents with (CHOOSE ONE) elements from any of the following: body structures and functions, activity limitations, and/or participation restrictions.  Leading to a clinical presentation that is considered (CHOOSE ONE)                              Clinical Decision Making  (Eval Complexity):  Low- 27237     Time Tracking:     PT Received On: 07/20/18  PT Start Time: 1000     PT Stop Time: 1033  PT Total Time (min): 33 min     Billable Minutes: Evaluation 13 and Gait Training 20      Carolin Valdez, PT  07/20/2018

## 2018-07-20 NOTE — PLAN OF CARE
Problem: Patient Care Overview  Goal: Plan of Care Review  Outcome: Ongoing (interventions implemented as appropriate)  Plan of care reviewed with the patient, a 73  Year old male with the DX of appendicitis,,, S/P day 2 of a lap appy turned open for appendicitis,,, patient is still tachy,, has a PCA pump,,, mohr had to be reinserted due to retention,,,still  NPO,, alert and oriented,, uneventful night , no falls,, slept nicely,,, bed locked and low, call light in reach,,,,

## 2018-07-20 NOTE — PLAN OF CARE
Problem: Patient Care Overview  Goal: Plan of Care Review  Outcome: Ongoing (interventions implemented as appropriate)  Plan of care reviewed with pt/family verbalized understanding, vss, pt on clear liquid diet tolerating well with no c/o of nausea/vomiting, mohr intact draining mya colored urine, pain controlled with PCA-Pump, family remains @bed-side, call-light within reach, will continue to monitor.

## 2018-07-20 NOTE — PROGRESS NOTES
[facility]  General Surgery  Progress Note    Subjective:     History of Present Illness:  Shady Prakash Jr. is a 73 y.o. M with hx of metastatic prostate cancer s/p robotic prostatectomy (2015) and biopsy proven pancreatic adenocarcinoma who is scheduled to undergo a distal pancreatectomy and splenectomy on 8/10 with Dr Fregoso who presents to the ED today with a ~2wk history of vague RLQ pain which has worsened over the last couple days.  He endorses some constipation but denies diarrhea, nausea, vomiting, subjective fevers, chills.  In the ED he is mildly tachycardic (110) with a WBC of 13.  CT scan showed signs of acute appendicitis with associated possible small abscess.      Post-Op Info:  Procedure(s) (LRB):  Laparoscopy Converted to Laparotomy, Peritoneal Biopsy , Ileocecal Resection (N/A)   2 Days Post-Op     Interval History:   Patient seen and examined, no acute events overnight  Pain well controlled with PCA  Denies N/V/F/BM  Tachycardic, afebrile    Medications:  Continuous Infusions:   hydromorphone in 0.9 % NaCl 6 mg/30 ml      lactated ringers 75 mL/hr at 07/19/18 0816     Scheduled Meds:   enoxaparin  40 mg Subcutaneous Daily     PRN Meds:acetaminophen, dextrose 50%, diphenhydrAMINE, glucagon (human recombinant), insulin aspart U-100, naloxone, ondansetron, promethazine (PHENERGAN) IVPB, ramelteon, sodium chloride 0.9%, sodium chloride 0.9%     Review of patient's allergies indicates:  No Known Allergies  Objective:     Vital Signs (Most Recent):  Temp: 96.3 °F (35.7 °C) (07/20/18 0716)  Pulse: 102 (07/20/18 0716)  Resp: 18 (07/20/18 0716)  BP: 115/67 (07/20/18 0716)  SpO2: 98 % (07/20/18 0716) Vital Signs (24h Range):  Temp:  [96.3 °F (35.7 °C)-100.1 °F (37.8 °C)] 96.3 °F (35.7 °C)  Pulse:  [100-114] 102  Resp:  [16-18] 18  SpO2:  [97 %-99 %] 98 %  BP: (111-130)/(64-82) 115/67     Weight: 82 kg (180 lb 12.4 oz)  Body mass index is 27.49 kg/m².    Intake/Output - Last 3 Shifts        07/18 0700 - 07/19 0659 07/19 0700 - 07/20 0659 07/20 0700 - 07/21 0659    P.O.  0     I.V. (mL/kg) 1635.4 (19.9) 1630 (19.9)     IV Piggyback 150      Total Intake(mL/kg) 1785.4 (21.8) 1630 (19.9)     Urine (mL/kg/hr) 180 (0.1) 1050 (0.5)     Emesis/NG output 0 (0) 0 (0)     Stool  0 (0)     Blood 100 (0.1)      Total Output 280 1050      Net +1505.4 +580             Urine Occurrence 0 x 0 x     Stool Occurrence 0 x 0 x     Emesis Occurrence 0 x 0 x           Physical Exam   Constitutional: He is oriented to person, place, and time. He appears well-developed and well-nourished. No distress.   Cardiovascular: Intact distal pulses.  Tachycardia present.    Pulmonary/Chest: Effort normal. No respiratory distress.   Abdominal:   Soft, ND, appropriate TTP  Incision - surgical dressing in place, clean, dry and intact    Neurological: He is alert and oriented to person, place, and time.       Significant Labs:  CBC:   Recent Labs  Lab 07/19/18  0353   WBC 8.98   RBC 3.37*   HGB 9.4*   HCT 29.5*   *   MCV 88   MCH 27.9   MCHC 31.9*     BMP:   Recent Labs  Lab 07/19/18  0353   *   *   K 5.0      CO2 22*   BUN 14   CREATININE 0.9   CALCIUM 7.6*   MG 1.7     CMP:   Recent Labs  Lab 07/18/18  2132 07/19/18  0353   * 282*   CALCIUM 8.0* 7.6*   ALBUMIN 2.5*  --    PROT 6.2  --    * 131*   K 4.8 5.0   CO2 22* 22*    100   BUN 14 14   CREATININE 1.0 0.9   ALKPHOS 90  --    ALT 8*  --    AST 8*  --    BILITOT 1.2*  --      LFTs:   Recent Labs  Lab 07/18/18 2132   ALT 8*   AST 8*   ALKPHOS 90   BILITOT 1.2*   PROT 6.2   ALBUMIN 2.5*     Assessment/Plan:     Acute appendicitis    72 yo male w widely metastatic disease likely from pancreatic primary, s/p Ileocecectomy 7/18/18    -NPO  -IVF LR 75  -PCA for pain  -nausea meds PRN  -DVT prophylaxis  -f/u daily labs  -PT/OT            Bernice Peter PA-C   [id number]  General Surgery  [facility]

## 2018-07-21 LAB
ALBUMIN SERPL BCP-MCNC: 2 G/DL
ALP SERPL-CCNC: 89 U/L
ALT SERPL W/O P-5'-P-CCNC: 25 U/L
ANION GAP SERPL CALC-SCNC: 8 MMOL/L
AST SERPL-CCNC: 34 U/L
BASOPHILS # BLD AUTO: 0.02 K/UL
BASOPHILS NFR BLD: 0.2 %
BILIRUB SERPL-MCNC: 1.8 MG/DL
BUN SERPL-MCNC: 16 MG/DL
CALCIUM SERPL-MCNC: 7.8 MG/DL
CHLORIDE SERPL-SCNC: 102 MMOL/L
CO2 SERPL-SCNC: 26 MMOL/L
CREAT SERPL-MCNC: 0.8 MG/DL
DIFFERENTIAL METHOD: ABNORMAL
EOSINOPHIL # BLD AUTO: 0.1 K/UL
EOSINOPHIL NFR BLD: 0.6 %
ERYTHROCYTE [DISTWIDTH] IN BLOOD BY AUTOMATED COUNT: 14 %
EST. GFR  (AFRICAN AMERICAN): >60 ML/MIN/1.73 M^2
EST. GFR  (NON AFRICAN AMERICAN): >60 ML/MIN/1.73 M^2
GLUCOSE SERPL-MCNC: 173 MG/DL
HCT VFR BLD AUTO: 26.6 %
HGB BLD-MCNC: 8.2 G/DL
IMM GRANULOCYTES # BLD AUTO: 0.05 K/UL
IMM GRANULOCYTES NFR BLD AUTO: 0.5 %
LYMPHOCYTES # BLD AUTO: 0.4 K/UL
LYMPHOCYTES NFR BLD: 4.3 %
MAGNESIUM SERPL-MCNC: 2.2 MG/DL
MCH RBC QN AUTO: 27.2 PG
MCHC RBC AUTO-ENTMCNC: 30.8 G/DL
MCV RBC AUTO: 88 FL
MONOCYTES # BLD AUTO: 0.6 K/UL
MONOCYTES NFR BLD: 6.4 %
NEUTROPHILS # BLD AUTO: 8.7 K/UL
NEUTROPHILS NFR BLD: 88 %
NRBC BLD-RTO: 0 /100 WBC
PHOSPHATE SERPL-MCNC: 1.7 MG/DL
PLATELET # BLD AUTO: 419 K/UL
PMV BLD AUTO: 8.6 FL
POCT GLUCOSE: 171 MG/DL (ref 70–110)
POCT GLUCOSE: 176 MG/DL (ref 70–110)
POCT GLUCOSE: 182 MG/DL (ref 70–110)
POCT GLUCOSE: 211 MG/DL (ref 70–110)
POTASSIUM SERPL-SCNC: 4.5 MMOL/L
PROT SERPL-MCNC: 5.6 G/DL
RBC # BLD AUTO: 3.01 M/UL
SODIUM SERPL-SCNC: 136 MMOL/L
WBC # BLD AUTO: 9.93 K/UL

## 2018-07-21 PROCEDURE — 63600175 PHARM REV CODE 636 W HCPCS: Performed by: STUDENT IN AN ORGANIZED HEALTH CARE EDUCATION/TRAINING PROGRAM

## 2018-07-21 PROCEDURE — 25000003 PHARM REV CODE 250: Performed by: PHYSICIAN ASSISTANT

## 2018-07-21 PROCEDURE — 11000001 HC ACUTE MED/SURG PRIVATE ROOM

## 2018-07-21 PROCEDURE — 85025 COMPLETE CBC W/AUTO DIFF WBC: CPT

## 2018-07-21 PROCEDURE — 97165 OT EVAL LOW COMPLEX 30 MIN: CPT

## 2018-07-21 PROCEDURE — 84100 ASSAY OF PHOSPHORUS: CPT

## 2018-07-21 PROCEDURE — 25000003 PHARM REV CODE 250: Performed by: STUDENT IN AN ORGANIZED HEALTH CARE EDUCATION/TRAINING PROGRAM

## 2018-07-21 PROCEDURE — G8987 SELF CARE CURRENT STATUS: HCPCS | Mod: CJ

## 2018-07-21 PROCEDURE — 83735 ASSAY OF MAGNESIUM: CPT

## 2018-07-21 PROCEDURE — 36415 COLL VENOUS BLD VENIPUNCTURE: CPT

## 2018-07-21 PROCEDURE — 80053 COMPREHEN METABOLIC PANEL: CPT

## 2018-07-21 PROCEDURE — G8988 SELF CARE GOAL STATUS: HCPCS | Mod: CI

## 2018-07-21 RX ADMIN — INSULIN ASPART 2 UNITS: 100 INJECTION, SOLUTION INTRAVENOUS; SUBCUTANEOUS at 01:07

## 2018-07-21 RX ADMIN — SODIUM PHOSPHATE, MONOBASIC, MONOHYDRATE 39.99 MMOL: 276; 142 INJECTION, SOLUTION INTRAVENOUS at 11:07

## 2018-07-21 RX ADMIN — SODIUM CHLORIDE, SODIUM LACTATE, POTASSIUM CHLORIDE, AND CALCIUM CHLORIDE: 600; 310; 30; 20 INJECTION, SOLUTION INTRAVENOUS at 12:07

## 2018-07-21 RX ADMIN — ENOXAPARIN SODIUM 40 MG: 100 INJECTION SUBCUTANEOUS at 05:07

## 2018-07-21 RX ADMIN — TAMSULOSIN HYDROCHLORIDE 0.4 MG: 0.4 CAPSULE ORAL at 08:07

## 2018-07-21 RX ADMIN — Medication: at 08:07

## 2018-07-21 NOTE — PROGRESS NOTES
[facility]  General Surgery  Progress Note    Subjective:     History of Present Illness:  Shady Prakash Jr. is a 73 y.o. M with hx of metastatic prostate cancer s/p robotic prostatectomy (2015) and biopsy proven pancreatic adenocarcinoma who is scheduled to undergo a distal pancreatectomy and splenectomy on 8/10 with Dr Fregoso who presents to the ED today with a ~2wk history of vague RLQ pain which has worsened over the last couple days.  He endorses some constipation but denies diarrhea, nausea, vomiting, subjective fevers, chills.  In the ED he is mildly tachycardic (110) with a WBC of 13.  CT scan showed signs of acute appendicitis with associated possible small abscess.      Post-Op Info:  Procedure(s) (LRB):  Laparoscopy Converted to Laparotomy, Peritoneal Biopsy , Ileocecal Resection (N/A)   3 Days Post-Op     Interval History: NAEON, belching, denies nausea or vomiting, pain well controlled, denies flatus or BM    Medications:  Continuous Infusions:   hydromorphone in 0.9 % NaCl 6 mg/30 ml      lactated ringers 75 mL/hr at 07/19/18 0816     Scheduled Meds:   enoxaparin  40 mg Subcutaneous Daily    sodium phosphate IVPB  39.99 mmol Intravenous Once    tamsulosin  0.4 mg Oral Daily     PRN Meds:acetaminophen, dextrose 50%, diphenhydrAMINE, glucagon (human recombinant), insulin aspart U-100, naloxone, ondansetron, promethazine (PHENERGAN) IVPB, ramelteon, sodium chloride 0.9%, sodium chloride 0.9%     Review of patient's allergies indicates:  No Known Allergies  Objective:     Vital Signs (Most Recent):  Temp: 98.3 °F (36.8 °C) (07/21/18 0840)  Pulse: 97 (07/21/18 0840)  Resp: 12 (07/21/18 0840)  BP: 130/68 (07/21/18 0840)  SpO2: 100 % (07/21/18 0840) Vital Signs (24h Range):  Temp:  [97.8 °F (36.6 °C)-99.8 °F (37.7 °C)] 98.3 °F (36.8 °C)  Pulse:  [] 97  Resp:  [12-18] 12  SpO2:  [98 %-100 %] 100 %  BP: (102-139)/(59-79) 130/68     Weight: 82 kg (180 lb 12.4 oz)  Body mass index is  27.49 kg/m².    Intake/Output - Last 3 Shifts       07/19 0700 - 07/20 0659 07/20 0700 - 07/21 0659 07/21 0700 - 07/22 0659    P.O. 0 840     I.V. (mL/kg) 1630 (19.9) 1725 (21)     Total Intake(mL/kg) 1630 (19.9) 2565 (31.3)     Urine (mL/kg/hr) 1050 (0.5) 1500 (0.8)     Emesis/NG output 0 (0)      Stool 0 (0)      Total Output 1050 1500      Net +580 +1065             Urine Occurrence 0 x      Stool Occurrence 0 x 0 x     Emesis Occurrence 0 x            Physical Exam   Constitutional: He is oriented to person, place, and time. He appears well-developed and well-nourished. No distress.   Cardiovascular: Intact distal pulses.    Pulmonary/Chest: Effort normal. No respiratory distress.   Abdominal:   Soft, ND, appropriate TTP  Incision - surgical dressing in place, clean, dry and intact    Neurological: He is alert and oriented to person, place, and time.       Significant Labs:  CBC:   Recent Labs  Lab 07/21/18  0411   WBC 9.93   RBC 3.01*   HGB 8.2*   HCT 26.6*   *   MCV 88   MCH 27.2   MCHC 30.8*     CMP:   Recent Labs  Lab 07/21/18  0411   *   CALCIUM 7.8*   ALBUMIN 2.0*   PROT 5.6*      K 4.5   CO2 26      BUN 16   CREATININE 0.8   ALKPHOS 89   ALT 25   AST 34   BILITOT 1.8*       Significant Diagnostics:  I have reviewed all pertinent imaging results/findings within the past 24 hours.    Assessment/Plan:     Acute appendicitis    72 yo male w widely metastatic disease likely from pancreatic primary, s/p Ileocecectomy 7/18/18    -Tbili mildly elevated, likely 2/2 metastatic pancreatic cancer  -Continue NPO  -IVF LR 75  -PCA for pain  -nausea meds PRN  -DVT prophylaxis  -f/u daily labs  -PT/OT  -IS, wean O2 as tolerated            Pedro Reardon MD  General Surgery  [facility]

## 2018-07-21 NOTE — SUBJECTIVE & OBJECTIVE
Interval History: NAEON, belching, denies nausea or vomiting, pain well controlled, denies flatus or BM    Medications:  Continuous Infusions:   hydromorphone in 0.9 % NaCl 6 mg/30 ml      lactated ringers 75 mL/hr at 07/19/18 0816     Scheduled Meds:   enoxaparin  40 mg Subcutaneous Daily    sodium phosphate IVPB  39.99 mmol Intravenous Once    tamsulosin  0.4 mg Oral Daily     PRN Meds:acetaminophen, dextrose 50%, diphenhydrAMINE, glucagon (human recombinant), insulin aspart U-100, naloxone, ondansetron, promethazine (PHENERGAN) IVPB, ramelteon, sodium chloride 0.9%, sodium chloride 0.9%     Review of patient's allergies indicates:  No Known Allergies  Objective:     Vital Signs (Most Recent):  Temp: 98.3 °F (36.8 °C) (07/21/18 0840)  Pulse: 97 (07/21/18 0840)  Resp: 12 (07/21/18 0840)  BP: 130/68 (07/21/18 0840)  SpO2: 100 % (07/21/18 0840) Vital Signs (24h Range):  Temp:  [97.8 °F (36.6 °C)-99.8 °F (37.7 °C)] 98.3 °F (36.8 °C)  Pulse:  [] 97  Resp:  [12-18] 12  SpO2:  [98 %-100 %] 100 %  BP: (102-139)/(59-79) 130/68     Weight: 82 kg (180 lb 12.4 oz)  Body mass index is 27.49 kg/m².    Intake/Output - Last 3 Shifts       07/19 0700 - 07/20 0659 07/20 0700 - 07/21 0659 07/21 0700 - 07/22 0659    P.O. 0 840     I.V. (mL/kg) 1630 (19.9) 1725 (21)     Total Intake(mL/kg) 1630 (19.9) 2565 (31.3)     Urine (mL/kg/hr) 1050 (0.5) 1500 (0.8)     Emesis/NG output 0 (0)      Stool 0 (0)      Total Output 1050 1500      Net +580 +1065             Urine Occurrence 0 x      Stool Occurrence 0 x 0 x     Emesis Occurrence 0 x            Physical Exam   Constitutional: He is oriented to person, place, and time. He appears well-developed and well-nourished. No distress.   Cardiovascular: Intact distal pulses.    Pulmonary/Chest: Effort normal. No respiratory distress.   Abdominal:   Soft, ND, appropriate TTP  Incision - surgical dressing in place, clean, dry and intact    Neurological: He is alert and oriented to  person, place, and time.       Significant Labs:  CBC:   Recent Labs  Lab 07/21/18  0411   WBC 9.93   RBC 3.01*   HGB 8.2*   HCT 26.6*   *   MCV 88   MCH 27.2   MCHC 30.8*     CMP:   Recent Labs  Lab 07/21/18  0411   *   CALCIUM 7.8*   ALBUMIN 2.0*   PROT 5.6*      K 4.5   CO2 26      BUN 16   CREATININE 0.8   ALKPHOS 89   ALT 25   AST 34   BILITOT 1.8*       Significant Diagnostics:  I have reviewed all pertinent imaging results/findings within the past 24 hours.

## 2018-07-21 NOTE — ASSESSMENT & PLAN NOTE
72 yo male w widely metastatic disease likely from pancreatic primary, s/p Ileocecectomy 7/18/18    -Tbili mildly elevated, likely 2/2 metastatic pancreatic cancer  -Continue NPO  -IVF LR 75  -PCA for pain  -nausea meds PRN  -DVT prophylaxis  -f/u daily labs  -PT/OT  -IS, wean O2 as tolerated

## 2018-07-21 NOTE — PLAN OF CARE
Problem: Occupational Therapy Goal  Goal: Occupational Therapy Goal  Goals to be met by: 8/4/18     Patient will increase functional independence with ADLs by performing:    UE Dressing with [place].  LE Dressing with [place].  Grooming while standing at sink with Supervision.  Toileting from toilet with Supervision for hygiene and clothing management.   Toilet transfer to toilet with Supervision.    Outcome: Ongoing (interventions implemented as appropriate)  Eval completed; goals established    Comments: Initiate OT CHARMAINE Oliveros OT  7/21/2018

## 2018-07-21 NOTE — PLAN OF CARE
Problem: Patient Care Overview  Goal: Plan of Care Review  Outcome: Ongoing (interventions implemented as appropriate)  Plan of care reviewed with the Patient,, a 73 year old male with the DX of appendicitis ,,, S/P lap appy turned open,,,, still has mohr due to retention and unable to void,, no BM as of yet,,, sat on commode for a while but could not go,, did pass gas though,,, runs tachy,,,, all other Vitals WNL,,, uneventful night, rested nicely,,,, bed locked and low, call light in reach,,,,,

## 2018-07-21 NOTE — PT/OT/SLP EVAL
"Occupational Therapy   Evaluation    Name: Shady Prakash Jr.  MRN: [id number]  Admitting Diagnosis:  Abdominal pain 3 Days Post-Op    Recommendations:     Discharge Recommendations: home (with family support)  Discharge Equipment Recommendations:  none  Barriers to discharge:  None    History:     Occupational Profile:  Living Environment: Pt lives with wife, son, daughter-in-law, and grandchildren in H with 4STE and LHR. Home has tub/shower  Previous level of function: PTA, pt reports independence with all ADL and IADL, including driving. Pt was not using AD for ambulation or ADLs.  Roles and Routines: , father, grandfather  Equipment Owned:  none  Assistance upon Discharge: Wife and family assist as needed following d/c     Past Medical History:   Diagnosis Date    Allergy     Arthritis     Cancer     prostate    Diabetes mellitus     Hyperlipidemia     Hypertension     Prostate cancer        Past Surgical History:   Procedure Laterality Date    ENDOSCOPIC ULTRASOUND OF UPPER GASTROINTESTINAL TRACT N/A 7/5/2018    Procedure: ULTRASOUND, ENDOSCOPIC, UPPER GI TRACT;  Surgeon: Wiliam Ayoub MD;  Location: [facility] ([address] [place] [address]);  Service: Endoscopy;  Laterality: N/A;    EYE SURGERY Right     x9    LAPAROSCOPIC APPENDECTOMY N/A 7/18/2018    Procedure: Laparoscopy Converted to Laparotomy, Peritoneal Biopsy , Ileocecal Resection;  Surgeon: Rolan Pierce MD;  Location: [facility] OR [address] [place] [address];  Service: General;  Laterality: N/A;  converted to open @1633      LYMPH NODE DISSECTION      UPPER GASTROINTESTINAL ENDOSCOPY         Subjective     Chief Complaint: did not like being asked questions during OT eval and being asked to due simple ROM   Patient/Family stated goals: go home ASAP  Communicated with: RN prior to session.  Pain/Comfort:  · Pain Rating 1: 0/10 ("tightness")  · Location - Side 1: Bilateral  · Location - Orientation 1: generalized  · Location 1: abdomen  · Pain Addressed 1: Reposition, " Distraction, Cessation of Activity  · Pain Rating Post-Intervention 1: 0/10    Patients cultural, spiritual, Rastafarian conflicts given the current situation: none reported    Objective:     Patient found with: PCA, peripheral IV, mohr catheter    General Precautions: Standard, fall   Orthopedic Precautions:N/A   Braces: N/A     Occupational Performance:    Bed Mobility:    · Pt found UIC    Functional Mobility/Transfers:  · Patient completed Sit <> Stand Transfer with stand by assistance  with  no assistive device   · Functional Mobility: Pt took 3 steps forward/backward with SBA using no AD; pt decline further mobility at time of eval    Activities of Daily Living:  · Lower Body Dressing: stand by assistance to manage B socks; cues for use of figure-four position while UIC    Cognitive/Visual Perceptual:  Cognitive/Psychosocial Skills:     -       Oriented to: Person, Place and Situation   -       Follows Commands/attention:Follows one-step commands  -       Communication: clear/fluent  -       Memory: No Deficits noted  -       Safety awareness/insight to disability: intact   -       Mood/Affect/Coping skills/emotional control: Agitated  Visual/Perceptual:      -Intact    Physical Exam:  Balance:    -       good sitting/standing balance  Postural examination/scapula alignment:    -       No postural abnormalities identified  Skin integrity: Visible skin intact  Edema:  None noted  Sensation:    -       Impaired  Pt reports occasional numbness/tingling in B feet  Dominant hand:    -       R hand  Upper Extremity Range of Motion:     -       Right Upper Extremity: WFL  -       Left Upper Extremity: WFL  Upper Extremity Strength:    -       Right Upper Extremity: WFL  -       Left Upper Extremity: WFL   Strength:    -       Right Upper Extremity: WFL  -       Left Upper Extremity: WFL  Fine Motor Coordination:    -       Intact  Gross motor coordination:   WFL    Patient left up in chair with all lines intact,  "call button in reach and wife, family present    [facility] 6 Click:  [facility] Total Score: 20    Treatment & Education:  Pt educated on role of OT/POC  Pt educated on importance of ambulation/UIC  Pt educated on possibility of HH OT; pt stating he is able to do everything independently and would have family assist, declining HH OT  White board/communication board updated  Education:    Assessment:     Shady Prakash Jr. is a 73 y.o. male with a medical diagnosis of Abdominal pain.  He presents with low motivation to participate in therapy this date. Pt demo good balance and ROM/strength but would benefit from f/u with OT to assess ADL completion.  Performance deficits affecting function are weakness, impaired endurance, gait instability, impaired functional mobilty, impaired balance, impaired self care skills, pain, decreased safety awareness.      Rehab Prognosis:  Good; patient would benefit from acute skilled OT services to address these deficits and reach maximum level of function.         Clinical Decision Makin.  OT Low:  "Pt evaluation falls under low complexity for evaluation coding due to performance deficits noted in 1-3 areas as stated above and 0 co-morbities affecting current functional status. Data obtained from problem focused assessments. No modifications or assistance was required for completion of evaluation. Only brief occupational profile and history review completed."     Plan:     Patient to be seen 3 x/week to address the above listed problems via self-care/home management, therapeutic activities, therapeutic exercises  · Plan of Care Expires: 18  · Plan of Care Reviewed with: patient, spouse, family    This Plan of care has been discussed with the patient who was involved in its development and understands and is in agreement with the identified goals and treatment plan    GOALS:    Occupational Therapy Goals        Problem: Occupational Therapy Goal    Goal Priority Disciplines Outcome " Interventions   Occupational Therapy Goal     OT, PT/OT Ongoing (interventions implemented as appropriate)    Description:  Goals to be met by: 8/4/18     Patient will increase functional independence with ADLs by performing:    UE Dressing with [place].  LE Dressing with [place].  Grooming while standing at sink with Supervision.  Toileting from toilet with Supervision for hygiene and clothing management.   Toilet transfer to toilet with Supervision.                      Time Tracking:     OT Date of Treatment: 07/21/18  OT Start Time: 1307  OT Stop Time: 1318  OT Total Time (min): 11 min    Billable Minutes:Evaluation 11    Merissa Oliveros OT  7/21/2018

## 2018-07-21 NOTE — PLAN OF CARE
Problem: Patient Care Overview  Goal: Plan of Care Review  Outcome: Ongoing (interventions implemented as appropriate)  Plan of care discussed with pt. Pt verbalizes understanding. Pt tolerating clear liquid diet with no complaints of discomfort or nausea. Pain managed with PCA pain pump. Pt ambulated in temple once with PT/OT. Pt positions independently. Pt sat up in chair 2 times this shift. Tolerated well. Pt remains free of falls and injury. No acute events this shift. Will continue to monitor.

## 2018-07-22 LAB
ALBUMIN SERPL BCP-MCNC: 1.8 G/DL
ALP SERPL-CCNC: 85 U/L
ALT SERPL W/O P-5'-P-CCNC: 22 U/L
ANION GAP SERPL CALC-SCNC: 8 MMOL/L
AST SERPL-CCNC: 21 U/L
BASOPHILS # BLD AUTO: 0.01 K/UL
BASOPHILS NFR BLD: 0.1 %
BILIRUB SERPL-MCNC: 1.7 MG/DL
BUN SERPL-MCNC: 12 MG/DL
CALCIUM SERPL-MCNC: 7.9 MG/DL
CHLORIDE SERPL-SCNC: 100 MMOL/L
CO2 SERPL-SCNC: 26 MMOL/L
CREAT SERPL-MCNC: 0.7 MG/DL
DIFFERENTIAL METHOD: ABNORMAL
EOSINOPHIL # BLD AUTO: 0.1 K/UL
EOSINOPHIL NFR BLD: 1.5 %
ERYTHROCYTE [DISTWIDTH] IN BLOOD BY AUTOMATED COUNT: 14.3 %
EST. GFR  (AFRICAN AMERICAN): >60 ML/MIN/1.73 M^2
EST. GFR  (NON AFRICAN AMERICAN): >60 ML/MIN/1.73 M^2
GLUCOSE SERPL-MCNC: 148 MG/DL
HCT VFR BLD AUTO: 27 %
HGB BLD-MCNC: 8.4 G/DL
IMM GRANULOCYTES # BLD AUTO: 0.04 K/UL
IMM GRANULOCYTES NFR BLD AUTO: 0.5 %
LYMPHOCYTES # BLD AUTO: 0.6 K/UL
LYMPHOCYTES NFR BLD: 7.5 %
MAGNESIUM SERPL-MCNC: 2.3 MG/DL
MCH RBC QN AUTO: 27.5 PG
MCHC RBC AUTO-ENTMCNC: 31.1 G/DL
MCV RBC AUTO: 89 FL
MONOCYTES # BLD AUTO: 0.7 K/UL
MONOCYTES NFR BLD: 8.6 %
NEUTROPHILS # BLD AUTO: 6.6 K/UL
NEUTROPHILS NFR BLD: 81.8 %
NRBC BLD-RTO: 0 /100 WBC
PHOSPHATE SERPL-MCNC: 1.7 MG/DL
PLATELET # BLD AUTO: 417 K/UL
PMV BLD AUTO: 8.6 FL
POCT GLUCOSE: 124 MG/DL (ref 70–110)
POCT GLUCOSE: 153 MG/DL (ref 70–110)
POCT GLUCOSE: 168 MG/DL (ref 70–110)
POCT GLUCOSE: 169 MG/DL (ref 70–110)
POTASSIUM SERPL-SCNC: 4.1 MMOL/L
PROT SERPL-MCNC: 5.3 G/DL
RBC # BLD AUTO: 3.05 M/UL
SODIUM SERPL-SCNC: 134 MMOL/L
WBC # BLD AUTO: 8.03 K/UL

## 2018-07-22 PROCEDURE — 80053 COMPREHEN METABOLIC PANEL: CPT

## 2018-07-22 PROCEDURE — 85025 COMPLETE CBC W/AUTO DIFF WBC: CPT

## 2018-07-22 PROCEDURE — 25000003 PHARM REV CODE 250: Performed by: STUDENT IN AN ORGANIZED HEALTH CARE EDUCATION/TRAINING PROGRAM

## 2018-07-22 PROCEDURE — 83735 ASSAY OF MAGNESIUM: CPT

## 2018-07-22 PROCEDURE — 84100 ASSAY OF PHOSPHORUS: CPT

## 2018-07-22 PROCEDURE — 25000003 PHARM REV CODE 250: Performed by: PHYSICIAN ASSISTANT

## 2018-07-22 PROCEDURE — 63600175 PHARM REV CODE 636 W HCPCS: Performed by: STUDENT IN AN ORGANIZED HEALTH CARE EDUCATION/TRAINING PROGRAM

## 2018-07-22 PROCEDURE — 36415 COLL VENOUS BLD VENIPUNCTURE: CPT

## 2018-07-22 PROCEDURE — 11000001 HC ACUTE MED/SURG PRIVATE ROOM

## 2018-07-22 RX ADMIN — TAMSULOSIN HYDROCHLORIDE 0.4 MG: 0.4 CAPSULE ORAL at 08:07

## 2018-07-22 RX ADMIN — SODIUM CHLORIDE, SODIUM LACTATE, POTASSIUM CHLORIDE, AND CALCIUM CHLORIDE: 600; 310; 30; 20 INJECTION, SOLUTION INTRAVENOUS at 05:07

## 2018-07-22 RX ADMIN — Medication: at 10:07

## 2018-07-22 RX ADMIN — DIPHENHYDRAMINE HYDROCHLORIDE 25 MG: 50 INJECTION, SOLUTION INTRAMUSCULAR; INTRAVENOUS at 08:07

## 2018-07-22 RX ADMIN — DIPHENHYDRAMINE HYDROCHLORIDE 25 MG: 50 INJECTION, SOLUTION INTRAMUSCULAR; INTRAVENOUS at 05:07

## 2018-07-22 RX ADMIN — ENOXAPARIN SODIUM 40 MG: 100 INJECTION SUBCUTANEOUS at 05:07

## 2018-07-22 NOTE — ASSESSMENT & PLAN NOTE
72 yo male w widely metastatic disease likely from pancreatic primary, s/p Ileocecectomy 7/18/18    -Tbili mildly elevated, likely 2/2 metastatic pancreatic cancer  -Continue NPO  -IVF LR 75  -PCA  -nausea meds PRN  -DVT prophylaxis  -f/u daily labs  -PT/OT  -IS, wean O2 as tolerated

## 2018-07-22 NOTE — PROGRESS NOTES
[facility]  General Surgery  Progress Note    Subjective:     History of Present Illness:  Shady Prakash Jr. is a 73 y.o. M with hx of metastatic prostate cancer s/p robotic prostatectomy (2015) and biopsy proven pancreatic adenocarcinoma who is scheduled to undergo a distal pancreatectomy and splenectomy on 8/10 with Dr Fregoso who presents to the ED today with a ~2wk history of vague RLQ pain which has worsened over the last couple days.  He endorses some constipation but denies diarrhea, nausea, vomiting, subjective fevers, chills.  In the ED he is mildly tachycardic (110) with a WBC of 13.  CT scan showed signs of acute appendicitis with associated possible small abscess.      Post-Op Info:  Procedure(s) (LRB):  Laparoscopy Converted to Laparotomy, Peritoneal Biopsy , Ileocecal Resection (N/A)   4 Days Post-Op     Interval History: NAEON, pain well controlled, denies nausea or vomiting, is having belching, denies flatus or BM, ambulating    Medications:  Continuous Infusions:   hydromorphone in 0.9 % NaCl 6 mg/30 ml      lactated ringers 75 mL/hr at 07/21/18 1200     Scheduled Meds:   enoxaparin  40 mg Subcutaneous Daily    tamsulosin  0.4 mg Oral Daily     PRN Meds:acetaminophen, dextrose 50%, diphenhydrAMINE, glucagon (human recombinant), insulin aspart U-100, naloxone, ondansetron, promethazine (PHENERGAN) IVPB, ramelteon, sodium chloride 0.9%, sodium chloride 0.9%     Review of patient's allergies indicates:  No Known Allergies  Objective:     Vital Signs (Most Recent):  Temp: 97.9 °F (36.6 °C) (07/22/18 0815)  Pulse: 88 (07/22/18 0815)  Resp: 12 (07/22/18 0815)  BP: 133/74 (07/22/18 0815)  SpO2: 100 % (07/22/18 0815) Vital Signs (24h Range):  Temp:  [97.6 °F (36.4 °C)-98.9 °F (37.2 °C)] 97.9 °F (36.6 °C)  Pulse:  [] 88  Resp:  [12-18] 12  SpO2:  [99 %-100 %] 100 %  BP: (120-139)/(72-80) 133/74     Weight: 82 kg (180 lb 12.4 oz)  Body mass index is 27.49 kg/m².    Intake/Output -  Last 3 Shifts       07/20 0700 - 07/21 0659 07/21 0700 - 07/22 0659 07/22 0700 - 07/23 0659    P.O. 840 720     I.V. (mL/kg) 1725 (21) 1737 (21.2)     IV Piggyback  250     Total Intake(mL/kg) 2565 (31.3) 2707 (33)     Urine (mL/kg/hr) 1500 (0.8) 990 (0.5)     Total Output 1500 990      Net +1065 +1717             Stool Occurrence 0 x            Physical Exam   Constitutional: He is oriented to person, place, and time. He appears well-developed and well-nourished. No distress.   Cardiovascular: Intact distal pulses.    Pulmonary/Chest: Effort normal. No respiratory distress.   Abdominal:   Soft, ND, appropriate TTP  Incision - surgical dressing in place, clean, dry and intact    Neurological: He is alert and oriented to person, place, and time.       Significant Labs:  CBC:   Recent Labs  Lab 07/22/18  0451   WBC 8.03   RBC 3.05*   HGB 8.4*   HCT 27.0*   *   MCV 89   MCH 27.5   MCHC 31.1*     CMP:   Recent Labs  Lab 07/22/18  0451   *   CALCIUM 7.9*   ALBUMIN 1.8*   PROT 5.3*   *   K 4.1   CO2 26      BUN 12   CREATININE 0.7   ALKPHOS 85   ALT 22   AST 21   BILITOT 1.7*       Significant Diagnostics:  I have reviewed all pertinent imaging results/findings within the past 24 hours.    Assessment/Plan:     Acute appendicitis    74 yo male w widely metastatic disease likely from pancreatic primary, s/p Ileocecectomy 7/18/18    -Tbili mildly elevated, likely 2/2 metastatic pancreatic cancer  -Continue NPO  -IVF LR 75  -PCA  -nausea meds PRN  -DVT prophylaxis  -f/u daily labs  -PT/OT  -IS, wean O2 as tolerated            Pedro Reardon MD  General Surgery  [facility]

## 2018-07-22 NOTE — PLAN OF CARE
Problem: Patient Care Overview  Goal: Plan of Care Review  Plan of care reviewed, all questions and concerns addressed. Patient vital signs remain normal and stable for patient, with no complaints of SOB, headaches, or dizziness. Patient urine output remains adequate through mohr cath, Patient is tolerating diet well with no complaints of nausea or vomiting. Patient pain well controlled with ordered pain medications. Patient is resting quietly with side rails up and call light with in reach. Will continue to monitor patient status.

## 2018-07-22 NOTE — SUBJECTIVE & OBJECTIVE
Interval History: NAEON, pain well controlled, denies nausea or vomiting, is having belching, denies flatus or BM, ambulating    Medications:  Continuous Infusions:   hydromorphone in 0.9 % NaCl 6 mg/30 ml      lactated ringers 75 mL/hr at 07/21/18 1200     Scheduled Meds:   enoxaparin  40 mg Subcutaneous Daily    tamsulosin  0.4 mg Oral Daily     PRN Meds:acetaminophen, dextrose 50%, diphenhydrAMINE, glucagon (human recombinant), insulin aspart U-100, naloxone, ondansetron, promethazine (PHENERGAN) IVPB, ramelteon, sodium chloride 0.9%, sodium chloride 0.9%     Review of patient's allergies indicates:  No Known Allergies  Objective:     Vital Signs (Most Recent):  Temp: 97.9 °F (36.6 °C) (07/22/18 0815)  Pulse: 88 (07/22/18 0815)  Resp: 12 (07/22/18 0815)  BP: 133/74 (07/22/18 0815)  SpO2: 100 % (07/22/18 0815) Vital Signs (24h Range):  Temp:  [97.6 °F (36.4 °C)-98.9 °F (37.2 °C)] 97.9 °F (36.6 °C)  Pulse:  [] 88  Resp:  [12-18] 12  SpO2:  [99 %-100 %] 100 %  BP: (120-139)/(72-80) 133/74     Weight: 82 kg (180 lb 12.4 oz)  Body mass index is 27.49 kg/m².    Intake/Output - Last 3 Shifts       07/20 0700 - 07/21 0659 07/21 0700 - 07/22 0659 07/22 0700 - 07/23 0659    P.O. 840 720     I.V. (mL/kg) 1725 (21) 1737 (21.2)     IV Piggyback  250     Total Intake(mL/kg) 2565 (31.3) 2707 (33)     Urine (mL/kg/hr) 1500 (0.8) 990 (0.5)     Total Output 1500 990      Net +1065 +1717             Stool Occurrence 0 x            Physical Exam   Constitutional: He is oriented to person, place, and time. He appears well-developed and well-nourished. No distress.   Cardiovascular: Intact distal pulses.    Pulmonary/Chest: Effort normal. No respiratory distress.   Abdominal:   Soft, ND, appropriate TTP  Incision - surgical dressing in place, clean, dry and intact    Neurological: He is alert and oriented to person, place, and time.       Significant Labs:  CBC:   Recent Labs  Lab 07/22/18  0451   WBC 8.03   RBC 3.05*   HGB  8.4*   HCT 27.0*   *   MCV 89   MCH 27.5   MCHC 31.1*     CMP:   Recent Labs  Lab 07/22/18  0451   *   CALCIUM 7.9*   ALBUMIN 1.8*   PROT 5.3*   *   K 4.1   CO2 26      BUN 12   CREATININE 0.7   ALKPHOS 85   ALT 22   AST 21   BILITOT 1.7*       Significant Diagnostics:  I have reviewed all pertinent imaging results/findings within the past 24 hours.

## 2018-07-23 ENCOUNTER — TELEPHONE (OUTPATIENT)
Dept: PHARMACY | Facility: CLINIC | Age: 73
End: 2018-07-23

## 2018-07-23 PROBLEM — E80.6 HYPERBILIRUBINEMIA: Status: ACTIVE | Noted: 2018-07-23

## 2018-07-23 PROBLEM — C25.1 MALIGNANT NEOPLASM OF BODY OF PANCREAS: Status: ACTIVE | Noted: 2018-07-23

## 2018-07-23 LAB
ALBUMIN SERPL BCP-MCNC: 2 G/DL
ALP SERPL-CCNC: 93 U/L
ALT SERPL W/O P-5'-P-CCNC: 19 U/L
ANION GAP SERPL CALC-SCNC: 11 MMOL/L
AST SERPL-CCNC: 18 U/L
BACTERIA BLD CULT: NORMAL
BACTERIA BLD CULT: NORMAL
BASOPHILS # BLD AUTO: 0.02 K/UL
BASOPHILS NFR BLD: 0.4 %
BILIRUB SERPL-MCNC: 1.9 MG/DL
BUN SERPL-MCNC: 11 MG/DL
CALCIUM SERPL-MCNC: 7.9 MG/DL
CHLORIDE SERPL-SCNC: 98 MMOL/L
CO2 SERPL-SCNC: 25 MMOL/L
CREAT SERPL-MCNC: 0.6 MG/DL
DIFFERENTIAL METHOD: ABNORMAL
EOSINOPHIL # BLD AUTO: 0.1 K/UL
EOSINOPHIL NFR BLD: 2 %
ERYTHROCYTE [DISTWIDTH] IN BLOOD BY AUTOMATED COUNT: 14.1 %
EST. GFR  (AFRICAN AMERICAN): >60 ML/MIN/1.73 M^2
EST. GFR  (NON AFRICAN AMERICAN): >60 ML/MIN/1.73 M^2
GLUCOSE SERPL-MCNC: 118 MG/DL
HCT VFR BLD AUTO: 26.9 %
HGB BLD-MCNC: 8.2 G/DL
IMM GRANULOCYTES # BLD AUTO: 0.04 K/UL
IMM GRANULOCYTES NFR BLD AUTO: 0.7 %
LYMPHOCYTES # BLD AUTO: 0.7 K/UL
LYMPHOCYTES NFR BLD: 12.8 %
MAGNESIUM SERPL-MCNC: 1.8 MG/DL
MCH RBC QN AUTO: 26.9 PG
MCHC RBC AUTO-ENTMCNC: 30.5 G/DL
MCV RBC AUTO: 88 FL
MONOCYTES # BLD AUTO: 0.6 K/UL
MONOCYTES NFR BLD: 11.9 %
NEUTROPHILS # BLD AUTO: 3.9 K/UL
NEUTROPHILS NFR BLD: 72.2 %
NRBC BLD-RTO: 0 /100 WBC
PHOSPHATE SERPL-MCNC: 1.6 MG/DL
PLATELET # BLD AUTO: 483 K/UL
PMV BLD AUTO: 8.8 FL
POCT GLUCOSE: 140 MG/DL (ref 70–110)
POCT GLUCOSE: 143 MG/DL (ref 70–110)
POCT GLUCOSE: 145 MG/DL (ref 70–110)
POCT GLUCOSE: 152 MG/DL (ref 70–110)
POTASSIUM SERPL-SCNC: 4.1 MMOL/L
PROT SERPL-MCNC: 5.5 G/DL
RBC # BLD AUTO: 3.05 M/UL
SODIUM SERPL-SCNC: 134 MMOL/L
WBC # BLD AUTO: 5.39 K/UL

## 2018-07-23 PROCEDURE — 63600175 PHARM REV CODE 636 W HCPCS: Performed by: STUDENT IN AN ORGANIZED HEALTH CARE EDUCATION/TRAINING PROGRAM

## 2018-07-23 PROCEDURE — 83735 ASSAY OF MAGNESIUM: CPT

## 2018-07-23 PROCEDURE — 25000003 PHARM REV CODE 250: Performed by: PHYSICIAN ASSISTANT

## 2018-07-23 PROCEDURE — 85025 COMPLETE CBC W/AUTO DIFF WBC: CPT

## 2018-07-23 PROCEDURE — 25000003 PHARM REV CODE 250: Performed by: STUDENT IN AN ORGANIZED HEALTH CARE EDUCATION/TRAINING PROGRAM

## 2018-07-23 PROCEDURE — 80053 COMPREHEN METABOLIC PANEL: CPT

## 2018-07-23 PROCEDURE — 84100 ASSAY OF PHOSPHORUS: CPT

## 2018-07-23 PROCEDURE — 36415 COLL VENOUS BLD VENIPUNCTURE: CPT

## 2018-07-23 PROCEDURE — 11000001 HC ACUTE MED/SURG PRIVATE ROOM

## 2018-07-23 PROCEDURE — 94761 N-INVAS EAR/PLS OXIMETRY MLT: CPT

## 2018-07-23 RX ADMIN — DIPHENHYDRAMINE HYDROCHLORIDE 25 MG: 50 INJECTION, SOLUTION INTRAMUSCULAR; INTRAVENOUS at 08:07

## 2018-07-23 RX ADMIN — Medication: at 08:07

## 2018-07-23 RX ADMIN — SODIUM PHOSPHATE, MONOBASIC, MONOHYDRATE 30 MMOL: 276; 142 INJECTION, SOLUTION INTRAVENOUS at 02:07

## 2018-07-23 RX ADMIN — DIPHENHYDRAMINE HYDROCHLORIDE 25 MG: 50 INJECTION, SOLUTION INTRAMUSCULAR; INTRAVENOUS at 05:07

## 2018-07-23 RX ADMIN — ENOXAPARIN SODIUM 40 MG: 100 INJECTION SUBCUTANEOUS at 04:07

## 2018-07-23 RX ADMIN — TAMSULOSIN HYDROCHLORIDE 0.4 MG: 0.4 CAPSULE ORAL at 08:07

## 2018-07-23 NOTE — PLAN OF CARE
Problem: Patient Care Overview  Goal: Plan of Care Review  Outcome: Ongoing (interventions implemented as appropriate)  Plan of care reviewed with patient. VSS, AAOx4. Pt. Tolerating clear liquid diet well. No reports of BM but stated presence of flatus. Patient ambulated in hallx2 with x1 assistance. No trauma or falls noted. Urinary catheter drainage color, a mixture of pink and yellow but flowing consistently. Patient reports alleviation of pain while using PCA pump. Call light within reach, bed in lowest position. Instructed to call with any questions or concerns.

## 2018-07-23 NOTE — SUBJECTIVE & OBJECTIVE
Interval History:   Patient seen and examined, no acute events overnight  Denies N/V/F/BM  Pain well controlled with PCA  Has been ambulating  Afebrile/VSS    Medications:  Continuous Infusions:   hydromorphone in 0.9 % NaCl 6 mg/30 ml      lactated ringers 75 mL/hr at 07/22/18 1704     Scheduled Meds:   enoxaparin  40 mg Subcutaneous Daily    tamsulosin  0.4 mg Oral Daily     PRN Meds:acetaminophen, dextrose 50%, diphenhydrAMINE, glucagon (human recombinant), insulin aspart U-100, naloxone, ondansetron, promethazine (PHENERGAN) IVPB, ramelteon, sodium chloride 0.9%, sodium chloride 0.9%     Review of patient's allergies indicates:  No Known Allergies  Objective:     Vital Signs (Most Recent):  Temp: 98.2 °F (36.8 °C) (07/23/18 1228)  Pulse: 108 (07/23/18 1228)  Resp: 18 (07/23/18 1228)  BP: 112/73 (07/23/18 1228)  SpO2: 100 % (07/23/18 1228) Vital Signs (24h Range):  Temp:  [97.5 °F (36.4 °C)-98.9 °F (37.2 °C)] 98.2 °F (36.8 °C)  Pulse:  [] 108  Resp:  [14-18] 18  SpO2:  [96 %-100 %] 100 %  BP: (112-151)/(69-77) 112/73     Weight: 82 kg (180 lb 12.4 oz)  Body mass index is 27.49 kg/m².    Intake/Output - Last 3 Shifts       07/21 0700 - 07/22 0659 07/22 0700 - 07/23 0659 07/23 0700 - 07/24 0659    P.O. 720 720     I.V. (mL/kg) 1737 (21.2) 1087.5 (13.3)     IV Piggyback 250      Total Intake(mL/kg) 2707 (33) 1807.5 (22)     Urine (mL/kg/hr) 990 (0.5) 425 (0.2) 140 (0.3)    Total Output 990 425 140    Net +1717 +1382.5 -140           Stool Occurrence   0 x          Physical Exam   Constitutional: He is oriented to person, place, and time. He appears well-developed and well-nourished. No distress.   Cardiovascular: Intact distal pulses.    Pulmonary/Chest: Effort normal. No respiratory distress.   Abdominal:   Soft, ND, appropriate TTP  Incision - clean, dry and intact    Neurological: He is alert and oriented to person, place, and time.       Significant Labs:  CBC:   Recent Labs  Lab 07/23/18  0529   WBC  5.39   RBC 3.05*   HGB 8.2*   HCT 26.9*   *   MCV 88   MCH 26.9*   MCHC 30.5*     BMP:   Recent Labs  Lab 07/23/18  0529   *   *   K 4.1   CL 98   CO2 25   BUN 11   CREATININE 0.6   CALCIUM 7.9*   MG 1.8     CMP:   Recent Labs  Lab 07/23/18  0529   *   CALCIUM 7.9*   ALBUMIN 2.0*   PROT 5.5*   *   K 4.1   CO2 25   CL 98   BUN 11   CREATININE 0.6   ALKPHOS 93   ALT 19   AST 18   BILITOT 1.9*     LFTs:   Recent Labs  Lab 07/23/18  0529   ALT 19   AST 18   ALKPHOS 93   BILITOT 1.9*   PROT 5.5*   ALBUMIN 2.0*

## 2018-07-23 NOTE — PROGRESS NOTES
[facility]  General Surgery  Progress Note    Subjective:     History of Present Illness:  Shady Prakash Jr. is a 73 y.o. M with hx of metastatic prostate cancer s/p robotic prostatectomy (2015) and biopsy proven pancreatic adenocarcinoma who is scheduled to undergo a distal pancreatectomy and splenectomy on 8/10 with Dr Fregoso who presents to the ED today with a ~2wk history of vague RLQ pain which has worsened over the last couple days.  He endorses some constipation but denies diarrhea, nausea, vomiting, subjective fevers, chills.  In the ED he is mildly tachycardic (110) with a WBC of 13.  CT scan showed signs of acute appendicitis with associated possible small abscess.      Post-Op Info:  Procedure(s) (LRB):  Laparoscopy Converted to Laparotomy, Peritoneal Biopsy , Ileocecal Resection (N/A)   5 Days Post-Op     Interval History:   Patient seen and examined, no acute events overnight  Denies N/V/F/BM  Pain well controlled with PCA  Has been ambulating  Afebrile/VSS    Medications:  Continuous Infusions:   hydromorphone in 0.9 % NaCl 6 mg/30 ml      lactated ringers 75 mL/hr at 07/22/18 1704     Scheduled Meds:   enoxaparin  40 mg Subcutaneous Daily    tamsulosin  0.4 mg Oral Daily     PRN Meds:acetaminophen, dextrose 50%, diphenhydrAMINE, glucagon (human recombinant), insulin aspart U-100, naloxone, ondansetron, promethazine (PHENERGAN) IVPB, ramelteon, sodium chloride 0.9%, sodium chloride 0.9%     Review of patient's allergies indicates:  No Known Allergies  Objective:     Vital Signs (Most Recent):  Temp: 98.2 °F (36.8 °C) (07/23/18 1228)  Pulse: 108 (07/23/18 1228)  Resp: 18 (07/23/18 1228)  BP: 112/73 (07/23/18 1228)  SpO2: 100 % (07/23/18 1228) Vital Signs (24h Range):  Temp:  [97.5 °F (36.4 °C)-98.9 °F (37.2 °C)] 98.2 °F (36.8 °C)  Pulse:  [] 108  Resp:  [14-18] 18  SpO2:  [96 %-100 %] 100 %  BP: (112-151)/(69-77) 112/73     Weight: 82 kg (180 lb 12.4 oz)  Body mass index is  27.49 kg/m².    Intake/Output - Last 3 Shifts       07/21 0700 - 07/22 0659 07/22 0700 - 07/23 0659 07/23 0700 - 07/24 0659    P.O. 720 720     I.V. (mL/kg) 1737 (21.2) 1087.5 (13.3)     IV Piggyback 250      Total Intake(mL/kg) 2707 (33) 1807.5 (22)     Urine (mL/kg/hr) 990 (0.5) 425 (0.2) 140 (0.3)    Total Output 990 425 140    Net +1717 +1382.5 -140           Stool Occurrence   0 x          Physical Exam   Constitutional: He is oriented to person, place, and time. He appears well-developed and well-nourished. No distress.   Cardiovascular: Intact distal pulses.    Pulmonary/Chest: Effort normal. No respiratory distress.   Abdominal:   Soft, ND, appropriate TTP  Incision - clean, dry and intact    Neurological: He is alert and oriented to person, place, and time.       Significant Labs:  CBC:   Recent Labs  Lab 07/23/18  0529   WBC 5.39   RBC 3.05*   HGB 8.2*   HCT 26.9*   *   MCV 88   MCH 26.9*   MCHC 30.5*     BMP:   Recent Labs  Lab 07/23/18  0529   *   *   K 4.1   CL 98   CO2 25   BUN 11   CREATININE 0.6   CALCIUM 7.9*   MG 1.8     CMP:   Recent Labs  Lab 07/23/18  0529   *   CALCIUM 7.9*   ALBUMIN 2.0*   PROT 5.5*   *   K 4.1   CO2 25   CL 98   BUN 11   CREATININE 0.6   ALKPHOS 93   ALT 19   AST 18   BILITOT 1.9*     LFTs:   Recent Labs  Lab 07/23/18  0529   ALT 19   AST 18   ALKPHOS 93   BILITOT 1.9*   PROT 5.5*   ALBUMIN 2.0*     Assessment/Plan:     Hyperbilirubinemia    Mildly elevated  Likely due to pancreatic cancer  Continue to monitor        Acute appendicitis    72 yo male w widely metastatic disease likely from pancreatic primary, s/p Ileocecectomy 7/18/18    -obtain KUB this AM  -Continue NPO  -IVF LR 75  -PCA  -nausea meds PRN  -DVT prophylaxis  -PT/OT  -IS, wean O2 as tolerated            Bernice Peter PA-C   [id number]  General Surgery  [facility]

## 2018-07-23 NOTE — TELEPHONE ENCOUNTER
Documentation Only:    Prior Authorization for [employer] has been approved for 5 months.    Approval dates: 07/23/2018 - 12/31/2018    Case ID#: None    Patient co-pay: $8.35

## 2018-07-23 NOTE — PLAN OF CARE
07/23/18 1801   Discharge Reassessment   Assessment Type Discharge Planning Reassessment   Provided patient/caregiver education on the expected discharge date and the discharge plan No  (D/c date per MD. POD # 5: waiting for bowel function to return. No needs determined. )   Do you have any problems affording any of your prescribed medications? No   Discharge Plan A Home with family   Discharge Plan B Home with family

## 2018-07-23 NOTE — ASSESSMENT & PLAN NOTE
72 yo male w widely metastatic disease likely from pancreatic primary, s/p Ileocecectomy 7/18/18    -obtain KUB this AM  -Continue NPO  -IVF LR 75  -PCA  -nausea meds PRN  -DVT prophylaxis  -PT/OT  -IS, wean O2 as tolerated

## 2018-07-23 NOTE — NURSING
Plan of care discussed with pt. Pt verbalizes understanding. Pt tolerating clear liquid diet with no complaints of discomfort or nausea. Pain managed with PCA pump. Pt sat up in chair, intermittently throughout the shift.  Pt ambulated in temple once with RN. Pt walked 350 ft, tolerated well. Pt positions independently. VS stable. Pt remains free of falls and injury. No acute events this shift. Will continue to monitor.

## 2018-07-24 PROBLEM — E83.39 HYPOPHOSPHATEMIA: Status: ACTIVE | Noted: 2018-07-24

## 2018-07-24 LAB
ALBUMIN SERPL BCP-MCNC: 2 G/DL
ALP SERPL-CCNC: 95 U/L
ALT SERPL W/O P-5'-P-CCNC: 17 U/L
ANION GAP SERPL CALC-SCNC: 9 MMOL/L
AST SERPL-CCNC: 15 U/L
BASOPHILS # BLD AUTO: 0.03 K/UL
BASOPHILS NFR BLD: 0.5 %
BILIRUB SERPL-MCNC: 1.6 MG/DL
BUN SERPL-MCNC: 7 MG/DL
CALCIUM SERPL-MCNC: 7.8 MG/DL
CHLORIDE SERPL-SCNC: 99 MMOL/L
CO2 SERPL-SCNC: 26 MMOL/L
CREAT SERPL-MCNC: 0.7 MG/DL
DIFFERENTIAL METHOD: ABNORMAL
EOSINOPHIL # BLD AUTO: 0.1 K/UL
EOSINOPHIL NFR BLD: 1.1 %
ERYTHROCYTE [DISTWIDTH] IN BLOOD BY AUTOMATED COUNT: 14.1 %
EST. GFR  (AFRICAN AMERICAN): >60 ML/MIN/1.73 M^2
EST. GFR  (NON AFRICAN AMERICAN): >60 ML/MIN/1.73 M^2
GLUCOSE SERPL-MCNC: 137 MG/DL
HCT VFR BLD AUTO: 28 %
HGB BLD-MCNC: 8.5 G/DL
IMM GRANULOCYTES # BLD AUTO: 0.06 K/UL
IMM GRANULOCYTES NFR BLD AUTO: 0.9 %
LYMPHOCYTES # BLD AUTO: 0.5 K/UL
LYMPHOCYTES NFR BLD: 7.5 %
MAGNESIUM SERPL-MCNC: 1.9 MG/DL
MCH RBC QN AUTO: 26.8 PG
MCHC RBC AUTO-ENTMCNC: 30.4 G/DL
MCV RBC AUTO: 88 FL
MONOCYTES # BLD AUTO: 0.7 K/UL
MONOCYTES NFR BLD: 10.3 %
NEUTROPHILS # BLD AUTO: 5.1 K/UL
NEUTROPHILS NFR BLD: 79.7 %
NRBC BLD-RTO: 0 /100 WBC
PHOSPHATE SERPL-MCNC: 1.8 MG/DL
PLATELET # BLD AUTO: 467 K/UL
PMV BLD AUTO: 8.8 FL
POCT GLUCOSE: 130 MG/DL (ref 70–110)
POCT GLUCOSE: 160 MG/DL (ref 70–110)
POCT GLUCOSE: 166 MG/DL (ref 70–110)
POCT GLUCOSE: 180 MG/DL (ref 70–110)
POTASSIUM SERPL-SCNC: 4 MMOL/L
PROT SERPL-MCNC: 5.6 G/DL
RBC # BLD AUTO: 3.17 M/UL
SODIUM SERPL-SCNC: 134 MMOL/L
WBC # BLD AUTO: 6.4 K/UL

## 2018-07-24 PROCEDURE — 83735 ASSAY OF MAGNESIUM: CPT

## 2018-07-24 PROCEDURE — 36415 COLL VENOUS BLD VENIPUNCTURE: CPT

## 2018-07-24 PROCEDURE — 80053 COMPREHEN METABOLIC PANEL: CPT

## 2018-07-24 PROCEDURE — 84100 ASSAY OF PHOSPHORUS: CPT

## 2018-07-24 PROCEDURE — 85025 COMPLETE CBC W/AUTO DIFF WBC: CPT

## 2018-07-24 PROCEDURE — 25000003 PHARM REV CODE 250: Performed by: STUDENT IN AN ORGANIZED HEALTH CARE EDUCATION/TRAINING PROGRAM

## 2018-07-24 PROCEDURE — 97116 GAIT TRAINING THERAPY: CPT

## 2018-07-24 PROCEDURE — 11000001 HC ACUTE MED/SURG PRIVATE ROOM

## 2018-07-24 PROCEDURE — 25000003 PHARM REV CODE 250: Performed by: PHYSICIAN ASSISTANT

## 2018-07-24 PROCEDURE — 63600175 PHARM REV CODE 636 W HCPCS: Performed by: STUDENT IN AN ORGANIZED HEALTH CARE EDUCATION/TRAINING PROGRAM

## 2018-07-24 RX ORDER — SODIUM,POTASSIUM PHOSPHATES 280-250MG
2 POWDER IN PACKET (EA) ORAL ONCE
Status: COMPLETED | OUTPATIENT
Start: 2018-07-24 | End: 2018-07-24

## 2018-07-24 RX ORDER — OXYCODONE AND ACETAMINOPHEN 5; 325 MG/1; MG/1
1 TABLET ORAL EVERY 4 HOURS PRN
Status: DISCONTINUED | OUTPATIENT
Start: 2018-07-24 | End: 2018-07-25 | Stop reason: HOSPADM

## 2018-07-24 RX ORDER — OXYCODONE AND ACETAMINOPHEN 10; 325 MG/1; MG/1
1 TABLET ORAL EVERY 4 HOURS PRN
Status: DISCONTINUED | OUTPATIENT
Start: 2018-07-24 | End: 2018-07-25 | Stop reason: HOSPADM

## 2018-07-24 RX ADMIN — OXYCODONE HYDROCHLORIDE AND ACETAMINOPHEN 1 TABLET: 10; 325 TABLET ORAL at 07:07

## 2018-07-24 RX ADMIN — Medication: at 05:07

## 2018-07-24 RX ADMIN — ENOXAPARIN SODIUM 40 MG: 100 INJECTION SUBCUTANEOUS at 04:07

## 2018-07-24 RX ADMIN — POTASSIUM & SODIUM PHOSPHATES POWDER PACK 280-160-250 MG 2 PACKET: 280-160-250 PACK at 08:07

## 2018-07-24 RX ADMIN — OXYCODONE HYDROCHLORIDE AND ACETAMINOPHEN 1 TABLET: 10; 325 TABLET ORAL at 09:07

## 2018-07-24 RX ADMIN — TAMSULOSIN HYDROCHLORIDE 0.4 MG: 0.4 CAPSULE ORAL at 08:07

## 2018-07-24 RX ADMIN — OXYCODONE HYDROCHLORIDE AND ACETAMINOPHEN 1 TABLET: 10; 325 TABLET ORAL at 02:07

## 2018-07-24 NOTE — PROGRESS NOTES
[facility]  General Surgery  Progress Note    Subjective:     History of Present Illness:  Shady Prakash Jr. is a 73 y.o. M with hx of metastatic prostate cancer s/p robotic prostatectomy (2015) and biopsy proven pancreatic adenocarcinoma who is scheduled to undergo a distal pancreatectomy and splenectomy on 8/10 with Dr Fregoso who presents to the ED today with a ~2wk history of vague RLQ pain which has worsened over the last couple days.  He endorses some constipation but denies diarrhea, nausea, vomiting, subjective fevers, chills.  In the ED he is mildly tachycardic (110) with a WBC of 13.  CT scan showed signs of acute appendicitis with associated possible small abscess.      Post-Op Info:  Procedure(s) (LRB):  Laparoscopy Converted to Laparotomy, Peritoneal Biopsy , Ileocecal Resection (N/A)   6 Days Post-Op     Interval History:   Patient seen and examined, no acute events overnight  Denies N/V  +F/BM  Pain well controlled with PCA  Has been ambulating  Intermittent tachycardia    Medications:  Continuous Infusions:   hydromorphone in 0.9 % NaCl 6 mg/30 ml      lactated ringers 75 mL/hr at 07/22/18 1704     Scheduled Meds:   enoxaparin  40 mg Subcutaneous Daily    tamsulosin  0.4 mg Oral Daily     PRN Meds:acetaminophen, dextrose 50%, diphenhydrAMINE, glucagon (human recombinant), insulin aspart U-100, naloxone, ondansetron, promethazine (PHENERGAN) IVPB, ramelteon, sodium chloride 0.9%, sodium chloride 0.9%     Review of patient's allergies indicates:  No Known Allergies  Objective:     Vital Signs (Most Recent):  Temp: 99.1 °F (37.3 °C) (07/24/18 0807)  Pulse: 104 (07/24/18 0807)  Resp: 18 (07/24/18 0807)  BP: (!) 140/70 (07/24/18 0807)  SpO2: (!) 94 % (07/24/18 0807) Vital Signs (24h Range):  Temp:  [97.6 °F (36.4 °C)-99.1 °F (37.3 °C)] 99.1 °F (37.3 °C)  Pulse:  [] 104  Resp:  [14-18] 18  SpO2:  [94 %-100 %] 94 %  BP: (112-140)/(63-74) 140/70     Weight: 82 kg (180 lb 12.4  oz)  Body mass index is 27.49 kg/m².    Intake/Output - Last 3 Shifts       07/22 0700 - 07/23 0659 07/23 0700 - 07/24 0659 07/24 0700 - 07/25 0659    P.O. 720 360     I.V. (mL/kg) 1087.5 (13.3) 2512.5 (30.6)     Total Intake(mL/kg) 1807.5 (22) 2872.5 (35)     Urine (mL/kg/hr) 425 (0.2) 890 (0.5)     Total Output 425 890      Net +1382.5 +1982.5             Stool Occurrence  1 x           Physical Exam   Constitutional: He is oriented to person, place, and time. He appears well-developed and well-nourished. No distress.   Cardiovascular: Intact distal pulses.    Pulmonary/Chest: Effort normal. No respiratory distress.   Abdominal:   Soft, ND, appropriate TTP  Incision - clean, dry and intact    Neurological: He is alert and oriented to person, place, and time.       Significant Labs:  CBC:   Recent Labs  Lab 07/24/18  0456   WBC 6.40   RBC 3.17*   HGB 8.5*   HCT 28.0*   *   MCV 88   MCH 26.8*   MCHC 30.4*     BMP:   Recent Labs  Lab 07/24/18  0456   *   *   K 4.0   CL 99   CO2 26   BUN 7*   CREATININE 0.7   CALCIUM 7.8*   MG 1.9     CMP:   Recent Labs  Lab 07/24/18  0456   *   CALCIUM 7.8*   ALBUMIN 2.0*   PROT 5.6*   *   K 4.0   CO2 26   CL 99   BUN 7*   CREATININE 0.7   ALKPHOS 95   ALT 17   AST 15   BILITOT 1.6*     LFTs:   Recent Labs  Lab 07/24/18  0456   ALT 17   AST 15   ALKPHOS 95   BILITOT 1.6*   PROT 5.6*   ALBUMIN 2.0*     Assessment/Plan:     Hypophosphatemia    Replace         Hyperbilirubinemia    Mildly elevated  Likely due to pancreatic cancer  Continue to monitor        Acute appendicitis    72 yo male w widely metastatic disease likely from pancreatic primary, s/p Ileocecectomy 7/18/18    -regular diet  -d/c IVF  -d/c PCA  -PO pain/nausea meds  -DVT prophylaxis  -PT/OT  -IS, wean O2 as tolerated  -plan for dc tomorrow pending toleration of diet            Bernice Peter PA-C   [id number]  General Surgery  [facility]

## 2018-07-24 NOTE — PLAN OF CARE
Problem: Physical Therapy Goal  Goal: Physical Therapy Goal  PT goals until 7/25/18    1. Pt supine to sit with mod independent-not met  2. Pt sit to supine with mod independent-not met  3. Pt sit to stand with no AD with supervision-not met  4. Pt to perform gait 300ft with no AD with supervision.- met  5. Pt to up/down 4 steps with L UE rail with SBA.- met  6. Pt to perform B LE exs in sitting or supine x 20 reps to strengthen B LE to improve functional mobility.-not met     Outcome: Outcome(s) achieved Date Met: 07/24/18  PT D/Evgeny due to pt able to go up/down steps as needed to enter his home. Carolin Valdez PT 7/24/18

## 2018-07-24 NOTE — PLAN OF CARE
Problem: Patient Care Overview  Goal: Plan of Care Review  Outcome: Ongoing (interventions implemented as appropriate)  Plan of care reviewed with patient and wife. Both verbalized understanding of care given. VSS, AAOx4. Patient denies any pain at this time. Patient verbalized  satisfaction of new pain medications offered. Patient diet changed to a regular diet; tolerating diet well. Last BM recorded today. Urinary catheter removed; patient voids via urinal; urine output noted 300mL; Urine dark. Fluids encouraged orally.  Patient ambulated the temple x1 and up and down the stairs x2 per nurse and PT. No falls or trauma noted. Call light within reach , bed in lowest position and patient instructed to call with any questions or concerns.

## 2018-07-24 NOTE — PT/OT/SLP PROGRESS
"Physical Therapy Treatment    Patient Name:  Shady Prakash Jr.   MRN:  [id number]    Recommendations:     Discharge Recommendations:  home (with family support)   Discharge Equipment Recommendations: none   Barriers to discharge: None pt able to go up/down steps as needed to enter home    Assessment:     Shady Prakash Jr. is a 73 y.o. male admitted with a medical diagnosis of Abdominal pain.  He presents with the following impairments/functional limitations:  impaired functional mobilty . Pt us able to ambulate in halls with family and is able to go up/down steps as needed to enter home. D/C PT at this time.    Recent Surgery: Procedure(s) (LRB):  Laparoscopy Converted to Laparotomy, Peritoneal Biopsy , Ileocecal Resection (N/A) 6 Days Post-Op    Plan:     During this hospitalization, patient to be seen  (D/C PT due to pt able to go up/down steps as needed to enter home; PT found pt ambulating in halls with family) to address the above listed problems via gait training, therapeutic activities, therapeutic exercises  · Plan of Care Expires:  08/19/18   Plan of Care Reviewed with: patient    Subjective     Communicated with nurse prior to session.  Patient found walking in temple with family upon PT arrival, agreeable to treatment.    "I will show you that I can do it"   Patient comments/goals: "I want to go home"  Pain/Comfort:  · Pain Rating 1: 0/10  · Pain Rating Post-Intervention 1: 0/10    Patients cultural, spiritual, Hindu conflicts given the current situation: none noted    Objective:     Patient found with:  (none)     General Precautions: Standard, fall   Orthopedic Precautions:N/A   Braces: N/A     Functional Mobility:  · Gait: 100ft with no AD with SBA with pt reaching for the wall rail at times for support. Pt found ambulating in halls with family holding their hand.   · Stairs:  Pt ascended/descended 8 stair(s) with No Assistive Device with bilateral handrails with Supervision or Set-up Assistance. Pt " educated that he only has one rail at home then went up/down 6 steps with L UE rail support going up and R UE rail support going down with supervision. Pt's family present and observed proper technique with guarding pt on steps.    AM-PAC 6 CLICK MOBILITY  Turning over in bed (including adjusting bedclothes, sheets and blankets)?: 3  Sitting down on and standing up from a chair with arms (e.g., wheelchair, bedside commode, etc.): 4  Moving from lying on back to sitting on the side of the bed?: 3  Moving to and from a bed to a chair (including a wheelchair)?: 4  Need to walk in hospital room?: 3  Climbing 3-5 steps with a railing?: 4  Basic Mobility Total Score: 21     Patient left walking in halls with family and nurse     GOALS:    Physical Therapy Goals     Not on file          Multidisciplinary Problems (Resolved)        Problem: Physical Therapy Goal    Goal Priority Disciplines Outcome Goal Variances Interventions   Physical Therapy Goal   (Resolved)     PT/OT, PT Outcome(s) achieved     Description:  PT goals until 7/25/18    1. Pt supine to sit with mod independent-not met  2. Pt sit to supine with mod independent-not met  3. Pt sit to stand with no AD with supervision-not met  4. Pt to perform gait 300ft with no AD with supervision.- met  5. Pt to up/down 4 steps with L UE rail with SBA.- met  6. Pt to perform B LE exs in sitting or supine x 20 reps to strengthen B LE to improve functional mobility.-not met                       Time Tracking:     PT Received On: 07/24/18  PT Start Time: 1148     PT Stop Time: 1158  PT Total Time (min): 10 min     Billable Minutes: Gait Training 10    Treatment Type: Treatment  PT/PTA: PT           Carolin Valdez, PT  07/24/2018

## 2018-07-24 NOTE — ASSESSMENT & PLAN NOTE
74 yo male w widely metastatic disease likely from pancreatic primary, s/p Ileocecectomy 7/18/18    -regular diet  -d/c IVF  -d/c PCA  -PO pain/nausea meds  -DVT prophylaxis  -PT/OT  -IS, wean O2 as tolerated  -plan for dc tomorrow pending toleration of diet

## 2018-07-25 ENCOUNTER — TELEPHONE (OUTPATIENT)
Dept: HEMATOLOGY/ONCOLOGY | Facility: CLINIC | Age: 73
End: 2018-07-25

## 2018-07-25 VITALS
TEMPERATURE: 97 F | SYSTOLIC BLOOD PRESSURE: 136 MMHG | OXYGEN SATURATION: 94 % | WEIGHT: 180.75 LBS | HEIGHT: 68 IN | BODY MASS INDEX: 27.39 KG/M2 | DIASTOLIC BLOOD PRESSURE: 81 MMHG | RESPIRATION RATE: 14 BRPM | HEART RATE: 100 BPM

## 2018-07-25 LAB
ALBUMIN SERPL BCP-MCNC: 2 G/DL
ALP SERPL-CCNC: 95 U/L
ALT SERPL W/O P-5'-P-CCNC: 14 U/L
ANION GAP SERPL CALC-SCNC: 8 MMOL/L
AST SERPL-CCNC: 17 U/L
BASOPHILS # BLD AUTO: 0.01 K/UL
BASOPHILS NFR BLD: 0.2 %
BILIRUB SERPL-MCNC: 1.1 MG/DL
BUN SERPL-MCNC: 6 MG/DL
CALCIUM SERPL-MCNC: 7.8 MG/DL
CHLORIDE SERPL-SCNC: 101 MMOL/L
CO2 SERPL-SCNC: 27 MMOL/L
CREAT SERPL-MCNC: 0.7 MG/DL
DIFFERENTIAL METHOD: ABNORMAL
EOSINOPHIL # BLD AUTO: 0.1 K/UL
EOSINOPHIL NFR BLD: 1.8 %
ERYTHROCYTE [DISTWIDTH] IN BLOOD BY AUTOMATED COUNT: 14.1 %
EST. GFR  (AFRICAN AMERICAN): >60 ML/MIN/1.73 M^2
EST. GFR  (NON AFRICAN AMERICAN): >60 ML/MIN/1.73 M^2
GLUCOSE SERPL-MCNC: 151 MG/DL
HCT VFR BLD AUTO: 24.7 %
HGB BLD-MCNC: 7.6 G/DL
IMM GRANULOCYTES # BLD AUTO: 0.06 K/UL
IMM GRANULOCYTES NFR BLD AUTO: 1.1 %
LYMPHOCYTES # BLD AUTO: 0.5 K/UL
LYMPHOCYTES NFR BLD: 9.4 %
MAGNESIUM SERPL-MCNC: 1.8 MG/DL
MCH RBC QN AUTO: 26.4 PG
MCHC RBC AUTO-ENTMCNC: 30.8 G/DL
MCV RBC AUTO: 86 FL
MONOCYTES # BLD AUTO: 0.6 K/UL
MONOCYTES NFR BLD: 11 %
NEUTROPHILS # BLD AUTO: 4.3 K/UL
NEUTROPHILS NFR BLD: 76.5 %
NRBC BLD-RTO: 0 /100 WBC
PHOSPHATE SERPL-MCNC: 1.9 MG/DL
PLATELET # BLD AUTO: 455 K/UL
PMV BLD AUTO: 8.6 FL
POCT GLUCOSE: 144 MG/DL (ref 70–110)
POTASSIUM SERPL-SCNC: 3.6 MMOL/L
PROT SERPL-MCNC: 5.1 G/DL
RBC # BLD AUTO: 2.88 M/UL
SODIUM SERPL-SCNC: 136 MMOL/L
WBC # BLD AUTO: 5.65 K/UL

## 2018-07-25 PROCEDURE — 25000003 PHARM REV CODE 250: Performed by: PHYSICIAN ASSISTANT

## 2018-07-25 PROCEDURE — 84100 ASSAY OF PHOSPHORUS: CPT

## 2018-07-25 PROCEDURE — 80053 COMPREHEN METABOLIC PANEL: CPT

## 2018-07-25 PROCEDURE — 85025 COMPLETE CBC W/AUTO DIFF WBC: CPT

## 2018-07-25 PROCEDURE — 25000003 PHARM REV CODE 250: Performed by: STUDENT IN AN ORGANIZED HEALTH CARE EDUCATION/TRAINING PROGRAM

## 2018-07-25 PROCEDURE — 83735 ASSAY OF MAGNESIUM: CPT

## 2018-07-25 PROCEDURE — 63600175 PHARM REV CODE 636 W HCPCS: Performed by: STUDENT IN AN ORGANIZED HEALTH CARE EDUCATION/TRAINING PROGRAM

## 2018-07-25 PROCEDURE — 36415 COLL VENOUS BLD VENIPUNCTURE: CPT

## 2018-07-25 RX ORDER — SODIUM,POTASSIUM PHOSPHATES 280-250MG
2 POWDER IN PACKET (EA) ORAL ONCE
Status: COMPLETED | OUTPATIENT
Start: 2018-07-25 | End: 2018-07-25

## 2018-07-25 RX ORDER — OXYCODONE AND ACETAMINOPHEN 5; 325 MG/1; MG/1
1 TABLET ORAL EVERY 4 HOURS PRN
Qty: 42 TABLET | Refills: 0 | Status: SHIPPED | OUTPATIENT
Start: 2018-07-25 | End: 2018-09-28 | Stop reason: SDUPTHER

## 2018-07-25 RX ADMIN — TAMSULOSIN HYDROCHLORIDE 0.4 MG: 0.4 CAPSULE ORAL at 08:07

## 2018-07-25 RX ADMIN — OXYCODONE HYDROCHLORIDE AND ACETAMINOPHEN 1 TABLET: 10; 325 TABLET ORAL at 07:07

## 2018-07-25 RX ADMIN — DIPHENHYDRAMINE HYDROCHLORIDE 25 MG: 50 INJECTION, SOLUTION INTRAMUSCULAR; INTRAVENOUS at 08:07

## 2018-07-25 RX ADMIN — POTASSIUM & SODIUM PHOSPHATES POWDER PACK 280-160-250 MG 2 PACKET: 280-160-250 PACK at 08:07

## 2018-07-25 RX ADMIN — OXYCODONE HYDROCHLORIDE AND ACETAMINOPHEN 1 TABLET: 10; 325 TABLET ORAL at 03:07

## 2018-07-25 RX ADMIN — OXYCODONE HYDROCHLORIDE AND ACETAMINOPHEN 1 TABLET: 10; 325 TABLET ORAL at 10:07

## 2018-07-25 NOTE — PLAN OF CARE
Problem: Patient Care Overview  Goal: Plan of Care Review  Outcome: Ongoing (interventions implemented as appropriate)  Plan of care reviewed with patient and patient's spouse; both verbalized an understanding of the plan of care.  Patient is AAOx4. VSS. Patient is on a regular diet. Patient denies nausea, emesis.  Patient spontaneously voiding per urinal, adequate output noted.  Patient's pain managed with PRN medications.  Patient ambulated in room and to the bathroom without incident. Patient up in chair.  Patient slept in between care. Patient independent. Patient remains free from trauma, falls, and/or incidences.  Call light within reach.  [facility]

## 2018-07-25 NOTE — TELEPHONE ENCOUNTER
Message fwd to MD.         ----- Message from Yuni Lopez sent at 7/25/2018 12:05 PM CDT -----  Contact: Viola            Name of Who is Calling: kameron       What is the request in detail:states that Dr Fregoso stated pt needs a sooner appointment due to the fact that pt cancer has spread to stomach Aug 28 is to far out, please advise       Can the clinic reply by [facility]: no      What Number to Call Back if not in [facility]: [phone]

## 2018-07-25 NOTE — DISCHARGE SUMMARY
[facility]  General Surgery  Discharge Summary      Patient Name: Shady Prakash Jr.  MRN: [id number]  Admission Date: 7/18/2018  Hospital Length of Stay: 6 days  Discharge Date and Time:  07/25/2018 1:15 PM  Attending Physician: Rolan Pierce MD  Discharging Provider: Bernice Peter PA-C  Primary Care Provider: Hiram Coronado MD    HPI:   Shady Prakash Jr. is a 73 y.o. M with hx of metastatic prostate cancer s/p robotic prostatectomy (2015) and biopsy proven pancreatic adenocarcinoma who is scheduled to undergo a distal pancreatectomy and splenectomy on 8/10 with Dr Fregoso who presents to the ED today with a ~2wk history of vague RLQ pain which has worsened over the last couple days.  He endorses some constipation but denies diarrhea, nausea, vomiting, subjective fevers, chills.  In the ED he is mildly tachycardic (110) with a WBC of 13.  CT scan showed signs of acute appendicitis with associated possible small abscess.      Procedure(s) (LRB):  Laparoscopy Converted to Laparotomy, Peritoneal Biopsy , Ileocecal Resection (N/A)      Indwelling Lines/Drains at time of discharge:   Lines/Drains/Airways          No matching active lines, drains, or airways        Hospital Course:   Patient presented to the ER with acute appendicitis. He underwent: Procedure(s) (LRB):  Laparoscopy Converted to Laparotomy, Peritoneal Biopsy , Ileocecal Resection (N/A). He tolerated the procedure well and was transferred to the floor after recovery from anesthesia. Please see the operative note for further procedure details. Vitals remained stable, and he was afebrile all throughout the post operative period. Labs were reviewed and electrolytes were replaced appropriately. Physical exam was appropriate for post operative state.  Diet was advanced, and he was able to tolerate a regular diet prior to discharge. He was ambulating without difficulty and had normal bowel function prior to discharge. Patient was deemed  suitable for discharge on 7 Days Post-Op. He was discharged home with medications and instructions as below. He voiced understanding of the instructions prior to discharge.     For more thorough information, please refer to the hospital record and operative report.    Consults:   Consults         Status Ordering Provider     Inpatient consult to General surgery  Once     Provider:  (Not yet assigned)    DENG Luna          Significant Diagnostic Studies: Labs:   BMP:   Recent Labs  Lab 07/24/18 0456 07/25/18  0426   * 151*   * 136   K 4.0 3.6   CL 99 101   CO2 26 27   BUN 7* 6*   CREATININE 0.7 0.7   CALCIUM 7.8* 7.8*   MG 1.9 1.8   , CMP   Recent Labs  Lab 07/24/18  0456 07/25/18  0426   * 136   K 4.0 3.6   CL 99 101   CO2 26 27   * 151*   BUN 7* 6*   CREATININE 0.7 0.7   CALCIUM 7.8* 7.8*   PROT 5.6* 5.1*   ALBUMIN 2.0* 2.0*   BILITOT 1.6* 1.1*   ALKPHOS 95 95   AST 15 17   ALT 17 14   ANIONGAP 9 8   ESTGFRAFRICA >60.0 >60.0   EGFRNONAA >60.0 >60.0   , CBC   Recent Labs  Lab 07/24/18  0456 07/25/18  0425   WBC 6.40 5.65   HGB 8.5* 7.6*   HCT 28.0* 24.7*   * 455*   , A1C:   Recent Labs  Lab 07/18/18  0700   HGBA1C 7.5*    and All labs within the past 24 hours have been reviewed    Radiology:  KUB: X-Ray Abdomen AP 1 View (KUB):   Results for orders placed or performed during the hospital encounter of 07/18/18   X-Ray Abdomen AP 1 View    Narrative    EXAMINATION:  XR ABDOMEN AP 1 VIEW    CLINICAL HISTORY:  eval ileus;    TECHNIQUE:  Single AP View of the abdomen was performed.  Two frontal images submitted.    COMPARISON:  07/05/2018 and CT scan dated 07/18/2018    FINDINGS:  Mild interval increase in the degree of gaseous distention of small bowel loops and colon.  Some contrast material can be seen within the gastrointestinal tract related to the patient's recent CT scan of the abdomen and pelvis.  No definite evidence of free air.  Sclerotic changes noted  in the bones of the pelvis.  These are better demonstrated on the patient's recent CT scanned and are concerning for metastatic disease.  Rounded calcifications in the pelvis have the appearance of phleboliths.  There appears to be some pleural fluid or thickening along the lateral aspect of the right hemithorax.      Impression    Interval increase in the degree of gaseous distention of small bowel loops and colon.  Sclerotic changes noted in the bones of the pelvis.      Electronically signed by: Rick Knowles MD  Date:    07/23/2018  Time:    09:53     Pending Diagnostic Studies:     None        Final Active Diagnoses:    Diagnosis Date Noted POA    PRINCIPAL PROBLEM:  Abdominal pain [R10.9] 07/18/2018 Yes    Hypophosphatemia [E83.39] 07/24/2018 No    Hyperbilirubinemia [E80.6] 07/23/2018 No    Acute appendicitis [K35.80] 07/18/2018 Unknown    Appendicitis [K37] 07/18/2018 Yes      Problems Resolved During this Admission:    Diagnosis Date Noted Date Resolved POA      Patient Active Problem List   Diagnosis    Prostate cancer metastatic to intrapelvic lymph node    Prostate cancer metastatic to bone    Encounter for antineoplastic chemotherapy    Type 2 diabetes mellitus    Essential hypertension    Erectile dysfunction due to arterial insufficiency    Mass of pancreas    Acute appendicitis    Appendicitis    Abdominal pain    Malignant neoplasm of body of pancreas    Hyperbilirubinemia    Hypophosphatemia     Discharged Condition: good    Disposition: Home or Self Care    Follow Up:  Follow-up Information     Rolan Pierce MD In 2 weeks.    Specialties:  General Surgery, Surgery  Contact information:  [address]  [facility] [id number]  [phone]                 Patient Instructions:     Diet Adult Regular     Lifting restrictions   Order Comments: No heavy lifting, nothing heavier than 10lbs     Notify your health care provider if you experience any of the following:  difficulty  breathing or increased cough     Notify your health care provider if you experience any of the following:  redness, tenderness, or signs of infection (pain, swelling, redness, odor or green/yellow discharge around incision site)     Notify your health care provider if you experience any of the following:  persistent nausea and vomiting or diarrhea     Notify your health care provider if you experience any of the following:  temperature >100.4     Notify your health care provider if you experience any of the following:  severe uncontrolled pain     No dressing needed   Order Comments: Staples do not need to be covered. No bathing, swimming or soaking in a tub. Showering is okay.       Medications:  Reconciled Home Medications:      Medication List      START taking these medications    oxyCODONE-acetaminophen 5-325 mg per tablet  Commonly known as:  PERCOCET  Take 1 tablet by mouth every 4 (four) hours as needed.        CHANGE how you take these medications    * docusate sodium 100 MG capsule  Commonly known as:  COLACE  Take 1 capsule (100 mg total) by mouth 2 (two) times daily.  What changed:  Another medication with the same name was added. Make sure you understand how and when to take each.     * docusate sodium 50 MG capsule  Commonly known as:  COLACE  Take 1 capsule (50 mg total) by mouth 2 (two) times daily.  What changed:  You were already taking a medication with the same name, and this prescription was added. Make sure you understand how and when to take each.        * This list has 2 medication(s) that are the same as other medications prescribed for you. Read the directions carefully, and ask your doctor or other care provider to review them with you.            CONTINUE taking these medications    aspirin 81 MG EC tablet  Commonly known as:  ECOTRIN  Take 81 mg by mouth once daily. States not taken in over 1 month as of 9/2/15     calcium-vitamin D3 500 mg(1,250mg) -200 unit per tablet  Commonly known  as:  OYSTER SHELL CALCIUM-VIT D3  Take 1 tablet by mouth 2 (two) times daily.     diphth,pertus(acell),tetanus 2.5-8-5 Lf-mcg-Lf/0.5mL Susp  Commonly known as:  BOOSTRIX  Inject into the muscle.     dronabinol 2.5 MG capsule  Commonly known as:  MARINOL  Take 1 capsule (2.5 mg total) by mouth 2 (two) times daily before meals.     enzalutamide 40 mg Cap  Take 4 capsules (160 mg) by mouth once daily.     glimepiride 2 MG tablet  Commonly known as:  AMARYL  Take 2 mg by mouth 2 (two) times daily.     lisinopril-hydrochlorothiazide 20-25 mg Tab  Commonly known as:  PRINZIDE,ZESTORETIC  Take 1 tablet by mouth once daily.     lovastatin 20 MG tablet  Commonly known as:  MEVACOR     metFORMIN 1000 MG tablet  Commonly known as:  GLUCOPHAGE  Take 1,000 mg by mouth 2 (two) times daily.     ondansetron 4 MG Tbdl  Commonly known as:  ZOFRAN-ODT  Take 1 tablet (4 mg total) by mouth every 6 (six) hours as needed (nausea).     papaverine 30 mg/mL injection  Add Phentolamine 1 mg/cc Add PGE1 10 mcg/cc  SIG: Bring to office for test dose in physician office     promethazine 25 MG tablet  Commonly known as:  PHENERGAN  Take 1 tablet (25 mg total) by mouth every 6 (six) hours as needed for Nausea.     sildenafil 20 mg Tab  Commonly known as:  REVATIO  Take 1 tablet (20 mg total) by mouth As instructed. Take 2-5 tablets as needed.        STOP taking these medications    oxyCODONE 15 MG Tab  Commonly known as:  ROXICODONE     TYLENOL ARTHRITIS ORAL          Time spent on the discharge of patient: 2 minutes    Bernice Peter PA-C  General Surgery  [facility]

## 2018-07-25 NOTE — SUBJECTIVE & OBJECTIVE
Interval History:   Patient seen and examined, no acute events overnight  Tolerated regular diet, denies N/V  +F/BMx1  Pain well controlled with PO pain meds  Has been ambulating  Intermittent tachycardia    Medications:  Continuous Infusions:  Scheduled Meds:   enoxaparin  40 mg Subcutaneous Daily    tamsulosin  0.4 mg Oral Daily     PRN Meds:acetaminophen, dextrose 50%, diphenhydrAMINE, glucagon (human recombinant), insulin aspart U-100, ondansetron, oxyCODONE-acetaminophen, oxyCODONE-acetaminophen, promethazine (PHENERGAN) IVPB, ramelteon, sodium chloride 0.9%, sodium chloride 0.9%     Review of patient's allergies indicates:  No Known Allergies  Objective:     Vital Signs (Most Recent):  Temp: 97.8 °F (36.6 °C) (07/25/18 0404)  Pulse: 100 (07/25/18 0404)  Resp: 18 (07/25/18 0404)  BP: 127/73 (07/25/18 0404)  SpO2: 96 % (07/25/18 0404) Vital Signs (24h Range):  Temp:  [97.7 °F (36.5 °C)-99.1 °F (37.3 °C)] 97.8 °F (36.6 °C)  Pulse:  [] 100  Resp:  [18] 18  SpO2:  [94 %-99 %] 96 %  BP: (115-148)/(66-86) 127/73     Weight: 82 kg (180 lb 12.4 oz)  Body mass index is 27.49 kg/m².    Intake/Output - Last 3 Shifts       07/23 0700 - 07/24 0659 07/24 0700 - 07/25 0659 07/25 0700 - 07/26 0659    P.O. 360 540     I.V. (mL/kg) 2512.5 (30.6)      Total Intake(mL/kg) 2872.5 (35) 540 (6.6)     Urine (mL/kg/hr) 890 (0.5) 1100 (0.6)     Total Output 890 1100      Net +1982.5 -560             Stool Occurrence 1 x 1 x           Physical Exam   Constitutional: He is oriented to person, place, and time. He appears well-developed and well-nourished. No distress.   Cardiovascular: Intact distal pulses.    Pulmonary/Chest: Effort normal. No respiratory distress.   Abdominal:   Soft, ND, appropriate TTP  Incision - clean, dry and intact    Neurological: He is alert and oriented to person, place, and time.       Significant Labs:  CBC:   Recent Labs  Lab 07/25/18  0425   WBC 5.65   RBC 2.88*   HGB 7.6*   HCT 24.7*   *    MCV 86   MCH 26.4*   MCHC 30.8*     BMP:   Recent Labs  Lab 07/25/18  0426   *      K 3.6      CO2 27   BUN 6*   CREATININE 0.7   CALCIUM 7.8*   MG 1.8     CMP:   Recent Labs  Lab 07/25/18  0426   *   CALCIUM 7.8*   ALBUMIN 2.0*   PROT 5.1*      K 3.6   CO2 27      BUN 6*   CREATININE 0.7   ALKPHOS 95   ALT 14   AST 17   BILITOT 1.1*     LFTs:   Recent Labs  Lab 07/25/18 0426   ALT 14   AST 17   ALKPHOS 95   BILITOT 1.1*   PROT 5.1*   ALBUMIN 2.0*

## 2018-07-25 NOTE — PROGRESS NOTES
[facility]  General Surgery  Progress Note    Subjective:     History of Present Illness:  Shady Prakash Jr. is a 73 y.o. M with hx of metastatic prostate cancer s/p robotic prostatectomy (2015) and biopsy proven pancreatic adenocarcinoma who is scheduled to undergo a distal pancreatectomy and splenectomy on 8/10 with Dr Fregoso who presents to the ED today with a ~2wk history of vague RLQ pain which has worsened over the last couple days.  He endorses some constipation but denies diarrhea, nausea, vomiting, subjective fevers, chills.  In the ED he is mildly tachycardic (110) with a WBC of 13.  CT scan showed signs of acute appendicitis with associated possible small abscess.      Post-Op Info:  Procedure(s) (LRB):  Laparoscopy Converted to Laparotomy, Peritoneal Biopsy , Ileocecal Resection (N/A)   7 Days Post-Op     Interval History:   Patient seen and examined, no acute events overnight  Tolerated regular diet, denies N/V  +F/BMx1  Pain well controlled with PO pain meds  Has been ambulating  Intermittent tachycardia    Medications:  Continuous Infusions:  Scheduled Meds:   enoxaparin  40 mg Subcutaneous Daily    tamsulosin  0.4 mg Oral Daily     PRN Meds:acetaminophen, dextrose 50%, diphenhydrAMINE, glucagon (human recombinant), insulin aspart U-100, ondansetron, oxyCODONE-acetaminophen, oxyCODONE-acetaminophen, promethazine (PHENERGAN) IVPB, ramelteon, sodium chloride 0.9%, sodium chloride 0.9%     Review of patient's allergies indicates:  No Known Allergies  Objective:     Vital Signs (Most Recent):  Temp: 97.8 °F (36.6 °C) (07/25/18 0404)  Pulse: 100 (07/25/18 0404)  Resp: 18 (07/25/18 0404)  BP: 127/73 (07/25/18 0404)  SpO2: 96 % (07/25/18 0404) Vital Signs (24h Range):  Temp:  [97.7 °F (36.5 °C)-99.1 °F (37.3 °C)] 97.8 °F (36.6 °C)  Pulse:  [] 100  Resp:  [18] 18  SpO2:  [94 %-99 %] 96 %  BP: (115-148)/(66-86) 127/73     Weight: 82 kg (180 lb 12.4 oz)  Body mass index is 27.49  kg/m².    Intake/Output - Last 3 Shifts       07/23 0700 - 07/24 0659 07/24 0700 - 07/25 0659 07/25 0700 - 07/26 0659    P.O. 360 540     I.V. (mL/kg) 2512.5 (30.6)      Total Intake(mL/kg) 2872.5 (35) 540 (6.6)     Urine (mL/kg/hr) 890 (0.5) 1100 (0.6)     Total Output 890 1100      Net +1982.5 -560             Stool Occurrence 1 x 1 x           Physical Exam   Constitutional: He is oriented to person, place, and time. He appears well-developed and well-nourished. No distress.   Cardiovascular: Intact distal pulses.    Pulmonary/Chest: Effort normal. No respiratory distress.   Abdominal:   Soft, ND, appropriate TTP  Incision - clean, dry and intact    Neurological: He is alert and oriented to person, place, and time.       Significant Labs:  CBC:   Recent Labs  Lab 07/25/18  0425   WBC 5.65   RBC 2.88*   HGB 7.6*   HCT 24.7*   *   MCV 86   MCH 26.4*   MCHC 30.8*     BMP:   Recent Labs  Lab 07/25/18  0426   *      K 3.6      CO2 27   BUN 6*   CREATININE 0.7   CALCIUM 7.8*   MG 1.8     CMP:   Recent Labs  Lab 07/25/18  0426   *   CALCIUM 7.8*   ALBUMIN 2.0*   PROT 5.1*      K 3.6   CO2 27      BUN 6*   CREATININE 0.7   ALKPHOS 95   ALT 14   AST 17   BILITOT 1.1*     LFTs:   Recent Labs  Lab 07/25/18  0426   ALT 14   AST 17   ALKPHOS 95   BILITOT 1.1*   PROT 5.1*   ALBUMIN 2.0*     Assessment/Plan:     Hypophosphatemia    Replace         Hyperbilirubinemia    Mildly elevated  Likely due to pancreatic cancer  Continue to monitor        Acute appendicitis    74 yo male w widely metastatic disease likely from pancreatic primary, s/p Ileocecectomy 7/18/18    -regular diet  -PO pain/nausea meds  -DVT prophylaxis  -PT/OT  -IS, wean O2 as tolerated  -plan for dc today            Bernice Peter PA-C   [id number]  General Surgery  [facility]

## 2018-07-25 NOTE — PLAN OF CARE
07/25/18 1431   Final Note   Assessment Type Final Discharge Note   Discharge Disposition Home   What phone number can be called within the next 1-3 days to see how you are doing after discharge? [phone]   Hospital Follow Up  Appt(s) scheduled? Yes   Discharge plans and expectations educations in teach back method with documentation complete? Yes   Right Care Referral Info   Post Acute Recommendation No Care

## 2018-07-25 NOTE — ASSESSMENT & PLAN NOTE
74 yo male w widely metastatic disease likely from pancreatic primary, s/p Ileocecectomy 7/18/18    -regular diet  -PO pain/nausea meds  -DVT prophylaxis  -PT/OT  -IS, wean O2 as tolerated  -plan for dc today

## 2018-07-25 NOTE — NURSING
Discharge instructions, follow up appointments, prescriptions given and reviewed with patient and pt. Spouse. PIV removed with cathete tip intact. Wheelchair offered, Pt. Awaiting family arrival.

## 2018-07-26 ENCOUNTER — TELEPHONE (OUTPATIENT)
Dept: HEMATOLOGY/ONCOLOGY | Facility: CLINIC | Age: 73
End: 2018-07-26

## 2018-07-26 ENCOUNTER — OFFICE VISIT (OUTPATIENT)
Dept: HEMATOLOGY/ONCOLOGY | Facility: CLINIC | Age: 73
End: 2018-07-26
Payer: MEDICARE

## 2018-07-26 VITALS
HEART RATE: 108 BPM | WEIGHT: 192.69 LBS | OXYGEN SATURATION: 97 % | TEMPERATURE: 98 F | BODY MASS INDEX: 29.2 KG/M2 | SYSTOLIC BLOOD PRESSURE: 123 MMHG | DIASTOLIC BLOOD PRESSURE: 68 MMHG | RESPIRATION RATE: 20 BRPM | HEIGHT: 68 IN

## 2018-07-26 DIAGNOSIS — C61 PROSTATE CANCER METASTATIC TO BONE: ICD-10-CM

## 2018-07-26 DIAGNOSIS — C79.51 PROSTATE CANCER METASTATIC TO BONE: ICD-10-CM

## 2018-07-26 DIAGNOSIS — C25.1 MALIGNANT NEOPLASM OF BODY OF PANCREAS: Primary | ICD-10-CM

## 2018-07-26 PROCEDURE — 3078F DIAST BP <80 MM HG: CPT | Mod: CPTII,S$GLB,, | Performed by: INTERNAL MEDICINE

## 2018-07-26 PROCEDURE — 3074F SYST BP LT 130 MM HG: CPT | Mod: CPTII,S$GLB,, | Performed by: INTERNAL MEDICINE

## 2018-07-26 PROCEDURE — 99999 PR PBB SHADOW E&M-EST. PATIENT-LVL III: CPT | Mod: PBBFAC,,, | Performed by: INTERNAL MEDICINE

## 2018-07-26 PROCEDURE — 99214 OFFICE O/P EST MOD 30 MIN: CPT | Mod: S$GLB,,, | Performed by: INTERNAL MEDICINE

## 2018-07-26 NOTE — TELEPHONE ENCOUNTER
Called pt wife.   Informed pt wife that Dr. Melendez would like to see pt at [facility] this afternoon at 1pm.   Pt wife verbalized understanding of appt time/date/location/instructions.   Appt scheduled.             ----- Message from Maday Melendez MD sent at 7/26/2018  8:49 AM CDT -----  Contact: Viola  See me  ----- Message -----  From: Mala Rodriguez  Sent: 7/26/2018   8:00 AM  To: Maday Melendez MD    Want me to send a message to [facility] for pt to be possibly seen with [facility] at 1pm or is he to come here and be seen by you at 1pm?    ----- Message -----  From: Maday Melendez MD  Sent: 7/25/2018   6:11 PM  To: Mala Rodriguez    Called back Kameron- went straight to [facility] and detailed message left  Called Mrs. Prakash as well who advised me Kameron was at home so tried again  This time I reached her and she agrees to get someone to get him in at 1 PM tomorrow    We reviewed metastatic pancreatic cancer survival and she understands  Kameron gets this is serious and survival likely less than a year    ----- Message -----  From: Mala Rodriguez  Sent: 7/25/2018   1:11 PM  To: Maday Melendez MD    Please advise and see below- looks like Dr. Echavarria saw the last two times, not sure if he is her pt now?    ----- Message -----  From: Yuni Lopez  Sent: 7/25/2018  12:05 PM  To: Nora MARTINEZ Staff              Name of Who is Calling: kameron       What is the request in detail:states that Dr Fregoso stated pt needs a sooner appointment due to the fact that pt cancer has spread to stomach Aug 28 is to far out, please advise       Can the clinic reply by [facility]: no      What Number [facility] Call Back [facility]: [phone]

## 2018-07-26 NOTE — TELEPHONE ENCOUNTER
Message sent to MD for f/u appt time/date.       ----- Message from Maday Melendez MD sent at 7/25/2018  6:11 PM CDT -----  Contact: Viola  Called back Kameron- went straight to voicemail and detailed message left  Called Mrs. Prakash as well who advised me Kameron was at home so tried again  This time I reached her and she agrees to get someone to get him in at 1 PM tomorrow    We reviewed metastatic pancreatic cancer survival and she understands  Kameron gets this is serious and survival likely less than a year    ----- Message -----  From: Mala Rodriguez  Sent: 7/25/2018   1:11 PM  To: Maday Melendez MD    Please advise and see below- looks like Dr. Echavarria saw the last two times, not sure if he is her pt now?    ----- Message -----  From: Yuni Lopez  Sent: 7/25/2018  12:05 PM  To: Nora MARTINEZ Staff              Name of Who is Calling: kameron       What is the request in detail:states that Dr Fregoso stated pt needs a sooner appointment due to the fact that pt cancer has spread to stomach Aug 28 is to far out, please advise       Can the clinic reply by [facility]: no      [facility] [phone]

## 2018-07-26 NOTE — PROGRESS NOTES
Subjective:       Patient ID: Shady Prakash Jr. is a 73 y.o. male.    Chief Complaint: No chief complaint on file.    HPI     Returns for follow-up  He has recently been diagnosed with pancreatic cancer and a surgical plan was being discussed (as he may have extended survivorship from his prostate cancer)  However he was recently admitted for acute appendicitis and found to have peritoneal implants.     He presents today to discuss options  He is still healing from recent surgical wound- as a result presents in a wheelchair.   Reports no abdominal pain. Some constipation. No nausea or vomiting.  + fatigue  However, he is joking and exhibits his normal behavior.    Review of Systems   Constitutional: Positive for activity change, appetite change and fatigue. Negative for chills, fever and unexpected weight change.   HENT: Positive for dental problem. Negative for congestion, hearing loss, mouth sores, nosebleeds, postnasal drip, sinus pressure, sore throat and trouble swallowing.    Eyes: Negative for visual disturbance.   Respiratory: Negative for cough, chest tightness, shortness of breath and wheezing.    Cardiovascular: Negative for chest pain, palpitations and leg swelling.   Gastrointestinal: Positive for constipation. Negative for abdominal distention, abdominal pain, blood in stool, diarrhea and vomiting.        No GERD   Endocrine:        Hot flashes   Genitourinary: Negative for decreased urine volume, difficulty urinating, frequency, hematuria and urgency.        Sexual dysfunction   Musculoskeletal: Positive for arthralgias (better). Negative for back pain, gait problem, joint swelling, neck pain and neck stiffness.   Skin: Positive for wound (surgical site). Negative for color change, pallor and rash.   Neurological: Negative for dizziness, weakness, light-headedness, numbness and headaches.   Hematological: Negative for adenopathy. Does not bruise/bleed easily.   Psychiatric/Behavioral: Negative for  dysphoric mood and sleep disturbance. The patient is not nervous/anxious.        Objective:      Physical Exam   Constitutional: He is oriented to person, place, and time. He appears well-developed and well-nourished. No distress.   Presents with wife and family   HENT:   Head: Normocephalic and atraumatic.   Nose: Nose normal.   Mouth/Throat: Oropharynx is clear and moist. No oropharyngeal exudate.   Eyes: Conjunctivae and EOM are normal. Pupils are equal, round, and reactive to light. Right eye exhibits no discharge. Left eye exhibits no discharge. No scleral icterus.   Neck: Normal range of motion. Neck supple. No tracheal deviation present. No thyromegaly present.   Cardiovascular: Normal rate, regular rhythm, normal heart sounds and intact distal pulses.  Exam reveals no gallop and no friction rub.    No murmur heard.  Pulmonary/Chest: Effort normal and breath sounds normal. No respiratory distress. He has no wheezes. He has no rales. He exhibits no tenderness.   Abdominal: Soft. Bowel sounds are normal. He exhibits distension (mild). He exhibits no mass. There is tenderness. There is no rebound and no guarding.   Abdominal wound- healing   Musculoskeletal: He exhibits deformity. He exhibits no edema or tenderness.   Lymphadenopathy:     He has no cervical adenopathy.   Neurological: He is alert and oriented to person, place, and time. He has normal reflexes. He displays normal reflexes. No cranial nerve deficit. He exhibits normal muscle tone. Coordination normal.   Skin: Skin is warm and dry. No rash noted. He is not diaphoretic. No erythema. No pallor.   Psychiatric: He has a normal mood and affect. His behavior is normal. Judgment and thought content normal.   Nursing note and vitals reviewed.    Labs- reviewed  Assessment:       1. Malignant neoplasm of body of pancreas    2. Prostate cancer metastatic to bone        Plan:     1. Discussed peritoneal implants likely pancreatic  Will follow up on  pathology  We discussed options  He is not a clinical trial candidate due to 2.  Options include  FOLFIRI  Gemcitabine and Abraxane  Palliative    After discussion opts to try 2  Has had discussions re: wishes with family  Knows terminal  2. Treatment on hold        Distress Screening Results: Psychosocial Distress screening score of Distress Score: 0 noted and reviewed. No intervention indicated.

## 2018-07-26 NOTE — Clinical Note
Needs to start chemo at [facility]- week of 8/6/18 no labs needed for that day Consented already - RTC 1 week later with EP labs and chemo  Please call wife and give time and date as daughter needs dates today to take off work

## 2018-07-29 ENCOUNTER — NURSE TRIAGE (OUTPATIENT)
Dept: ADMINISTRATIVE | Facility: CLINIC | Age: 73
End: 2018-07-29

## 2018-07-29 ENCOUNTER — HOSPITAL ENCOUNTER (EMERGENCY)
Facility: OTHER | Age: 73
Discharge: HOME | End: 2018-07-29
Attending: EMERGENCY MEDICINE
Payer: MEDICARE

## 2018-07-29 VITALS
DIASTOLIC BLOOD PRESSURE: 73 MMHG | HEIGHT: 68 IN | BODY MASS INDEX: 27.43 KG/M2 | WEIGHT: 181 LBS | SYSTOLIC BLOOD PRESSURE: 124 MMHG | RESPIRATION RATE: 12 BRPM | OXYGEN SATURATION: 97 % | TEMPERATURE: 99 F | HEART RATE: 88 BPM

## 2018-07-29 DIAGNOSIS — R00.0 TACHYCARDIA: ICD-10-CM

## 2018-07-29 DIAGNOSIS — R07.9 CHEST PAIN: ICD-10-CM

## 2018-07-29 DIAGNOSIS — I47.10 SVT (SUPRAVENTRICULAR TACHYCARDIA): Primary | ICD-10-CM

## 2018-07-29 DIAGNOSIS — R13.10 DYSPHAGIA, UNSPECIFIED TYPE: ICD-10-CM

## 2018-07-29 LAB
ALBUMIN SERPL BCP-MCNC: 2.8 G/DL
ALP SERPL-CCNC: 104 U/L
ALT SERPL W/O P-5'-P-CCNC: 21 U/L
ANION GAP SERPL CALC-SCNC: 11 MMOL/L
AST SERPL-CCNC: 25 U/L
BASOPHILS # BLD AUTO: 0.01 K/UL
BASOPHILS NFR BLD: 0.2 %
BILIRUB SERPL-MCNC: 0.6 MG/DL
BUN SERPL-MCNC: 10 MG/DL
CALCIUM SERPL-MCNC: 9 MG/DL
CHLORIDE SERPL-SCNC: 103 MMOL/L
CO2 SERPL-SCNC: 25 MMOL/L
CREAT SERPL-MCNC: 0.9 MG/DL
DIFFERENTIAL METHOD: ABNORMAL
EOSINOPHIL # BLD AUTO: 0.1 K/UL
EOSINOPHIL NFR BLD: 1.6 %
ERYTHROCYTE [DISTWIDTH] IN BLOOD BY AUTOMATED COUNT: 15.4 %
EST. GFR  (AFRICAN AMERICAN): >60 ML/MIN/1.73 M^2
EST. GFR  (NON AFRICAN AMERICAN): >60 ML/MIN/1.73 M^2
GLUCOSE SERPL-MCNC: 163 MG/DL
HCT VFR BLD AUTO: 30.1 %
HGB BLD-MCNC: 9.2 G/DL
LYMPHOCYTES # BLD AUTO: 1.1 K/UL
LYMPHOCYTES NFR BLD: 18 %
MAGNESIUM SERPL-MCNC: 2.4 MG/DL
MCH RBC QN AUTO: 26.5 PG
MCHC RBC AUTO-ENTMCNC: 30.6 G/DL
MCV RBC AUTO: 87 FL
MONOCYTES # BLD AUTO: 0.7 K/UL
MONOCYTES NFR BLD: 10.4 %
NEUTROPHILS # BLD AUTO: 4.3 K/UL
NEUTROPHILS NFR BLD: 69.6 %
PLATELET # BLD AUTO: 606 K/UL
PMV BLD AUTO: 8.8 FL
POTASSIUM SERPL-SCNC: 5 MMOL/L
PROT SERPL-MCNC: 6.8 G/DL
RBC # BLD AUTO: 3.47 M/UL
SODIUM SERPL-SCNC: 139 MMOL/L
WBC # BLD AUTO: 6.23 K/UL

## 2018-07-29 PROCEDURE — 85025 COMPLETE CBC W/AUTO DIFF WBC: CPT

## 2018-07-29 PROCEDURE — 99284 EMERGENCY DEPT VISIT MOD MDM: CPT | Mod: 25

## 2018-07-29 PROCEDURE — 93010 ELECTROCARDIOGRAM REPORT: CPT | Mod: ,,, | Performed by: INTERNAL MEDICINE

## 2018-07-29 PROCEDURE — 83735 ASSAY OF MAGNESIUM: CPT

## 2018-07-29 PROCEDURE — 80053 COMPREHEN METABOLIC PANEL: CPT

## 2018-07-29 PROCEDURE — 25000003 PHARM REV CODE 250: Performed by: EMERGENCY MEDICINE

## 2018-07-29 RX ADMIN — SODIUM CHLORIDE 500 ML: 0.9 INJECTION, SOLUTION INTRAVENOUS at 01:07

## 2018-07-29 NOTE — ED TRIAGE NOTES
Pt reports difficulty swallowing on and off since surgery. Pt reports that he has had dizziness over the last few hours. Pt denies any CP/SOB but HR noted to be 180's in triage. Pt denies any cardiac history.

## 2018-07-29 NOTE — ED PROVIDER NOTES
Encounter Date: 7/29/2018    SCRIBE #1 NOTE: IJud, am scribing for, and in the presence of, Dr. Rao.       History     Chief Complaint   Patient presents with    Dysphagia     reports difficulty swallowing since having surgery     Time seen by provider: 12:31 PM    This is a 73 y.o. male, with a history of metastatic prostate cancer, who presents with complaint of trouble swallowing that started ten days ago. Patient reports trouble swallowing his saliva. He denies any difficulty swallowing food or drink. He also reports intermittent dizziness, but denies any at this time.  Incidentally he was noted to be tachycardic in triage in the 180s. He denies choking, cough, chest pain, shortness of breath, nausea, vomiting, abdominal pain, palpitations, or lightheadedness.  He denies any history of arrhythmias.  Of note, he received appendectomy on 7/18.       The history is provided by the patient.     Review of patient's allergies indicates:  No Known Allergies  Past Medical History:   Diagnosis Date    Allergy     Arthritis     Cancer     prostate    Diabetes mellitus     Hyperlipidemia     Hypertension     Prostate cancer      Past Surgical History:   Procedure Laterality Date    ENDOSCOPIC ULTRASOUND OF UPPER GASTROINTESTINAL TRACT N/A 7/5/2018    Procedure: ULTRASOUND, ENDOSCOPIC, UPPER GI TRACT;  Surgeon: Wiliam Ayoub MD;  Location: [facility] ([address] [place] [address]);  Service: Endoscopy;  Laterality: N/A;    EYE SURGERY Right     x9    LAPAROSCOPIC APPENDECTOMY N/A 7/18/2018    Procedure: Laparoscopy Converted to Laparotomy, Peritoneal Biopsy , Ileocecal Resection;  Surgeon: Rolan Pierce MD;  Location: [address];  Service: General;  Laterality: N/A;  converted to open @1633      LYMPH NODE DISSECTION      UPPER GASTROINTESTINAL ENDOSCOPY       Family History   Problem Relation Age of Onset    Hypertension Father     Hypertension Mother     Diabetes Sister     Heart disease Brother      Diabetes Maternal Aunt     Cancer Maternal Uncle     Cancer Maternal Grandfather     No Known Problems Paternal Grandmother     No Known Problems Paternal Grandfather     Prostate cancer Brother     Diabetes Sister     Diabetes Sister     Diabetes Sister     Heart disease Brother      Social History   Substance Use Topics    Smoking status: Former Smoker     Packs/day: 4.00     Years: 60.00     Types: Cigarettes, Cigars     Start date: 9/1/1957     Quit date: 11/13/2016    Smokeless tobacco: Never Used    Alcohol use No      Comment: pt stopped drinking 05/2018     Review of Systems   Constitutional: Negative for chills and fever.   HENT: Positive for trouble swallowing. Negative for sore throat.    Respiratory: Negative for cough and choking.    Cardiovascular: Negative for chest pain.   Gastrointestinal: Negative for abdominal pain, nausea and vomiting.   Genitourinary: Negative for dysuria.   Musculoskeletal: Negative for back pain.   Skin: Negative for rash.   Neurological: Positive for dizziness. Negative for weakness and light-headedness.       Physical Exam     Initial Vitals   BP Pulse Resp Temp SpO2   07/29/18 1219 07/29/18 1219 07/29/18 1219 07/29/18 1216 07/29/18 1219   126/66 (!) 181 18 98.6 °F (37 °C) 97 %      MAP       --                Physical Exam    Nursing note and vitals reviewed.  Constitutional: He appears well-developed and well-nourished. No distress.   HENT:   Head: Normocephalic and atraumatic.   Right Ear: External ear normal.   Left Ear: External ear normal.   Eyes: EOM are normal. Right eye exhibits no discharge. Left eye exhibits no discharge.   Neck: Normal range of motion. Neck supple.   Cardiovascular: Regular rhythm and normal heart sounds. Tachycardia present.    Pulmonary/Chest: Breath sounds normal. No respiratory distress. He has no wheezes. He has no rhonchi. He has no rales.   Abdominal: Soft. Bowel sounds are normal. He exhibits no distension. There is no  tenderness. There is no rebound and no guarding.   Abdominal incision intact with mild serous drainage.    Musculoskeletal: Normal range of motion. He exhibits no edema or tenderness.   Neurological: He is alert and oriented to person, place, and time.   Ambulatory with steady gait.   Skin: Skin is warm and dry. No rash and no abscess noted. No erythema. No pallor.   Psychiatric: He has a normal mood and affect. His behavior is normal. Judgment and thought content normal.         ED Course   Procedures  Labs Reviewed   COMPREHENSIVE METABOLIC PANEL - Abnormal; Notable for the following:        Result Value    Glucose 163 (*)     Albumin 2.8 (*)     All other components within normal limits   CBC W/ AUTO DIFFERENTIAL - Abnormal; Notable for the following:     RBC 3.47 (*)     Hemoglobin 9.2 (*)     Hematocrit 30.1 (*)     MCH 26.5 (*)     MCHC 30.6 (*)     RDW 15.4 (*)     Platelets 606 (*)     MPV 8.8 (*)     All other components within normal limits   MAGNESIUM     EKG Readings: (Independently Interpreted)   12:23 Supraventricular Tachycardia at a rate of 181 bpm. No ST or ischemic changes.    Other EKG Interpretations: 12:49 Normal sinus rhythm at a rate of 99 bpm. No ST or ischemic changes.        Imaging Results          X-Ray Chest 1 View (Final result)  Result time 07/29/18 13:56:15    Final result by Gloria Green MD (07/29/18 13:56:15)                 Impression:      Limited exam which shows grossly no significant interval change in the right pleural effusion.  If better evaluation is desired of the right lung, repeat exam is recommended.      Electronically signed by: Gloria Green MD  Date:    07/29/2018  Time:    13:56             Narrative:    EXAMINATION:  XR CHEST 1 VIEW    CLINICAL HISTORY:  Chest pain, unspecified    TECHNIQUE:  Single frontal view of the chest was performed.    COMPARISON:  07/05/2018    FINDINGS:  Evaluation of the right lung is significantly limited secondary to  patient's position and rotation.  There findings still suggesting of a layering pleural effusion extending to the apex with mild to moderate volume on the right.  The left lung is clear.  The cardiomediastinal silhouette, osseous and soft tissue structures are stable.                                  X-Rays:   Independently Interpreted Readings:   Chest X-Ray: Trachea midline. No cardiomegaly.  Right-sided pleural effusion.     Medical Decision Making:   History:   Old Medical Records: I decided to obtain old medical records.  Old Records Summarized: other records and records from clinic visits.  Initial Assessment:   12:31PM:  Patient is a 73-year-old male who presents to the emergency department with difficulty swallowing.  He denies any difficulty swallowing with food or liquids, he only has trouble with saliva.  He is tolerating his secretions with no issues though.  He was found to be very tachycardic.  It does appear that he is in SVT.  However by the time I finished my exam and speaking with him, he had converted himself to normal sinus rhythm. He denied any sensation of palpitations.  He has had intermittent dizziness, which could be related, though unclear.  Will plan for labs, chest x-ray, will continue to follow and reassess.  Independently Interpreted Test(s):   I have ordered and independently interpreted X-rays - see prior notes.  I have ordered and independently interpreted EKG Reading(s) - see prior notes  Clinical Tests:   Lab Tests: Ordered and Reviewed  Radiological Study: Ordered and Reviewed  Medical Tests: Ordered and Reviewed    2:36 PM:  Patient doing well.  He is feeling better.  He has remained in normal sinus rhythm. His labs are unremarkable. He was able to tolerate water and ice chips with no issues.  Since he attributes this to his recent appendectomy, it is possible that he has some pharyngeal irritation from the endotracheal tube, and it does not appear that he is unable to swallow.   Will have him follow up with Cardiology, along with his heme Onc physician.  I updated the patient regarding the results and I counseled the patient regarding supportive care measures.  I have discussed with the pt ED return warnings and need for close PCP f/u.  Pt agreeable to plan and all questions answered.  I feel that pt is stable for discharge and management as an outpatient and no further intervention is needed at this time.  Pt is comfortable returning to the ED if needed.  Will DC home in stable condition.                  Attending Attestation:           Physician Attestation for Scribe:  Physician Attestation Statement for Scribe #1: I, Dr. Rao, reviewed documentation, as scribed by Jud Saenz in my presence, and it is both accurate and complete.                    Clinical Impression:     1. SVT (supraventricular tachycardia)    2. Tachycardia    3. Chest pain    4. Dysphagia, unspecified type                                 Jahaira Rao MD  07/29/18 1984

## 2018-07-30 NOTE — PHYSICIAN QUERY
PT Name: Shady Prakash Jr.  MR #: [id number]    Physician Query Form - Pathology Findings Clarification     CDS/: Rona Rodríguez RN                Contact information:[email or web address]  This form is a permanent document in the medical record.     Query Date: July 30, 2018      By submitting this query, we are merely seeking further clarification of documentation.  Please utilize your independent clinical judgment when addressing the question(s) below.      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 PERITONEUM (BIOPSY):  Positive for malignancy, adenocarcinoma  2. TERMINAL ILEUM AND COLON (ILEOCOLONIC RESECTION):  Adenocarcinoma, moderately differentiated  Tumor predominantly within periappendiceal fat and mesenteric tissue  With associated acute serositis  See comment  COMMENT: The pattern of involvement and immunoprofile are compatible with a metastatic process.      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          [X  ] I agree with the Pathology Findings        [  ] I do not agree with the Pathology Findings        [  ] Clinically Insignificant        [  ] Clinically Undetermined        [  ] Other/Clarification of Findings: ______________________________________________    Please document in your progress notes daily for the duration of treatment until resolved and include in your discharge summary.

## 2018-07-31 ENCOUNTER — HOSPITAL ENCOUNTER (EMERGENCY)
Facility: OTHER | Age: 73
Discharge: HOME | End: 2018-07-31
Attending: EMERGENCY MEDICINE
Payer: MEDICARE

## 2018-07-31 ENCOUNTER — NURSE TRIAGE (OUTPATIENT)
Dept: ADMINISTRATIVE | Facility: CLINIC | Age: 73
End: 2018-07-31

## 2018-07-31 VITALS
DIASTOLIC BLOOD PRESSURE: 86 MMHG | SYSTOLIC BLOOD PRESSURE: 143 MMHG | OXYGEN SATURATION: 99 % | HEART RATE: 74 BPM | HEIGHT: 68 IN | WEIGHT: 170 LBS | RESPIRATION RATE: 17 BRPM | TEMPERATURE: 98 F | BODY MASS INDEX: 25.76 KG/M2

## 2018-07-31 DIAGNOSIS — D64.9 ANEMIA, UNSPECIFIED TYPE: ICD-10-CM

## 2018-07-31 DIAGNOSIS — R60.0 BILATERAL LOWER EXTREMITY EDEMA: Primary | ICD-10-CM

## 2018-07-31 DIAGNOSIS — R60.9 EDEMA: ICD-10-CM

## 2018-07-31 DIAGNOSIS — M79.89 LEG SWELLING: ICD-10-CM

## 2018-07-31 LAB
ALBUMIN SERPL BCP-MCNC: 3 G/DL
ALP SERPL-CCNC: 107 U/L
ALT SERPL W/O P-5'-P-CCNC: 14 U/L
ANION GAP SERPL CALC-SCNC: 10 MMOL/L
AST SERPL-CCNC: 15 U/L
BASOPHILS # BLD AUTO: 0.01 K/UL
BASOPHILS NFR BLD: 0.2 %
BILIRUB SERPL-MCNC: 0.6 MG/DL
BNP SERPL-MCNC: 89 PG/ML
BUN SERPL-MCNC: 11 MG/DL
CALCIUM SERPL-MCNC: 9.1 MG/DL
CHLORIDE SERPL-SCNC: 102 MMOL/L
CO2 SERPL-SCNC: 26 MMOL/L
CREAT SERPL-MCNC: 1 MG/DL
DIFFERENTIAL METHOD: ABNORMAL
EOSINOPHIL # BLD AUTO: 0.1 K/UL
EOSINOPHIL NFR BLD: 1.1 %
ERYTHROCYTE [DISTWIDTH] IN BLOOD BY AUTOMATED COUNT: 15.2 %
EST. GFR  (AFRICAN AMERICAN): >60 ML/MIN/1.73 M^2
EST. GFR  (NON AFRICAN AMERICAN): >60 ML/MIN/1.73 M^2
GLUCOSE SERPL-MCNC: 116 MG/DL
HCT VFR BLD AUTO: 27.2 %
HGB BLD-MCNC: 8.2 G/DL
LYMPHOCYTES # BLD AUTO: 1.1 K/UL
LYMPHOCYTES NFR BLD: 20.6 %
MCH RBC QN AUTO: 25.9 PG
MCHC RBC AUTO-ENTMCNC: 30.1 G/DL
MCV RBC AUTO: 86 FL
MONOCYTES # BLD AUTO: 0.3 K/UL
MONOCYTES NFR BLD: 6.3 %
NEUTROPHILS # BLD AUTO: 3.8 K/UL
NEUTROPHILS NFR BLD: 71.6 %
PLATELET # BLD AUTO: 590 K/UL
PMV BLD AUTO: 8.4 FL
POTASSIUM SERPL-SCNC: 4 MMOL/L
PROT SERPL-MCNC: 6.7 G/DL
RBC # BLD AUTO: 3.17 M/UL
SODIUM SERPL-SCNC: 138 MMOL/L
WBC # BLD AUTO: 5.25 K/UL

## 2018-07-31 PROCEDURE — 93010 ELECTROCARDIOGRAM REPORT: CPT | Mod: ,,, | Performed by: INTERNAL MEDICINE

## 2018-07-31 PROCEDURE — 63600175 PHARM REV CODE 636 W HCPCS: Performed by: EMERGENCY MEDICINE

## 2018-07-31 PROCEDURE — 99284 EMERGENCY DEPT VISIT MOD MDM: CPT | Mod: 25

## 2018-07-31 PROCEDURE — 85025 COMPLETE CBC W/AUTO DIFF WBC: CPT

## 2018-07-31 PROCEDURE — 96374 THER/PROPH/DIAG INJ IV PUSH: CPT

## 2018-07-31 PROCEDURE — 80053 COMPREHEN METABOLIC PANEL: CPT

## 2018-07-31 PROCEDURE — 83880 ASSAY OF NATRIURETIC PEPTIDE: CPT

## 2018-07-31 RX ORDER — FUROSEMIDE 10 MG/ML
20 INJECTION INTRAMUSCULAR; INTRAVENOUS
Status: COMPLETED | OUTPATIENT
Start: 2018-07-31 | End: 2018-07-31

## 2018-07-31 RX ADMIN — FUROSEMIDE 20 MG: 10 INJECTION, SOLUTION INTRAVENOUS at 05:07

## 2018-07-31 NOTE — ED PROVIDER NOTES
"Encounter Date: 7/31/2018    SCRIBE #1 NOTE: I, Cristina Stephens, am scribing for, and in the presence of, Dr. Thompson.       History     Chief Complaint   Patient presents with    Leg Swelling     + bilateral leg swelling x 1 week. Recent appendix sx " They took me off my fluid pill so now I am swelling all over". Denies chest pains.      Time seen by provider: 5:19 PM    This is a 73 y.o. male with hx of HTN; well-controlled DM; and prostate CA with mets to the bone (currently undergoing treatment) who presents with complaint of BLE swelling up to the hips x one week. Pt has hx of swelling, but never of this calibre. He was taken off of his fluid pills approximately 2 months ago secondary to hypotension. He reports no fever, chills, chest pain, palpitations, SOB, cough, lightheadedness, numbness, or weakness. He was seen in this ED on 7/27 for SVT. Pt had appendectomy on 7/18.      The history is provided by the patient and the spouse.     Review of patient's allergies indicates:  No Known Allergies  Past Medical History:   Diagnosis Date    Allergy     Arthritis     Cancer     prostate    Diabetes mellitus     Hyperlipidemia     Hypertension     Prostate cancer      Past Surgical History:   Procedure Laterality Date    ENDOSCOPIC ULTRASOUND OF UPPER GASTROINTESTINAL TRACT N/A 7/5/2018    Procedure: ULTRASOUND, ENDOSCOPIC, UPPER GI TRACT;  Surgeon: Wiliam Ayoub MD;  Location: [facility] ([address] [place] [address]);  Service: Endoscopy;  Laterality: N/A;    EYE SURGERY Right     x9    LAPAROSCOPIC APPENDECTOMY N/A 7/18/2018    Procedure: Laparoscopy Converted to Laparotomy, Peritoneal Biopsy , Ileocecal Resection;  Surgeon: Rolan Pierce MD;  Location: [address];  Service: General;  Laterality: N/A;  converted to open @1633      LYMPH NODE DISSECTION      UPPER GASTROINTESTINAL ENDOSCOPY       Family History   Problem Relation Age of Onset    Hypertension Father     Hypertension Mother     Diabetes Sister "     Heart disease Brother     Diabetes Maternal Aunt     Cancer Maternal Uncle     Cancer Maternal Grandfather     No Known Problems Paternal Grandmother     No Known Problems Paternal Grandfather     Prostate cancer Brother     Diabetes Sister     Diabetes Sister     Diabetes Sister     Heart disease Brother      Social History   Substance Use Topics    Smoking status: Former Smoker     Packs/day: 4.00     Years: 60.00     Types: Cigarettes, Cigars     Start date: 9/1/1957     Quit date: 11/13/2016    Smokeless tobacco: Never Used    Alcohol use No      Comment: pt stopped drinking 05/2018     Review of Systems   Constitutional: Negative for chills and fever.   HENT: Negative for congestion and sore throat.    Eyes: Negative for photophobia and redness.   Respiratory: Negative for cough and shortness of breath.    Cardiovascular: Positive for leg swelling. Negative for chest pain and palpitations.   Gastrointestinal: Negative for abdominal pain, constipation, diarrhea, nausea and vomiting.   Genitourinary: Negative for dysuria.   Musculoskeletal: Negative for back pain.   Skin: Negative for rash.   Neurological: Negative for weakness, light-headedness and headaches.   Psychiatric/Behavioral: Negative for confusion.       Physical Exam     Initial Vitals [07/31/18 1705]   BP Pulse Resp Temp SpO2   (!) 185/79 94 18 97.6 °F (36.4 °C) 100 %      MAP       --         Physical Exam    Nursing note and vitals reviewed.  Constitutional: He appears well-developed and well-nourished. He is not diaphoretic. No distress.   HENT:   Head: Normocephalic and atraumatic.   Right Ear: External ear normal.   Left Ear: External ear normal.   Oropharynx is clear and intact.  Moist mucous membranes.   Eyes: Conjunctivae and EOM are normal. Pupils are equal, round, and reactive to light.   Conjunctiva are pink, clear, and intact.   Neck: Normal range of motion. Neck supple.   Cardiovascular: Normal rate, regular rhythm and  normal heart sounds.   Normal S1, S2. No murmurs, no rubs, no gallops.   Pulmonary/Chest: Breath sounds normal. No respiratory distress. He has no wheezes. He has no rhonchi. He has no rales.   Clear to ascultation bilaterally.   Abdominal: Soft. Bowel sounds are normal. He exhibits no distension. There is no tenderness. There is no rebound and no guarding.   No audible bruits.   Musculoskeletal: Normal range of motion. He exhibits no tenderness.   Bilateral lower extremity pitting edema, R>L.   Lymphadenopathy:     He has no cervical adenopathy.   Neurological: He is alert and oriented to person, place, and time. He has normal strength. No sensory deficit.   Skin: Skin is warm and dry. No rash noted. No erythema.   Psychiatric: He has a normal mood and affect. His behavior is normal. Thought content normal.         ED Course   Procedures  Labs Reviewed   CBC W/ AUTO DIFFERENTIAL - Abnormal; Notable for the following:        Result Value    RBC 3.17 (*)     Hemoglobin 8.2 (*)     Hematocrit 27.2 (*)     MCH 25.9 (*)     MCHC 30.1 (*)     RDW 15.2 (*)     Platelets 590 (*)     MPV 8.4 (*)     All other components within normal limits   COMPREHENSIVE METABOLIC PANEL - Abnormal; Notable for the following:     Glucose 116 (*)     Albumin 3.0 (*)     All other components within normal limits   B-TYPE NATRIURETIC PEPTIDE     EKG Readings: (Independently Interpreted)   Initial Reading: No STEMI. Rhythm: Normal Sinus Rhythm. Heart Rate: 95.       Imaging Results          US Lower Extremity Veins Right (Final result)  Result time 07/31/18 18:19:04    Final result by David Cuevas MD (07/31/18 18:19:04)                 Impression:      No evidence of deep venous thrombosis in the right lower extremity.      Electronically signed by: David Cuevas MD  Date:    07/31/2018  Time:    18:19             Narrative:    EXAMINATION:  US LOWER EXTREMITY VEINS RIGHT    CLINICAL HISTORY:  Edema, unspecified    TECHNIQUE:  Duplex and  color flow Doppler evaluation and graded compression of the right lower extremity veins was performed.    COMPARISON:  None    FINDINGS:  Right thigh veins: The common femoral, femoral, popliteal, upper greater saphenous, and deep femoral veins are patent and free of thrombus. The veins are normally compressible and have normal phasic flow and augmentation response.    Right calf veins: The visualized calf veins are patent.    Contralateral CFV: The contralateral (left) common femoral vein is patent and free of thrombus.    Miscellaneous: Mild nonspecific subcutaneous edema noted on the right.                                 Medical Decision Making:   Independently Interpreted Test(s):   I have ordered and independently interpreted EKG Reading(s) - see prior notes  Clinical Tests:   Lab Tests: Ordered and Reviewed  Radiological Study: Ordered  Medical Tests: Ordered and Reviewed            Scribe Attestation:   Scribe #1: I performed the above scribed service and the documentation accurately describes the services I performed. I attest to the accuracy of the note.    Attending Attestation:           Physician Attestation for Scribe:  Physician Attestation Statement for Scribe #1: I, Dr. Thompson, reviewed documentation, as scribed by Cristina Stephens in my presence, and it is both accurate and complete.         Attending ED Notes:   Emergent evaluation of a 73-year-old male with complaint of bilateral lower extremity edema.  Patient is afebrile, nontoxic appearing with stable vital signs.   Patient has no elevation of white blood cell count.  H&H is 8.2 and 27.2.  Platelets of 590.  Patient has chronic anemia.  Albumin is 3.0.  BNP is 89. Ultrasound is negative for DVT.  The patient is extensively calcified is diagnosis and treatment including all diagnostic, laboratory and physical exam findings.  The patient is discharged in good condition and directed to follow up with his primary care physician in the next 24-48  hours.             Clinical Impression:     1. Bilateral lower extremity edema    2. Leg swelling    3. Edema    4. Anemia, unspecified type                                 Aman Gorman MD  07/31/18 1950

## 2018-07-31 NOTE — ED NOTES
"Pt to ED with reporting bilateral LE's swelling x 1 week. Pt had recent appendectomy, he was instructed to stop taking BP/fluid pill because  Of surgery and his BP was controlled. Stating "but I need my fluid pill." pt reporting SOB "sometimes." pt denies CP, abdominal pain, nausea, vomiting. Pt AAOx4 and appropriate at this time. Respirations even and unlabored. No acute distress noted.    "

## 2018-07-31 NOTE — TELEPHONE ENCOUNTER
"    Reason for Disposition   SEVERE leg swelling (e.g., swelling extends above knee, entire leg is swollen, weeping fluid)    Protocols used: ST LEG SWELLING AND EDEMA-A-JUDY    Shady states he has not been on his fluid pill since 07/18 when he had appendicitis / peritonitis.  He states today that his legs are both swollen, with pitting edema, "from my toes all the way up to my hips and even my butt hole is swollen."  He states he has no difficulty breathing, but recommended ED now for evaluation for severe edema. Will go to [facility], he said.  Message to Hiram Coronado, pcp, and Maday Melendez MD, hemon.  Please contact caller directly with any additional care advice.    "

## 2018-08-01 NOTE — ED NOTES
Rounding on the pt has been completed. Pt resting on recliner with spouse at bedside. Positional, comfort, and bathroom needs addressed. Respirations even and unlabored, no distress noted. Call bell within reach, will continue to monitor.

## 2018-08-01 NOTE — ED NOTES
Rounding has been complete. Pt AAOx3. NAD noted. Respirations even and unlabored. Pain potty position needs addressed. Pt updated on POC. Call bell within reach. Will continue to monitor.

## 2018-08-02 ENCOUNTER — OFFICE VISIT (OUTPATIENT)
Dept: SURGERY | Facility: CLINIC | Age: 73
End: 2018-08-02
Payer: MEDICARE

## 2018-08-02 VITALS
HEART RATE: 86 BPM | DIASTOLIC BLOOD PRESSURE: 82 MMHG | WEIGHT: 187.5 LBS | HEIGHT: 68 IN | BODY MASS INDEX: 28.42 KG/M2 | SYSTOLIC BLOOD PRESSURE: 140 MMHG | TEMPERATURE: 98 F

## 2018-08-02 DIAGNOSIS — K35.30 ACUTE APPENDICITIS WITH LOCALIZED PERITONITIS: Primary | ICD-10-CM

## 2018-08-02 PROCEDURE — 99024 POSTOP FOLLOW-UP VISIT: CPT | Mod: S$GLB,,, | Performed by: SURGERY

## 2018-08-02 PROCEDURE — 99999 PR PBB SHADOW E&M-EST. PATIENT-LVL III: CPT | Mod: PBBFAC,,, | Performed by: SURGERY

## 2018-08-02 NOTE — PROGRESS NOTES
GENERAL SURGERY CLINIC  HISTORY AND PHYSICAL    CC:  Post op    HPI:  Shady Prakash Jr. is a 73 y.o.  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  Since discharge, he was seen in the ED for BLE edema which he was given lasix.  He is tolerating diet and having bowel function.  He is following up with his oncologist as an outpatient.      ROS:  A 10-point review of systems is negative except for the above mentioned in the HPI.     Past Medical History:   Diagnosis Date    Allergy     Arthritis     Cancer     prostate    Diabetes mellitus     Hyperlipidemia     Hypertension     Prostate cancer        Past Surgical History:   Procedure Laterality Date    ENDOSCOPIC ULTRASOUND OF UPPER GASTROINTESTINAL TRACT N/A 7/5/2018    Procedure: ULTRASOUND, ENDOSCOPIC, UPPER GI TRACT;  Surgeon: Wiliam Ayoub MD;  Location: [facility] ([address] [place] [address]);  Service: Endoscopy;  Laterality: N/A;    EYE SURGERY Right     x9    LAPAROSCOPIC APPENDECTOMY N/A 7/18/2018    Procedure: Laparoscopy Converted to Laparotomy, Peritoneal Biopsy , Ileocecal Resection;  Surgeon: Rolan Pierce MD;  Location: [facility] OR [address] [place] [address];  Service: General;  Laterality: N/A;  converted to open @1633      LYMPH NODE DISSECTION      UPPER GASTROINTESTINAL ENDOSCOPY         Social History     Social History    Marital status:      Spouse name: N/A    Number of children: N/A    Years of education: N/A     Occupational History    Not on file.     Social History Main Topics    Smoking status: Former Smoker     Packs/day: 4.00     Years: 60.00     Types: Cigarettes, Cigars     Start date: 9/1/1957     Quit date: 11/13/2016    Smokeless tobacco: Never Used    Alcohol use No      Comment: pt stopped drinking 05/2018    Drug use: No      Comment: Used to use cocaine in past     Sexual activity: Not Currently     Partners: Female     Other  Topics Concern    Not on file     Social History Narrative    No narrative on file       Review of patient's allergies indicates:  No Known Allergies      PHYSICAL EXAM:  Vitals:    08/02/18 0954   BP: (!) 140/82   Pulse: 86   Temp: 97.8 °F (36.6 °C)       General: NAD  Neuro: AAOx3  Cardio: RRR  Resp: Breathing even and unlabored  Abd: Soft, ND, NT, incision healing well   Ext: Warm and well perfused      PATH    FINAL PATHOLOGIC DIAGNOSIS  1. PERITONEUM (BIOPSY):  Positive for malignancy, adenocarcinoma  2. TERMINAL ILEUM AND COLON (ILEOCOLONIC RESECTION):  Adenocarcinoma, moderately differentiated  Tumor predominantly within periappendiceal fat and mesenteric tissue  With associated acute serositis  See comment  COMMENT: The pattern of involvement and immunoprofile are compatible with a metastatic process      ASSESSMENT/PLAN:  Shady Prakash Jr. is a 73 y.o. male who is s/p ileocecectomy for metastatic adenocarcinoma     - Doing well, continue lifting restrictions for 4 weeks  - F/u with oncology  - RTC PRN      Aman Mcfadden M.D.  General Surgery PGY3  [phone]     I have personally performed a detailed history and physical examination on this patient. My findings are summarized in the resident's note included in the record.  Feels and looks great  Pleased with result of this palliative procedure  WIll see as needed

## 2018-08-09 ENCOUNTER — INFUSION (OUTPATIENT)
Dept: INFUSION THERAPY | Facility: OTHER | Age: 73
End: 2018-08-09
Attending: INTERNAL MEDICINE
Payer: MEDICARE

## 2018-08-09 VITALS
DIASTOLIC BLOOD PRESSURE: 76 MMHG | BODY MASS INDEX: 27.87 KG/M2 | WEIGHT: 183.88 LBS | HEART RATE: 95 BPM | OXYGEN SATURATION: 99 % | RESPIRATION RATE: 18 BRPM | HEIGHT: 68 IN | SYSTOLIC BLOOD PRESSURE: 136 MMHG | TEMPERATURE: 98 F

## 2018-08-09 DIAGNOSIS — C61 PROSTATE CANCER METASTATIC TO INTRAPELVIC LYMPH NODE: ICD-10-CM

## 2018-08-09 DIAGNOSIS — C25.1 MALIGNANT NEOPLASM OF BODY OF PANCREAS: Primary | ICD-10-CM

## 2018-08-09 DIAGNOSIS — C77.5 PROSTATE CANCER METASTATIC TO INTRAPELVIC LYMPH NODE: ICD-10-CM

## 2018-08-09 DIAGNOSIS — C61 PROSTATE CANCER METASTATIC TO BONE: ICD-10-CM

## 2018-08-09 DIAGNOSIS — C79.51 PROSTATE CANCER METASTATIC TO BONE: ICD-10-CM

## 2018-08-09 PROCEDURE — 63600175 PHARM REV CODE 636 W HCPCS: Performed by: INTERNAL MEDICINE

## 2018-08-09 PROCEDURE — 96367 TX/PROPH/DG ADDL SEQ IV INF: CPT

## 2018-08-09 PROCEDURE — 96413 CHEMO IV INFUSION 1 HR: CPT

## 2018-08-09 PROCEDURE — 96417 CHEMO IV INFUS EACH ADDL SEQ: CPT

## 2018-08-09 PROCEDURE — 25000003 PHARM REV CODE 250: Performed by: INTERNAL MEDICINE

## 2018-08-09 RX ORDER — HEPARIN 100 UNIT/ML
500 SYRINGE INTRAVENOUS
Status: CANCELLED | OUTPATIENT
Start: 2018-08-09

## 2018-08-09 RX ORDER — SODIUM CHLORIDE 0.9 % (FLUSH) 0.9 %
10 SYRINGE (ML) INJECTION
Status: DISCONTINUED | OUTPATIENT
Start: 2018-08-09 | End: 2018-08-09 | Stop reason: HOSPADM

## 2018-08-09 RX ORDER — SODIUM CHLORIDE 0.9 % (FLUSH) 0.9 %
10 SYRINGE (ML) INJECTION
Status: CANCELLED | OUTPATIENT
Start: 2018-08-09

## 2018-08-09 RX ORDER — HEPARIN 100 UNIT/ML
500 SYRINGE INTRAVENOUS
Status: DISCONTINUED | OUTPATIENT
Start: 2018-08-09 | End: 2018-08-09 | Stop reason: HOSPADM

## 2018-08-09 RX ADMIN — PACLITAXEL 260 MG: 100 INJECTION, POWDER, LYOPHILIZED, FOR SUSPENSION INTRAVENOUS at 12:08

## 2018-08-09 RX ADMIN — Medication 1 MG: at 11:08

## 2018-08-09 RX ADMIN — SODIUM CHLORIDE: 0.9 INJECTION, SOLUTION INTRAVENOUS at 11:08

## 2018-08-09 RX ADMIN — GEMCITABINE 2050 MG: 38 INJECTION, SOLUTION INTRAVENOUS at 01:08

## 2018-08-09 NOTE — PLAN OF CARE
Problem: Patient Care Overview  Goal: Plan of Care Review  Outcome: Ongoing (interventions implemented as appropriate)  Patient tolerated Abraxane and Gemzar treatment well. No complaints or signs of reaction, VSS. AVS provided.

## 2018-08-11 ENCOUNTER — NURSE TRIAGE (OUTPATIENT)
Dept: ADMINISTRATIVE | Facility: CLINIC | Age: 73
End: 2018-08-11

## 2018-08-11 ENCOUNTER — HOSPITAL ENCOUNTER (OUTPATIENT)
Facility: OTHER | Age: 73
Discharge: HOME | End: 2018-08-12
Attending: EMERGENCY MEDICINE | Admitting: INTERNAL MEDICINE
Payer: MEDICARE

## 2018-08-11 DIAGNOSIS — R50.9 FEVER, UNSPECIFIED FEVER CAUSE: Primary | ICD-10-CM

## 2018-08-11 DIAGNOSIS — R00.0 TACHYCARDIA: ICD-10-CM

## 2018-08-11 DIAGNOSIS — R41.82 ALTERED MENTAL STATUS: ICD-10-CM

## 2018-08-11 LAB
BACTERIA #/AREA URNS HPF: ABNORMAL /HPF
BILIRUB UR QL STRIP: ABNORMAL
CLARITY UR: CLEAR
COLOR UR: YELLOW
GLUCOSE UR QL STRIP: NEGATIVE
HGB UR QL STRIP: NEGATIVE
HYALINE CASTS #/AREA URNS LPF: 2 /LPF
KETONES UR QL STRIP: ABNORMAL
LEUKOCYTE ESTERASE UR QL STRIP: NEGATIVE
MICROSCOPIC COMMENT: ABNORMAL
NITRITE UR QL STRIP: NEGATIVE
PH UR STRIP: 6 [PH] (ref 5–8)
POCT GLUCOSE: 163 MG/DL (ref 70–110)
PROT UR QL STRIP: ABNORMAL
RBC #/AREA URNS HPF: 1 /HPF (ref 0–4)
SP GR UR STRIP: 1.02 (ref 1–1.03)
URN SPEC COLLECT METH UR: ABNORMAL
UROBILINOGEN UR STRIP-ACNC: ABNORMAL EU/DL
WBC #/AREA URNS HPF: 2 /HPF (ref 0–5)

## 2018-08-11 PROCEDURE — 81000 URINALYSIS NONAUTO W/SCOPE: CPT

## 2018-08-11 PROCEDURE — 96366 THER/PROPH/DIAG IV INF ADDON: CPT

## 2018-08-11 PROCEDURE — 87040 BLOOD CULTURE FOR BACTERIA: CPT

## 2018-08-11 PROCEDURE — 96365 THER/PROPH/DIAG IV INF INIT: CPT

## 2018-08-11 PROCEDURE — 96361 HYDRATE IV INFUSION ADD-ON: CPT

## 2018-08-11 PROCEDURE — 93010 ELECTROCARDIOGRAM REPORT: CPT | Mod: ,,, | Performed by: INTERNAL MEDICINE

## 2018-08-11 PROCEDURE — 83880 ASSAY OF NATRIURETIC PEPTIDE: CPT

## 2018-08-11 PROCEDURE — 99285 EMERGENCY DEPT VISIT HI MDM: CPT | Mod: 25

## 2018-08-11 PROCEDURE — 82962 GLUCOSE BLOOD TEST: CPT

## 2018-08-11 PROCEDURE — 93005 ELECTROCARDIOGRAM TRACING: CPT

## 2018-08-11 PROCEDURE — 85025 COMPLETE CBC W/AUTO DIFF WBC: CPT

## 2018-08-11 RX ADMIN — SODIUM CHLORIDE 2418 ML: 0.9 INJECTION, SOLUTION INTRAVENOUS at 11:08

## 2018-08-12 VITALS
DIASTOLIC BLOOD PRESSURE: 67 MMHG | HEIGHT: 68 IN | OXYGEN SATURATION: 99 % | TEMPERATURE: 98 F | SYSTOLIC BLOOD PRESSURE: 127 MMHG | BODY MASS INDEX: 27.36 KG/M2 | WEIGHT: 180.56 LBS | RESPIRATION RATE: 12 BRPM | HEART RATE: 94 BPM

## 2018-08-12 PROBLEM — R65.10 SIRS (SYSTEMIC INFLAMMATORY RESPONSE SYNDROME): Status: ACTIVE | Noted: 2018-08-12

## 2018-08-12 PROBLEM — R50.9 FEVER: Status: RESOLVED | Noted: 2018-08-12 | Resolved: 2018-08-12

## 2018-08-12 PROBLEM — R41.82 ALTERED MENTAL STATUS: Status: RESOLVED | Noted: 2018-08-12 | Resolved: 2018-08-12

## 2018-08-12 PROBLEM — R41.82 ALTERED MENTAL STATUS: Status: ACTIVE | Noted: 2018-08-12

## 2018-08-12 PROBLEM — R50.9 FEVER: Status: ACTIVE | Noted: 2018-08-12

## 2018-08-12 PROBLEM — R65.10 SIRS (SYSTEMIC INFLAMMATORY RESPONSE SYNDROME): Status: RESOLVED | Noted: 2018-08-12 | Resolved: 2018-08-12

## 2018-08-12 LAB
ALBUMIN SERPL BCP-MCNC: 2.5 G/DL
ALP SERPL-CCNC: 80 U/L
ALT SERPL W/O P-5'-P-CCNC: 8 U/L
ANION GAP SERPL CALC-SCNC: 9 MMOL/L
ANISOCYTOSIS BLD QL SMEAR: SLIGHT
AST SERPL-CCNC: 12 U/L
BASOPHILS # BLD AUTO: 0.01 K/UL
BASOPHILS NFR BLD: 0.1 %
BILIRUB SERPL-MCNC: 1.5 MG/DL
BNP SERPL-MCNC: 33 PG/ML
BUN SERPL-MCNC: 14 MG/DL
CALCIUM SERPL-MCNC: 8 MG/DL
CHLORIDE SERPL-SCNC: 105 MMOL/L
CO2 SERPL-SCNC: 23 MMOL/L
CREAT SERPL-MCNC: 0.9 MG/DL
DIFFERENTIAL METHOD: ABNORMAL
EOSINOPHIL # BLD AUTO: 0 K/UL
EOSINOPHIL NFR BLD: 0.1 %
ERYTHROCYTE [DISTWIDTH] IN BLOOD BY AUTOMATED COUNT: 16 %
EST. GFR  (AFRICAN AMERICAN): >60 ML/MIN/1.73 M^2
EST. GFR  (NON AFRICAN AMERICAN): >60 ML/MIN/1.73 M^2
ESTIMATED AVG GLUCOSE: 131 MG/DL
GLUCOSE SERPL-MCNC: 157 MG/DL
HBA1C MFR BLD HPLC: 6.2 %
HCT VFR BLD AUTO: 28.2 %
HGB BLD-MCNC: 8.4 G/DL
LACTATE SERPL-SCNC: 0.7 MMOL/L
LACTATE SERPL-SCNC: 1.5 MMOL/L
LACTATE SERPL-SCNC: 1.5 MMOL/L
LYMPHOCYTES # BLD AUTO: 0.8 K/UL
LYMPHOCYTES NFR BLD: 8.1 %
MCH RBC QN AUTO: 24.7 PG
MCHC RBC AUTO-ENTMCNC: 29.8 G/DL
MCV RBC AUTO: 83 FL
MONOCYTES # BLD AUTO: 0.1 K/UL
MONOCYTES NFR BLD: 0.8 %
NEUTROPHILS # BLD AUTO: 8.4 K/UL
NEUTROPHILS NFR BLD: 90.9 %
PLATELET # BLD AUTO: 240 K/UL
PLATELET BLD QL SMEAR: ABNORMAL
PMV BLD AUTO: 9.2 FL
POCT GLUCOSE: 112 MG/DL (ref 70–110)
POCT GLUCOSE: 141 MG/DL (ref 70–110)
POCT GLUCOSE: 146 MG/DL (ref 70–110)
POCT GLUCOSE: 172 MG/DL (ref 70–110)
POTASSIUM SERPL-SCNC: 4.3 MMOL/L
PROT SERPL-MCNC: 5.5 G/DL
RBC # BLD AUTO: 3.4 M/UL
SCHISTOCYTES BLD QL SMEAR: ABNORMAL
SODIUM SERPL-SCNC: 137 MMOL/L
WBC # BLD AUTO: 9.26 K/UL

## 2018-08-12 PROCEDURE — 99217 PR OBSERVATION CARE DISCHARGE: CPT | Mod: ,,, | Performed by: INTERNAL MEDICINE

## 2018-08-12 PROCEDURE — 94761 N-INVAS EAR/PLS OXIMETRY MLT: CPT

## 2018-08-12 PROCEDURE — 83036 HEMOGLOBIN GLYCOSYLATED A1C: CPT

## 2018-08-12 PROCEDURE — 83605 ASSAY OF LACTIC ACID: CPT | Mod: 91

## 2018-08-12 PROCEDURE — 80053 COMPREHEN METABOLIC PANEL: CPT

## 2018-08-12 PROCEDURE — 25000003 PHARM REV CODE 250: Performed by: PHYSICIAN ASSISTANT

## 2018-08-12 PROCEDURE — G0378 HOSPITAL OBSERVATION PER HR: HCPCS

## 2018-08-12 PROCEDURE — 63600175 PHARM REV CODE 636 W HCPCS: Performed by: PHYSICIAN ASSISTANT

## 2018-08-12 PROCEDURE — 87449 NOS EACH ORGANISM AG IA: CPT

## 2018-08-12 PROCEDURE — 63600175 PHARM REV CODE 636 W HCPCS

## 2018-08-12 PROCEDURE — 25000003 PHARM REV CODE 250: Performed by: EMERGENCY MEDICINE

## 2018-08-12 PROCEDURE — 99220 PR INITIAL OBSERVATION CARE,LEVL III: CPT | Mod: ,,, | Performed by: PHYSICIAN ASSISTANT

## 2018-08-12 PROCEDURE — 36415 COLL VENOUS BLD VENIPUNCTURE: CPT

## 2018-08-12 RX ORDER — VANCOMYCIN HCL IN 5 % DEXTROSE 1G/250ML
PLASTIC BAG, INJECTION (ML) INTRAVENOUS
Status: COMPLETED
Start: 2018-08-12 | End: 2018-08-12

## 2018-08-12 RX ORDER — BENZONATATE 100 MG/1
100 CAPSULE ORAL 3 TIMES DAILY PRN
Status: DISCONTINUED | OUTPATIENT
Start: 2018-08-12 | End: 2018-08-12 | Stop reason: HOSPADM

## 2018-08-12 RX ORDER — GLUCAGON 1 MG
1 KIT INJECTION
Status: DISCONTINUED | OUTPATIENT
Start: 2018-08-12 | End: 2018-08-12 | Stop reason: HOSPADM

## 2018-08-12 RX ORDER — VANCOMYCIN HCL IN 5 % DEXTROSE 1G/250ML
1000 PLASTIC BAG, INJECTION (ML) INTRAVENOUS
Status: COMPLETED | OUTPATIENT
Start: 2018-08-12 | End: 2018-08-12

## 2018-08-12 RX ORDER — SODIUM CHLORIDE 0.9 % (FLUSH) 0.9 %
5 SYRINGE (ML) INJECTION
Status: DISCONTINUED | OUTPATIENT
Start: 2018-08-12 | End: 2018-08-12 | Stop reason: HOSPADM

## 2018-08-12 RX ORDER — GUAIFENESIN 100 MG/5ML
200 SOLUTION ORAL EVERY 4 HOURS PRN
Status: DISCONTINUED | OUTPATIENT
Start: 2018-08-12 | End: 2018-08-12 | Stop reason: HOSPADM

## 2018-08-12 RX ORDER — LOVASTATIN 20 MG/1
20 TABLET ORAL NIGHTLY
Status: DISCONTINUED | OUTPATIENT
Start: 2018-08-12 | End: 2018-08-12 | Stop reason: HOSPADM

## 2018-08-12 RX ORDER — IBUPROFEN 200 MG
24 TABLET ORAL
Status: DISCONTINUED | OUTPATIENT
Start: 2018-08-12 | End: 2018-08-12 | Stop reason: HOSPADM

## 2018-08-12 RX ORDER — ACETAMINOPHEN 325 MG/1
650 TABLET ORAL EVERY 4 HOURS PRN
Status: DISCONTINUED | OUTPATIENT
Start: 2018-08-12 | End: 2018-08-12 | Stop reason: HOSPADM

## 2018-08-12 RX ORDER — IBUPROFEN 400 MG/1
800 TABLET ORAL
Status: COMPLETED | OUTPATIENT
Start: 2018-08-12 | End: 2018-08-12

## 2018-08-12 RX ORDER — SODIUM CHLORIDE 9 MG/ML
INJECTION, SOLUTION INTRAVENOUS CONTINUOUS
Status: DISCONTINUED | OUTPATIENT
Start: 2018-08-12 | End: 2018-08-12 | Stop reason: HOSPADM

## 2018-08-12 RX ORDER — ONDANSETRON 8 MG/1
8 TABLET, ORALLY DISINTEGRATING ORAL EVERY 8 HOURS PRN
Status: DISCONTINUED | OUTPATIENT
Start: 2018-08-12 | End: 2018-08-12 | Stop reason: HOSPADM

## 2018-08-12 RX ORDER — IBUPROFEN 400 MG/1
TABLET ORAL
Status: DISPENSED
Start: 2018-08-12 | End: 2018-08-12

## 2018-08-12 RX ORDER — IBUPROFEN 200 MG
16 TABLET ORAL
Status: DISCONTINUED | OUTPATIENT
Start: 2018-08-12 | End: 2018-08-12 | Stop reason: HOSPADM

## 2018-08-12 RX ORDER — INSULIN ASPART 100 [IU]/ML
0-5 INJECTION, SOLUTION INTRAVENOUS; SUBCUTANEOUS
Status: DISCONTINUED | OUTPATIENT
Start: 2018-08-12 | End: 2018-08-12 | Stop reason: HOSPADM

## 2018-08-12 RX ADMIN — IBUPROFEN 800 MG: 400 TABLET, FILM COATED ORAL at 02:08

## 2018-08-12 RX ADMIN — Medication 1000 MG: at 12:08

## 2018-08-12 RX ADMIN — PIPERACILLIN SODIUM AND TAZOBACTAM SODIUM 4.5 G: 4; .5 INJECTION, POWDER, LYOPHILIZED, FOR SOLUTION INTRAVENOUS at 09:08

## 2018-08-12 RX ADMIN — SODIUM CHLORIDE: 0.9 INJECTION, SOLUTION INTRAVENOUS at 05:08

## 2018-08-12 NOTE — ASSESSMENT & PLAN NOTE
- most recent blood pressure reading BP: (!) 109/59   - continue home meds  - hydralazine PRN for SBP > 170  - monitor

## 2018-08-12 NOTE — DISCHARGE SUMMARY
"[facility] Hospital Medicine  Discharge Summary      Patient Name: Shady Prakash Jr.  MRN: [id number]  Admission Date: 8/11/2018  Hospital Length of Stay: 0 days  Discharge Date and Time: 8/12/2018 3:09 pm  Attending Physician: Gloria Nicolas, *   Discharging Provider: Gloria Nicolas MD  Primary Care Provider: Hiram Coronado MD      HPI:   Mr. Shady Prakash Jr. is a 73 y.o. male, with PMH of metastatic pancreatic cancer, with mets to bone and lymph nodes (currently undergoing chemotherapy; last was 8/9/18), who presented to [facility] ED on 8/11/18 2/2 altered mental status x 30 minutes PTA. Per patient/family the patient was standing and staring blankly in the bathroom, and would not move. He was responding verbally, but not making eye contact. The patient states "I was just messing with them, and had to use the bathroom." He denies prior fevers, chills, chest pain, shortness of breath, weakness, headache, or confusion. Upon arrival to ED, he did have a fever, and admitted to recent cough productive of white sputum. Labs showed no leukocytosis, CXR without infiltrate, UA without UTI. He was admitted for further workup and management. He was started on broad spectrum antibiotics in the ED for suspicion of pneumonia.        Hospital Course:   Patient received vancomycin and zosyn during hospital stay.  Fever resolved during the night and requested discharge in AM. Patient last chemotherapy was 08/09/2018 and patient tolerated well.  Declined Hematology consult. Patient will have lab follow up on 8/15/18 and chemotherapy the following day. Patient reports that he is back to baseline.  Declined to wait for blood and urine culture results.  Discussed with patient and wife that if symptoms return, he should contact his oncologist and or return to the nearest ER, patient and wife understood and stated that would follow up with PCP and Heme Onc on this coming Tuesday and " Wednesday.     Consults:     No new Assessment & Plan notes have been filed under this hospital service since the last note was generated.  Service: Hospital Medicine    Final Active Diagnoses:    Diagnosis Date Noted POA    Type 2 diabetes mellitus [E11.9] 11/16/2017 Yes    Essential hypertension [I10] 11/16/2017 Yes    Prostate cancer metastatic to bone [C61, C79.51] 09/29/2017 Yes      Problems Resolved During this Admission:    Diagnosis Date Noted Date Resolved POA    PRINCIPAL PROBLEM:  SIRS (systemic inflammatory response syndrome) [R65.10] 08/12/2018 08/12/2018 Yes    Fever [R50.9] 08/12/2018 08/12/2018 Yes    Altered mental status [R41.82] 08/12/2018 08/12/2018 Yes       Discharged Condition: stable    Disposition: Home or Self Care    Follow Up:  Follow-up Information     Hiram Coronado MD In 1 week.    Specialty:  Pediatrics  Why:  For follow up after hospital discharge  Contact information:  [address] [facility] [address]  [phone]             Maday Melendez MD On 8/28/2018.    Specialties:  Hematology and Oncology, Hematology  Why:  For follow up after hospital discharge  Contact information:  [facility] [address]  [phone]                 Patient Instructions:      Diet diabetic     Activity as tolerated       Significant Diagnostic Studies: Labs:   CMP   Recent Labs   Lab  08/12/18   0100   NA  137   K  4.3   CL  105   CO2  23   GLU  157*   BUN  14   CREATININE  0.9   CALCIUM  8.0*   PROT  5.5*   ALBUMIN  2.5*   BILITOT  1.5*   ALKPHOS  80   AST  12   ALT  8*   ANIONGAP  9   ESTGFRAFRICA  >60   EGFRNONAA  >60   , CBC   Recent Labs   Lab  08/11/18 2348   WBC  9.26   HGB  8.4*   HCT  28.2*   PLT  240    and All labs within the past 24 hours have been reviewed    Pending Diagnostic Studies:     Procedure Component Value Units Date/Time    CT Head Without Contrast [[id number]] Resulted:  08/11/18 2346    Order Status:  Sent Lab  Status:  In process Updated:  08/12/18 0329         Medications:  Reconciled Home Medications:      Medication List      CONTINUE taking these medications    aspirin 81 MG EC tablet  Commonly known as:  ECOTRIN  Take 81 mg by mouth once daily. States not taken in over 1 month as of 9/2/15     calcium-vitamin D3 500 mg(1,250mg) -200 unit per tablet  Commonly known as:  OYSTER SHELL CALCIUM-VIT D3  Take 1 tablet by mouth 2 (two) times daily.     diphth,pertus(acell),tetanus 2.5-8-5 Lf-mcg-Lf/0.5mL Susp  Commonly known as:  BOOSTRIX  Inject into the muscle.     * docusate sodium 100 MG capsule  Commonly known as:  COLACE  Take 1 capsule (100 mg total) by mouth 2 (two) times daily.     * docusate sodium 50 MG capsule  Commonly known as:  COLACE  Take 1 capsule (50 mg total) by mouth 2 (two) times daily.     dronabinol 2.5 MG capsule  Commonly known as:  MARINOL  Take 1 capsule (2.5 mg total) by mouth 2 (two) times daily before meals.     enzalutamide 40 mg Cap  Commonly known as:  XTANDI  Take 4 capsules (160 mg) by mouth once daily.     glimepiride 2 MG tablet  Commonly known as:  AMARYL  Take 2 mg by mouth 2 (two) times daily.     lisinopril-hydrochlorothiazide 20-25 mg Tab  Commonly known as:  PRINZIDE,ZESTORETIC  Take 1 tablet by mouth once daily.     lovastatin 20 MG tablet  Commonly known as:  MEVACOR     metFORMIN 1000 MG tablet  Commonly known as:  GLUCOPHAGE  Take 1,000 mg by mouth 2 (two) times daily.     ondansetron 4 MG Tbdl  Commonly known as:  ZOFRAN-ODT  Take 1 tablet (4 mg total) by mouth every 6 (six) hours as needed (nausea).     oxyCODONE-acetaminophen 5-325 mg per tablet  Commonly known as:  PERCOCET  Take 1 tablet by mouth every 4 (four) hours as needed.     papaverine 30 mg/mL injection  Add Phentolamine 1 mg/cc  Add PGE1 10 mcg/cc    SIG:  Bring to office for test dose in physician office     promethazine 25 MG tablet  Commonly known as:  PHENERGAN  Take 1 tablet (25 mg total) by mouth every 6  (six) hours as needed for Nausea.     sildenafil 20 mg Tab  Commonly known as:  REVATIO  Take 1 tablet (20 mg total) by mouth As instructed. Take 2-5 tablets as needed.         * This list has 2 medication(s) that are the same as other medications prescribed for you. Read the directions carefully, and ask your doctor or other care provider to review them with you.                Indwelling Lines/Drains at time of discharge:   Lines/Drains/Airways          None          Time spent on the discharge of patient: >30 minutes  Patient was seen and examined on the date of discharge and determined to be suitable for discharge.         Gloria Nicolas MD  Department of Hospital Medicine  [facility]

## 2018-08-12 NOTE — ASSESSMENT & PLAN NOTE
- etiology unclear  - patient admits to cough productive of white sputum, but is a former smoker of ~3 PPD x ~60 years    - quit smoking 3 years ago    - ? COPD ?   - continue broad spectrum antibiotics   - pneumonia studies sent   - febrile at time of triage in ED   - UA without UTI   - CXR without infiltrate   - no further symptoms to suggest a source

## 2018-08-12 NOTE — ED NOTES
Pt lying down, respirations even/unlabored. NAD noted. Pt denies any needs at this time. Side rails upx2, call bell within reach. Will continue to monitor.

## 2018-08-12 NOTE — PLAN OF CARE
Problem: Patient Care Overview  Goal: Plan of Care Review  Outcome: Ongoing (interventions implemented as appropriate)  Pt no complaints of pain. Afebrile. Call light in reach. Will continue to monitor.

## 2018-08-12 NOTE — HPI
"Mr. Shady Prakash Jr. is a 73 y.o. male, with PMH of metastatic pancreatic cancer, with mets to bone and lymph nodes (currently undergoing chemotherapy; last was 8/9/18), who presented to [facility] ED on 8/11/18 2/2 altered mental status x 30 minutes PTA. Per patient/family the patient was standing and staring blankly in the bathroom, and would not move. He was responding verbally, but not making eye contact. The patient states "I was just messing with them, and had to use the bathroom." He denies prior fevers, chills, chest pain, shortness of breath, weakness, headache, or confusion. Upon arrival to ED, he did have a fever, and admitted to recent cough productive of white sputum. Labs showed no leukocytosis, CXR without infiltrate, UA without UTI. He was admitted for further workup and management. He was started on broad spectrum antibiotics in the ED for suspicion of pneumonia.    "

## 2018-08-12 NOTE — TELEPHONE ENCOUNTER
"Family called re pt only had ensure today, ate soup at 5p, postop pain- pt seems confused, agitated.     Reason for Disposition   Difficult to awaken or acting confused  (e.g., disoriented, slurred speech)    Answer Assessment - Initial Assessment Questions  1. SYMPTOM: "What is the main symptom you are concerned about?" (e.g., weakness, numbness)     Confused this evening   2. ONSET: "When did this start?" (minutes, hours, days; while sleeping)     10 pm   3. LAST NORMAL: "When was the last time you were normal (no symptoms)?"      Earlier today seemed fine   4. PATTERN "Does this come and go, or has it been constant since it started?"  "Is it present now?"     Still confused   5. CARDIAC SYMPTOMS: "Have you had any of the following symptoms: chest pain, difficulty breathing, palpitations?"    No   6. NEUROLOGIC SYMPTOMS: "Have you had any of the following symptoms: headache, dizziness, vision loss, double vision, changes in speech, unsteady on your feet?"     Wobbly   7. OTHER SYMPTOMS: "Do you have any other symptoms?"     Diabetic - rec to check BS. rec snack    Protocols used: ST NEUROLOGIC DEFICIT-A-AH  rec EMS due to confusion >30 mins. Call back with questions     "

## 2018-08-12 NOTE — TELEPHONE ENCOUNTER
FL=601  /70 AE=842, no SOB, no dizzy. rec ED due to HR>130. Daughter states that she courtney take pt in her call to ED. Call back with questions.      Reason for Disposition   Nursing judgment or information in reference    Protocols used: ST NO GUIDELINE AVAILABLE - SICK ADULT CALL-A-AH

## 2018-08-12 NOTE — ED PROVIDER NOTES
Encounter Date: 8/11/2018    SCRIBE #1 NOTE: I, Jud Saenz, am scribing for, and in the presence of, Dr. Alex.       History     Chief Complaint   Patient presents with    Altered Mental Status     w/ fever, undergoing chemo for prostate/pancreatic CA, denies pain     Time seen by provider: 11:37 PM    This is a 73 y.o. male, with a history of metastatic prostate cancer undergoing chemotherapy, who presents with complaint of altered mental status that began 30 minutes prior to arrival. Wife reports she had a difficult time getting the patient out of the bathroom. She states he was standing. He was verbally responding. He was staring blankly and not making eye contact. He denies chest pain, shortness of breath, weakness, headache, or confusion. Per nursing staff, patient had fever on arrival. He last received chemotherapy 8/9. He denies any pain or complaints at this time.      The history is provided by the spouse and the patient.     Review of patient's allergies indicates:  No Known Allergies  Past Medical History:   Diagnosis Date    Allergy     Arthritis     Cancer     prostate    Diabetes mellitus     Hyperlipidemia     Hypertension     Prostate cancer      Past Surgical History:   Procedure Laterality Date    EYE SURGERY Right     x9    LYMPH NODE DISSECTION      UPPER GASTROINTESTINAL ENDOSCOPY       Family History   Problem Relation Age of Onset    Hypertension Father     Hypertension Mother     Diabetes Sister     Heart disease Brother     Diabetes Maternal Aunt     Cancer Maternal Uncle     Cancer Maternal Grandfather     No Known Problems Paternal Grandmother     No Known Problems Paternal Grandfather     Prostate cancer Brother     Diabetes Sister     Diabetes Sister     Diabetes Sister     Heart disease Brother      Social History     Tobacco Use    Smoking status: Former Smoker     Packs/day: 4.00     Years: 60.00     Pack years: 240.00     Types: Cigarettes, Cigars      Start date: 1957     Last attempt to quit: 2016     Years since quittin.7    Smokeless tobacco: Never Used   Substance Use Topics    Alcohol use: No     Comment: pt stopped drinking 2018    Drug use: No     Comment: Used to use cocaine in past      Review of Systems   Constitutional: Positive for fever. Negative for chills.   HENT: Negative for sore throat.    Eyes: Negative for visual disturbance.   Respiratory: Negative for shortness of breath.    Cardiovascular: Negative for chest pain.   Gastrointestinal: Negative for nausea and vomiting.   Genitourinary: Negative for dysuria.   Musculoskeletal: Negative for back pain.   Skin: Negative for rash.   Allergic/Immunologic: Positive for immunocompromised state.   Neurological: Negative for weakness.       Physical Exam     Initial Vitals [18 2330]   BP Pulse Resp Temp SpO2   (!) 103/57 (!) 144 20 (!) 101.7 °F (38.7 °C) (!) 94 %      MAP       --         Physical Exam    Nursing note and vitals reviewed.  Constitutional: Vital signs are normal. He appears well-developed and well-nourished. No distress.   HENT:   Head: Normocephalic and atraumatic.   Mouth/Throat: Oropharynx is clear and moist.   Eyes: Conjunctivae and EOM are normal. Pupils are equal, round, and reactive to light.   Neck: Normal range of motion. Neck supple.   No meningeal signs.    Cardiovascular: Regular rhythm and normal heart sounds. Tachycardia present.  Exam reveals no gallop and no friction rub.    No murmur heard.  Pulmonary/Chest: No respiratory distress.   Breath sounds diminshed on the right.    Abdominal: Soft. Bowel sounds are normal. There is no tenderness. There is no rebound and no guarding.   Musculoskeletal: Normal range of motion. He exhibits edema (2+ BLE edema). He exhibits no tenderness.   Neurological: He is alert and oriented to person, place, and time. He has normal strength and normal reflexes. He displays normal reflexes. No cranial nerve deficit  or sensory deficit. He displays a negative Romberg sign.   Answering questions approprietly.    Skin: Skin is warm and dry. No rash noted. No pallor.         ED Course   Critical Care  Date/Time: 8/12/2018 3:44 AM  Performed by: Prashant Alex DO  Authorized by: Prashant Alex DO   Direct patient critical care time: 20 minutes  Additional history critical care time: 5 minutes  Ordering / reviewing critical care time: 8 minutes  Documentation critical care time: 8 minutes  Consulting other physicians critical care time: 4 minutes  Total critical care time (exclusive of procedural time) : 45 minutes        Labs Reviewed   URINALYSIS, REFLEX TO URINE CULTURE - Abnormal; Notable for the following components:       Result Value    Protein, UA 1+ (*)     Ketones, UA Trace (*)     Bilirubin (UA) 1+ (*)     Urobilinogen, UA 4.0-6.0 (*)     All other components within normal limits    Narrative:     Preferred Collection Type->Urine, Clean Catch   CBC W/ AUTO DIFFERENTIAL - Abnormal; Notable for the following components:    RBC 3.40 (*)     Hemoglobin 8.4 (*)     Hematocrit 28.2 (*)     MCH 24.7 (*)     MCHC 29.8 (*)     RDW 16.0 (*)     Gran # (ANC) 8.4 (*)     Lymph # 0.8 (*)     Mono # 0.1 (*)     Gran% 90.9 (*)     Lymph% 8.1 (*)     Mono% 0.8 (*)     All other components within normal limits   COMPREHENSIVE METABOLIC PANEL - Abnormal; Notable for the following components:    Glucose 157 (*)     Calcium 8.0 (*)     Total Protein 5.5 (*)     Albumin 2.5 (*)     Total Bilirubin 1.5 (*)     ALT 8 (*)     All other components within normal limits    Narrative:     Recoll. [id number] by IVA at 08/12/2018 00:35, reason: Specimen   hemolyzed spoke with Dr. Narvaez @ 00:35   URINALYSIS MICROSCOPIC - Abnormal; Notable for the following components:    Hyaline Casts, UA 2 (*)     All other components within normal limits    Narrative:     Preferred Collection Type->Urine, Clean Catch   POCT GLUCOSE - Abnormal;  "Notable for the following components:    POCT Glucose 163 (*)     All other components within normal limits   CULTURE, BLOOD   CULTURE, BLOOD   LACTIC ACID, PLASMA    Narrative:     Recoll. [id number] by DAP at 08/12/2018 00:55, reason: Specimen   hemolyzed spoke with Sierra Pradhan @ 00:55   B-TYPE NATRIURETIC PEPTIDE   LACTIC ACID, PLASMA     EKG Readings: (Independently Interpreted)   Sinus tachycardia at rate of 128 bpm. Normal intervals. Normal QRS. No acute ST or T wave abnormalities. Compared to an EKG from July 31, 2018 shows no change.          Imaging Results          CT Head Without Contrast (In process)                X-Ray Chest AP Portable (Final result)  Result time 08/12/18 00:09:50    Final result by Rolan Brennan MD (08/12/18 00:09:50)                 Impression:      Stable chronic changes involving the right rubio thorax.  No definite acute cardiopulmonary finding.      Electronically signed by: Rolan Brennan MD  Date:    08/12/2018  Time:    00:09             Narrative:    EXAMINATION:  XR CHEST AP PORTABLE    CLINICAL HISTORY:  Provided history is "Sepsis;  ".    TECHNIQUE:  One view of the chest.    COMPARISON:  07/29/2018.    FINDINGS:  Cardiac wires overlie the chest.  Cardiac silhouette is stable.  There is volume loss and pleural thickening involving the right lung with areas of subsegmental atelectasis or airspace disease, similar to the prior exam.  Left lung is grossly clear.  No detrimental change in lung aeration.                              X-Rays:   Independently Interpreted Readings:   Chest X-Ray: Right pleural effusion.     Medical Decision Making:   Independently Interpreted Test(s):   I have ordered and independently interpreted X-rays - see prior notes.  I have ordered and independently interpreted EKG Reading(s) - see prior notes  Clinical Tests:   Lab Tests: Ordered and Reviewed  Radiological Study: Ordered and Reviewed  Medical Tests: Ordered and Reviewed  ED " Management:  73-year-old male history of prostate cancer presents with family for brief episode of altered mental status.  Currently the patient is alert and oriented person place and time.  He has no complaints. The patient is noted be febrile 101.7.  He has no meningeal signs I have a low suspicion for acute meningitis.  He is receiving chemotherapy.  Concern of neutropenic fever.  Also will get a CT of the brain to rule out metastasis to the brain which could have caused his altered mental status.  Will do a septic workup and further observed.    11:55 p.m. reviewed the chest x-ray.  Patient has a significant pleural effusion.  Compared to a CT   scan showing lower borders of the lung in March, 2018 shows that this is new.  Differential would   include infectious versus neoplastic.  Will start antibiotics.    1:50 a.m. CBC shows no acute abnormality.  White count of 9500.  Lactic acid is 1.5.  Chemistries show no significant abnormality.  Patient's fever is down but he is still tachycardic.  On re-evaluation he does state he has no complaints at this time.  I do feel he needs to be admitted for further evaluation management due to the new pleural effusion as well as for antibiotics until we can rule out that this is not infectious.  Discussed at bedside with the patient as well as his wife and daughters.              Attending Attestation:           Physician Attestation for Scribe:  Physician Attestation Statement for Scribe #1: I, Dr. Alex, reviewed documentation, as scribed by Jud Saenz in my presence, and it is both accurate and complete.                    Clinical Impression:     1. Fever, unspecified fever cause    2. Altered mental status    3. Tachycardia                                 Prashant Alex,   08/12/18 0344

## 2018-08-12 NOTE — PLAN OF CARE
SW met with pt at bedside to complete discharge assessment, verified PCP and uses [facility] pharmacy on [facility].  Pt's daughter will provide transportation home.  No needs identified at this time.     08/12/18 1228   Discharge Assessment   Assessment Type Discharge Planning Assessment   Confirmed/corrected address and phone number on facesheet? Yes   Assessment information obtained from? Patient   Communicated expected length of stay with patient/caregiver no   Prior to hospitilization cognitive status: Alert/Oriented   Prior to hospitalization functional status: Independent   Current cognitive status: Alert/Oriented   Current Functional Status: Independent   Lives With spouse;child(leidy), adult;grandchild(leidy)   Able to Return to Prior Arrangements yes   Is patient able to care for self after discharge? Yes   Patient's perception of discharge disposition home or selfcare   Readmission Within The Last 30 Days no previous admission in last 30 days   Patient currently being followed by outpatient case management? No   Patient currently receives any other outside agency services? No   Equipment Currently Used at Home none   Do you have any problems affording any of your prescribed medications? No   Is the patient taking medications as prescribed? yes   Does the patient have transportation home? Yes   Transportation Available family or friend will provide   Does the patient receive services at the [facility]? No   Discharge Plan A Home   Patient/Family In Agreement With Plan yes

## 2018-08-12 NOTE — ED TRIAGE NOTES
"Pt presents to ED with c/o AMS. Wife at bedside states " we had a hard time getting him out the bathroom, he was just standing there lost in space, my daughter called the nurse on call and they told me to come to the ER. His sugar and ehart rate were high when we took it at home"  Pt is aax3, answering questions appropriately. B\pt cannot recall year. Pt is on chemo, last on Thursday. Pt seems confused when asking questions. Pt denying what wife is saying. Pt denies cp, sob, abdominal pain, dysuria.  Pt follows commands   "

## 2018-08-12 NOTE — ASSESSMENT & PLAN NOTE
- last A1C:   Lab Results   Component Value Date    HGBA1C 7.5 (H) 07/18/2018   - hold oral antidiabetic meds   - Diabetic diet   - SSI with accavi AC/HS

## 2018-08-12 NOTE — SUBJECTIVE & OBJECTIVE
Past Medical History:   Diagnosis Date    Allergy     Arthritis     Cancer     prostate    Diabetes mellitus     Hyperlipidemia     Hypertension     Prostate cancer        Past Surgical History:   Procedure Laterality Date    EYE SURGERY Right     x9    LYMPH NODE DISSECTION      UPPER GASTROINTESTINAL ENDOSCOPY         Review of patient's allergies indicates:  No Known Allergies    No current facility-administered medications on file prior to encounter.      Current Outpatient Medications on File Prior to Encounter   Medication Sig    aspirin (ECOTRIN) 81 MG EC tablet Take 81 mg by mouth once daily. States not taken in over 1 month as of 9/2/15    glimepiride (AMARYL) 2 MG tablet Take 2 mg by mouth 2 (two) times daily.     lovastatin (MEVACOR) 20 MG tablet     metformin (GLUCOPHAGE) 1000 MG tablet Take 1,000 mg by mouth 2 (two) times daily.     calcium-vitamin D3 (OYSTER SHELL CALCIUM-VIT D3) 500 mg(1,250mg) -200 unit per tablet Take 1 tablet by mouth 2 (two) times daily.    diphth,pertus,acell,,tetanus (BOOSTRIX) 2.5-8-5 Lf-mcg-Lf/0.5mL Susp Inject into the muscle.    docusate sodium (COLACE) 100 MG capsule Take 1 capsule (100 mg total) by mouth 2 (two) times daily.    docusate sodium (COLACE) 50 MG capsule Take 1 capsule (50 mg total) by mouth 2 (two) times daily.    dronabinol (MARINOL) 2.5 MG capsule Take 1 capsule (2.5 mg total) by mouth 2 (two) times daily before meals.    enzalutamide 40 mg Cap Take 4 capsules (160 mg) by mouth once daily.    lisinopril-hydrochlorothiazide (PRINZIDE,ZESTORETIC) 20-25 mg Tab Take 1 tablet by mouth once daily.     ondansetron (ZOFRAN-ODT) 4 MG TbDL Take 1 tablet (4 mg total) by mouth every 6 (six) hours as needed (nausea).    oxyCODONE-acetaminophen (PERCOCET) 5-325 mg per tablet Take 1 tablet by mouth every 4 (four) hours as needed.    papaverine 30 mg/mL injection Add Phentolamine 1 mg/cc  Add PGE1 10 mcg/cc    SIG:  Bring to office for test dose in  physician office    promethazine (PHENERGAN) 25 MG tablet Take 1 tablet (25 mg total) by mouth every 6 (six) hours as needed for Nausea.    sildenafil (REVATIO) 20 mg Tab Take 1 tablet (20 mg total) by mouth As instructed. Take 2-5 tablets as needed.     Family History     Problem Relation (Age of Onset)    Cancer Maternal Uncle, Maternal Grandfather    Diabetes Sister, Maternal Aunt, Sister, Sister, Sister    Heart disease Brother, Brother    Hypertension Father, Mother    No Known Problems Paternal Grandmother, Paternal Grandfather    Prostate cancer Brother        Tobacco Use    Smoking status: Former Smoker     Packs/day: 4.00     Years: 60.00     Pack years: 240.00     Types: Cigarettes, Cigars     Start date: 1957     Last attempt to quit: 2016     Years since quittin.7    Smokeless tobacco: Never Used   Substance and Sexual Activity    Alcohol use: No     Comment: pt stopped drinking 2018    Drug use: No     Comment: Used to use cocaine in past     Sexual activity: Not Currently     Partners: Female     Review of Systems   Constitutional: Positive for diaphoresis. Negative for activity change, appetite change, chills, fatigue and fever.   HENT: Negative for congestion, ear pain, postnasal drip, rhinorrhea and sore throat.    Eyes: Negative for pain.   Respiratory: Positive for cough. Negative for shortness of breath and wheezing.    Cardiovascular: Negative for chest pain and palpitations.   Gastrointestinal: Positive for constipation (mild). Negative for abdominal pain, diarrhea, nausea and vomiting.   Genitourinary: Negative for difficulty urinating, dysuria, frequency, hematuria and urgency.   Musculoskeletal: Negative for arthralgias, back pain, myalgias, neck pain and neck stiffness.   Skin: Negative for rash and wound.   Neurological: Negative for dizziness, seizures, syncope, weakness, numbness and headaches.   Psychiatric/Behavioral: Negative for confusion and decreased  concentration.     Objective:     Vital Signs (Most Recent):  Temp: 99.2 °F (37.3 °C) (08/12/18 0430)  Pulse: 104 (08/12/18 0545)  Resp: 20 (08/12/18 0545)  BP: 111/65 (08/12/18 0545)  SpO2: 99 % (08/12/18 0545) Vital Signs (24h Range):  Temp:  [99.2 °F (37.3 °C)-101.8 °F (38.8 °C)] 99.2 °F (37.3 °C)  Pulse:  [100-144] 104  Resp:  [10-25] 20  SpO2:  [94 %-100 %] 99 %  BP: ()/(55-70) 111/65     Weight: 81.9 kg (180 lb 8.9 oz)  Body mass index is 27.45 kg/m².    Physical Exam   Constitutional: He is oriented to person, place, and time. Vital signs are normal. He appears well-developed and well-nourished.  Non-toxic appearance. He does not have a sickly appearance. He does not appear ill. No distress.   HENT:   Head: Normocephalic and atraumatic.   Right Ear: External ear normal.   Left Ear: External ear normal.   Eyes: Conjunctivae and EOM are normal. Pupils are equal, round, and reactive to light. No scleral icterus.   Neck: Normal range of motion. Neck supple. No JVD present. No tracheal deviation present.   Cardiovascular: Normal rate, regular rhythm, normal heart sounds and intact distal pulses. Exam reveals no gallop and no friction rub.   No murmur heard.  Pulmonary/Chest: Effort normal and breath sounds normal. No stridor. No respiratory distress. He has no wheezes. He has no rales.   Abdominal: Soft. Bowel sounds are normal. He exhibits no distension and no mass. There is no tenderness. There is no guarding.   Neurological: He is alert and oriented to person, place, and time. No cranial nerve deficit. He exhibits normal muscle tone.   Skin: Skin is warm and dry. Capillary refill takes less than 2 seconds. No rash noted. He is not diaphoretic. No erythema. No pallor.   Psychiatric: He has a normal mood and affect. His behavior is normal. Judgment and thought content normal.   Nursing note and vitals reviewed.        CRANIAL NERVES     CN III, IV, VI   Pupils are equal, round, and reactive to  "light.  Extraocular motions are normal.        Significant Labs:   BMP:   Recent Labs   Lab  08/12/18   0100   GLU  157*   NA  137   K  4.3   CL  105   CO2  23   BUN  14   CREATININE  0.9   CALCIUM  8.0*     CBC:   Recent Labs   Lab  08/11/18   2348   WBC  9.26   HGB  8.4*   HCT  28.2*   PLT  240     CMP:   Recent Labs   Lab  08/12/18   0100   NA  137   K  4.3   CL  105   CO2  23   GLU  157*   BUN  14   CREATININE  0.9   CALCIUM  8.0*   PROT  5.5*   ALBUMIN  2.5*   BILITOT  1.5*   ALKPHOS  80   AST  12   ALT  8*   ANIONGAP  9   EGFRNONAA  >60     All pertinent labs within the past 24 hours have been reviewed.    Significant Imaging: I have reviewed all pertinent imaging results/findings within the past 24 hours.   Imaging Results          CT Head Without Contrast (In process)                X-Ray Chest AP Portable (Final result)  Result time 08/12/18 00:09:50    Final result by Rolan Brennan MD (08/12/18 00:09:50)                 Impression:      Stable chronic changes involving the right rubio thorax.  No definite acute cardiopulmonary finding.      Electronically signed by: Rolan Brennan MD  Date:    08/12/2018  Time:    00:09             Narrative:    EXAMINATION:  XR CHEST AP PORTABLE    CLINICAL HISTORY:  Provided history is "Sepsis;  ".    TECHNIQUE:  One view of the chest.    COMPARISON:  07/29/2018.    FINDINGS:  Cardiac wires overlie the chest.  Cardiac silhouette is stable.  There is volume loss and pleural thickening involving the right lung with areas of subsegmental atelectasis or airspace disease, similar to the prior exam.  Left lung is grossly clear.  No detrimental change in lung aeration.                               "

## 2018-08-12 NOTE — EICU
eICU Note : New Admit :notified by the [facility]:    Brief HPI: 73-year-old male with history of metastatic pancreatic cancer with metastases to the bone and lymph nodes, currently on chemotherapy, presented with mental status changes due to Sepsis ? Source, presented with fever with WBC 9.6    Vital Signs :  Vitals:    08/12/18 0530   BP: (!) 99/55   Pulse: 100   Resp: 10   Temp:        Camera Assessment :Patient lying in bed in no distress      Data:WBC 9.26, hemoglobin 8.4, hematocrit 28.2, platelets 240, Sodium 137, potassium 4.3,'s, chloride 105, CO2 23, BUN 14, creatinine 0.9, glucose 157, calcium 8.0, total protein 5.5, albumin 2.5, AST 12, ALT 8  DVT negative ,CXR normal  Impression and recommendations:Empiric ABX (Zosyn +Vanco) Pan cultures sent         Adelia Chaves M.D  eICU Physician

## 2018-08-12 NOTE — H&P
"[facility] Medicine  History & Physical    Patient Name: Shady Prakash Jr.  MRN: [id number]  Admission Date: 8/11/2018  Attending Physician: Gloria Nicolas, *   Primary Care Provider: Hiram Coronado MD         Patient information was obtained from patient, past medical records and ER records.     Subjective:     Principal Problem:SIRS (systemic inflammatory response syndrome)    Chief Complaint:   Chief Complaint   Patient presents with    Altered Mental Status     w/ fever, undergoing chemo for prostate/pancreatic CA, denies pain        HPI: Mr. Shady Prakash Jr. is a 73 y.o. male, with PMH of metastatic pancreatic cancer, with mets to bone and lymph nodes (currently undergoing chemotherapy; last was 8/9/18), who presented to [facility] ED on 8/11/18 2/2 altered mental status x 30 minutes PTA. Per patient/family the patient was standing and staring blankly in the bathroom, and would not move. He was responding verbally, but not making eye contact. The patient states "I was just messing with them, and had to use the bathroom." He denies prior fevers, chills, chest pain, shortness of breath, weakness, headache, or confusion. Upon arrival to ED, he did have a fever, and admitted to recent cough productive of white sputum. Labs showed no leukocytosis, CXR without infiltrate, UA without UTI. He was admitted for further workup and management. He was started on broad spectrum antibiotics in the ED for suspicion of pneumonia.      Past Medical History:   Diagnosis Date    Allergy     Arthritis     Cancer     prostate    Diabetes mellitus     Hyperlipidemia     Hypertension     Prostate cancer        Past Surgical History:   Procedure Laterality Date    EYE SURGERY Right     x9    LYMPH NODE DISSECTION      UPPER GASTROINTESTINAL ENDOSCOPY         Review of patient's allergies indicates:  No Known Allergies    No current facility-administered medications on file " prior to encounter.      Current Outpatient Medications on File Prior to Encounter   Medication Sig    aspirin (ECOTRIN) 81 MG EC tablet Take 81 mg by mouth once daily. States not taken in over 1 month as of 9/2/15    glimepiride (AMARYL) 2 MG tablet Take 2 mg by mouth 2 (two) times daily.     lovastatin (MEVACOR) 20 MG tablet     metformin (GLUCOPHAGE) 1000 MG tablet Take 1,000 mg by mouth 2 (two) times daily.     calcium-vitamin D3 (OYSTER SHELL CALCIUM-VIT D3) 500 mg(1,250mg) -200 unit per tablet Take 1 tablet by mouth 2 (two) times daily.    diphth,pertus,acell,,tetanus (BOOSTRIX) 2.5-8-5 Lf-mcg-Lf/0.5mL Susp Inject into the muscle.    docusate sodium (COLACE) 100 MG capsule Take 1 capsule (100 mg total) by mouth 2 (two) times daily.    docusate sodium (COLACE) 50 MG capsule Take 1 capsule (50 mg total) by mouth 2 (two) times daily.    dronabinol (MARINOL) 2.5 MG capsule Take 1 capsule (2.5 mg total) by mouth 2 (two) times daily before meals.    enzalutamide 40 mg Cap Take 4 capsules (160 mg) by mouth once daily.    lisinopril-hydrochlorothiazide (PRINZIDE,ZESTORETIC) 20-25 mg Tab Take 1 tablet by mouth once daily.     ondansetron (ZOFRAN-ODT) 4 MG TbDL Take 1 tablet (4 mg total) by mouth every 6 (six) hours as needed (nausea).    oxyCODONE-acetaminophen (PERCOCET) 5-325 mg per tablet Take 1 tablet by mouth every 4 (four) hours as needed.    papaverine 30 mg/mL injection Add Phentolamine 1 mg/cc  Add PGE1 10 mcg/cc    SIG:  Bring to office for test dose in physician office    promethazine (PHENERGAN) 25 MG tablet Take 1 tablet (25 mg total) by mouth every 6 (six) hours as needed for Nausea.    sildenafil (REVATIO) 20 mg Tab Take 1 tablet (20 mg total) by mouth As instructed. Take 2-5 tablets as needed.     Family History     Problem Relation (Age of Onset)    Cancer Maternal Uncle, Maternal Grandfather    Diabetes Sister, Maternal Aunt, Sister, Sister, Sister    Heart disease Brother, Brother     Hypertension Father, Mother    No Known Problems Paternal Grandmother, Paternal Grandfather    Prostate cancer Brother        Tobacco Use    Smoking status: Former Smoker     Packs/day: 4.00     Years: 60.00     Pack years: 240.00     Types: Cigarettes, Cigars     Start date: 1957     Last attempt to quit: 2016     Years since quittin.7    Smokeless tobacco: Never Used   Substance and Sexual Activity    Alcohol use: No     Comment: pt stopped drinking 2018    Drug use: No     Comment: Used to use cocaine in past     Sexual activity: Not Currently     Partners: Female     Review of Systems   Constitutional: Positive for diaphoresis. Negative for activity change, appetite change, chills, fatigue and fever.   HENT: Negative for congestion, ear pain, postnasal drip, rhinorrhea and sore throat.    Eyes: Negative for pain.   Respiratory: Positive for cough. Negative for shortness of breath and wheezing.    Cardiovascular: Negative for chest pain and palpitations.   Gastrointestinal: Positive for constipation (mild). Negative for abdominal pain, diarrhea, nausea and vomiting.   Genitourinary: Negative for difficulty urinating, dysuria, frequency, hematuria and urgency.   Musculoskeletal: Negative for arthralgias, back pain, myalgias, neck pain and neck stiffness.   Skin: Negative for rash and wound.   Neurological: Negative for dizziness, seizures, syncope, weakness, numbness and headaches.   Psychiatric/Behavioral: Negative for confusion and decreased concentration.     Objective:     Vital Signs (Most Recent):  Temp: 99.2 °F (37.3 °C) (18 0430)  Pulse: 104 (18 0545)  Resp: 20 (18 0545)  BP: 111/65 (18 0545)  SpO2: 99 % (18 0545) Vital Signs (24h Range):  Temp:  [99.2 °F (37.3 °C)-101.8 °F (38.8 °C)] 99.2 °F (37.3 °C)  Pulse:  [100-144] 104  Resp:  [10-25] 20  SpO2:  [94 %-100 %] 99 %  BP: ()/(55-70) 111/65     Weight: 81.9 kg (180 lb 8.9 oz)  Body mass index is  27.45 kg/m².    Physical Exam   Constitutional: He is oriented to person, place, and time. Vital signs are normal. He appears well-developed and well-nourished.  Non-toxic appearance. He does not have a sickly appearance. He does not appear ill. No distress.   HENT:   Head: Normocephalic and atraumatic.   Right Ear: External ear normal.   Left Ear: External ear normal.   Eyes: Conjunctivae and EOM are normal. Pupils are equal, round, and reactive to light. No scleral icterus.   Neck: Normal range of motion. Neck supple. No JVD present. No tracheal deviation present.   Cardiovascular: Normal rate, regular rhythm, normal heart sounds and intact distal pulses. Exam reveals no gallop and no friction rub.   No murmur heard.  Pulmonary/Chest: Effort normal and breath sounds normal. No stridor. No respiratory distress. He has no wheezes. He has no rales.   Abdominal: Soft. Bowel sounds are normal. He exhibits no distension and no mass. There is no tenderness. There is no guarding.   Neurological: He is alert and oriented to person, place, and time. No cranial nerve deficit. He exhibits normal muscle tone.   Skin: Skin is warm and dry. Capillary refill takes less than 2 seconds. No rash noted. He is not diaphoretic. No erythema. No pallor.   Psychiatric: He has a normal mood and affect. His behavior is normal. Judgment and thought content normal.   Nursing note and vitals reviewed.        CRANIAL NERVES     CN III, IV, VI   Pupils are equal, round, and reactive to light.  Extraocular motions are normal.        Significant Labs:   BMP:   Recent Labs   Lab  08/12/18   0100   GLU  157*   NA  137   K  4.3   CL  105   CO2  23   BUN  14   CREATININE  0.9   CALCIUM  8.0*     CBC:   Recent Labs   Lab  08/11/18   2348   WBC  9.26   HGB  8.4*   HCT  28.2*   PLT  240     CMP:   Recent Labs   Lab  08/12/18   0100   NA  137   K  4.3   CL  105   CO2  23   GLU  157*   BUN  14   CREATININE  0.9   CALCIUM  8.0*   PROT  5.5*   ALBUMIN  2.5*  "  BILITOT  1.5*   ALKPHOS  80   AST  12   ALT  8*   ANIONGAP  9   EGFRNONAA  >60     All pertinent labs within the past 24 hours have been reviewed.    Significant Imaging: I have reviewed all pertinent imaging results/findings within the past 24 hours.   Imaging Results          CT Head Without Contrast (In process)                X-Ray Chest AP Portable (Final result)  Result time 08/12/18 00:09:50    Final result by Rolan Brennan MD (08/12/18 00:09:50)                 Impression:      Stable chronic changes involving the right rubio thorax.  No definite acute cardiopulmonary finding.      Electronically signed by: Rolan Brennan MD  Date:    08/12/2018  Time:    00:09             Narrative:    EXAMINATION:  XR CHEST AP PORTABLE    CLINICAL HISTORY:  Provided history is "Sepsis;  ".    TECHNIQUE:  One view of the chest.    COMPARISON:  07/29/2018.    FINDINGS:  Cardiac wires overlie the chest.  Cardiac silhouette is stable.  There is volume loss and pleural thickening involving the right lung with areas of subsegmental atelectasis or airspace disease, similar to the prior exam.  Left lung is grossly clear.  No detrimental change in lung aeration.                                 Assessment/Plan:     * SIRS (systemic inflammatory response syndrome)    - etiology unclear  - patient admits to cough productive of white sputum, but is a former smoker of ~3 PPD x ~60 years    - quit smoking 3 years ago    - ? COPD ?   - continue broad spectrum antibiotics   - pneumonia studies sent   - febrile at time of triage in ED   - UA without UTI   - CXR without infiltrate   - no further symptoms to suggest a source           Altered mental status    - resolved at time of admission          Fever    - PRN antipyretics & workup as above in SIRS A&P        Prostate cancer metastatic to bone    - follows with Dr. Melendez   - currently receiving chemotherapy, with last session 8/9/18          Essential hypertension    - most recent " blood pressure reading BP: (!) 109/59   - continue home meds  - hydralazine PRN for SBP > 170  - monitor           Type 2 diabetes mellitus    - last A1C:   Lab Results   Component Value Date    HGBA1C 7.5 (H) 07/18/2018   - hold oral antidiabetic meds   - Diabetic diet   - SSI with accuchekcs AC/HS              VTE Risk Mitigation (From admission, onward)        Ordered     Place sequential compression device  Until discontinued      08/12/18 0509     Place MAEVE hose  Until discontinued      08/12/18 0509     IP VTE HIGH RISK PATIENT  Once      08/12/18 0509     Place sequential compression device  Until discontinued      08/12/18 0424             Luz Whalen PA-C  Department of Hospital Medicine   [facility]

## 2018-08-13 ENCOUNTER — TELEPHONE (OUTPATIENT)
Dept: HEMATOLOGY/ONCOLOGY | Facility: CLINIC | Age: 73
End: 2018-08-13

## 2018-08-13 NOTE — TELEPHONE ENCOUNTER
Message fwd to MD.         ----- Message from Mallory Max sent at 8/13/2018  2:48 PM CDT -----  Contact: Pt daughter  Pt daughter calling stating that pt was seen in hospital and was told that he needs to be seen sooner then 8/28. She would like to speak with nurse to discuss possible dates        Vanessa call back number [phone]

## 2018-08-14 ENCOUNTER — TELEPHONE (OUTPATIENT)
Dept: HEMATOLOGY/ONCOLOGY | Facility: CLINIC | Age: 73
End: 2018-08-14

## 2018-08-14 NOTE — TELEPHONE ENCOUNTER
----- Message from Marlene Echavarria MD sent at 8/14/2018  1:44 PM CDT -----  Yes it is fine  ----- Message -----  From: Vickie Noyola RN  Sent: 8/14/2018   1:40 PM  To: MD Dr. Jericho Cotto,          Pt was wondering if he can be rescheduled to Thursday August 16, 2018 at 10 am prior to his Chemotherapy at 11 am. Is it okay for us to double book you for that time. Thanks,  Vickie

## 2018-08-14 NOTE — TELEPHONE ENCOUNTER
Spoke with Mrs. Prakash and informed her that pt's follow up appt with Dr. Echavarria has been rescheduled to 8/16/18 at 0930 am. Pt voiced understanding.

## 2018-08-15 ENCOUNTER — LAB VISIT (OUTPATIENT)
Dept: LAB | Facility: OTHER | Age: 73
End: 2018-08-15
Attending: INTERNAL MEDICINE
Payer: MEDICARE

## 2018-08-15 DIAGNOSIS — Z51.11 ENCOUNTER FOR CHEMOTHERAPY MANAGEMENT: ICD-10-CM

## 2018-08-15 LAB
ALBUMIN SERPL BCP-MCNC: 2.7 G/DL
ALP SERPL-CCNC: 92 U/L
ALT SERPL W/O P-5'-P-CCNC: 46 U/L
ANION GAP SERPL CALC-SCNC: 7 MMOL/L
AST SERPL-CCNC: 53 U/L
BILIRUB SERPL-MCNC: 0.5 MG/DL
BUN SERPL-MCNC: 8 MG/DL
CALCIUM SERPL-MCNC: 8.8 MG/DL
CHLORIDE SERPL-SCNC: 103 MMOL/L
CO2 SERPL-SCNC: 27 MMOL/L
CREAT SERPL-MCNC: 0.8 MG/DL
ERYTHROCYTE [DISTWIDTH] IN BLOOD BY AUTOMATED COUNT: 15.6 %
EST. GFR  (AFRICAN AMERICAN): >60 ML/MIN/1.73 M^2
EST. GFR  (NON AFRICAN AMERICAN): >60 ML/MIN/1.73 M^2
GLUCOSE SERPL-MCNC: 133 MG/DL
HCT VFR BLD AUTO: 26.1 %
HGB BLD-MCNC: 7.6 G/DL
L PNEUMO AG UR QL IA: NOT DETECTED
MCH RBC QN AUTO: 24.4 PG
MCHC RBC AUTO-ENTMCNC: 29.1 G/DL
MCV RBC AUTO: 84 FL
NEUTROPHILS # BLD AUTO: 1.7 K/UL
PLATELET # BLD AUTO: 176 K/UL
PMV BLD AUTO: 8.7 FL
POTASSIUM SERPL-SCNC: 4.2 MMOL/L
PROT SERPL-MCNC: 6.7 G/DL
RBC # BLD AUTO: 3.11 M/UL
SODIUM SERPL-SCNC: 137 MMOL/L
WBC # BLD AUTO: 2.53 K/UL

## 2018-08-15 PROCEDURE — 80053 COMPREHEN METABOLIC PANEL: CPT

## 2018-08-15 PROCEDURE — 36415 COLL VENOUS BLD VENIPUNCTURE: CPT

## 2018-08-15 PROCEDURE — 85027 COMPLETE CBC AUTOMATED: CPT

## 2018-08-16 ENCOUNTER — INFUSION (OUTPATIENT)
Dept: INFUSION THERAPY | Facility: OTHER | Age: 73
End: 2018-08-16
Attending: INTERNAL MEDICINE
Payer: MEDICARE

## 2018-08-16 ENCOUNTER — OFFICE VISIT (OUTPATIENT)
Dept: HEMATOLOGY/ONCOLOGY | Facility: CLINIC | Age: 73
End: 2018-08-16
Payer: MEDICARE

## 2018-08-16 VITALS
WEIGHT: 180.75 LBS | OXYGEN SATURATION: 99 % | BODY MASS INDEX: 27.39 KG/M2 | HEART RATE: 96 BPM | HEIGHT: 68 IN | SYSTOLIC BLOOD PRESSURE: 128 MMHG | TEMPERATURE: 98 F | RESPIRATION RATE: 18 BRPM | DIASTOLIC BLOOD PRESSURE: 65 MMHG

## 2018-08-16 DIAGNOSIS — C61 PROSTATE CANCER METASTATIC TO INTRAPELVIC LYMPH NODE: ICD-10-CM

## 2018-08-16 DIAGNOSIS — C77.5 PROSTATE CANCER METASTATIC TO INTRAPELVIC LYMPH NODE: ICD-10-CM

## 2018-08-16 DIAGNOSIS — Z51.11 ENCOUNTER FOR ANTINEOPLASTIC CHEMOTHERAPY: ICD-10-CM

## 2018-08-16 DIAGNOSIS — E11.8 TYPE 2 DIABETES MELLITUS WITH COMPLICATION, WITHOUT LONG-TERM CURRENT USE OF INSULIN: ICD-10-CM

## 2018-08-16 DIAGNOSIS — D64.9 ANEMIA, UNSPECIFIED TYPE: ICD-10-CM

## 2018-08-16 DIAGNOSIS — I10 ESSENTIAL HYPERTENSION: ICD-10-CM

## 2018-08-16 DIAGNOSIS — C79.51 PROSTATE CANCER METASTATIC TO BONE: ICD-10-CM

## 2018-08-16 DIAGNOSIS — C78.6 METASTASIS TO PERITONEUM: ICD-10-CM

## 2018-08-16 DIAGNOSIS — D70.1 CHEMOTHERAPY-INDUCED NEUTROPENIA: ICD-10-CM

## 2018-08-16 DIAGNOSIS — C61 PROSTATE CANCER METASTATIC TO BONE: ICD-10-CM

## 2018-08-16 DIAGNOSIS — C25.9 MALIGNANT NEOPLASM OF PANCREAS, UNSPECIFIED LOCATION OF MALIGNANCY: Primary | ICD-10-CM

## 2018-08-16 DIAGNOSIS — T45.1X5A CHEMOTHERAPY-INDUCED NEUTROPENIA: ICD-10-CM

## 2018-08-16 DIAGNOSIS — C25.1 MALIGNANT NEOPLASM OF BODY OF PANCREAS: Primary | ICD-10-CM

## 2018-08-16 PROCEDURE — 25000003 PHARM REV CODE 250: Performed by: INTERNAL MEDICINE

## 2018-08-16 PROCEDURE — 3078F DIAST BP <80 MM HG: CPT | Mod: CPTII,S$GLB,, | Performed by: INTERNAL MEDICINE

## 2018-08-16 PROCEDURE — 63600175 PHARM REV CODE 636 W HCPCS: Mod: JG | Performed by: INTERNAL MEDICINE

## 2018-08-16 PROCEDURE — 3074F SYST BP LT 130 MM HG: CPT | Mod: CPTII,S$GLB,, | Performed by: INTERNAL MEDICINE

## 2018-08-16 PROCEDURE — 96367 TX/PROPH/DG ADDL SEQ IV INF: CPT

## 2018-08-16 PROCEDURE — 3044F HG A1C LEVEL LT 7.0%: CPT | Mod: CPTII,S$GLB,, | Performed by: INTERNAL MEDICINE

## 2018-08-16 PROCEDURE — 99999 PR PBB SHADOW E&M-EST. PATIENT-LVL V: CPT | Mod: PBBFAC,,, | Performed by: INTERNAL MEDICINE

## 2018-08-16 PROCEDURE — 96417 CHEMO IV INFUS EACH ADDL SEQ: CPT

## 2018-08-16 PROCEDURE — 96413 CHEMO IV INFUSION 1 HR: CPT

## 2018-08-16 PROCEDURE — 99215 OFFICE O/P EST HI 40 MIN: CPT | Mod: S$GLB,,, | Performed by: INTERNAL MEDICINE

## 2018-08-16 RX ORDER — SODIUM CHLORIDE 0.9 % (FLUSH) 0.9 %
10 SYRINGE (ML) INJECTION
Status: DISCONTINUED | OUTPATIENT
Start: 2018-08-16 | End: 2018-08-16 | Stop reason: HOSPADM

## 2018-08-16 RX ORDER — HEPARIN 100 UNIT/ML
500 SYRINGE INTRAVENOUS
Status: DISCONTINUED | OUTPATIENT
Start: 2018-08-16 | End: 2018-08-16 | Stop reason: HOSPADM

## 2018-08-16 RX ORDER — SODIUM CHLORIDE 0.9 % (FLUSH) 0.9 %
10 SYRINGE (ML) INJECTION
Status: CANCELLED | OUTPATIENT
Start: 2018-08-16

## 2018-08-16 RX ORDER — HEPARIN 100 UNIT/ML
500 SYRINGE INTRAVENOUS
Status: CANCELLED | OUTPATIENT
Start: 2018-08-16

## 2018-08-16 RX ADMIN — Medication 1 MG: at 09:08

## 2018-08-16 RX ADMIN — PACLITAXEL 260 MG: 100 INJECTION, POWDER, LYOPHILIZED, FOR SUSPENSION INTRAVENOUS at 10:08

## 2018-08-16 RX ADMIN — SODIUM CHLORIDE: 0.9 INJECTION, SOLUTION INTRAVENOUS at 09:08

## 2018-08-16 RX ADMIN — GEMCITABINE 2050 MG: 38 INJECTION, SOLUTION INTRAVENOUS at 10:08

## 2018-08-16 NOTE — PROGRESS NOTES
Subjective:       Patient ID: Shady Prakash Jr. is a 73 y.o. male.     Chief Complaint: No chief complaint on file.     HPI      Returns for follow-up  Received week 1 of gemcitabine/abraxane for metastatic pancreatic cancer. Developed AMS the evening of 8/11 and was sent to ER. Wife said she had hard time getting him out of the restroom. Had a fever in ED. CXR without infiltrate, UA without UTI. He was started on vanc and zosyn and admitted. He requested discharge the next day. Patient is feeling good today. Still has mild soreness at RLQ of the abdomen where he had the surgery. Denies other complaints. He told me he was joking with his family.           Review of Systems   Constitutional: Positive for activity change, appetite change and fatigue. Negative for chills, fever and unexpected weight change.   HENT: Positive for dental problem. Negative for congestion, hearing loss, mouth sores, nosebleeds, postnasal drip, sinus pressure, sore throat and trouble swallowing.    Eyes: Negative for visual disturbance.   Respiratory: Negative for cough, chest tightness, shortness of breath and wheezing.    Cardiovascular: Negative for chest pain, palpitations and leg swelling.   Gastrointestinal: +soreness at the RLQ of abdomen. Negative for abdominal distention, blood in stool, diarrhea and vomiting.        No GERD   Endocrine:        Hot flashes   Genitourinary: Negative for decreased urine volume, difficulty urinating, frequency, hematuria and urgency.        Sexual dysfunction   Musculoskeletal: + b/l LE swelling since hospitalization. Negative for back pain, gait problem, joint swelling, neck pain and neck stiffness.   Skin: Positive for itching of arms. Negative for color change, pallor and rash.   Neurological: Negative for dizziness, weakness, light-headedness, numbness and headaches.   Hematological: Negative for adenopathy. Does not bruise/bleed easily.   Psychiatric/Behavioral: Negative for dysphoric mood and  sleep disturbance. The patient is not nervous/anxious.        Objective:   Physical Exam   Constitutional: He is oriented to person, place, and time. He appears well-developed and well-nourished. No distress.   Presents with wife  HENT:   Head: Normocephalic and atraumatic.   Nose: Nose normal.   Mouth/Throat: Oropharynx is clear and moist. No oropharyngeal exudate.   Eyes: Conjunctivae and EOM are normal. Pupils are equal, round, and reactive to light. Right eye exhibits no discharge. Left eye exhibits no discharge. No scleral icterus.   Neck: Normal range of motion. Neck supple. No tracheal deviation present. No thyromegaly present.   Cardiovascular: Normal rate, regular rhythm, normal heart sounds and intact distal pulses.  Exam reveals no gallop and no friction rub.    No murmur heard.  Pulmonary/Chest: Effort normal and breath sounds normal. No respiratory distress. He has no wheezes. He has no rales. He exhibits no tenderness.   Abdominal: Soft. Bowel sounds are normal. Soft, non distended, mild tenderness in RLQ. There is no rebound and no guarding.   Abdominal wound- healed  Musculoskeletal: b/l LE 1+ edema  Lymphadenopathy:     He has no cervical adenopathy.   Neurological: He is alert and oriented to person, place, and time. He has normal reflexes. He displays normal reflexes. No cranial nerve deficit. He exhibits normal muscle tone. Coordination normal.   Skin: Skin is warm and dry. No rash noted. He is not diaphoretic. No erythema. No pallor.   Psychiatric: He has a normal mood and affect. His behavior is normal. Judgment and thought content normal.   Nursing note and vitals reviewed.    Labs- reviewed  Assessment:       1. Malignant neoplasm of pancreas, unspecified location of malignancy    2. 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 Encounter for antineoplastic chemotherapy    4. Chemotherapy-induced neutropenia    5. Anemia, unspecified type    6. Essential hypertension    7. Type 2 diabetes mellitus with  complication, without long-term current use of insulin    8. Prostate cancer metastatic to bone    9. Prostate cancer metastatic to intrapelvic lymph node        Plan:     1-3  - Cycle 1 day 8 of gem/abraxane today    4.   - ANC 1.7, adequate for chemo  - Discussed with patient that his ANC is adequate for chemo today, but is likely to drop further. He needs to go to the ER if he develops fever    5.   - stable. Asymptomatic  - monitor    6.   - BP good. C/w current meds    7.  - good control. C/w current meds    8.9  - treatment on hold given recent diagnosis of metastatic pancreatic cancer.        RTC next week prior to cycle 1 day 15 to assess

## 2018-08-16 NOTE — PLAN OF CARE
Problem: Patient Care Overview  Goal: Plan of Care Review  Outcome: Ongoing (interventions implemented as appropriate)  Pt tolerated Abraxane/Gemzar infusions today. No signs of reaction, VSS. AVS given.

## 2018-08-17 LAB
BACTERIA BLD CULT: NORMAL
BACTERIA BLD CULT: NORMAL

## 2018-08-23 ENCOUNTER — INFUSION (OUTPATIENT)
Dept: INFUSION THERAPY | Facility: OTHER | Age: 73
End: 2018-08-23
Attending: INTERNAL MEDICINE
Payer: MEDICARE

## 2018-08-23 ENCOUNTER — OFFICE VISIT (OUTPATIENT)
Dept: HEMATOLOGY/ONCOLOGY | Facility: CLINIC | Age: 73
End: 2018-08-23
Payer: MEDICARE

## 2018-08-23 VITALS
WEIGHT: 175.5 LBS | RESPIRATION RATE: 16 BRPM | SYSTOLIC BLOOD PRESSURE: 126 MMHG | OXYGEN SATURATION: 100 % | DIASTOLIC BLOOD PRESSURE: 59 MMHG | HEIGHT: 68 IN | TEMPERATURE: 98 F | HEART RATE: 101 BPM | BODY MASS INDEX: 26.6 KG/M2

## 2018-08-23 DIAGNOSIS — C61 PROSTATE CANCER METASTATIC TO BONE: ICD-10-CM

## 2018-08-23 DIAGNOSIS — C61 PROSTATE CANCER METASTATIC TO INTRAPELVIC LYMPH NODE: ICD-10-CM

## 2018-08-23 DIAGNOSIS — D70.9 NEUTROPENIA, UNSPECIFIED TYPE: ICD-10-CM

## 2018-08-23 DIAGNOSIS — C79.51 PROSTATE CANCER METASTATIC TO BONE: ICD-10-CM

## 2018-08-23 DIAGNOSIS — C25.1 MALIGNANT NEOPLASM OF BODY OF PANCREAS: Primary | ICD-10-CM

## 2018-08-23 DIAGNOSIS — I10 ESSENTIAL HYPERTENSION: ICD-10-CM

## 2018-08-23 DIAGNOSIS — C77.5 PROSTATE CANCER METASTATIC TO INTRAPELVIC LYMPH NODE: ICD-10-CM

## 2018-08-23 DIAGNOSIS — E11.8 TYPE 2 DIABETES MELLITUS WITH COMPLICATION, WITHOUT LONG-TERM CURRENT USE OF INSULIN: ICD-10-CM

## 2018-08-23 DIAGNOSIS — D64.9 ANEMIA, UNSPECIFIED TYPE: ICD-10-CM

## 2018-08-23 DIAGNOSIS — Z51.11 ENCOUNTER FOR ANTINEOPLASTIC CHEMOTHERAPY: ICD-10-CM

## 2018-08-23 PROCEDURE — 3074F SYST BP LT 130 MM HG: CPT | Mod: CPTII,S$GLB,, | Performed by: INTERNAL MEDICINE

## 2018-08-23 PROCEDURE — 25000003 PHARM REV CODE 250: Performed by: INTERNAL MEDICINE

## 2018-08-23 PROCEDURE — 96361 HYDRATE IV INFUSION ADD-ON: CPT

## 2018-08-23 PROCEDURE — 96367 TX/PROPH/DG ADDL SEQ IV INF: CPT

## 2018-08-23 PROCEDURE — 3044F HG A1C LEVEL LT 7.0%: CPT | Mod: CPTII,S$GLB,, | Performed by: INTERNAL MEDICINE

## 2018-08-23 PROCEDURE — 96417 CHEMO IV INFUS EACH ADDL SEQ: CPT

## 2018-08-23 PROCEDURE — 63600175 PHARM REV CODE 636 W HCPCS: Performed by: INTERNAL MEDICINE

## 2018-08-23 PROCEDURE — 99999 PR PBB SHADOW E&M-EST. PATIENT-LVL III: CPT | Mod: PBBFAC,,, | Performed by: INTERNAL MEDICINE

## 2018-08-23 PROCEDURE — 99215 OFFICE O/P EST HI 40 MIN: CPT | Mod: S$GLB,,, | Performed by: INTERNAL MEDICINE

## 2018-08-23 PROCEDURE — 96413 CHEMO IV INFUSION 1 HR: CPT

## 2018-08-23 PROCEDURE — 3078F DIAST BP <80 MM HG: CPT | Mod: CPTII,S$GLB,, | Performed by: INTERNAL MEDICINE

## 2018-08-23 RX ORDER — SODIUM CHLORIDE 9 MG/ML
INJECTION, SOLUTION INTRAVENOUS CONTINUOUS
Status: ACTIVE | OUTPATIENT
Start: 2018-08-23 | End: 2018-08-23

## 2018-08-23 RX ORDER — HEPARIN 100 UNIT/ML
500 SYRINGE INTRAVENOUS
Status: CANCELLED | OUTPATIENT
Start: 2018-08-23

## 2018-08-23 RX ORDER — SODIUM CHLORIDE 0.9 % (FLUSH) 0.9 %
10 SYRINGE (ML) INJECTION
Status: DISCONTINUED | OUTPATIENT
Start: 2018-08-23 | End: 2018-08-23 | Stop reason: HOSPADM

## 2018-08-23 RX ORDER — HEPARIN 100 UNIT/ML
500 SYRINGE INTRAVENOUS
Status: DISCONTINUED | OUTPATIENT
Start: 2018-08-23 | End: 2018-08-23 | Stop reason: HOSPADM

## 2018-08-23 RX ORDER — SODIUM CHLORIDE 0.9 % (FLUSH) 0.9 %
10 SYRINGE (ML) INJECTION
Status: CANCELLED | OUTPATIENT
Start: 2018-08-23

## 2018-08-23 RX ORDER — SODIUM CHLORIDE 9 MG/ML
INJECTION, SOLUTION INTRAVENOUS CONTINUOUS
Status: CANCELLED
Start: 2018-08-23

## 2018-08-23 RX ADMIN — GEMCITABINE 2050 MG: 38 INJECTION, SOLUTION INTRAVENOUS at 11:08

## 2018-08-23 RX ADMIN — PACLITAXEL 260 MG: 100 INJECTION, POWDER, LYOPHILIZED, FOR SUSPENSION INTRAVENOUS at 10:08

## 2018-08-23 RX ADMIN — GRANISETRON HYDROCHLORIDE 1 MG: 0.1 INJECTION, SOLUTION INTRAVENOUS at 10:08

## 2018-08-23 RX ADMIN — SODIUM CHLORIDE: 0.9 INJECTION, SOLUTION INTRAVENOUS at 09:08

## 2018-08-23 NOTE — PROGRESS NOTES
Subjective:       Patient ID: Shady Prakash Jr. is a 73 y.o. male.     Chief Complaint: follow up for pancreatic cancer     HPI      Returns for follow-up  Received week 1 and 2 of gemcitabine/abraxane for metastatic pancreatic cancer. Overall tolerated week 2 well. Low appetite. Eating but not much. +fatigue but still functions around.            Review of Systems   Constitutional: Positive for activity change, appetite change and fatigue. Negative for chills, fever and unexpected weight change.   HENT: Positive for dental problem. Negative for congestion, hearing loss, mouth sores, nosebleeds, postnasal drip, sinus pressure, sore throat and trouble swallowing.    Eyes: Negative for visual disturbance.   Respiratory: Negative for cough, chest tightness, shortness of breath and wheezing.    Cardiovascular: Negative for chest pain, palpitations and leg swelling.   Gastrointestinal: +soreness at the RLQ of abdomen. Negative for abdominal distention, blood in stool, diarrhea and vomiting.        No GERD   Endocrine:        Hot flashes   Genitourinary: Negative for decreased urine volume, difficulty urinating, frequency, hematuria and urgency.        Sexual dysfunction   Musculoskeletal: + b/l LE swelling since hospitalization. Improved. Negative for back pain, gait problem, joint swelling, neck pain and neck stiffness.   Skin: Negative for color change, pallor and rash.   Neurological: Negative for dizziness, weakness, light-headedness, numbness and headaches.   Hematological: Negative for adenopathy. Does not bruise/bleed easily.   Psychiatric/Behavioral: Negative for dysphoric mood and sleep disturbance. The patient is not nervous/anxious.       Objective:   Physical Exam   Constitutional: He is oriented to person, place, and time. He appears well-developed and well-nourished. No distress.   Presents with wife  HENT:   Head: Normocephalic and atraumatic.   Nose: Nose normal.   Mouth/Throat: Oropharynx is clear and  moist. No oropharyngeal exudate.   Eyes: Conjunctivae and EOM are normal. Pupils are equal, round, and reactive to light. Right eye exhibits no discharge. Left eye exhibits no discharge. No scleral icterus.   Neck: Normal range of motion. Neck supple. No tracheal deviation present. No thyromegaly present.   Cardiovascular: Normal rate, regular rhythm, normal heart sounds and intact distal pulses.  Exam reveals no gallop and no friction rub.    No murmur heard.  Pulmonary/Chest: Effort normal and breath sounds normal. No respiratory distress. He has no wheezes. He has no rales. He exhibits no tenderness.   Abdominal: Soft. Bowel sounds are normal. Soft, non distended, mild tenderness in RLQ. There is no rebound and no guarding.   Abdominal wound- healed  Musculoskeletal: b/l LE 1+ edema. improved  Lymphadenopathy:     He has no cervical adenopathy.   Neurological: He is alert and oriented to person, place, and time. He has normal reflexes. He displays normal reflexes. No cranial nerve deficit. He exhibits normal muscle tone. Coordination normal.   Skin: Skin is warm and dry. No rash noted. He is not diaphoretic. No erythema. No pallor.   Psychiatric: He has a normal mood and affect. His behavior is normal. Judgment and thought content normal.   Nursing note and vitals reviewed.    Labs- reviewed  Assessment:      1. Malignant neoplasm of body of pancreas    2. Encounter for antineoplastic chemotherapy    3. Anemia, unspecified type    4. Neutropenia, unspecified type    5. Prostate cancer metastatic to bone    6. Prostate cancer metastatic to intrapelvic lymph node    7. Essential hypertension    8. Type 2 diabetes mellitus with complication, without long-term current use of insulin           Plan:      1-2  - overall tolerating gem/abraxane well  - counts adequate  - Cycle 1 day 15 of gem/abraxane today    3.  - stable. Monitor  - transfuse if Hb<7     4.   - ANC 1.5, adequate for chemo  - monitor     5.6.  - hold  off on treatment now. Getting treatment for metastatic pancreatic cancer     7.   - BP good. C/w current meds     8.  - good control. C/w current meds          FOLLOW UP:  - Has f/u appt with Dr. Melendez next week

## 2018-08-24 ENCOUNTER — LAB VISIT (OUTPATIENT)
Dept: LAB | Facility: OTHER | Age: 73
End: 2018-08-24
Attending: INTERNAL MEDICINE
Payer: MEDICARE

## 2018-08-24 DIAGNOSIS — C61 PROSTATE CANCER METASTATIC TO BONE: ICD-10-CM

## 2018-08-24 DIAGNOSIS — C79.51 PROSTATE CANCER METASTATIC TO BONE: ICD-10-CM

## 2018-08-24 DIAGNOSIS — C25.9 MALIGNANT NEOPLASM OF PANCREAS, UNSPECIFIED LOCATION OF MALIGNANCY: ICD-10-CM

## 2018-08-24 LAB
ALBUMIN SERPL BCP-MCNC: 2.5 G/DL
ALP SERPL-CCNC: 101 U/L
ALT SERPL W/O P-5'-P-CCNC: 116 U/L
ANION GAP SERPL CALC-SCNC: 11 MMOL/L
AST SERPL-CCNC: 80 U/L
BILIRUB SERPL-MCNC: 0.5 MG/DL
BUN SERPL-MCNC: 11 MG/DL
CALCIUM SERPL-MCNC: 8.4 MG/DL
CANCER AG19-9 SERPL-ACNC: <2 U/ML
CHLORIDE SERPL-SCNC: 105 MMOL/L
CO2 SERPL-SCNC: 24 MMOL/L
COMPLEXED PSA SERPL-MCNC: 6.7 NG/ML
CREAT SERPL-MCNC: 0.8 MG/DL
ERYTHROCYTE [DISTWIDTH] IN BLOOD BY AUTOMATED COUNT: 16.4 %
EST. GFR  (AFRICAN AMERICAN): >60 ML/MIN/1.73 M^2
EST. GFR  (NON AFRICAN AMERICAN): >60 ML/MIN/1.73 M^2
GLUCOSE SERPL-MCNC: 150 MG/DL
HCT VFR BLD AUTO: 22.6 %
HGB BLD-MCNC: 6.7 G/DL
MCH RBC QN AUTO: 24 PG
MCHC RBC AUTO-ENTMCNC: 29.6 G/DL
MCV RBC AUTO: 81 FL
NEUTROPHILS # BLD AUTO: 1.9 K/UL
PLATELET # BLD AUTO: 170 K/UL
PMV BLD AUTO: 9.6 FL
POTASSIUM SERPL-SCNC: 3.5 MMOL/L
PROT SERPL-MCNC: 6.1 G/DL
RBC # BLD AUTO: 2.79 M/UL
SODIUM SERPL-SCNC: 140 MMOL/L
WBC # BLD AUTO: 2.24 K/UL

## 2018-08-24 PROCEDURE — 36415 COLL VENOUS BLD VENIPUNCTURE: CPT

## 2018-08-24 PROCEDURE — 84153 ASSAY OF PSA TOTAL: CPT

## 2018-08-24 PROCEDURE — 86301 IMMUNOASSAY TUMOR CA 19-9: CPT

## 2018-08-24 PROCEDURE — 80053 COMPREHEN METABOLIC PANEL: CPT

## 2018-08-24 PROCEDURE — 85027 COMPLETE CBC AUTOMATED: CPT

## 2018-08-28 ENCOUNTER — OFFICE VISIT (OUTPATIENT)
Dept: HEMATOLOGY/ONCOLOGY | Facility: CLINIC | Age: 73
End: 2018-08-28
Attending: INTERNAL MEDICINE
Payer: MEDICARE

## 2018-08-28 VITALS
RESPIRATION RATE: 16 BRPM | SYSTOLIC BLOOD PRESSURE: 120 MMHG | BODY MASS INDEX: 26.43 KG/M2 | DIASTOLIC BLOOD PRESSURE: 62 MMHG | OXYGEN SATURATION: 99 % | TEMPERATURE: 98 F | HEIGHT: 68 IN | HEART RATE: 92 BPM | WEIGHT: 174.38 LBS

## 2018-08-28 DIAGNOSIS — C61 PROSTATE CANCER METASTATIC TO BONE: ICD-10-CM

## 2018-08-28 DIAGNOSIS — R21 RASH AND NONSPECIFIC SKIN ERUPTION: ICD-10-CM

## 2018-08-28 DIAGNOSIS — C77.5 PROSTATE CANCER METASTATIC TO INTRAPELVIC LYMPH NODE: ICD-10-CM

## 2018-08-28 DIAGNOSIS — C79.9 METASTASIS FROM PANCREATIC CANCER: Primary | ICD-10-CM

## 2018-08-28 DIAGNOSIS — C78.6 PERITONEAL METASTASES: ICD-10-CM

## 2018-08-28 DIAGNOSIS — C79.51 PROSTATE CANCER METASTATIC TO BONE: ICD-10-CM

## 2018-08-28 DIAGNOSIS — C25.1 MALIGNANT NEOPLASM OF BODY OF PANCREAS: ICD-10-CM

## 2018-08-28 DIAGNOSIS — C61 PROSTATE CANCER METASTATIC TO INTRAPELVIC LYMPH NODE: ICD-10-CM

## 2018-08-28 DIAGNOSIS — R74.01 ELEVATED TRANSAMINASE LEVEL: ICD-10-CM

## 2018-08-28 DIAGNOSIS — C25.9 METASTASIS FROM PANCREATIC CANCER: Primary | ICD-10-CM

## 2018-08-28 DIAGNOSIS — C61 PROSTATE CANCER METASTATIC TO MULTIPLE SITES: ICD-10-CM

## 2018-08-28 PROCEDURE — 99214 OFFICE O/P EST MOD 30 MIN: CPT | Mod: S$GLB,,, | Performed by: INTERNAL MEDICINE

## 2018-08-28 PROCEDURE — 3074F SYST BP LT 130 MM HG: CPT | Mod: CPTII,S$GLB,, | Performed by: INTERNAL MEDICINE

## 2018-08-28 PROCEDURE — 99999 PR PBB SHADOW E&M-EST. PATIENT-LVL III: CPT | Mod: PBBFAC,,, | Performed by: INTERNAL MEDICINE

## 2018-08-28 PROCEDURE — 3078F DIAST BP <80 MM HG: CPT | Mod: CPTII,S$GLB,, | Performed by: INTERNAL MEDICINE

## 2018-08-28 NOTE — PROGRESS NOTES
Subjective:       Patient ID: Shady Prakash Jr. is a 73 y.o. male.    Chief Complaint: No chief complaint on file.    HPI     Returns for follow-up- on chemotherapy with gemcitabine/abraxane for metastatic pancreatic cancer. Presents for consideration of cycle #2 Day 1.  + fatigue but stable  Denies fevers, chills, or infectious complaints.  Recent cough- improving and mostly dry  Appetite is better but weight still down slightly.     Oncology History:  1. Metastatic prostate cancer  He is s/p TRUS/bx on 8/7/15 w/ high volume marta 9 (5+4), PSA 25.  He had negative metastatic workup at diagnosis.  However, at time of planned RALP on 9/10/15 he was noted to have BN invasion on cysto and therefore only PLND was done, which confirmed metastatic disease.  He was started on ADT 9/17/15 w/ firmagon followed by Eligard on 10/15/15.    Started Casodex 7/20/17 - scans at that time showed osseus metastatic disease  Started docetaxel 10/6/17 with progression    - on follow-up scans a pancreatic lesion noted concerning for pancreatic cancer as it was an unlikely area for prostate cancer spread-  Biopsy confirmed adenocarcinoma and PSA was negative     Imaging as below:  Scan results:  6/20/18 Bone scan:  FINDINGS:  Two lesions in the left scapula, stable.  Elongated lesion in the right mid rib posteriorly, stable.  Prior exam demonstrated a large right iliac lesion, which no longer exhibits increased uptake.  Left pubic lesion, stable.  There is also now a small lesion in the left iliac bone. Two additional new small foci in the mid thoracic vertebra.  Both kidneys and the bladder appear unremarkable.  Impression:  Findings consistent with osseous metastatic disease as above, noting 3 new small lesions.     6/21/18 CT scan abdomen and pelvis:  FINDINGS:  Lower chest: There has been interval increase in size in the focal consolidation which is pleural based in the posterior aspect of the right lower lobe.  Today's exam  measures 5.1 cm maximum diameter.  Previous exam is measure approximately 3.3 cm.  This is associated with pleural thickening that extends laterally.  There is a small focus of cylinder all coal bronchiectasis in the medial left lower lobe.  The remaining aspects of the lung parenchyma that are visualized show no abnormalities.  The visualized heart and pericardium are normal  Liver: There are 3 hyperenhancing foci along the periphery of the right and left lobes of the liver which are stable from 2015.  The largest measures 1.1 cm in maximum diameter.  Although not characteristic on this exam, previous exam in 2015 shows findings most characteristic of hepatic hemangioma.  The remaining liver parenchyma as well as the hepatic and portal veins are patent.  Gallbladder: Normal.  Pancreas: There is been interval development of an ill-defined hypodense focus within the proximal body of the pancreas measuring 2.1 cm x 1.8 cm.  Spleen: Calcified hemangiomas present.  Otherwise normal.  Adrenal glands: Normal.  Genitourinary: Stable simple cyst in the lower pole of the right kidney measuring 1.6 cm.  The left kidney and bilateral ureters are normal.  The bladder is partially distended and shows wall thickening on the non dependent surface of the bladder.  This is not significantly changed in appearance.  Gastrointestinal: Stomach and small intestines are normal.  Colonic diverticulosis is present without evidence of diverticulitis.  Prostate: Enlarged and nodular causing mass effect on the bladder.  Miscellaneous: There is no intra-abdominal or pelvic free fluid, free air or lymphadenopathy.  The abdominal aorta tapers normally.  Bilateral fat containing inguinal hernias present.  Osseous and soft tissue structures: Sclerotic metastases involving the right iliac bone and left superior pubic ramus are identified.  The small focus in the left iliac bone seen on yesterday's bone scan is not well seen on this exam.  The soft  tissues are within normal limits.  Impression:  1. Interval increase in size in the pleural base, right lower lobe consolidation.  2. Hyperenhancing foci likely represent hemangiomas.  They have been stable since 2015.  Is on the 2015 exam where they demonstrate characteristics most compatible with hemangioma.  3. Bladder wall thickening is not significantly changed.  This may be related to outlet obstruction from the nodular prostate.  4. Interval development of ill-defined hypodense focus measuring 2.1 cm within the proximal body of the pancreas.  This may also be a focus of metastatic disease although is uncommon for the prostate to metastasized to the pancreas.  A dedicated pancreatic CT may be beneficial in further characterizing this lesion.    - on 7/25/18 he came to ER with worsening abdominal pain and concerns for appendicitis  Underwent Laparoscopy Converted to Laparotomy, Peritoneal Biopsy , Ileocecal with Dr. Stephenson  Biopsy concerning for metastatic pancreatic carcinoma    - started on [place]/Abraxane- note Ca19-9 never elevated      Review of Systems   Constitutional: Positive for activity change, appetite change, fatigue and unexpected weight change. Negative for chills and fever.   HENT: Positive for dental problem. Negative for congestion, hearing loss, mouth sores, nosebleeds, postnasal drip, sinus pressure, sore throat and trouble swallowing.    Eyes: Negative for visual disturbance.   Respiratory: Positive for cough. Negative for chest tightness, shortness of breath and wheezing.    Cardiovascular: Negative for chest pain, palpitations and leg swelling.   Gastrointestinal: Negative for abdominal distention, abdominal pain, blood in stool, constipation, diarrhea and vomiting.        No GERD   Endocrine:        Hot flashes   Genitourinary: Positive for urgency. Negative for decreased urine volume, difficulty urinating, frequency and hematuria.        Sexual dysfunction   Musculoskeletal: Positive for  arthralgias (better). Negative for back pain, gait problem, joint swelling, neck pain and neck stiffness.   Skin: Positive for wound (surgical site). Negative for color change, pallor and rash.   Neurological: Negative for dizziness, weakness, light-headedness, numbness and headaches.   Hematological: Negative for adenopathy. Does not bruise/bleed easily.   Psychiatric/Behavioral: Negative for dysphoric mood and sleep disturbance. The patient is not nervous/anxious.        Objective:      Physical Exam   Constitutional: He is oriented to person, place, and time. He appears well-developed and well-nourished. No distress.   Presents with wife   HENT:   Head: Normocephalic and atraumatic.   Right Ear: External ear normal.   Left Ear: External ear normal.   Nose: Nose normal.   Mouth/Throat: Oropharynx is clear and moist. No oropharyngeal exudate.   Eyes: Conjunctivae and EOM are normal. Pupils are equal, round, and reactive to light. Left eye exhibits no discharge. No scleral icterus.   Neck: Normal range of motion. Neck supple. No tracheal deviation present. No thyromegaly present.   Cardiovascular: Normal rate, regular rhythm, normal heart sounds and intact distal pulses. Exam reveals no gallop and no friction rub.   No murmur heard.  Pulmonary/Chest: Effort normal and breath sounds normal. No respiratory distress. He has no wheezes. He has no rales. He exhibits no tenderness.   Abdominal: Soft. Bowel sounds are normal. He exhibits no distension and no mass. There is no tenderness. There is no rebound and no guarding.   Musculoskeletal: Normal range of motion. He exhibits no edema, tenderness or deformity.   Lymphadenopathy:     He has no cervical adenopathy.   Neurological: He is alert and oriented to person, place, and time. He has normal reflexes. He displays normal reflexes. No cranial nerve deficit. He exhibits normal muscle tone. Coordination normal.   Skin: Skin is warm and dry. Rash (areas of excoriation)  noted. He is not diaphoretic. No erythema. No pallor.   Psychiatric: He has a normal mood and affect. His behavior is normal. Judgment and thought content normal.   Nursing note and vitals reviewed.    Labs- reviewed  Assessment:       1. Metastasis from pancreatic cancer    2. Prostate cancer metastatic to multiple sites    3. Prostate cancer metastatic to bone    4. Prostate cancer metastatic to intrapelvic lymph node    5. Malignant neoplasm of body of pancreas    6. Peritoneal metastases    7. Elevated transaminase level    8. Rash and nonspecific skin eruption        Plan:     1,5,6, 7. Completed cycle # 1 [place]/Abraxane  With clinical condition and elevated liver enzymes we recommend restaging asap  2-4. Treatment on hold with above  7. Rash resolved, areas of excoriation healing  Reviewed skin care and if returns we will need to evaluate    45 minutes spent with patient and wife  Follow-up after scans

## 2018-08-29 ENCOUNTER — HOSPITAL ENCOUNTER (OUTPATIENT)
Dept: RADIOLOGY | Facility: OTHER | Age: 73
Discharge: HOME | End: 2018-08-29
Attending: INTERNAL MEDICINE
Payer: MEDICARE

## 2018-08-29 DIAGNOSIS — C61 PROSTATE CANCER METASTATIC TO MULTIPLE SITES: ICD-10-CM

## 2018-08-29 PROCEDURE — 74177 CT ABD & PELVIS W/CONTRAST: CPT | Mod: TC

## 2018-08-29 PROCEDURE — 74177 CT ABD & PELVIS W/CONTRAST: CPT | Mod: 26,,, | Performed by: INTERNAL MEDICINE

## 2018-08-29 PROCEDURE — 25500020 PHARM REV CODE 255: Performed by: INTERNAL MEDICINE

## 2018-08-29 PROCEDURE — 71260 CT THORAX DX C+: CPT | Mod: 26,,, | Performed by: INTERNAL MEDICINE

## 2018-08-29 RX ADMIN — IOHEXOL 75 ML: 350 INJECTION, SOLUTION INTRAVENOUS at 12:08

## 2018-08-31 ENCOUNTER — OFFICE VISIT (OUTPATIENT)
Dept: HEMATOLOGY/ONCOLOGY | Facility: CLINIC | Age: 73
End: 2018-08-31
Payer: MEDICARE

## 2018-08-31 VITALS
DIASTOLIC BLOOD PRESSURE: 78 MMHG | BODY MASS INDEX: 26.16 KG/M2 | HEIGHT: 68 IN | RESPIRATION RATE: 16 BRPM | WEIGHT: 172.63 LBS | TEMPERATURE: 98 F | SYSTOLIC BLOOD PRESSURE: 156 MMHG | OXYGEN SATURATION: 99 % | HEART RATE: 87 BPM

## 2018-08-31 DIAGNOSIS — C25.1 MALIGNANT NEOPLASM OF BODY OF PANCREAS: ICD-10-CM

## 2018-08-31 DIAGNOSIS — D63.0 ANEMIA IN NEOPLASTIC DISEASE: ICD-10-CM

## 2018-08-31 DIAGNOSIS — C61 PROSTATE CANCER METASTATIC TO BONE: Primary | ICD-10-CM

## 2018-08-31 DIAGNOSIS — C78.6 PERITONEAL METASTASES: ICD-10-CM

## 2018-08-31 DIAGNOSIS — C79.51 PROSTATE CANCER METASTATIC TO BONE: Primary | ICD-10-CM

## 2018-08-31 PROCEDURE — 3078F DIAST BP <80 MM HG: CPT | Mod: CPTII,S$GLB,, | Performed by: INTERNAL MEDICINE

## 2018-08-31 PROCEDURE — 99214 OFFICE O/P EST MOD 30 MIN: CPT | Mod: S$GLB,,, | Performed by: INTERNAL MEDICINE

## 2018-08-31 PROCEDURE — 3077F SYST BP >= 140 MM HG: CPT | Mod: CPTII,S$GLB,, | Performed by: INTERNAL MEDICINE

## 2018-08-31 PROCEDURE — 99999 PR PBB SHADOW E&M-EST. PATIENT-LVL IV: CPT | Mod: PBBFAC,,, | Performed by: INTERNAL MEDICINE

## 2018-08-31 RX ORDER — FUROSEMIDE 40 MG/1
40 TABLET ORAL
COMMUNITY
Start: 2018-08-24 | End: 2018-09-25

## 2018-09-02 PROBLEM — D63.0 ANEMIA IN NEOPLASTIC DISEASE: Status: ACTIVE | Noted: 2018-09-02

## 2018-09-02 NOTE — PROGRESS NOTES
Subjective:       Patient ID: Shady Prakash Jr. is a 73 y.o. male.    Chief Complaint: Malignant neoplasm of body of pancreas    HPI     Returns after recent scans  See note 8/28/18    CT C/A/P 8/29/18:  FINDINGS:  There is a single micro nodule which is stable in the left lung, a nonspecific finding.  The right lung remains abnormal with pleural thickening, pleural calcification and some pleural fluid.  The right lung also demonstrates some atelectasis versus scarring as well as stable focal consolidation versus volume loss at the lung base.  Lung fields bilaterally demonstrate emphysema.  There are hilar and mediastinal calcifications, compatible with calcified nodes.  No pericardial fluid is present.    On today's study, best seen on the arterial phase imaging are 3 small hyperdense foci, the largest measuring 1 cm.  These are stable compared to the prior CT which describes debility compared to 2015.  An additional subcentimeter hypodensity is stable dating back to 2017, likely benign.  There is no new liver lesion.  The gallbladder demonstrates no abnormality.  The spleen shows granulomas.    Within the body of the pancreas is an ill-defined hypodense lesion which measures 2 cm in greatest dimension, similar compared to prior study.  The upstream pancreatic duct is dilated measuring 4 mm.    The adrenal glands demonstrate no mass.  There is a right renal cyst.  There are small lymph nodes about the upper abdomen mesentery and rhett hepatis.  There is no aneurysmal dilatation of the aorta.    No pelvic lymphadenopathy.  Bladder is not well distended for good evaluation.  Colon demonstrates diverticulosis.  There are surgical changes along the abdominal wall.  Surgical changes are present at the base of the cecum.  There is some mild mesenteric edema.  Several small soft tissue density nodules within the mesentery of the abdomen on the most recent prior study have decreased in number.    Osseous structures again  show sclerotic foci compatible with osseous metastatic disease.      Impression       In this patient with history metastatic pancreatic and prostate cancer, there is a stable pancreatic mass which measures 2 cm.    Overall decrease in size of mesenteric soft tissue densities, which may reflect peritoneal metastatic disease.    Hepatic hyperenhancing foci stable, most compatible with hemangioma based on stability and prior imaging.    stable changes within the right chest.     We discussed results with patient, wife, and extended family    We discussed that current therapy for pancreatic cancer appears to be working after cycle # 1 of gemcitabine/abraxane.  + fatigue but slowly improving  Denies pain issues  Denies fevers, chills, or infectious complaints.  Appetite improving and weight has stabilized     Oncology History:  1. Metastatic prostate cancer  He is s/p TRUS/bx on 8/7/15 w/ high volume marta 9 (5+4), PSA 25.  He had negative metastatic workup at diagnosis.  However, at time of planned RALP on 9/10/15 he was noted to have BN invasion on cysto and therefore only PLND was done, which confirmed metastatic disease.  He was started on ADT 9/17/15 w/ firmagon followed by Eligard on 10/15/15.    Started Casodex 7/20/17 - scans at that time showed osseus metastatic disease  Started docetaxel 10/6/17 with progression, then found to have below     - on follow-up scans a pancreatic lesion noted concerning for pancreatic cancer as it was an unlikely area for prostate cancer spread-  Biopsy confirmed adenocarcinoma and PSA was negative     Imaging as below:  Scan results:  6/20/18 Bone scan:  FINDINGS:  Two lesions in the left scapula, stable.  Elongated lesion in the right mid rib posteriorly, stable.  Prior exam demonstrated a large right iliac lesion, which no longer exhibits increased uptake.  Left pubic lesion, stable.  There is also now a small lesion in the left iliac bone. Two additional new small foci in the  mid thoracic vertebra.  Both kidneys and the bladder appear unremarkable.  Impression:  Findings consistent with osseous metastatic disease as above, noting 3 new small lesions.     6/21/18 CT scan abdomen and pelvis:  FINDINGS:  Lower chest: There has been interval increase in size in the focal consolidation which is pleural based in the posterior aspect of the right lower lobe.  Today's exam measures 5.1 cm maximum diameter.  Previous exam is measure approximately 3.3 cm.  This is associated with pleural thickening that extends laterally.  There is a small focus of cylinder all coal bronchiectasis in the medial left lower lobe.  The remaining aspects of the lung parenchyma that are visualized show no abnormalities.  The visualized heart and pericardium are normal  Liver: There are 3 hyperenhancing foci along the periphery of the right and left lobes of the liver which are stable from 2015.  The largest measures 1.1 cm in maximum diameter.  Although not characteristic on this exam, previous exam in 2015 shows findings most characteristic of hepatic hemangioma.  The remaining liver parenchyma as well as the hepatic and portal veins are patent.  Gallbladder: Normal.  Pancreas: There is been interval development of an ill-defined hypodense focus within the proximal body of the pancreas measuring 2.1 cm x 1.8 cm.  Spleen: Calcified hemangiomas present.  Otherwise normal.  Adrenal glands: Normal.  Genitourinary: Stable simple cyst in the lower pole of the right kidney measuring 1.6 cm.  The left kidney and bilateral ureters are normal.  The bladder is partially distended and shows wall thickening on the non dependent surface of the bladder.  This is not significantly changed in appearance.  Gastrointestinal: Stomach and small intestines are normal.  Colonic diverticulosis is present without evidence of diverticulitis.  Prostate: Enlarged and nodular causing mass effect on the bladder.  Miscellaneous: There is no  intra-abdominal or pelvic free fluid, free air or lymphadenopathy.  The abdominal aorta tapers normally.  Bilateral fat containing inguinal hernias present.  Osseous and soft tissue structures: Sclerotic metastases involving the right iliac bone and left superior pubic ramus are identified.  The small focus in the left iliac bone seen on yesterday's bone scan is not well seen on this exam.  The soft tissues are within normal limits.  Impression:  1. Interval increase in size in the pleural base, right lower lobe consolidation.  2. Hyperenhancing foci likely represent hemangiomas.  They have been stable since 2015.  Is on the 2015 exam where they demonstrate characteristics most compatible with hemangioma.  3. Bladder wall thickening is not significantly changed.  This may be related to outlet obstruction from the nodular prostate.  4. Interval development of ill-defined hypodense focus measuring 2.1 cm within the proximal body of the pancreas.  This may also be a focus of metastatic disease although is uncommon for the prostate to metastasized to the pancreas.  A dedicated pancreatic CT may be beneficial in further characterizing this lesion.     - on 7/25/18 he came to ER with worsening abdominal pain and concerns for appendicitis  Underwent Laparoscopy Converted to Laparotomy, Peritoneal Biopsy , Ileocecal with Dr. Stephenson  Biopsy concerning for metastatic pancreatic carcinoma     2. Metastatic pancreatic cancer  - started on [place]/Abraxane- note Ca19-9 never elevated  Response as above      Review of Systems  see recent note  Objective:      Physical Exam  see recent note  Assessment:       1. Prostate cancer metastatic to bone    2. Malignant neoplasm of body of pancreas    3. 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 Therapy stopped with detection of #2,3 and was progressing  2-3. Restart Gemcitabine and Abraxane  With cycle # 2 will split doses by 2 weeks and reduce doses  slightly to see if tolerates better and not as much toxicity  4. Parameters slightly down  recheck before next cycle as asymptomatic    25 minutes total discussion

## 2018-09-05 ENCOUNTER — OFFICE VISIT (OUTPATIENT)
Dept: CARDIOLOGY | Facility: CLINIC | Age: 73
End: 2018-09-05
Attending: INTERNAL MEDICINE
Payer: MEDICARE

## 2018-09-05 VITALS
SYSTOLIC BLOOD PRESSURE: 133 MMHG | DIASTOLIC BLOOD PRESSURE: 77 MMHG | BODY MASS INDEX: 25.46 KG/M2 | HEART RATE: 84 BPM | WEIGHT: 168 LBS | HEIGHT: 68 IN

## 2018-09-05 DIAGNOSIS — C78.6 PERITONEAL METASTASES: ICD-10-CM

## 2018-09-05 DIAGNOSIS — C25.1 MALIGNANT NEOPLASM OF BODY OF PANCREAS: ICD-10-CM

## 2018-09-05 DIAGNOSIS — E11.8 TYPE 2 DIABETES MELLITUS WITH COMPLICATION, WITHOUT LONG-TERM CURRENT USE OF INSULIN: ICD-10-CM

## 2018-09-05 DIAGNOSIS — R00.2 PALPITATIONS: Primary | ICD-10-CM

## 2018-09-05 DIAGNOSIS — K86.89 MASS OF PANCREAS: ICD-10-CM

## 2018-09-05 DIAGNOSIS — C77.5 PROSTATE CANCER METASTATIC TO INTRAPELVIC LYMPH NODE: ICD-10-CM

## 2018-09-05 DIAGNOSIS — I10 ESSENTIAL HYPERTENSION: ICD-10-CM

## 2018-09-05 DIAGNOSIS — C61 PROSTATE CANCER METASTATIC TO INTRAPELVIC LYMPH NODE: ICD-10-CM

## 2018-09-05 PROCEDURE — 1101F PT FALLS ASSESS-DOCD LE1/YR: CPT | Mod: CPTII,S$GLB,, | Performed by: INTERNAL MEDICINE

## 2018-09-05 PROCEDURE — 3078F DIAST BP <80 MM HG: CPT | Mod: CPTII,S$GLB,, | Performed by: INTERNAL MEDICINE

## 2018-09-05 PROCEDURE — 93000 ELECTROCARDIOGRAM COMPLETE: CPT | Mod: 59,S$GLB,, | Performed by: INTERNAL MEDICINE

## 2018-09-05 PROCEDURE — 99204 OFFICE O/P NEW MOD 45 MIN: CPT | Mod: S$GLB,,, | Performed by: INTERNAL MEDICINE

## 2018-09-05 PROCEDURE — 93224 XTRNL ECG REC UP TO 48 HRS: CPT | Mod: S$GLB,,, | Performed by: INTERNAL MEDICINE

## 2018-09-05 PROCEDURE — 3075F SYST BP GE 130 - 139MM HG: CPT | Mod: CPTII,S$GLB,, | Performed by: INTERNAL MEDICINE

## 2018-09-05 PROCEDURE — 3044F HG A1C LEVEL LT 7.0%: CPT | Mod: CPTII,S$GLB,, | Performed by: INTERNAL MEDICINE

## 2018-09-05 NOTE — LETTER
September 5, 2018      Hiram Coronado MD  [address] [facility] [address]           [facility]  [address], [address]  [facility] [address]  Phone: [phone]  Fax: [phone]          Patient: Shady Prakash Jr.   MR Number: [id number]   YOB: 1945   Date of Visit: 9/5/2018       Dear Dr. Hiram Coronado:    Thank you for referring Shady Prakash to me for evaluation. Attached you will find relevant portions of my assessment and plan of care.    If you have questions, please do not hesitate to call me. I look forward to following Shady Prakash along with you.    Sincerely,    Johana Holloway RN    Enclosure  CC:  No Recipients    If you would like to receive this communication electronically, please contact [email or web address] or [phone] to request more information on [employer] Link access.    For providers and/or their staff who would like to refer a patient to [facility], please contact us through our one-stop-shop provider referral line, [facility], at [phone].    If you feel you have received this communication in error or would no longer like to receive these types of communications, please e-mail [email or web address]

## 2018-09-05 NOTE — PROGRESS NOTES
"Subjective:    Patient ID:  Shady Prakash Jr. is a 73 y.o. male     HPI     Referred here for cardiac evaluation.  During his recent hospitalization he was apparently noted to have palpitations.    " my heart was racing fast in the hospital a few times"  I do not know why they sent me here.    I have no shortness of breath or chest discomfort.      Past history     have had high blood pressure and diabetes mellitus.    Metastatic prostate cancer to the bones     Pancreatic cancer    Current Outpatient Medications   Medication Sig    aspirin (ECOTRIN) 81 MG EC tablet Take 81 mg by mouth once daily. States not taken in over 1 month as of 9/2/15    calcium-vitamin D3 (OYSTER SHELL CALCIUM-VIT D3) 500 mg(1,250mg) -200 unit per tablet Take 1 tablet by mouth 2 (two) times daily.    diphth,pertus,acell,,tetanus (BOOSTRIX) 2.5-8-5 Lf-mcg-Lf/0.5mL Susp Inject into the muscle.    docusate sodium (COLACE) 100 MG capsule Take 1 capsule (100 mg total) by mouth 2 (two) times daily.    docusate sodium (COLACE) 50 MG capsule Take 1 capsule (50 mg total) by mouth 2 (two) times daily.    dronabinol (MARINOL) 2.5 MG capsule Take 1 capsule (2.5 mg total) by mouth 2 (two) times daily before meals.    furosemide (LASIX) 40 MG tablet Take 40 mg by mouth.    glimepiride (AMARYL) 2 MG tablet Take 2 mg by mouth 2 (two) times daily.     lisinopril-hydrochlorothiazide (PRINZIDE,ZESTORETIC) 20-25 mg Tab Take 1 tablet by mouth once daily.     lovastatin (MEVACOR) 20 MG tablet     metformin (GLUCOPHAGE) 1000 MG tablet Take 1,000 mg by mouth 2 (two) times daily.     ondansetron (ZOFRAN-ODT) 4 MG TbDL Take 1 tablet (4 mg total) by mouth every 6 (six) hours as needed (nausea).    oxyCODONE-acetaminophen (PERCOCET) 5-325 mg per tablet Take 1 tablet by mouth every 4 (four) hours as needed.    papaverine 30 mg/mL injection Add Phentolamine 1 mg/cc  Add PGE1 10 mcg/cc    SIG:  Bring to office for test dose in physician office    " "promethazine (PHENERGAN) 25 MG tablet Take 1 tablet (25 mg total) by mouth every 6 (six) hours as needed for Nausea.    sildenafil (REVATIO) 20 mg Tab Take 1 tablet (20 mg total) by mouth As instructed. Take 2-5 tablets as needed.     Current Facility-Administered Medications   Medication    leuprolide (6 month) (ELIGARD) injection 45 mg    [START ON 11/7/2018] leuprolide (6 month) (ELIGARD) injection 45 mg    leuprolide (ELIGARD) injection 45 mg           Review of Systems   Constitution: Positive for weight loss. Negative for chills, decreased appetite, fever and weight gain.   HENT: Negative for congestion, hearing loss and sore throat.    Eyes: Negative for blurred vision, double vision and visual disturbance.   Cardiovascular: Positive for palpitations. Negative for chest pain, claudication, dyspnea on exertion, leg swelling and syncope.   Respiratory: Negative for cough, hemoptysis, shortness of breath, sputum production and wheezing.    Endocrine: Negative for cold intolerance and heat intolerance.   Hematologic/Lymphatic: Negative for bleeding problem. Does not bruise/bleed easily.   Skin: Negative for color change, dry skin, flushing and itching.   Musculoskeletal: Negative for back pain, joint pain and myalgias.   Gastrointestinal: Negative for abdominal pain, anorexia, constipation, diarrhea, dysphagia, nausea and vomiting.        No bleeding per rectum   Genitourinary: Negative for dysuria, flank pain, frequency, hematuria and nocturia.   Neurological: Negative for dizziness, headaches, light-headedness, loss of balance, seizures and tremors.   Psychiatric/Behavioral: Negative for altered mental status and depression.         Vitals:    09/05/18 1422   BP: 133/77   Pulse: 84   Weight: 76.2 kg (168 lb)   Height: 5' 8" (1.727 m)     Objective:    Physical Exam   Constitutional: He is oriented to person, place, and time. He appears well-developed and well-nourished.   HENT:   Head: Normocephalic and " atraumatic.   Right Ear: External ear normal.   Left Ear: External ear normal.   Nose: Nose normal.   Eyes: Conjunctivae and EOM are normal. Pupils are equal, round, and reactive to light. No scleral icterus.   Neck: Normal range of motion. Neck supple. No JVD present. No tracheal deviation present. No thyromegaly present.   Cardiovascular: Normal rate, regular rhythm and normal heart sounds. Exam reveals no gallop and no friction rub.   No murmur heard.  Pulmonary/Chest: Effort normal and breath sounds normal. No respiratory distress. He has no rales. He exhibits no tenderness.   Abdominal: Soft. Bowel sounds are normal. He exhibits no distension and no mass. There is no tenderness.   Musculoskeletal: Normal range of motion. He exhibits no edema or tenderness.   Lymphadenopathy:     He has no cervical adenopathy.   Neurological: He is alert and oriented to person, place, and time. He has normal reflexes. No cranial nerve deficit. Coordination normal.   Skin: Skin is warm and dry. No rash noted.   Psychiatric: He has a normal mood and affect. His behavior is normal.         Assessment:       1. Palpitations    2. Mass of pancreas    3. Peritoneal metastases    4. Malignant neoplasm of body of pancreas    5. Prostate cancer metastatic to intrapelvic lymph node    6. Type 2 diabetes mellitus with complication, without long-term current use of insulin    7. Essential hypertension         Plan:        Holter monitor.

## 2018-09-06 ENCOUNTER — INFUSION (OUTPATIENT)
Dept: INFUSION THERAPY | Facility: OTHER | Age: 73
End: 2018-09-06
Attending: INTERNAL MEDICINE
Payer: MEDICARE

## 2018-09-06 VITALS
WEIGHT: 168.88 LBS | SYSTOLIC BLOOD PRESSURE: 143 MMHG | HEIGHT: 68 IN | BODY MASS INDEX: 25.59 KG/M2 | HEART RATE: 80 BPM | RESPIRATION RATE: 16 BRPM | OXYGEN SATURATION: 97 % | TEMPERATURE: 99 F | DIASTOLIC BLOOD PRESSURE: 75 MMHG

## 2018-09-06 DIAGNOSIS — C61 PROSTATE CANCER METASTATIC TO INTRAPELVIC LYMPH NODE: ICD-10-CM

## 2018-09-06 DIAGNOSIS — C61 PROSTATE CANCER METASTATIC TO BONE: ICD-10-CM

## 2018-09-06 DIAGNOSIS — C77.5 PROSTATE CANCER METASTATIC TO INTRAPELVIC LYMPH NODE: ICD-10-CM

## 2018-09-06 DIAGNOSIS — C25.1 MALIGNANT NEOPLASM OF BODY OF PANCREAS: Primary | ICD-10-CM

## 2018-09-06 DIAGNOSIS — C79.51 PROSTATE CANCER METASTATIC TO BONE: ICD-10-CM

## 2018-09-06 PROCEDURE — 96413 CHEMO IV INFUSION 1 HR: CPT

## 2018-09-06 PROCEDURE — 63600175 PHARM REV CODE 636 W HCPCS: Performed by: INTERNAL MEDICINE

## 2018-09-06 PROCEDURE — 96417 CHEMO IV INFUS EACH ADDL SEQ: CPT

## 2018-09-06 PROCEDURE — 96367 TX/PROPH/DG ADDL SEQ IV INF: CPT

## 2018-09-06 PROCEDURE — 25000003 PHARM REV CODE 250: Performed by: INTERNAL MEDICINE

## 2018-09-06 RX ORDER — SODIUM CHLORIDE 0.9 % (FLUSH) 0.9 %
10 SYRINGE (ML) INJECTION
Status: DISCONTINUED | OUTPATIENT
Start: 2018-09-06 | End: 2018-09-06 | Stop reason: HOSPADM

## 2018-09-06 RX ORDER — HEPARIN 100 UNIT/ML
500 SYRINGE INTRAVENOUS
Status: CANCELLED | OUTPATIENT
Start: 2018-09-06

## 2018-09-06 RX ORDER — HEPARIN 100 UNIT/ML
500 SYRINGE INTRAVENOUS
Status: DISCONTINUED | OUTPATIENT
Start: 2018-09-06 | End: 2018-09-06 | Stop reason: HOSPADM

## 2018-09-06 RX ORDER — SODIUM CHLORIDE 0.9 % (FLUSH) 0.9 %
10 SYRINGE (ML) INJECTION
Status: CANCELLED | OUTPATIENT
Start: 2018-09-06

## 2018-09-06 RX ADMIN — PACLITAXEL 260 MG: 100 INJECTION, POWDER, LYOPHILIZED, FOR SUSPENSION INTRAVENOUS at 10:09

## 2018-09-06 RX ADMIN — SODIUM CHLORIDE: 0.9 INJECTION, SOLUTION INTRAVENOUS at 09:09

## 2018-09-06 RX ADMIN — Medication 1 MG: at 09:09

## 2018-09-06 RX ADMIN — GEMCITABINE 2050 MG: 38 INJECTION, SOLUTION INTRAVENOUS at 10:09

## 2018-09-06 NOTE — PLAN OF CARE
Problem: Patient Care Overview  Goal: Plan of Care Review  Outcome: Ongoing (interventions implemented as appropriate)  C2D1 Abraxane/Gemzar administered, patient tolerated well. VSS, NAD. Education provided. D/C'd home with wife.

## 2018-09-07 ENCOUNTER — TELEPHONE (OUTPATIENT)
Dept: CARDIOLOGY | Facility: CLINIC | Age: 73
End: 2018-09-07

## 2018-09-11 ENCOUNTER — OFFICE VISIT (OUTPATIENT)
Dept: HEMATOLOGY/ONCOLOGY | Facility: CLINIC | Age: 73
End: 2018-09-11
Attending: INTERNAL MEDICINE
Payer: MEDICARE

## 2018-09-11 ENCOUNTER — LAB VISIT (OUTPATIENT)
Dept: LAB | Facility: OTHER | Age: 73
End: 2018-09-11
Attending: INTERNAL MEDICINE
Payer: MEDICARE

## 2018-09-11 VITALS
HEIGHT: 68 IN | TEMPERATURE: 99 F | WEIGHT: 161.81 LBS | DIASTOLIC BLOOD PRESSURE: 60 MMHG | RESPIRATION RATE: 16 BRPM | OXYGEN SATURATION: 100 % | HEART RATE: 97 BPM | BODY MASS INDEX: 24.52 KG/M2 | SYSTOLIC BLOOD PRESSURE: 98 MMHG

## 2018-09-11 DIAGNOSIS — C61 PROSTATE CANCER METASTATIC TO BONE: ICD-10-CM

## 2018-09-11 DIAGNOSIS — C79.51 PROSTATE CANCER METASTATIC TO BONE: ICD-10-CM

## 2018-09-11 DIAGNOSIS — E87.6 HYPOKALEMIA: ICD-10-CM

## 2018-09-11 DIAGNOSIS — C25.1 MALIGNANT NEOPLASM OF BODY OF PANCREAS: ICD-10-CM

## 2018-09-11 DIAGNOSIS — D63.0 ANEMIA IN NEOPLASTIC DISEASE: ICD-10-CM

## 2018-09-11 DIAGNOSIS — D63.0 ANEMIA IN NEOPLASTIC DISEASE: Primary | ICD-10-CM

## 2018-09-11 DIAGNOSIS — I95.1 ORTHOSTATIC HYPOTENSION: ICD-10-CM

## 2018-09-11 DIAGNOSIS — R42 DIZZINESS: ICD-10-CM

## 2018-09-11 DIAGNOSIS — E43 SEVERE MALNUTRITION: ICD-10-CM

## 2018-09-11 DIAGNOSIS — C78.6 PERITONEAL METASTASES: ICD-10-CM

## 2018-09-11 PROBLEM — I95.9 HYPOTENSION: Status: ACTIVE | Noted: 2018-09-11

## 2018-09-11 LAB
ALBUMIN SERPL BCP-MCNC: 2.9 G/DL
ALP SERPL-CCNC: 93 U/L
ALT SERPL W/O P-5'-P-CCNC: 16 U/L
ANION GAP SERPL CALC-SCNC: 14 MMOL/L
AST SERPL-CCNC: 22 U/L
BASOPHILS # BLD AUTO: 0.02 K/UL
BASOPHILS NFR BLD: 1 %
BILIRUB SERPL-MCNC: 0.5 MG/DL
BUN SERPL-MCNC: 8 MG/DL
CALCIUM SERPL-MCNC: 9.1 MG/DL
CHLORIDE SERPL-SCNC: 95 MMOL/L
CO2 SERPL-SCNC: 31 MMOL/L
CREAT SERPL-MCNC: 0.8 MG/DL
DIFFERENTIAL METHOD: ABNORMAL
EOSINOPHIL # BLD AUTO: 0.1 K/UL
EOSINOPHIL NFR BLD: 2.4 %
ERYTHROCYTE [DISTWIDTH] IN BLOOD BY AUTOMATED COUNT: 19.1 %
EST. GFR  (AFRICAN AMERICAN): >60 ML/MIN/1.73 M^2
EST. GFR  (NON AFRICAN AMERICAN): >60 ML/MIN/1.73 M^2
GLUCOSE SERPL-MCNC: 132 MG/DL
HCT VFR BLD AUTO: 25.2 %
HGB BLD-MCNC: 7.4 G/DL
LYMPHOCYTES # BLD AUTO: 0.7 K/UL
LYMPHOCYTES NFR BLD: 33.5 %
MCH RBC QN AUTO: 23.9 PG
MCHC RBC AUTO-ENTMCNC: 29.4 G/DL
MCV RBC AUTO: 81 FL
MONOCYTES # BLD AUTO: 0 K/UL
MONOCYTES NFR BLD: 1.9 %
NEUTROPHILS # BLD AUTO: 1.3 K/UL
NEUTROPHILS NFR BLD: 61.2 %
PLATELET # BLD AUTO: 480 K/UL
PMV BLD AUTO: 8.1 FL
POTASSIUM SERPL-SCNC: 3 MMOL/L
PROT SERPL-MCNC: 6.7 G/DL
RBC # BLD AUTO: 3.1 M/UL
SODIUM SERPL-SCNC: 140 MMOL/L
WBC # BLD AUTO: 2.06 K/UL

## 2018-09-11 PROCEDURE — 1101F PT FALLS ASSESS-DOCD LE1/YR: CPT | Mod: CPTII,,, | Performed by: INTERNAL MEDICINE

## 2018-09-11 PROCEDURE — 80053 COMPREHEN METABOLIC PANEL: CPT

## 2018-09-11 PROCEDURE — 99213 OFFICE O/P EST LOW 20 MIN: CPT | Mod: PBBFAC | Performed by: INTERNAL MEDICINE

## 2018-09-11 PROCEDURE — 85025 COMPLETE CBC W/AUTO DIFF WBC: CPT

## 2018-09-11 PROCEDURE — 3078F DIAST BP <80 MM HG: CPT | Mod: CPTII,,, | Performed by: INTERNAL MEDICINE

## 2018-09-11 PROCEDURE — 99999 PR PBB SHADOW E&M-EST. PATIENT-LVL III: CPT | Mod: PBBFAC,,, | Performed by: INTERNAL MEDICINE

## 2018-09-11 PROCEDURE — 36415 COLL VENOUS BLD VENIPUNCTURE: CPT

## 2018-09-11 PROCEDURE — 99214 OFFICE O/P EST MOD 30 MIN: CPT | Mod: S$PBB,,, | Performed by: INTERNAL MEDICINE

## 2018-09-11 PROCEDURE — 3074F SYST BP LT 130 MM HG: CPT | Mod: CPTII,,, | Performed by: INTERNAL MEDICINE

## 2018-09-11 RX ORDER — DIPHENHYDRAMINE HYDROCHLORIDE 50 MG/ML
25 INJECTION INTRAMUSCULAR; INTRAVENOUS
Status: CANCELLED | OUTPATIENT
Start: 2018-09-11

## 2018-09-11 RX ORDER — ACETAMINOPHEN 325 MG/1
650 TABLET ORAL
Status: CANCELLED | OUTPATIENT
Start: 2018-09-11

## 2018-09-11 RX ORDER — POTASSIUM CHLORIDE 20 MEQ/1
20 TABLET, EXTENDED RELEASE ORAL 2 TIMES DAILY
Qty: 6 TABLET | Refills: 0 | Status: SHIPPED | OUTPATIENT
Start: 2018-09-11 | End: 2019-09-11

## 2018-09-11 RX ORDER — HYDROCODONE BITARTRATE AND ACETAMINOPHEN 500; 5 MG/1; MG/1
TABLET ORAL ONCE
Status: CANCELLED | OUTPATIENT
Start: 2018-09-11 | End: 2018-09-11

## 2018-09-11 NOTE — PROGRESS NOTES
Subjective:       Patient ID: Shady Prakash Jr. is a 73 y.o. male.    Chief Complaint: No chief complaint on file.    HPI     Appetite is gone again  Lost 7 lbs of weight  Feels phlegm that is difficult to get out  + chills- no fevers  No nausea or vomiting  Notes getting dizzy again- recently had his blood pressure medication doubled and feels like it started after  Reports drinking a lot water and urinating well  Denies headaches    Returns after recent scans  See note 8/28/18     CT C/A/P 8/29/18:       FINDINGS:  There is a single micro nodule which is stable in the left lung, a nonspecific finding.  The right lung remains abnormal with pleural thickening, pleural calcification and some pleural fluid.  The right lung also demonstrates some atelectasis versus scarring as well as stable focal consolidation versus volume loss at the lung base.  Lung fields bilaterally demonstrate emphysema.  There are hilar and mediastinal calcifications, compatible with calcified nodes.  No pericardial fluid is present.    On today's study, best seen on the arterial phase imaging are 3 small hyperdense foci, the largest measuring 1 cm.  These are stable compared to the prior CT which describes debility compared to 2015.  An additional subcentimeter hypodensity is stable dating back to 2017, likely benign.  There is no new liver lesion.  The gallbladder demonstrates no abnormality.  The spleen shows granulomas.    Within the body of the pancreas is an ill-defined hypodense lesion which measures 2 cm in greatest dimension, similar compared to prior study.  The upstream pancreatic duct is dilated measuring 4 mm.    The adrenal glands demonstrate no mass.  There is a right renal cyst.  There are small lymph nodes about the upper abdomen mesentery and rhett hepatis.  There is no aneurysmal dilatation of the aorta.    No pelvic lymphadenopathy.  Bladder is not well distended for good evaluation.  Colon demonstrates diverticulosis.   There are surgical changes along the abdominal wall.  Surgical changes are present at the base of the cecum.  There is some mild mesenteric edema.  Several small soft tissue density nodules within the mesentery of the abdomen on the most recent prior study have decreased in number.    Osseous structures again show sclerotic foci compatible with osseous metastatic disease.       Impression         In this patient with history metastatic pancreatic and prostate cancer, there is a stable pancreatic mass which measures 2 cm.    Overall decrease in size of mesenteric soft tissue densities, which may reflect peritoneal metastatic disease.    Hepatic hyperenhancing foci stable, most compatible with hemangioma based on stability and prior imaging.    stable changes within the right chest.      We discussed results with patient, wife, and extended family     We discussed that current therapy for pancreatic cancer appears to be working after cycle # 1 of gemcitabine/abraxane.  + fatigue but slowly improving  Denies pain issues  Denies fevers, chills, or infectious complaints.  Appetite improving and weight has stabilized     Oncology History:  1. Metastatic prostate cancer  He is s/p TRUS/bx on 8/7/15 w/ high volume marta 9 (5+4), PSA 25.  He had negative metastatic workup at diagnosis.  However, at time of planned RALP on 9/10/15 he was noted to have BN invasion on cysto and therefore only PLND was done, which confirmed metastatic disease.  He was started on ADT 9/17/15 w/ firmagon followed by Eligard on 10/15/15.    Started Casodex 7/20/17 - scans at that time showed osseus metastatic disease  Started docetaxel 10/6/17 with progression, then found to have below     - on follow-up scans a pancreatic lesion noted concerning for pancreatic cancer as it was an unlikely area for prostate cancer spread-  Biopsy confirmed adenocarcinoma and PSA was negative     Imaging as below:  Scan results:  6/20/18 Bone  scan:  FINDINGS:  Two lesions in the left scapula, stable.  Elongated lesion in the right mid rib posteriorly, stable.  Prior exam demonstrated a large right iliac lesion, which no longer exhibits increased uptake.  Left pubic lesion, stable.  There is also now a small lesion in the left iliac bone. Two additional new small foci in the mid thoracic vertebra.  Both kidneys and the bladder appear unremarkable.  Impression:  Findings consistent with osseous metastatic disease as above, noting 3 new small lesions.     6/21/18 CT scan abdomen and pelvis:  FINDINGS:  Lower chest: There has been interval increase in size in the focal consolidation which is pleural based in the posterior aspect of the right lower lobe.  Today's exam measures 5.1 cm maximum diameter.  Previous exam is measure approximately 3.3 cm.  This is associated with pleural thickening that extends laterally.  There is a small focus of cylinder all coal bronchiectasis in the medial left lower lobe.  The remaining aspects of the lung parenchyma that are visualized show no abnormalities.  The visualized heart and pericardium are normal  Liver: There are 3 hyperenhancing foci along the periphery of the right and left lobes of the liver which are stable from 2015.  The largest measures 1.1 cm in maximum diameter.  Although not characteristic on this exam, previous exam in 2015 shows findings most characteristic of hepatic hemangioma.  The remaining liver parenchyma as well as the hepatic and portal veins are patent.  Gallbladder: Normal.  Pancreas: There is been interval development of an ill-defined hypodense focus within the proximal body of the pancreas measuring 2.1 cm x 1.8 cm.  Spleen: Calcified hemangiomas present.  Otherwise normal.  Adrenal glands: Normal.  Genitourinary: Stable simple cyst in the lower pole of the right kidney measuring 1.6 cm.  The left kidney and bilateral ureters are normal.  The bladder is partially distended and shows wall  thickening on the non dependent surface of the bladder.  This is not significantly changed in appearance.  Gastrointestinal: Stomach and small intestines are normal.  Colonic diverticulosis is present without evidence of diverticulitis.  Prostate: Enlarged and nodular causing mass effect on the bladder.  Miscellaneous: There is no intra-abdominal or pelvic free fluid, free air or lymphadenopathy.  The abdominal aorta tapers normally.  Bilateral fat containing inguinal hernias present.  Osseous and soft tissue structures: Sclerotic metastases involving the right iliac bone and left superior pubic ramus are identified.  The small focus in the left iliac bone seen on yesterday's bone scan is not well seen on this exam.  The soft tissues are within normal limits.  Impression:  1. Interval increase in size in the pleural base, right lower lobe consolidation.  2. Hyperenhancing foci likely represent hemangiomas.  They have been stable since 2015.  Is on the 2015 exam where they demonstrate characteristics most compatible with hemangioma.  3. Bladder wall thickening is not significantly changed.  This may be related to outlet obstruction from the nodular prostate.  4. Interval development of ill-defined hypodense focus measuring 2.1 cm within the proximal body of the pancreas.  This may also be a focus of metastatic disease although is uncommon for the prostate to metastasized to the pancreas.  A dedicated pancreatic CT may be beneficial in further characterizing this lesion.     - on 7/25/18 he came to ER with worsening abdominal pain and concerns for appendicitis  Underwent Laparoscopy Converted to Laparotomy, Peritoneal Biopsy , Ileocecal with Dr. Stephenson  Biopsy concerning for metastatic pancreatic carcinoma     2. Metastatic pancreatic cancer  - started on [place]/Abraxane- note Ca19-9 never elevated  Response as above      Review of Systems   Constitutional: Positive for activity change, appetite change, fatigue and  unexpected weight change. Negative for chills and fever.   HENT: Positive for dental problem. Negative for congestion, hearing loss, mouth sores, nosebleeds, postnasal drip, sinus pressure, sore throat and trouble swallowing.    Eyes: Negative for visual disturbance.   Respiratory: Positive for cough. Negative for chest tightness, shortness of breath and wheezing.    Cardiovascular: Negative for chest pain, palpitations and leg swelling.   Gastrointestinal: Negative for abdominal distention, abdominal pain, blood in stool, constipation, diarrhea and vomiting.        No GERD   Endocrine:        Hot flashes   Genitourinary: Positive for frequency. Negative for decreased urine volume, difficulty urinating, hematuria and urgency.        Sexual dysfunction   Musculoskeletal: Positive for arthralgias (better). Negative for back pain, gait problem, joint swelling, neck pain and neck stiffness.   Skin: Positive for wound (surgical site). Negative for color change, pallor and rash.   Neurological: Positive for dizziness. Negative for weakness, light-headedness, numbness and headaches.   Hematological: Negative for adenopathy. Does not bruise/bleed easily.   Psychiatric/Behavioral: Negative for dysphoric mood and sleep disturbance. The patient is not nervous/anxious.        Objective:      Physical Exam   Constitutional: He is oriented to person, place, and time. He appears well-developed and well-nourished. No distress.   Presents with wife   HENT:   Head: Normocephalic and atraumatic.   Right Ear: External ear normal.   Left Ear: External ear normal.   Nose: Nose normal.   Mouth/Throat: Oropharynx is clear and moist. No oropharyngeal exudate.   Eyes: Conjunctivae and EOM are normal. Pupils are equal, round, and reactive to light. Left eye exhibits no discharge. No scleral icterus.   Neck: Normal range of motion. Neck supple. No tracheal deviation present. No thyromegaly present.   Cardiovascular: Normal rate, regular  rhythm, normal heart sounds and intact distal pulses. Exam reveals no gallop and no friction rub.   No murmur heard.  Pulmonary/Chest: Effort normal and breath sounds normal. No respiratory distress. He has no wheezes. He has no rales. He exhibits no tenderness.   Abdominal: Soft. Bowel sounds are normal. He exhibits no distension and no mass. There is no tenderness. There is no rebound and no guarding.   Musculoskeletal: Normal range of motion. He exhibits no edema, tenderness or deformity.   Lymphadenopathy:     He has no cervical adenopathy.   Neurological: He is alert and oriented to person, place, and time. He has normal reflexes. He displays normal reflexes. No cranial nerve deficit. He exhibits normal muscle tone. Coordination normal.   Skin: Skin is warm and dry. Rash (areas of excoriation) noted. He is not diaphoretic. No erythema. No pallor.   Psychiatric: He has a normal mood and affect. His behavior is normal. Judgment and thought content normal.   Nursing note and vitals reviewed.    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 Hypokalemia    3. Malignant neoplasm of body of pancreas    4. Prostate cancer metastatic to bone    5. Severe malnutrition    6. Dizziness    7. Orthostatic hypotension        Plan:     1. Transfuse  2. Replacement ordered and reviewed with patient  3. On current chemotherapy  We discussed concerns about performance status  recheck cbc in 1 week and hopefully restart  Due to SEs we will be giving at dose reduction q 2 weeks  4. On Eligard, additional treatment on hold  5. Reviewed - on marinol  6-7. He apparently was advised to take 80 mg of Lasix daily for edema  He was told to stop all Lasix immediately  Replace potassium  Hydrate well

## 2018-09-11 NOTE — Clinical Note
- Transfuse Thursday- already has time - on 9/20/18 needs cbc before that chemo- see me 2 weeks later with labs (cbc, cmo) and EP and chemo

## 2018-09-12 DIAGNOSIS — C79.9 METASTASIS FROM PANCREATIC CANCER: Primary | ICD-10-CM

## 2018-09-12 DIAGNOSIS — C25.9 METASTASIS FROM PANCREATIC CANCER: Primary | ICD-10-CM

## 2018-09-12 PROCEDURE — 86920 COMPATIBILITY TEST SPIN: CPT

## 2018-09-13 ENCOUNTER — TELEPHONE (OUTPATIENT)
Dept: HEMATOLOGY/ONCOLOGY | Facility: CLINIC | Age: 73
End: 2018-09-13

## 2018-09-13 ENCOUNTER — INFUSION (OUTPATIENT)
Dept: INFUSION THERAPY | Facility: OTHER | Age: 73
End: 2018-09-13
Attending: INTERNAL MEDICINE
Payer: MEDICARE

## 2018-09-13 VITALS
WEIGHT: 161.63 LBS | DIASTOLIC BLOOD PRESSURE: 76 MMHG | TEMPERATURE: 98 F | BODY MASS INDEX: 24.49 KG/M2 | SYSTOLIC BLOOD PRESSURE: 149 MMHG | RESPIRATION RATE: 17 BRPM | HEART RATE: 76 BPM | OXYGEN SATURATION: 99 % | HEIGHT: 68 IN

## 2018-09-13 DIAGNOSIS — D63.0 ANEMIA IN NEOPLASTIC DISEASE: ICD-10-CM

## 2018-09-13 DIAGNOSIS — R00.2 PALPITATIONS: ICD-10-CM

## 2018-09-13 LAB
BLD PROD TYP BPU: NORMAL
BLOOD UNIT EXPIRATION DATE: NORMAL
BLOOD UNIT TYPE CODE: 6200
BLOOD UNIT TYPE: NORMAL
CODING SYSTEM: NORMAL
DISPENSE STATUS: NORMAL
TRANS ERYTHROCYTES VOL PATIENT: NORMAL ML

## 2018-09-13 PROCEDURE — P9021 RED BLOOD CELLS UNIT: HCPCS

## 2018-09-13 PROCEDURE — 96374 THER/PROPH/DIAG INJ IV PUSH: CPT

## 2018-09-13 PROCEDURE — 63600175 PHARM REV CODE 636 W HCPCS: Performed by: INTERNAL MEDICINE

## 2018-09-13 PROCEDURE — 25000003 PHARM REV CODE 250: Performed by: INTERNAL MEDICINE

## 2018-09-13 PROCEDURE — 36430 TRANSFUSION BLD/BLD COMPNT: CPT

## 2018-09-13 RX ORDER — ACETAMINOPHEN 325 MG/1
650 TABLET ORAL
Status: COMPLETED | OUTPATIENT
Start: 2018-09-13 | End: 2018-09-13

## 2018-09-13 RX ORDER — DIPHENHYDRAMINE HYDROCHLORIDE 50 MG/ML
25 INJECTION INTRAMUSCULAR; INTRAVENOUS
Status: COMPLETED | OUTPATIENT
Start: 2018-09-13 | End: 2018-09-13

## 2018-09-13 RX ORDER — HYDROCODONE BITARTRATE AND ACETAMINOPHEN 500; 5 MG/1; MG/1
TABLET ORAL ONCE
Status: COMPLETED | OUTPATIENT
Start: 2018-09-13 | End: 2018-09-13

## 2018-09-13 RX ADMIN — DIPHENHYDRAMINE HYDROCHLORIDE 25 MG: 50 INJECTION, SOLUTION INTRAMUSCULAR; INTRAVENOUS at 09:09

## 2018-09-13 RX ADMIN — SODIUM CHLORIDE: 0.9 INJECTION, SOLUTION INTRAVENOUS at 09:09

## 2018-09-13 RX ADMIN — ACETAMINOPHEN 650 MG: 325 TABLET ORAL at 09:09

## 2018-09-13 NOTE — TELEPHONE ENCOUNTER
"Call to pt wife.   Informed pt wife that immunization appt on 9/18 would be cancelled as should not get at this time while on chemo regimen.   Pt wife verbalized understanding.   Appt cancelled.       ----- Message from Maday Melendez MD sent at 9/13/2018  2:58 PM CDT -----  Should hold off on    ----- Message -----  From: Mala Rodriguez  Sent: 9/13/2018   1:13 PM  To: Maday Melendez MD    Looks like he is scheduled for post op immunizations on 9/18- bexsero, menactra, and pneumovax- with ID dept.    I don't know anything about it (see below) or why it was scheduled or if he should be getting while on tx.   Please advise, thanks!    ----- Message -----  From: Syeda Arriaza RN  Sent: 9/13/2018  11:58 AM  To: Mala Rodriguez    Mr. Prakash is scheduled for "post-op immunizations" on Tuesday 9/18 and he's confused about what these are and I can't find any information about who or why they were scheduled.   Do y'all know anything about this? Or if he should even be getting these while on chemo?  If you could give the Sina a call when you find out that would be amazing.   Thank you!         "

## 2018-09-13 NOTE — PLAN OF CARE
Problem: Patient Care Overview  Goal: Plan of Care Review  Outcome: Ongoing (interventions implemented as appropriate)  1 unit PRBCs administered, patient tolerated well. VSS, NAD. Education provided. D/C'd home with family.

## 2018-09-17 ENCOUNTER — NURSE TRIAGE (OUTPATIENT)
Dept: ADMINISTRATIVE | Facility: CLINIC | Age: 73
End: 2018-09-17

## 2018-09-17 NOTE — TELEPHONE ENCOUNTER
"    Reason for Disposition   Health Information question, no triage required and triager able to answer question    Answer Assessment - Initial Assessment Questions  1. REASON FOR CALL or QUESTION: "What is your reason for calling today?" or "How can I best help you?" or "What question do you have that I can help answer?"      Wife reported pt's b/p is 144/84 HR 84. Wants to know if this is high or low- pt asymptomatic.    Protocols used: ST INFORMATION ONLY CALL-A-AH      "

## 2018-09-18 RX ORDER — HEPARIN 100 UNIT/ML
500 SYRINGE INTRAVENOUS
Status: CANCELLED | OUTPATIENT
Start: 2018-09-18

## 2018-09-18 RX ORDER — SODIUM CHLORIDE 0.9 % (FLUSH) 0.9 %
10 SYRINGE (ML) INJECTION
Status: CANCELLED | OUTPATIENT
Start: 2018-09-18

## 2018-09-20 ENCOUNTER — INFUSION (OUTPATIENT)
Dept: INFUSION THERAPY | Facility: OTHER | Age: 73
End: 2018-09-20
Attending: INTERNAL MEDICINE
Payer: MEDICARE

## 2018-09-20 VITALS
RESPIRATION RATE: 17 BRPM | OXYGEN SATURATION: 100 % | WEIGHT: 170 LBS | TEMPERATURE: 99 F | HEART RATE: 72 BPM | DIASTOLIC BLOOD PRESSURE: 84 MMHG | HEIGHT: 68 IN | SYSTOLIC BLOOD PRESSURE: 154 MMHG | BODY MASS INDEX: 25.76 KG/M2

## 2018-09-20 DIAGNOSIS — C25.1 MALIGNANT NEOPLASM OF BODY OF PANCREAS: Primary | ICD-10-CM

## 2018-09-20 DIAGNOSIS — C79.51 PROSTATE CANCER METASTATIC TO BONE: ICD-10-CM

## 2018-09-20 DIAGNOSIS — C77.5 PROSTATE CANCER METASTATIC TO INTRAPELVIC LYMPH NODE: ICD-10-CM

## 2018-09-20 DIAGNOSIS — C61 PROSTATE CANCER METASTATIC TO INTRAPELVIC LYMPH NODE: ICD-10-CM

## 2018-09-20 DIAGNOSIS — C61 PROSTATE CANCER METASTATIC TO BONE: ICD-10-CM

## 2018-09-20 PROCEDURE — 96367 TX/PROPH/DG ADDL SEQ IV INF: CPT

## 2018-09-20 PROCEDURE — 63600175 PHARM REV CODE 636 W HCPCS: Mod: JG | Performed by: INTERNAL MEDICINE

## 2018-09-20 PROCEDURE — 25000003 PHARM REV CODE 250: Performed by: INTERNAL MEDICINE

## 2018-09-20 PROCEDURE — 96417 CHEMO IV INFUS EACH ADDL SEQ: CPT

## 2018-09-20 PROCEDURE — 96413 CHEMO IV INFUSION 1 HR: CPT

## 2018-09-20 RX ORDER — HEPARIN 100 UNIT/ML
500 SYRINGE INTRAVENOUS
Status: DISCONTINUED | OUTPATIENT
Start: 2018-09-20 | End: 2018-09-20 | Stop reason: HOSPADM

## 2018-09-20 RX ORDER — SODIUM CHLORIDE 0.9 % (FLUSH) 0.9 %
10 SYRINGE (ML) INJECTION
Status: DISCONTINUED | OUTPATIENT
Start: 2018-09-20 | End: 2018-09-20 | Stop reason: HOSPADM

## 2018-09-20 RX ADMIN — SODIUM CHLORIDE: 0.9 INJECTION, SOLUTION INTRAVENOUS at 08:09

## 2018-09-20 RX ADMIN — Medication 1 MG: at 08:09

## 2018-09-20 RX ADMIN — PACLITAXEL 210 MG: 100 INJECTION, POWDER, LYOPHILIZED, FOR SUSPENSION INTRAVENOUS at 09:09

## 2018-09-20 RX ADMIN — GEMCITABINE 1385 MG: 38 INJECTION, SOLUTION INTRAVENOUS at 10:09

## 2018-09-20 NOTE — PLAN OF CARE
Problem: Patient Care Overview  Goal: Plan of Care Review  Outcome: Ongoing (interventions implemented as appropriate)  Patient tolerated IV Gemzar and Abraxane treatment well. VSS, AVS provided. Patient to return 10/4/2018 for next treatment.

## 2018-09-24 ENCOUNTER — TELEPHONE (OUTPATIENT)
Dept: HEMATOLOGY/ONCOLOGY | Facility: CLINIC | Age: 73
End: 2018-09-24

## 2018-09-24 NOTE — TELEPHONE ENCOUNTER
MD Mala Ring   Caller: Unspecified (Today,  9:31 AM)             He may need to be seen   Does anyone have time today?   Or [facility] can see [facility]   Any pain in legs?     Called pt wife.   Pt wife put pt on the phone with nurse to discuss.   Pt stated that legs and feet just swelling bilaterally- no pain at this time, just can be uncomfortable occasionally.   Pt does feel like he needs to come and be seen- pt only wants to come to [facility] location.   Informed pt would send message to Dr. Echavarria staff to see if available to see today or tomorrow, as Dr. Melendez's [facility] schedule currently full for tomorrow.   Pt agreeable and verbalized understanding.       Message fwjuan.

## 2018-09-24 NOTE — TELEPHONE ENCOUNTER
Returned call to pt wife.   Pt wife calling to see if pt can take Lasix as he is having bilateral leg swelling.   Informed pt wife would check with MD and f/u.   Pt wife verbalized understanding.     Message fwd.           ----- Message from Mallory Max sent at 9/24/2018  9:28 AM CDT -----  Contact: Pt wife Laura  Pt wife calling with questions regarding pt fluid pill        Laura call back number [phone]

## 2018-09-25 ENCOUNTER — HOSPITAL ENCOUNTER (OUTPATIENT)
Dept: RADIOLOGY | Facility: OTHER | Age: 73
Discharge: HOME | End: 2018-09-25
Attending: INTERNAL MEDICINE
Payer: MEDICARE

## 2018-09-25 ENCOUNTER — OFFICE VISIT (OUTPATIENT)
Dept: HEMATOLOGY/ONCOLOGY | Facility: CLINIC | Age: 73
End: 2018-09-25
Payer: MEDICARE

## 2018-09-25 VITALS
RESPIRATION RATE: 20 BRPM | DIASTOLIC BLOOD PRESSURE: 66 MMHG | WEIGHT: 169.06 LBS | TEMPERATURE: 99 F | HEIGHT: 68 IN | SYSTOLIC BLOOD PRESSURE: 126 MMHG | HEART RATE: 90 BPM | BODY MASS INDEX: 25.62 KG/M2 | OXYGEN SATURATION: 99 %

## 2018-09-25 DIAGNOSIS — C78.6 PERITONEAL METASTASES: ICD-10-CM

## 2018-09-25 DIAGNOSIS — R21 RASH: ICD-10-CM

## 2018-09-25 DIAGNOSIS — R60.0 BILATERAL LEG EDEMA: ICD-10-CM

## 2018-09-25 DIAGNOSIS — C25.1 MALIGNANT NEOPLASM OF BODY OF PANCREAS: Primary | ICD-10-CM

## 2018-09-25 PROCEDURE — 1101F PT FALLS ASSESS-DOCD LE1/YR: CPT | Mod: CPTII,,, | Performed by: INTERNAL MEDICINE

## 2018-09-25 PROCEDURE — 3078F DIAST BP <80 MM HG: CPT | Mod: CPTII,,, | Performed by: INTERNAL MEDICINE

## 2018-09-25 PROCEDURE — 93970 EXTREMITY STUDY: CPT | Mod: 26,,, | Performed by: RADIOLOGY

## 2018-09-25 PROCEDURE — 99214 OFFICE O/P EST MOD 30 MIN: CPT | Mod: S$PBB,,, | Performed by: INTERNAL MEDICINE

## 2018-09-25 PROCEDURE — 93970 EXTREMITY STUDY: CPT | Mod: TC

## 2018-09-25 PROCEDURE — 99213 OFFICE O/P EST LOW 20 MIN: CPT | Mod: PBBFAC,25 | Performed by: INTERNAL MEDICINE

## 2018-09-25 PROCEDURE — 3074F SYST BP LT 130 MM HG: CPT | Mod: CPTII,,, | Performed by: INTERNAL MEDICINE

## 2018-09-25 PROCEDURE — 99999 PR PBB SHADOW E&M-EST. PATIENT-LVL III: CPT | Mod: PBBFAC,,, | Performed by: INTERNAL MEDICINE

## 2018-09-25 RX ORDER — TRIAMCINOLONE ACETONIDE 0.25 MG/G
CREAM TOPICAL 2 TIMES DAILY PRN
Qty: 1 TUBE | Refills: 0 | Status: SHIPPED | OUTPATIENT
Start: 2018-09-25 | End: 2018-09-25 | Stop reason: SDUPTHER

## 2018-09-25 RX ORDER — TRIAMCINOLONE ACETONIDE 0.25 MG/G
CREAM TOPICAL 2 TIMES DAILY PRN
Qty: 80 G | Refills: 0 | Status: SHIPPED | OUTPATIENT
Start: 2018-09-25

## 2018-09-25 RX ORDER — FUROSEMIDE 20 MG/1
20 TABLET ORAL DAILY PRN
Qty: 30 TABLET | Refills: 11 | Status: SHIPPED | OUTPATIENT
Start: 2018-09-25 | End: 2018-09-25 | Stop reason: SDUPTHER

## 2018-09-25 RX ORDER — FUROSEMIDE 20 MG/1
20 TABLET ORAL DAILY PRN
Qty: 30 TABLET | Refills: 11 | Status: SHIPPED | OUTPATIENT
Start: 2018-09-25 | End: 2019-12-09

## 2018-09-25 NOTE — PROGRESS NOTES
Subjective:       Patient ID: Shady Prakash Jr. is a 73 y.o. male.     Chief Complaint: leg swelling, itching, rash     HPI     Mr. Prakash returns today for an unscheduled visit for leg swelling. He noted intermittent bilateral leg swelling since started chemo, which has gotten worse lately. +numbness on the bottom of his feet. Continues to have a lot of itching and hypopigmented rash all over his body since he started chemo. OTC hydrocortisone cream does not help.        CT C/A/P 8/29/18:          FINDINGS:  There is a single micro nodule which is stable in the left lung, a nonspecific finding.  The right lung remains abnormal with pleural thickening, pleural calcification and some pleural fluid.  The right lung also demonstrates some atelectasis versus scarring as well as stable focal consolidation versus volume loss at the lung base.  Lung fields bilaterally demonstrate emphysema.  There are hilar and mediastinal calcifications, compatible with calcified nodes.  No pericardial fluid is present.    On today's study, best seen on the arterial phase imaging are 3 small hyperdense foci, the largest measuring 1 cm.  These are stable compared to the prior CT which describes debility compared to 2015.  An additional subcentimeter hypodensity is stable dating back to 2017, likely benign.  There is no new liver lesion.  The gallbladder demonstrates no abnormality.  The spleen shows granulomas.    Within the body of the pancreas is an ill-defined hypodense lesion which measures 2 cm in greatest dimension, similar compared to prior study.  The upstream pancreatic duct is dilated measuring 4 mm.    The adrenal glands demonstrate no mass.  There is a right renal cyst.  There are small lymph nodes about the upper abdomen mesentery and rhett hepatis.  There is no aneurysmal dilatation of the aorta.    No pelvic lymphadenopathy.  Bladder is not well distended for good evaluation.  Colon demonstrates diverticulosis.  There are  surgical changes along the abdominal wall.  Surgical changes are present at the base of the cecum.  There is some mild mesenteric edema.  Several small soft tissue density nodules within the mesentery of the abdomen on the most recent prior study have decreased in number.    Osseous structures again show sclerotic foci compatible with osseous metastatic disease.       Impression         In this patient with history metastatic pancreatic and prostate cancer, there is a stable pancreatic mass which measures 2 cm.    Overall decrease in size of mesenteric soft tissue densities, which may reflect peritoneal metastatic disease.    Hepatic hyperenhancing foci stable, most compatible with hemangioma based on stability and prior imaging.    stable changes within the right chest.        Oncology History:  1. Metastatic prostate cancer  He is s/p TRUS/bx on 8/7/15 w/ high volume marta 9 (5+4), PSA 25.  He had negative metastatic workup at diagnosis.  However, at time of planned RALP on 9/10/15 he was noted to have BN invasion on cysto and therefore only PLND was done, which confirmed metastatic disease.  He was started on ADT 9/17/15 w/ firmagon followed by Eligard on 10/15/15.    Started Casodex 7/20/17 - scans at that time showed osseus metastatic disease  Started docetaxel 10/6/17 with progression, then found to have below     - on follow-up scans a pancreatic lesion noted concerning for pancreatic cancer as it was an unlikely area for prostate cancer spread-  Biopsy confirmed adenocarcinoma and PSA was negative     Imaging as below:  Scan results:  6/20/18 Bone scan:  FINDINGS:  Two lesions in the left scapula, stable.  Elongated lesion in the right mid rib posteriorly, stable.  Prior exam demonstrated a large right iliac lesion, which no longer exhibits increased uptake.  Left pubic lesion, stable.  There is also now a small lesion in the left iliac bone. Two additional new small foci in the mid thoracic  vertebra.  Both kidneys and the bladder appear unremarkable.  Impression:  Findings consistent with osseous metastatic disease as above, noting 3 new small lesions.     6/21/18 CT scan abdomen and pelvis:  FINDINGS:  Lower chest: There has been interval increase in size in the focal consolidation which is pleural based in the posterior aspect of the right lower lobe.  Today's exam measures 5.1 cm maximum diameter.  Previous exam is measure approximately 3.3 cm.  This is associated with pleural thickening that extends laterally.  There is a small focus of cylinder all coal bronchiectasis in the medial left lower lobe.  The remaining aspects of the lung parenchyma that are visualized show no abnormalities.  The visualized heart and pericardium are normal  Liver: There are 3 hyperenhancing foci along the periphery of the right and left lobes of the liver which are stable from 2015.  The largest measures 1.1 cm in maximum diameter.  Although not characteristic on this exam, previous exam in 2015 shows findings most characteristic of hepatic hemangioma.  The remaining liver parenchyma as well as the hepatic and portal veins are patent.  Gallbladder: Normal.  Pancreas: There is been interval development of an ill-defined hypodense focus within the proximal body of the pancreas measuring 2.1 cm x 1.8 cm.  Spleen: Calcified hemangiomas present.  Otherwise normal.  Adrenal glands: Normal.  Genitourinary: Stable simple cyst in the lower pole of the right kidney measuring 1.6 cm.  The left kidney and bilateral ureters are normal.  The bladder is partially distended and shows wall thickening on the non dependent surface of the bladder.  This is not significantly changed in appearance.  Gastrointestinal: Stomach and small intestines are normal.  Colonic diverticulosis is present without evidence of diverticulitis.  Prostate: Enlarged and nodular causing mass effect on the bladder.  Miscellaneous: There is no intra-abdominal or  pelvic free fluid, free air or lymphadenopathy.  The abdominal aorta tapers normally.  Bilateral fat containing inguinal hernias present.  Osseous and soft tissue structures: Sclerotic metastases involving the right iliac bone and left superior pubic ramus are identified.  The small focus in the left iliac bone seen on yesterday's bone scan is not well seen on this exam.  The soft tissues are within normal limits.  Impression:  1. Interval increase in size in the pleural base, right lower lobe consolidation.  2. Hyperenhancing foci likely represent hemangiomas.  They have been stable since 2015.  Is on the 2015 exam where they demonstrate characteristics most compatible with hemangioma.  3. Bladder wall thickening is not significantly changed.  This may be related to outlet obstruction from the nodular prostate.  4. Interval development of ill-defined hypodense focus measuring 2.1 cm within the proximal body of the pancreas.  This may also be a focus of metastatic disease although is uncommon for the prostate to metastasized to the pancreas.  A dedicated pancreatic CT may be beneficial in further characterizing this lesion.     - on 7/25/18 he came to ER with worsening abdominal pain and concerns for appendicitis  Underwent Laparoscopy Converted to Laparotomy, Peritoneal Biopsy , Ileocecal with Dr. Stephenson  Biopsy concerning for metastatic pancreatic carcinoma     2. Metastatic pancreatic cancer  - started on [place]/Abraxane- note Ca19-9 never elevated  Response as above        Review of Systems   Constitutional: Positive for activity change, appetite change, fatigue and unexpected weight change. Negative for chills and fever.   HENT: Positive for dental problem. Negative for congestion, hearing loss, mouth sores, nosebleeds, postnasal drip, sinus pressure, sore throat and trouble swallowing.    Eyes: Negative for visual disturbance.   Respiratory:  Negative for chest tightness, shortness of breath and wheezing.     Cardiovascular: Negative for chest pain, palpitations and leg swelling.   Gastrointestinal: Negative for abdominal distention, abdominal pain, blood in stool, constipation, diarrhea and vomiting.        No GERD   Endocrine:        Hot flashes   Genitourinary: Positive for frequency. Negative for decreased urine volume, difficulty urinating, hematuria and urgency.        Sexual dysfunction   Musculoskeletal: Positive for leg swelling. Negative for back pain, gait problem, neck pain and neck stiffness.   Skin: Positive for pruritus on back and arms. Negative for color change, pallor and rash.   Neurological: Negative for weakness, light-headedness, numbness and headaches.   Hematological: Negative for adenopathy. Does not bruise/bleed easily.   Psychiatric/Behavioral: Negative for dysphoric mood and sleep disturbance. The patient is not nervous/anxious.        Objective:   Physical Exam   Constitutional: He is oriented to person, place, and time. He appears well-developed and well-nourished. No distress.   Presents with wife   HENT:   Head: Normocephalic and atraumatic.   Right Ear: External ear normal.   Left Ear: External ear normal.   Nose: Nose normal.   Mouth/Throat: Oropharynx is clear and moist. No oropharyngeal exudate.   Eyes: Conjunctivae and EOM are normal. Pupils are equal, round, and reactive to light. Left eye exhibits no discharge. No scleral icterus.   Neck: Normal range of motion. Neck supple. No tracheal deviation present. No thyromegaly present.   Cardiovascular: Normal rate, regular rhythm, normal heart sounds and intact distal pulses. Exam reveals no gallop and no friction rub.   No murmur heard.  Pulmonary/Chest: Effort normal and breath sounds normal. No respiratory distress. He has no wheezes. He has no rales. He exhibits no tenderness.   Abdominal: Soft. Bowel sounds are normal. He exhibits no distension and no mass. There is no tenderness. There is no rebound and no guarding.    Musculoskeletal: b/l LE 1+ edema  Lymphadenopathy:     He has no cervical adenopathy.   Neurological: He is alert and oriented to person, place, and time. He has normal reflexes. He displays normal reflexes. No cranial nerve deficit. He exhibits normal muscle tone. Coordination normal.   Skin: Skin is warm and dry. Hypopigmented rash on both arms and back with areas of excoriation noted. He is not diaphoretic. No erythema. No pallor.   Psychiatric: He has a normal mood and affect. His behavior is normal. Judgment and thought content normal.   Nursing note and vitals reviewed.    Labs- reviewed  Assessment:       1. Malignant neoplasm of body of pancreas    2. Peritoneal metastases    3. Rash    4. Bilateral leg edema        Plan:     1.2  - getting gem/abraxane  - f/u with Dr. Melendez on 10/2    3.  - has severe pruritus  - kenalog cream  - refer to dermatology    4.  - US leg today  - If neg for DVT, will give lasix 20 mg daily prn for swelling

## 2018-09-28 DIAGNOSIS — G89.3 NEOPLASM RELATED PAIN: ICD-10-CM

## 2018-09-28 DIAGNOSIS — G89.3 NEOPLASM RELATED PAIN: Primary | ICD-10-CM

## 2018-09-28 RX ORDER — OXYCODONE AND ACETAMINOPHEN 5; 325 MG/1; MG/1
1 TABLET ORAL EVERY 4 HOURS PRN
Qty: 90 TABLET | Refills: 0 | Status: SHIPPED | OUTPATIENT
Start: 2018-09-28 | End: 2018-10-02 | Stop reason: SDUPTHER

## 2018-09-28 RX ORDER — OXYCODONE AND ACETAMINOPHEN 5; 325 MG/1; MG/1
1 TABLET ORAL EVERY 4 HOURS PRN
Qty: 42 TABLET | Refills: 0 | Status: SHIPPED | OUTPATIENT
Start: 2018-09-28 | End: 2018-09-28 | Stop reason: SDUPTHER

## 2018-09-28 NOTE — TELEPHONE ENCOUNTER
----- Message from Mallory Max sent at 9/28/2018  9:00 AM CDT -----  Contact: Pt wife [place]  Pt wife calling requesting a refill on Percocet    Pharmacy number [phone]    Pt call back number [phone]

## 2018-10-01 ENCOUNTER — TELEPHONE (OUTPATIENT)
Dept: DERMATOLOGY | Facility: CLINIC | Age: 73
End: 2018-10-01

## 2018-10-01 NOTE — TELEPHONE ENCOUNTER
----- Message from Carolin Huitron sent at 10/1/2018 10:41 AM CDT -----  Contact: pts wife   Chayo- pt- pts wife is calling to speak with the nurse pt is a new pt to derm pt was scheduled incorrectly with the np on October 3rd for a rash that was canceled pt is wanting to schedule an appt with Dr Yeung can you please call pts wife [phone] jc

## 2018-10-01 NOTE — TELEPHONE ENCOUNTER
I called and spoke to patients wife and was able to hold a spot for the patient on 10/16 at 1015am. She was happy with this and thanked me for the call. I explained that I can not book the appointment until the schedule opens tomorrow and she stated that she understands and thanked me again.

## 2018-10-02 ENCOUNTER — OFFICE VISIT (OUTPATIENT)
Dept: HEMATOLOGY/ONCOLOGY | Facility: CLINIC | Age: 73
End: 2018-10-02
Attending: INTERNAL MEDICINE
Payer: MEDICARE

## 2018-10-02 ENCOUNTER — LAB VISIT (OUTPATIENT)
Dept: LAB | Facility: OTHER | Age: 73
End: 2018-10-02
Attending: INTERNAL MEDICINE
Payer: MEDICARE

## 2018-10-02 VITALS
WEIGHT: 169.75 LBS | SYSTOLIC BLOOD PRESSURE: 162 MMHG | TEMPERATURE: 99 F | HEART RATE: 76 BPM | BODY MASS INDEX: 25.73 KG/M2 | DIASTOLIC BLOOD PRESSURE: 78 MMHG | OXYGEN SATURATION: 99 % | HEIGHT: 68 IN | RESPIRATION RATE: 16 BRPM

## 2018-10-02 DIAGNOSIS — C79.51 PROSTATE CANCER METASTATIC TO BONE: ICD-10-CM

## 2018-10-02 DIAGNOSIS — C25.9 METASTASIS FROM PANCREATIC CANCER: ICD-10-CM

## 2018-10-02 DIAGNOSIS — C61 PROSTATE CANCER METASTATIC TO BONE: ICD-10-CM

## 2018-10-02 DIAGNOSIS — E11.22 TYPE 2 DIABETES MELLITUS WITH STAGE 1 CHRONIC KIDNEY DISEASE, WITHOUT LONG-TERM CURRENT USE OF INSULIN: ICD-10-CM

## 2018-10-02 DIAGNOSIS — C78.6 PERITONEAL METASTASES: ICD-10-CM

## 2018-10-02 DIAGNOSIS — C79.9 METASTASIS FROM PANCREATIC CANCER: ICD-10-CM

## 2018-10-02 DIAGNOSIS — N18.1 TYPE 2 DIABETES MELLITUS WITH STAGE 1 CHRONIC KIDNEY DISEASE, WITHOUT LONG-TERM CURRENT USE OF INSULIN: ICD-10-CM

## 2018-10-02 DIAGNOSIS — C25.1 MALIGNANT NEOPLASM OF BODY OF PANCREAS: Primary | ICD-10-CM

## 2018-10-02 DIAGNOSIS — G89.3 NEOPLASM RELATED PAIN: ICD-10-CM

## 2018-10-02 DIAGNOSIS — I10 ESSENTIAL HYPERTENSION: ICD-10-CM

## 2018-10-02 LAB
ALBUMIN SERPL BCP-MCNC: 3.4 G/DL
ALP SERPL-CCNC: 73 U/L
ALT SERPL W/O P-5'-P-CCNC: 9 U/L
ANION GAP SERPL CALC-SCNC: 8 MMOL/L
AST SERPL-CCNC: 14 U/L
BILIRUB SERPL-MCNC: 0.3 MG/DL
BUN SERPL-MCNC: 12 MG/DL
CALCIUM SERPL-MCNC: 9.6 MG/DL
CHLORIDE SERPL-SCNC: 109 MMOL/L
CO2 SERPL-SCNC: 26 MMOL/L
CREAT SERPL-MCNC: 0.9 MG/DL
ERYTHROCYTE [DISTWIDTH] IN BLOOD BY AUTOMATED COUNT: 21.3 %
EST. GFR  (AFRICAN AMERICAN): >60 ML/MIN/1.73 M^2
EST. GFR  (NON AFRICAN AMERICAN): >60 ML/MIN/1.73 M^2
GLUCOSE SERPL-MCNC: 106 MG/DL
HCT VFR BLD AUTO: 29.9 %
HGB BLD-MCNC: 8.8 G/DL
MCH RBC QN AUTO: 25.1 PG
MCHC RBC AUTO-ENTMCNC: 29.4 G/DL
MCV RBC AUTO: 85 FL
NEUTROPHILS # BLD AUTO: 4.1 K/UL
PLATELET # BLD AUTO: 286 K/UL
PMV BLD AUTO: 9.2 FL
POTASSIUM SERPL-SCNC: 4 MMOL/L
PROT SERPL-MCNC: 6.9 G/DL
RBC # BLD AUTO: 3.51 M/UL
SODIUM SERPL-SCNC: 143 MMOL/L
WBC # BLD AUTO: 6.4 K/UL

## 2018-10-02 PROCEDURE — 36415 COLL VENOUS BLD VENIPUNCTURE: CPT

## 2018-10-02 PROCEDURE — 3078F DIAST BP <80 MM HG: CPT | Mod: CPTII,,, | Performed by: INTERNAL MEDICINE

## 2018-10-02 PROCEDURE — 85027 COMPLETE CBC AUTOMATED: CPT

## 2018-10-02 PROCEDURE — 99999 PR PBB SHADOW E&M-EST. PATIENT-LVL III: CPT | Mod: PBBFAC,,, | Performed by: INTERNAL MEDICINE

## 2018-10-02 PROCEDURE — 80053 COMPREHEN METABOLIC PANEL: CPT

## 2018-10-02 PROCEDURE — 99214 OFFICE O/P EST MOD 30 MIN: CPT | Mod: S$PBB,,, | Performed by: INTERNAL MEDICINE

## 2018-10-02 PROCEDURE — 3077F SYST BP >= 140 MM HG: CPT | Mod: CPTII,,, | Performed by: INTERNAL MEDICINE

## 2018-10-02 PROCEDURE — 3044F HG A1C LEVEL LT 7.0%: CPT | Mod: CPTII,,, | Performed by: INTERNAL MEDICINE

## 2018-10-02 PROCEDURE — 99213 OFFICE O/P EST LOW 20 MIN: CPT | Mod: PBBFAC | Performed by: INTERNAL MEDICINE

## 2018-10-02 PROCEDURE — 1101F PT FALLS ASSESS-DOCD LE1/YR: CPT | Mod: CPTII,,, | Performed by: INTERNAL MEDICINE

## 2018-10-02 RX ORDER — SODIUM CHLORIDE 0.9 % (FLUSH) 0.9 %
10 SYRINGE (ML) INJECTION
Status: CANCELLED | OUTPATIENT
Start: 2018-10-02

## 2018-10-02 RX ORDER — HEPARIN 100 UNIT/ML
500 SYRINGE INTRAVENOUS
Status: CANCELLED | OUTPATIENT
Start: 2018-10-02

## 2018-10-02 RX ORDER — OXYCODONE AND ACETAMINOPHEN 5; 325 MG/1; MG/1
1 TABLET ORAL EVERY 4 HOURS PRN
Qty: 90 TABLET | Refills: 0 | Status: SHIPPED | OUTPATIENT
Start: 2018-10-02 | End: 2018-12-06 | Stop reason: SDUPTHER

## 2018-10-02 NOTE — PROGRESS NOTES
Subjective:       Patient ID: Shady Prakash Jr. is a 73 y.o. male.    Chief Complaint: No chief complaint on file.    HPI     Mr. Prakash returns today for follow up of metastatic pancreatic cancer and metastatic prostate cancer.  Reports feeling ok overall.  Notes swelling in feet and numbness in bottom- if not for this he states he would be feeling great    Tolerating chemotherapy overall well.  No nausea or vomiting.  Last treatment he noted burning during administration- no extravasation  No fevers, chills, or infections  Appetite better  Weight up     CT C/A/P 8/29/18:          FINDINGS:  There is a single micro nodule which is stable in the left lung, a nonspecific finding.  The right lung remains abnormal with pleural thickening, pleural calcification and some pleural fluid.  The right lung also demonstrates some atelectasis versus scarring as well as stable focal consolidation versus volume loss at the lung base.  Lung fields bilaterally demonstrate emphysema.  There are hilar and mediastinal calcifications, compatible with calcified nodes.  No pericardial fluid is present.    On today's study, best seen on the arterial phase imaging are 3 small hyperdense foci, the largest measuring 1 cm.  These are stable compared to the prior CT which describes debility compared to 2015.  An additional subcentimeter hypodensity is stable dating back to 2017, likely benign.  There is no new liver lesion.  The gallbladder demonstrates no abnormality.  The spleen shows granulomas.    Within the body of the pancreas is an ill-defined hypodense lesion which measures 2 cm in greatest dimension, similar compared to prior study.  The upstream pancreatic duct is dilated measuring 4 mm.    The adrenal glands demonstrate no mass.  There is a right renal cyst.  There are small lymph nodes about the upper abdomen mesentery and rhett hepatis.  There is no aneurysmal dilatation of the aorta.    No pelvic lymphadenopathy.  Bladder is not  well distended for good evaluation.  Colon demonstrates diverticulosis.  There are surgical changes along the abdominal wall.  Surgical changes are present at the base of the cecum.  There is some mild mesenteric edema.  Several small soft tissue density nodules within the mesentery of the abdomen on the most recent prior study have decreased in number.    Osseous structures again show sclerotic foci compatible with osseous metastatic disease.       Impression         In this patient with history metastatic pancreatic and prostate cancer, there is a stable pancreatic mass which measures 2 cm.    Overall decrease in size of mesenteric soft tissue densities, which may reflect peritoneal metastatic disease.    Hepatic hyperenhancing foci stable, most compatible with hemangioma based on stability and prior imaging.    stable changes within the right chest.         Oncology History:  1. Metastatic prostate cancer  He is s/p TRUS/bx on 8/7/15 w/ high volume marta 9 (5+4), PSA 25.  He had negative metastatic workup at diagnosis.  However, at time of planned RALP on 9/10/15 he was noted to have BN invasion on cysto and therefore only PLND was done, which confirmed metastatic disease.  He was started on ADT 9/17/15 w/ firmagon followed by Eligard on 10/15/15.    Started Casodex 7/20/17 - scans at that time showed osseus metastatic disease  Started docetaxel 10/6/17 with progression, then found to have below     - on follow-up scans a pancreatic lesion noted concerning for pancreatic cancer as it was an unlikely area for prostate cancer spread-  Biopsy confirmed adenocarcinoma and PSA was negative     Imaging as below:  Scan results:  6/20/18 Bone scan:  FINDINGS:  Two lesions in the left scapula, stable.  Elongated lesion in the right mid rib posteriorly, stable.  Prior exam demonstrated a large right iliac lesion, which no longer exhibits increased uptake.  Left pubic lesion, stable.  There is also now a small lesion in  the left iliac bone. Two additional new small foci in the mid thoracic vertebra.  Both kidneys and the bladder appear unremarkable.  Impression:  Findings consistent with osseous metastatic disease as above, noting 3 new small lesions.     6/21/18 CT scan abdomen and pelvis:  FINDINGS:  Lower chest: There has been interval increase in size in the focal consolidation which is pleural based in the posterior aspect of the right lower lobe.  Today's exam measures 5.1 cm maximum diameter.  Previous exam is measure approximately 3.3 cm.  This is associated with pleural thickening that extends laterally.  There is a small focus of cylinder all coal bronchiectasis in the medial left lower lobe.  The remaining aspects of the lung parenchyma that are visualized show no abnormalities.  The visualized heart and pericardium are normal  Liver: There are 3 hyperenhancing foci along the periphery of the right and left lobes of the liver which are stable from 2015.  The largest measures 1.1 cm in maximum diameter.  Although not characteristic on this exam, previous exam in 2015 shows findings most characteristic of hepatic hemangioma.  The remaining liver parenchyma as well as the hepatic and portal veins are patent.  Gallbladder: Normal.  Pancreas: There is been interval development of an ill-defined hypodense focus within the proximal body of the pancreas measuring 2.1 cm x 1.8 cm.  Spleen: Calcified hemangiomas present.  Otherwise normal.  Adrenal glands: Normal.  Genitourinary: Stable simple cyst in the lower pole of the right kidney measuring 1.6 cm.  The left kidney and bilateral ureters are normal.  The bladder is partially distended and shows wall thickening on the non dependent surface of the bladder.  This is not significantly changed in appearance.  Gastrointestinal: Stomach and small intestines are normal.  Colonic diverticulosis is present without evidence of diverticulitis.  Prostate: Enlarged and nodular causing mass  effect on the bladder.  Miscellaneous: There is no intra-abdominal or pelvic free fluid, free air or lymphadenopathy.  The abdominal aorta tapers normally.  Bilateral fat containing inguinal hernias present.  Osseous and soft tissue structures: Sclerotic metastases involving the right iliac bone and left superior pubic ramus are identified.  The small focus in the left iliac bone seen on yesterday's bone scan is not well seen on this exam.  The soft tissues are within normal limits.  Impression:  1. Interval increase in size in the pleural base, right lower lobe consolidation.  2. Hyperenhancing foci likely represent hemangiomas.  They have been stable since 2015.  Is on the 2015 exam where they demonstrate characteristics most compatible with hemangioma.  3. Bladder wall thickening is not significantly changed.  This may be related to outlet obstruction from the nodular prostate.  4. Interval development of ill-defined hypodense focus measuring 2.1 cm within the proximal body of the pancreas.  This may also be a focus of metastatic disease although is uncommon for the prostate to metastasized to the pancreas.  A dedicated pancreatic CT may be beneficial in further characterizing this lesion.     - on 7/25/18 he came to ER with worsening abdominal pain and concerns for appendicitis  Underwent Laparoscopy Converted to Laparotomy, Peritoneal Biopsy , Ileocecal with Dr. Stephenson  Biopsy concerning for metastatic pancreatic carcinoma     2. Metastatic pancreatic cancer  - started on [place]/Abraxane- note Ca19-9 never elevated  Response as above    Review of Systems   Constitutional: Negative for activity change, appetite change, chills, fatigue, fever and unexpected weight change.   HENT: Positive for dental problem. Negative for congestion, hearing loss, mouth sores, nosebleeds, postnasal drip, sinus pressure, sore throat and trouble swallowing.    Eyes: Negative for visual disturbance.   Respiratory: Negative for cough,  chest tightness, shortness of breath and wheezing.    Cardiovascular: Negative for chest pain, palpitations and leg swelling.   Gastrointestinal: Negative for abdominal distention, abdominal pain, blood in stool, constipation, diarrhea and vomiting.        No GERD   Endocrine:        Hot flashes   Genitourinary: Negative for decreased urine volume, difficulty urinating, frequency, hematuria and urgency.        Sexual dysfunction   Musculoskeletal: Negative for arthralgias (better), back pain, gait problem, joint swelling, neck pain and neck stiffness.   Skin: Positive for wound (surgical site). Negative for color change, pallor and rash.   Neurological: Negative for dizziness, weakness, light-headedness, numbness and headaches.   Hematological: Negative for adenopathy. Does not bruise/bleed easily.   Psychiatric/Behavioral: Negative for dysphoric mood and sleep disturbance. The patient is not nervous/anxious.        Objective:      Physical Exam   Constitutional: He is oriented to person, place, and time. He appears well-developed and well-nourished. No distress.   Presents with wife  ECOG=0   HENT:   Head: Normocephalic and atraumatic.   Right Ear: External ear normal.   Left Ear: External ear normal.   Nose: Nose normal.   Mouth/Throat: Oropharynx is clear and moist. No oropharyngeal exudate.   Eyes: Conjunctivae and EOM are normal. Pupils are equal, round, and reactive to light. Right eye exhibits no discharge. Left eye exhibits no discharge. No scleral icterus.   Neck: Normal range of motion. Neck supple. No tracheal deviation present. No thyromegaly present.   Cardiovascular: Normal rate, regular rhythm, normal heart sounds and intact distal pulses. Exam reveals no gallop and no friction rub.   No murmur heard.  Pulmonary/Chest: Effort normal and breath sounds normal. No respiratory distress. He has no wheezes. He has no rales. He exhibits no tenderness.   Abdominal: Soft. Bowel sounds are normal. He exhibits  no distension and no mass. There is no tenderness. There is no rebound and no guarding.   Musculoskeletal: Normal range of motion. He exhibits no edema, tenderness or deformity.   Lymphadenopathy:     He has no cervical adenopathy.   Neurological: He is alert and oriented to person, place, and time. He has normal reflexes. He displays normal reflexes. No cranial nerve deficit. He exhibits normal muscle tone. Coordination normal.   Skin: Skin is warm and dry. No rash noted. He is not diaphoretic. No erythema. No pallor.   Psychiatric: He has a normal mood and affect. His behavior is normal. Judgment and thought content normal.   Nursing note and vitals reviewed.    Labs- reviewed  Assessment:       1. Malignant neoplasm of body of pancreas    2. Neoplasm related pain    3. Peritoneal metastases    4. Prostate cancer metastatic to bone    5. Essential hypertension    6. Type 2 diabetes mellitus with stage 1 chronic kidney disease, without long-term current use of insulin        Plan:     1-3. Currently on q2 week chemotherapy to allow for count recovery  Tolerating well and feeling better overall  Counts adequate to proceed on 104/18  Knows to call for any issues  Pain medication refilled  4. Treatment on hold with above  5. Overall well controlled on current medications  Follows with his PCP  6. Managed by PCP

## 2018-10-04 ENCOUNTER — INFUSION (OUTPATIENT)
Dept: INFUSION THERAPY | Facility: OTHER | Age: 73
End: 2018-10-04
Attending: INTERNAL MEDICINE
Payer: MEDICARE

## 2018-10-04 VITALS
HEIGHT: 68 IN | BODY MASS INDEX: 25.83 KG/M2 | DIASTOLIC BLOOD PRESSURE: 84 MMHG | HEART RATE: 91 BPM | RESPIRATION RATE: 17 BRPM | TEMPERATURE: 97 F | OXYGEN SATURATION: 98 % | SYSTOLIC BLOOD PRESSURE: 159 MMHG | WEIGHT: 170.44 LBS

## 2018-10-04 DIAGNOSIS — C79.51 PROSTATE CANCER METASTATIC TO BONE: ICD-10-CM

## 2018-10-04 DIAGNOSIS — C77.5 PROSTATE CANCER METASTATIC TO INTRAPELVIC LYMPH NODE: ICD-10-CM

## 2018-10-04 DIAGNOSIS — C61 PROSTATE CANCER METASTATIC TO INTRAPELVIC LYMPH NODE: ICD-10-CM

## 2018-10-04 DIAGNOSIS — C25.1 MALIGNANT NEOPLASM OF BODY OF PANCREAS: Primary | ICD-10-CM

## 2018-10-04 DIAGNOSIS — C61 PROSTATE CANCER METASTATIC TO BONE: ICD-10-CM

## 2018-10-04 PROCEDURE — 63600175 PHARM REV CODE 636 W HCPCS: Mod: JG | Performed by: INTERNAL MEDICINE

## 2018-10-04 PROCEDURE — 25000003 PHARM REV CODE 250: Performed by: INTERNAL MEDICINE

## 2018-10-04 PROCEDURE — 96367 TX/PROPH/DG ADDL SEQ IV INF: CPT

## 2018-10-04 PROCEDURE — 96417 CHEMO IV INFUS EACH ADDL SEQ: CPT

## 2018-10-04 PROCEDURE — 96413 CHEMO IV INFUSION 1 HR: CPT

## 2018-10-04 RX ORDER — SODIUM CHLORIDE 0.9 % (FLUSH) 0.9 %
10 SYRINGE (ML) INJECTION
Status: DISCONTINUED | OUTPATIENT
Start: 2018-10-04 | End: 2018-10-04 | Stop reason: HOSPADM

## 2018-10-04 RX ORDER — HEPARIN 100 UNIT/ML
500 SYRINGE INTRAVENOUS
Status: DISCONTINUED | OUTPATIENT
Start: 2018-10-04 | End: 2018-10-04 | Stop reason: HOSPADM

## 2018-10-04 RX ADMIN — PACLITAXEL 260 MG: 100 INJECTION, POWDER, LYOPHILIZED, FOR SUSPENSION INTRAVENOUS at 02:10

## 2018-10-04 RX ADMIN — GEMCITABINE 2050 MG: 38 INJECTION, SOLUTION INTRAVENOUS at 02:10

## 2018-10-04 RX ADMIN — SODIUM CHLORIDE: 0.9 INJECTION, SOLUTION INTRAVENOUS at 01:10

## 2018-10-04 RX ADMIN — GRANISETRON HYDROCHLORIDE 1 MG: 0.1 INJECTION, SOLUTION INTRAVENOUS at 01:10

## 2018-10-04 NOTE — PLAN OF CARE
Problem: Patient Care Overview  Goal: Plan of Care Review  Outcome: Ongoing (interventions implemented as appropriate)  Pt tolerated Abraxane/Gemzar treatment without issue. VSS. AVS given. Pt to return for next treatment on 10.18.

## 2018-10-10 ENCOUNTER — OFFICE VISIT (OUTPATIENT)
Dept: CARDIOLOGY | Facility: CLINIC | Age: 73
End: 2018-10-10
Attending: INTERNAL MEDICINE
Payer: MEDICARE

## 2018-10-10 VITALS
DIASTOLIC BLOOD PRESSURE: 83 MMHG | SYSTOLIC BLOOD PRESSURE: 134 MMHG | HEIGHT: 68 IN | HEART RATE: 89 BPM | WEIGHT: 162 LBS | BODY MASS INDEX: 24.55 KG/M2

## 2018-10-10 DIAGNOSIS — C25.1 MALIGNANT NEOPLASM OF BODY OF PANCREAS: ICD-10-CM

## 2018-10-10 DIAGNOSIS — C79.51 PROSTATE CANCER METASTATIC TO BONE: ICD-10-CM

## 2018-10-10 DIAGNOSIS — Z51.11 ENCOUNTER FOR ANTINEOPLASTIC CHEMOTHERAPY: ICD-10-CM

## 2018-10-10 DIAGNOSIS — C77.5 PROSTATE CANCER METASTATIC TO INTRAPELVIC LYMPH NODE: ICD-10-CM

## 2018-10-10 DIAGNOSIS — I10 ESSENTIAL HYPERTENSION: Primary | ICD-10-CM

## 2018-10-10 DIAGNOSIS — C61 PROSTATE CANCER METASTATIC TO BONE: ICD-10-CM

## 2018-10-10 DIAGNOSIS — E11.22 TYPE 2 DIABETES MELLITUS WITH STAGE 1 CHRONIC KIDNEY DISEASE, WITHOUT LONG-TERM CURRENT USE OF INSULIN: ICD-10-CM

## 2018-10-10 DIAGNOSIS — C61 PROSTATE CANCER METASTATIC TO INTRAPELVIC LYMPH NODE: ICD-10-CM

## 2018-10-10 DIAGNOSIS — C78.6 PERITONEAL METASTASES: ICD-10-CM

## 2018-10-10 DIAGNOSIS — N18.1 TYPE 2 DIABETES MELLITUS WITH STAGE 1 CHRONIC KIDNEY DISEASE, WITHOUT LONG-TERM CURRENT USE OF INSULIN: ICD-10-CM

## 2018-10-10 PROCEDURE — 1101F PT FALLS ASSESS-DOCD LE1/YR: CPT | Mod: CPTII,S$GLB,, | Performed by: INTERNAL MEDICINE

## 2018-10-10 PROCEDURE — 3079F DIAST BP 80-89 MM HG: CPT | Mod: CPTII,S$GLB,, | Performed by: INTERNAL MEDICINE

## 2018-10-10 PROCEDURE — 99214 OFFICE O/P EST MOD 30 MIN: CPT | Mod: S$GLB,,, | Performed by: INTERNAL MEDICINE

## 2018-10-10 PROCEDURE — 3044F HG A1C LEVEL LT 7.0%: CPT | Mod: CPTII,S$GLB,, | Performed by: INTERNAL MEDICINE

## 2018-10-10 PROCEDURE — 3075F SYST BP GE 130 - 139MM HG: CPT | Mod: CPTII,S$GLB,, | Performed by: INTERNAL MEDICINE

## 2018-10-10 NOTE — PROGRESS NOTES
Subjective:    Patient ID:  Shady Prakash Jr. is a 73 y.o. male     HPI   Here for follow-up of essential hypertension, pancreatic cancer, peritoneal metastases, prostate cancer, diabetes mellitus.    I feel well.  I have no complaints.    Current Outpatient Medications   Medication Sig    aspirin (ECOTRIN) 81 MG EC tablet Take 81 mg by mouth once daily. States not taken in over 1 month as of 9/2/15    calcium-vitamin D3 (OYSTER [place] CALCIUM-VIT D3) 500 mg(1,250mg) -200 unit per tablet Take 1 tablet by mouth 2 (two) times daily.    diphth,pertus,acell,,tetanus (BOOSTRIX) 2.5-8-5 Lf-mcg-Lf/0.5mL Susp Inject into the muscle.    docusate sodium (COLACE) 100 MG capsule Take 1 capsule (100 mg total) by mouth 2 (two) times daily.    docusate sodium (COLACE) 50 MG capsule Take 1 capsule (50 mg total) by mouth 2 (two) times daily.    dronabinol (MARINOL) 2.5 MG capsule Take 1 capsule (2.5 mg total) by mouth 2 (two) times daily before meals.    furosemide (LASIX) 20 MG tablet Take 1 tablet (20 mg total) by mouth daily as needed (leg swelling).    glimepiride (AMARYL) 2 MG tablet Take 2 mg by mouth 2 (two) times daily.     lisinopril-hydrochlorothiazide (PRINZIDE,ZESTORETIC) 20-25 mg Tab Take 1 tablet by mouth once daily.     lovastatin (MEVACOR) 20 MG tablet     metformin (GLUCOPHAGE) 1000 MG tablet Take 1,000 mg by mouth 2 (two) times daily.     ondansetron (ZOFRAN-ODT) 4 MG TbDL Take 1 tablet (4 mg total) by mouth every 6 (six) hours as needed (nausea).    oxyCODONE-acetaminophen (PERCOCET) 5-325 mg per tablet Take 1 tablet by mouth every 4 (four) hours as needed.    papaverine 30 mg/mL injection Add Phentolamine 1 mg/cc  Add PGE1 10 mcg/cc    SIG:  Bring to office for test dose in physician office    potassium chloride SA (K-DUR,KLOR-CON) 20 MEQ tablet Take 1 tablet (20 mEq total) by mouth 2 (two) times daily.    promethazine (PHENERGAN) 25 MG tablet Take 1 tablet (25 mg total) by mouth every 6 (six)  "hours as needed for Nausea.    sildenafil (REVATIO) 20 mg Tab Take 1 tablet (20 mg total) by mouth As instructed. Take 2-5 tablets as needed.    triamcinolone acetonide 0.025% (KENALOG) 0.025 % cream Apply topically 2 (two) times daily as needed For itching     Current Facility-Administered Medications   Medication    leuprolide (6 month) (ELIGARD) injection 45 mg    [START ON 11/7/2018] leuprolide (6 month) (ELIGARD) injection 45 mg    leuprolide (ELIGARD) injection 45 mg         Review of Systems   Constitution: Negative for chills, decreased appetite, fever, weight gain and weight loss.   HENT: Negative for congestion, hearing loss and sore throat.    Eyes: Negative for blurred vision, double vision and visual disturbance.   Cardiovascular: Negative for chest pain, claudication, dyspnea on exertion, leg swelling, palpitations and syncope.   Respiratory: Negative for cough, hemoptysis, shortness of breath, sputum production and wheezing.    Endocrine: Negative for cold intolerance and heat intolerance.   Hematologic/Lymphatic: Negative for bleeding problem. Does not bruise/bleed easily.   Skin: Negative for color change, dry skin, flushing and itching.   Musculoskeletal: Negative for back pain, joint pain and myalgias.   Gastrointestinal: Negative for abdominal pain, anorexia, constipation, diarrhea, dysphagia, nausea and vomiting.        No bleeding per rectum   Genitourinary: Negative for dysuria, flank pain, frequency, hematuria and nocturia.   Neurological: Negative for dizziness, headaches, light-headedness, loss of balance, seizures and tremors.   Psychiatric/Behavioral: Negative for altered mental status and depression.     Vitals:    10/10/18 1346   BP: 134/83   Pulse: 89   Weight: 73.5 kg (162 lb)   Height: 5' 8" (1.727 m)        Objective:    Physical Exam   Constitutional: He is oriented to person, place, and time. He appears well-developed and well-nourished.   HENT:   Head: Normocephalic and " atraumatic.   Right Ear: External ear normal.   Left Ear: External ear normal.   Nose: Nose normal.   Eyes: Conjunctivae and EOM are normal. Pupils are equal, round, and reactive to light. No scleral icterus.   Neck: Normal range of motion. Neck supple. No JVD present. No tracheal deviation present. No thyromegaly present.   Cardiovascular: Normal rate, regular rhythm and normal heart sounds. Exam reveals no gallop and no friction rub.   No murmur heard.  Pulmonary/Chest: Effort normal and breath sounds normal. No respiratory distress. He has no rales. He exhibits no tenderness.   Abdominal: Soft. Bowel sounds are normal. He exhibits no distension and no mass. There is no tenderness.   Musculoskeletal: Normal range of motion. He exhibits no edema or tenderness.   Lymphadenopathy:     He has no cervical adenopathy.   Neurological: He is alert and oriented to person, place, and time. He has normal reflexes. No cranial nerve deficit. Coordination normal.   Skin: Skin is warm and dry. No rash noted.   Psychiatric: He has a normal mood and affect. His behavior is normal.         Assessment:       1. Essential hypertension        3. Malignant neoplasm of body of pancreas    4. Peritoneal metastases    5. Encounter for antineoplastic chemotherapy    6. Prostate cancer metastatic to intrapelvic lymph node    7. Type 2 diabetes mellitus with stage 1 chronic kidney disease, without long-term current use of insulin         Plan:       Stable from a cardiovascular standpoint.  Follow-up with Dr. Melendez.

## 2018-10-15 ENCOUNTER — LAB VISIT (OUTPATIENT)
Dept: LAB | Facility: OTHER | Age: 73
End: 2018-10-15
Attending: INTERNAL MEDICINE
Payer: MEDICARE

## 2018-10-15 ENCOUNTER — OFFICE VISIT (OUTPATIENT)
Dept: HEMATOLOGY/ONCOLOGY | Facility: CLINIC | Age: 73
End: 2018-10-15
Payer: MEDICARE

## 2018-10-15 VITALS
SYSTOLIC BLOOD PRESSURE: 169 MMHG | DIASTOLIC BLOOD PRESSURE: 88 MMHG | OXYGEN SATURATION: 98 % | TEMPERATURE: 98 F | RESPIRATION RATE: 16 BRPM | BODY MASS INDEX: 25.66 KG/M2 | WEIGHT: 169.31 LBS | HEART RATE: 77 BPM | HEIGHT: 68 IN

## 2018-10-15 DIAGNOSIS — C61 PROSTATE CANCER METASTATIC TO INTRAPELVIC LYMPH NODE: ICD-10-CM

## 2018-10-15 DIAGNOSIS — C79.51 PROSTATE CANCER METASTATIC TO BONE: ICD-10-CM

## 2018-10-15 DIAGNOSIS — D63.0 ANEMIA IN NEOPLASTIC DISEASE: ICD-10-CM

## 2018-10-15 DIAGNOSIS — I10 ESSENTIAL HYPERTENSION: ICD-10-CM

## 2018-10-15 DIAGNOSIS — C61 PROSTATE CANCER METASTATIC TO BONE: ICD-10-CM

## 2018-10-15 DIAGNOSIS — C77.5 PROSTATE CANCER METASTATIC TO INTRAPELVIC LYMPH NODE: ICD-10-CM

## 2018-10-15 DIAGNOSIS — N18.1 TYPE 2 DIABETES MELLITUS WITH STAGE 1 CHRONIC KIDNEY DISEASE, WITHOUT LONG-TERM CURRENT USE OF INSULIN: ICD-10-CM

## 2018-10-15 DIAGNOSIS — Z51.11 ENCOUNTER FOR ANTINEOPLASTIC CHEMOTHERAPY: ICD-10-CM

## 2018-10-15 DIAGNOSIS — C25.1 MALIGNANT NEOPLASM OF BODY OF PANCREAS: Primary | ICD-10-CM

## 2018-10-15 DIAGNOSIS — E11.22 TYPE 2 DIABETES MELLITUS WITH STAGE 1 CHRONIC KIDNEY DISEASE, WITHOUT LONG-TERM CURRENT USE OF INSULIN: ICD-10-CM

## 2018-10-15 DIAGNOSIS — C25.1 MALIGNANT NEOPLASM OF BODY OF PANCREAS: ICD-10-CM

## 2018-10-15 DIAGNOSIS — C78.6 PERITONEAL METASTASES: ICD-10-CM

## 2018-10-15 LAB
ALBUMIN SERPL BCP-MCNC: 3.4 G/DL
ALP SERPL-CCNC: 62 U/L
ALT SERPL W/O P-5'-P-CCNC: 11 U/L
ANION GAP SERPL CALC-SCNC: 7 MMOL/L
AST SERPL-CCNC: 16 U/L
BASOPHILS # BLD AUTO: 0.01 K/UL
BASOPHILS NFR BLD: 0.2 %
BILIRUB SERPL-MCNC: 0.3 MG/DL
BUN SERPL-MCNC: 12 MG/DL
CALCIUM SERPL-MCNC: 9.3 MG/DL
CANCER AG19-9 SERPL-ACNC: <2 U/ML
CHLORIDE SERPL-SCNC: 106 MMOL/L
CO2 SERPL-SCNC: 27 MMOL/L
CREAT SERPL-MCNC: 0.8 MG/DL
DIFFERENTIAL METHOD: ABNORMAL
EOSINOPHIL # BLD AUTO: 0.4 K/UL
EOSINOPHIL NFR BLD: 7 %
ERYTHROCYTE [DISTWIDTH] IN BLOOD BY AUTOMATED COUNT: 21.3 %
EST. GFR  (AFRICAN AMERICAN): >60 ML/MIN/1.73 M^2
EST. GFR  (NON AFRICAN AMERICAN): >60 ML/MIN/1.73 M^2
GLUCOSE SERPL-MCNC: 82 MG/DL
HCT VFR BLD AUTO: 28.8 %
HGB BLD-MCNC: 8.5 G/DL
LYMPHOCYTES # BLD AUTO: 1.3 K/UL
LYMPHOCYTES NFR BLD: 25.2 %
MCH RBC QN AUTO: 25.4 PG
MCHC RBC AUTO-ENTMCNC: 29.5 G/DL
MCV RBC AUTO: 86 FL
MONOCYTES # BLD AUTO: 0.4 K/UL
MONOCYTES NFR BLD: 7.6 %
NEUTROPHILS # BLD AUTO: 3.1 K/UL
NEUTROPHILS NFR BLD: 59.8 %
PLATELET # BLD AUTO: 265 K/UL
PMV BLD AUTO: 8.7 FL
POTASSIUM SERPL-SCNC: 3.7 MMOL/L
PROT SERPL-MCNC: 6.9 G/DL
RBC # BLD AUTO: 3.34 M/UL
SODIUM SERPL-SCNC: 140 MMOL/L
WBC # BLD AUTO: 5.12 K/UL

## 2018-10-15 PROCEDURE — 3044F HG A1C LEVEL LT 7.0%: CPT | Mod: CPTII,,, | Performed by: INTERNAL MEDICINE

## 2018-10-15 PROCEDURE — 36415 COLL VENOUS BLD VENIPUNCTURE: CPT

## 2018-10-15 PROCEDURE — 86301 IMMUNOASSAY TUMOR CA 19-9: CPT

## 2018-10-15 PROCEDURE — 99213 OFFICE O/P EST LOW 20 MIN: CPT | Mod: PBBFAC | Performed by: INTERNAL MEDICINE

## 2018-10-15 PROCEDURE — 99215 OFFICE O/P EST HI 40 MIN: CPT | Mod: S$PBB,,, | Performed by: INTERNAL MEDICINE

## 2018-10-15 PROCEDURE — 1101F PT FALLS ASSESS-DOCD LE1/YR: CPT | Mod: CPTII,,, | Performed by: INTERNAL MEDICINE

## 2018-10-15 PROCEDURE — 99999 PR PBB SHADOW E&M-EST. PATIENT-LVL III: CPT | Mod: PBBFAC,,, | Performed by: INTERNAL MEDICINE

## 2018-10-15 PROCEDURE — 3079F DIAST BP 80-89 MM HG: CPT | Mod: CPTII,,, | Performed by: INTERNAL MEDICINE

## 2018-10-15 PROCEDURE — 80053 COMPREHEN METABOLIC PANEL: CPT

## 2018-10-15 PROCEDURE — 85025 COMPLETE CBC W/AUTO DIFF WBC: CPT

## 2018-10-15 PROCEDURE — 3077F SYST BP >= 140 MM HG: CPT | Mod: CPTII,,, | Performed by: INTERNAL MEDICINE

## 2018-10-15 RX ORDER — SODIUM CHLORIDE 0.9 % (FLUSH) 0.9 %
10 SYRINGE (ML) INJECTION
Status: CANCELLED | OUTPATIENT
Start: 2018-10-15

## 2018-10-15 RX ORDER — HEPARIN 100 UNIT/ML
500 SYRINGE INTRAVENOUS
Status: CANCELLED | OUTPATIENT
Start: 2018-10-15

## 2018-10-15 NOTE — PROGRESS NOTES
Subjective:       Patient ID: Shady Prakash Jr. is a 73 y.o. male.     Chief Complaint: No chief complaint on file.     HPI      Mr. Prakash returns today for follow up of metastatic pancreatic cancer and metastatic prostate cancer.  Has been doing okay on gem/abraxane every 2 weeks. Still has mild swelling in feet and numbness in the bottom.   Tolerating chemo well otherwise.        CT C/A/P 8/29/18:          FINDINGS:  There is a single micro nodule which is stable in the left lung, a nonspecific finding.  The right lung remains abnormal with pleural thickening, pleural calcification and some pleural fluid.  The right lung also demonstrates some atelectasis versus scarring as well as stable focal consolidation versus volume loss at the lung base.  Lung fields bilaterally demonstrate emphysema.  There are hilar and mediastinal calcifications, compatible with calcified nodes.  No pericardial fluid is present.    On today's study, best seen on the arterial phase imaging are 3 small hyperdense foci, the largest measuring 1 cm.  These are stable compared to the prior CT which describes debility compared to 2015.  An additional subcentimeter hypodensity is stable dating back to 2017, likely benign.  There is no new liver lesion.  The gallbladder demonstrates no abnormality.  The spleen shows granulomas.    Within the body of the pancreas is an ill-defined hypodense lesion which measures 2 cm in greatest dimension, similar compared to prior study.  The upstream pancreatic duct is dilated measuring 4 mm.    The adrenal glands demonstrate no mass.  There is a right renal cyst.  There are small lymph nodes about the upper abdomen mesentery and rhett hepatis.  There is no aneurysmal dilatation of the aorta.    No pelvic lymphadenopathy.  Bladder is not well distended for good evaluation.  Colon demonstrates diverticulosis.  There are surgical changes along the abdominal wall.  Surgical changes are present at the base of the  cecum.  There is some mild mesenteric edema.  Several small soft tissue density nodules within the mesentery of the abdomen on the most recent prior study have decreased in number.    Osseous structures again show sclerotic foci compatible with osseous metastatic disease.       Impression         In this patient with history metastatic pancreatic and prostate cancer, there is a stable pancreatic mass which measures 2 cm.    Overall decrease in size of mesenteric soft tissue densities, which may reflect peritoneal metastatic disease.    Hepatic hyperenhancing foci stable, most compatible with hemangioma based on stability and prior imaging.    stable changes within the right chest.         Oncology History:  1. Metastatic prostate cancer  He is s/p TRUS/bx on 8/7/15 w/ high volume marta 9 (5+4), PSA 25.  He had negative metastatic workup at diagnosis.  However, at time of planned RALP on 9/10/15 he was noted to have BN invasion on cysto and therefore only PLND was done, which confirmed metastatic disease.  He was started on ADT 9/17/15 w/ firmagon followed by Eligard on 10/15/15.    Started Casodex 7/20/17 - scans at that time showed osseus metastatic disease  Started docetaxel 10/6/17 with progression, then found to have below     - on follow-up scans a pancreatic lesion noted concerning for pancreatic cancer as it was an unlikely area for prostate cancer spread-  Biopsy confirmed adenocarcinoma and PSA was negative     Imaging as below:  Scan results:  6/20/18 Bone scan:  FINDINGS:  Two lesions in the left scapula, stable.  Elongated lesion in the right mid rib posteriorly, stable.  Prior exam demonstrated a large right iliac lesion, which no longer exhibits increased uptake.  Left pubic lesion, stable.  There is also now a small lesion in the left iliac bone. Two additional new small foci in the mid thoracic vertebra.  Both kidneys and the bladder appear unremarkable.  Impression:  Findings consistent with  osseous metastatic disease as above, noting 3 new small lesions.     6/21/18 CT scan abdomen and pelvis:  FINDINGS:  Lower chest: There has been interval increase in size in the focal consolidation which is pleural based in the posterior aspect of the right lower lobe.  Today's exam measures 5.1 cm maximum diameter.  Previous exam is measure approximately 3.3 cm.  This is associated with pleural thickening that extends laterally.  There is a small focus of cylinder all coal bronchiectasis in the medial left lower lobe.  The remaining aspects of the lung parenchyma that are visualized show no abnormalities.  The visualized heart and pericardium are normal  Liver: There are 3 hyperenhancing foci along the periphery of the right and left lobes of the liver which are stable from 2015.  The largest measures 1.1 cm in maximum diameter.  Although not characteristic on this exam, previous exam in 2015 shows findings most characteristic of hepatic hemangioma.  The remaining liver parenchyma as well as the hepatic and portal veins are patent.  Gallbladder: Normal.  Pancreas: There is been interval development of an ill-defined hypodense focus within the proximal body of the pancreas measuring 2.1 cm x 1.8 cm.  Spleen: Calcified hemangiomas present.  Otherwise normal.  Adrenal glands: Normal.  Genitourinary: Stable simple cyst in the lower pole of the right kidney measuring 1.6 cm.  The left kidney and bilateral ureters are normal.  The bladder is partially distended and shows wall thickening on the non dependent surface of the bladder.  This is not significantly changed in appearance.  Gastrointestinal: Stomach and small intestines are normal.  Colonic diverticulosis is present without evidence of diverticulitis.  Prostate: Enlarged and nodular causing mass effect on the bladder.  Miscellaneous: There is no intra-abdominal or pelvic free fluid, free air or lymphadenopathy.  The abdominal aorta tapers normally.  Bilateral fat  containing inguinal hernias present.  Osseous and soft tissue structures: Sclerotic metastases involving the right iliac bone and left superior pubic ramus are identified.  The small focus in the left iliac bone seen on yesterday's bone scan is not well seen on this exam.  The soft tissues are within normal limits.  Impression:  1. Interval increase in size in the pleural base, right lower lobe consolidation.  2. Hyperenhancing foci likely represent hemangiomas.  They have been stable since 2015.  Is on the 2015 exam where they demonstrate characteristics most compatible with hemangioma.  3. Bladder wall thickening is not significantly changed.  This may be related to outlet obstruction from the nodular prostate.  4. Interval development of ill-defined hypodense focus measuring 2.1 cm within the proximal body of the pancreas.  This may also be a focus of metastatic disease although is uncommon for the prostate to metastasized to the pancreas.  A dedicated pancreatic CT may be beneficial in further characterizing this lesion.     - on 7/25/18 he came to ER with worsening abdominal pain and concerns for appendicitis  Underwent Laparoscopy Converted to Laparotomy, Peritoneal Biopsy , Ileocecal with Dr. Stephenson  Biopsy concerning for metastatic pancreatic carcinoma     2. Metastatic pancreatic cancer  - started on [place]/Abraxane- note Ca19-9 never elevated  Response as above     Review of Systems   Constitutional: Negative for activity change, appetite change, chills, fatigue, fever and unexpected weight change.   HENT: Positive for dental problem. Negative for congestion, hearing loss, mouth sores, nosebleeds, postnasal drip, sinus pressure, sore throat and trouble swallowing.    Eyes: Negative for visual disturbance.   Respiratory: Negative for cough, chest tightness, shortness of breath and wheezing.    Cardiovascular: Negative for chest pain, palpitations and leg swelling.   Gastrointestinal: Negative for abdominal  distention, abdominal pain, blood in stool, constipation, diarrhea and vomiting.        No GERD   Endocrine:        Hot flashes   Genitourinary: Negative for decreased urine volume, difficulty urinating, frequency, hematuria and urgency.        Sexual dysfunction   Musculoskeletal: Negative for arthralgias (better), back pain, gait problem, joint swelling, neck pain and neck stiffness.   Skin:. Negative for color change, pallor and rash.   Neurological: Negative for dizziness, weakness, light-headedness, numbness and headaches.   Hematological: Negative for adenopathy. Does not bruise/bleed easily.   Psychiatric/Behavioral: Negative for dysphoric mood and sleep disturbance. The patient is not nervous/anxious.        Objective:   Physical Exam   Constitutional: He is oriented to person, place, and time. He appears well-developed and well-nourished. No distress.   Presents with wife  ECOG=0   HENT:   Head: Normocephalic and atraumatic.   Right Ear: External ear normal.   Left Ear: External ear normal.   Nose: Nose normal.   Mouth/Throat: Oropharynx is clear and moist. No oropharyngeal exudate.   Eyes: Conjunctivae and EOM are normal. Pupils are equal, round, and reactive to light. Right eye exhibits no discharge. Left eye exhibits no discharge. No scleral icterus.   Neck: Normal range of motion. Neck supple. No tracheal deviation present. No thyromegaly present.   Cardiovascular: Normal rate, regular rhythm, normal heart sounds and intact distal pulses. Exam reveals no gallop and no friction rub.   No murmur heard.  Pulmonary/Chest: Effort normal and breath sounds normal. No respiratory distress. He has no wheezes. He has no rales. He exhibits no tenderness.   Abdominal: Soft. Bowel sounds are normal. He exhibits no distension and no mass. There is no tenderness. There is no rebound and no guarding.   Musculoskeletal: Normal range of motion. He exhibits no edema, tenderness or deformity.   Lymphadenopathy:     He has  no cervical adenopathy.   Neurological: He is alert and oriented to person, place, and time. He has normal reflexes. He displays normal reflexes. No cranial nerve deficit. He exhibits normal muscle tone. Coordination normal.   Skin: Skin is warm and dry. No rash noted. He is not diaphoretic. No erythema. No pallor.   Psychiatric: He has a normal mood and affect. His behavior is normal. Judgment and thought content normal.   Nursing note and vitals reviewed.    Labs- reviewed  Assessment:       1. Malignant neoplasm of body of pancreas    2. Peritoneal metastases    3. Prostate cancer metastatic to bone    4. Prostate cancer metastatic to intrapelvic lymph node    5. Encounter for antineoplastic chemotherapy    6. Essential hypertension    7. Anemia in neoplastic disease    8. Type 2 diabetes mellitus with stage 1 chronic kidney disease, without long-term current use of insulin        Plan:    1-2. 5  Getting gem/abraxane every 2 weeks. Regimen adjusted to allow count recovery.   Tolerating well and feeling better overall  Cycle 3 day 1 today  Return in 2 weeks for cycle 3 day 15    3.4. Treatment on hold with above    6.  - BP elevated today. Resume lisinopril-HCTZ 20 mg-25 mg daily    7.  - stable. Monitor    8.  - controlled. F/u with PCP

## 2018-10-18 ENCOUNTER — INFUSION (OUTPATIENT)
Dept: INFUSION THERAPY | Facility: OTHER | Age: 73
End: 2018-10-18
Attending: INTERNAL MEDICINE
Payer: MEDICARE

## 2018-10-18 VITALS
DIASTOLIC BLOOD PRESSURE: 82 MMHG | OXYGEN SATURATION: 99 % | WEIGHT: 163.81 LBS | TEMPERATURE: 98 F | HEIGHT: 68 IN | SYSTOLIC BLOOD PRESSURE: 129 MMHG | HEART RATE: 90 BPM | BODY MASS INDEX: 24.83 KG/M2 | RESPIRATION RATE: 18 BRPM

## 2018-10-18 DIAGNOSIS — C61 PROSTATE CANCER METASTATIC TO BONE: ICD-10-CM

## 2018-10-18 DIAGNOSIS — C77.5 PROSTATE CANCER METASTATIC TO INTRAPELVIC LYMPH NODE: ICD-10-CM

## 2018-10-18 DIAGNOSIS — C25.1 MALIGNANT NEOPLASM OF BODY OF PANCREAS: Primary | ICD-10-CM

## 2018-10-18 DIAGNOSIS — C61 PROSTATE CANCER METASTATIC TO INTRAPELVIC LYMPH NODE: ICD-10-CM

## 2018-10-18 DIAGNOSIS — C79.51 PROSTATE CANCER METASTATIC TO BONE: ICD-10-CM

## 2018-10-18 PROCEDURE — 96417 CHEMO IV INFUS EACH ADDL SEQ: CPT

## 2018-10-18 PROCEDURE — 25000003 PHARM REV CODE 250: Performed by: INTERNAL MEDICINE

## 2018-10-18 PROCEDURE — 96413 CHEMO IV INFUSION 1 HR: CPT

## 2018-10-18 PROCEDURE — 96367 TX/PROPH/DG ADDL SEQ IV INF: CPT

## 2018-10-18 PROCEDURE — 63600175 PHARM REV CODE 636 W HCPCS: Performed by: INTERNAL MEDICINE

## 2018-10-18 RX ORDER — HEPARIN 100 UNIT/ML
500 SYRINGE INTRAVENOUS
Status: DISCONTINUED | OUTPATIENT
Start: 2018-10-18 | End: 2018-10-18 | Stop reason: HOSPADM

## 2018-10-18 RX ORDER — SODIUM CHLORIDE 0.9 % (FLUSH) 0.9 %
10 SYRINGE (ML) INJECTION
Status: DISCONTINUED | OUTPATIENT
Start: 2018-10-18 | End: 2018-10-18 | Stop reason: HOSPADM

## 2018-10-18 RX ADMIN — PACLITAXEL 260 MG: 100 INJECTION, POWDER, LYOPHILIZED, FOR SUSPENSION INTRAVENOUS at 02:10

## 2018-10-18 RX ADMIN — SODIUM CHLORIDE: 0.9 INJECTION, SOLUTION INTRAVENOUS at 01:10

## 2018-10-18 RX ADMIN — GEMCITABINE 2050 MG: 38 INJECTION, SOLUTION INTRAVENOUS at 03:10

## 2018-10-18 RX ADMIN — Medication 1 MG: at 02:10

## 2018-10-18 NOTE — PLAN OF CARE
Problem: Patient Care Overview  Goal: Plan of Care Review  Outcome: Ongoing (interventions implemented as appropriate)  Pt tolerated Abraxane/Gemzar without issue. VSS. AVS given. Pt to return on 11.1 for next treatment.

## 2018-10-21 ENCOUNTER — NURSE TRIAGE (OUTPATIENT)
Dept: ADMINISTRATIVE | Facility: CLINIC | Age: 73
End: 2018-10-21

## 2018-10-21 NOTE — TELEPHONE ENCOUNTER
"Panc/prostate CA  Daughter called re 138/72, [phone] down to 119. Felt weak in legs.     Reason for Disposition   [1] MODERATE weakness AND [2] from poor fluid intake AND [3] no improvement after 2 hours of rest and fluids    Answer Assessment - Initial Assessment Questions  1. DESCRIPTION: "Describe how you are feeling."     Felt wobbly when walking this afternoon   2. SEVERITY: "How bad is it?"  "Can you stand and walk?"    - MILD - Feels weak or tired, but does not interfere with work, school or normal activities    - MODERATE - Able to stand and walk; weakness interferes with work, school, or normal activities    - SEVERE - Unable to stand or walk     Able to walk to BR, ate some spaghetti , no SOB, no CP, hx DM- BS today 119 this am   3. ONSET:  "When did the weakness begin?"     This afternoon, drinking cold drinks, last uop = 530pm   denies palps.    4. CAUSE: "What do you think is causing the weakness?"     Not drinking enough fluids , no dry mouth, no lightheaded   5. OTHER SYMPTOMS: "Do you have any other symptoms?" (e.g., chest pain, fever, cough, SOB, vomiting, diarrhea, bleeding)    Pt on chemo qo week , no abd pain, + flatus    Protocols used: ST WEAKNESS (GENERALIZED) AND FATIGUE-A-AH    rec fluids, rest, elevate feet. rec ED if no help. ED warnings given. Call back with questions.   "

## 2018-10-30 ENCOUNTER — LAB VISIT (OUTPATIENT)
Dept: LAB | Facility: OTHER | Age: 73
End: 2018-10-30
Attending: INTERNAL MEDICINE
Payer: MEDICARE

## 2018-10-30 ENCOUNTER — OFFICE VISIT (OUTPATIENT)
Dept: HEMATOLOGY/ONCOLOGY | Facility: CLINIC | Age: 73
End: 2018-10-30
Attending: INTERNAL MEDICINE
Payer: MEDICARE

## 2018-10-30 VITALS
RESPIRATION RATE: 16 BRPM | DIASTOLIC BLOOD PRESSURE: 61 MMHG | TEMPERATURE: 98 F | OXYGEN SATURATION: 99 % | HEIGHT: 68 IN | BODY MASS INDEX: 23.52 KG/M2 | HEART RATE: 101 BPM | SYSTOLIC BLOOD PRESSURE: 93 MMHG | WEIGHT: 155.19 LBS

## 2018-10-30 DIAGNOSIS — C61 PROSTATE CANCER METASTATIC TO INTRAPELVIC LYMPH NODE: ICD-10-CM

## 2018-10-30 DIAGNOSIS — I95.0 IDIOPATHIC HYPOTENSION: ICD-10-CM

## 2018-10-30 DIAGNOSIS — C77.5 PROSTATE CANCER METASTATIC TO INTRAPELVIC LYMPH NODE: ICD-10-CM

## 2018-10-30 DIAGNOSIS — C61 PROSTATE CANCER METASTATIC TO BONE: ICD-10-CM

## 2018-10-30 DIAGNOSIS — C25.1 MALIGNANT NEOPLASM OF BODY OF PANCREAS: Primary | ICD-10-CM

## 2018-10-30 DIAGNOSIS — Z51.11 ENCOUNTER FOR ANTINEOPLASTIC CHEMOTHERAPY: ICD-10-CM

## 2018-10-30 DIAGNOSIS — C25.1 MALIGNANT NEOPLASM OF BODY OF PANCREAS: ICD-10-CM

## 2018-10-30 DIAGNOSIS — C79.51 PROSTATE CANCER METASTATIC TO BONE: ICD-10-CM

## 2018-10-30 DIAGNOSIS — C78.6 PERITONEAL METASTASES: ICD-10-CM

## 2018-10-30 LAB
ALBUMIN SERPL BCP-MCNC: 3.6 G/DL
ALP SERPL-CCNC: 66 U/L
ALT SERPL W/O P-5'-P-CCNC: 19 U/L
ANION GAP SERPL CALC-SCNC: 12 MMOL/L
AST SERPL-CCNC: 25 U/L
BILIRUB SERPL-MCNC: 0.2 MG/DL
BUN SERPL-MCNC: 22 MG/DL
CALCIUM SERPL-MCNC: 10 MG/DL
CHLORIDE SERPL-SCNC: 98 MMOL/L
CO2 SERPL-SCNC: 26 MMOL/L
CREAT SERPL-MCNC: 1 MG/DL
ERYTHROCYTE [DISTWIDTH] IN BLOOD BY AUTOMATED COUNT: 20 %
EST. GFR  (AFRICAN AMERICAN): >60 ML/MIN/1.73 M^2
EST. GFR  (NON AFRICAN AMERICAN): >60 ML/MIN/1.73 M^2
GLUCOSE SERPL-MCNC: 135 MG/DL
HCT VFR BLD AUTO: 31 %
HGB BLD-MCNC: 9.5 G/DL
MCH RBC QN AUTO: 26.5 PG
MCHC RBC AUTO-ENTMCNC: 30.6 G/DL
MCV RBC AUTO: 86 FL
NEUTROPHILS # BLD AUTO: 3.8 K/UL
PLATELET # BLD AUTO: 333 K/UL
PMV BLD AUTO: 8.7 FL
POTASSIUM SERPL-SCNC: 4.1 MMOL/L
PROT SERPL-MCNC: 7.6 G/DL
RBC # BLD AUTO: 3.59 M/UL
SODIUM SERPL-SCNC: 136 MMOL/L
WBC # BLD AUTO: 6.02 K/UL

## 2018-10-30 PROCEDURE — 1101F PT FALLS ASSESS-DOCD LE1/YR: CPT | Mod: CPTII,,, | Performed by: INTERNAL MEDICINE

## 2018-10-30 PROCEDURE — 85027 COMPLETE CBC AUTOMATED: CPT

## 2018-10-30 PROCEDURE — 3078F DIAST BP <80 MM HG: CPT | Mod: CPTII,,, | Performed by: INTERNAL MEDICINE

## 2018-10-30 PROCEDURE — 36415 COLL VENOUS BLD VENIPUNCTURE: CPT

## 2018-10-30 PROCEDURE — 99999 PR PBB SHADOW E&M-EST. PATIENT-LVL III: CPT | Mod: PBBFAC,,, | Performed by: INTERNAL MEDICINE

## 2018-10-30 PROCEDURE — 99213 OFFICE O/P EST LOW 20 MIN: CPT | Mod: PBBFAC | Performed by: INTERNAL MEDICINE

## 2018-10-30 PROCEDURE — 99214 OFFICE O/P EST MOD 30 MIN: CPT | Mod: S$PBB,,, | Performed by: INTERNAL MEDICINE

## 2018-10-30 PROCEDURE — 80053 COMPREHEN METABOLIC PANEL: CPT

## 2018-10-30 PROCEDURE — 3074F SYST BP LT 130 MM HG: CPT | Mod: CPTII,,, | Performed by: INTERNAL MEDICINE

## 2018-10-30 RX ORDER — HEPARIN 100 UNIT/ML
500 SYRINGE INTRAVENOUS
Status: CANCELLED | OUTPATIENT
Start: 2018-10-30

## 2018-10-30 RX ORDER — SODIUM CHLORIDE 0.9 % (FLUSH) 0.9 %
10 SYRINGE (ML) INJECTION
Status: CANCELLED | OUTPATIENT
Start: 2018-10-30

## 2018-10-30 NOTE — PROGRESS NOTES
Subjective:       Patient ID: Shady Prakash Jr. is a 73 y.o. male.    Chief Complaint: Follow-up    HPI       Mr. Prakash returns today for follow up of metastatic pancreatic cancer and metastatic prostate cancer.  Reports no side effects from chemotherapy- noted burning - does not want port     Tolerating chemotherapy overall well.  No nausea or vomiting.  Last treatment he noted burning during administration- no extravasation  No fevers, chills, or infections  Appetite better  Weight up     CT C/A/P 8/29/18:          FINDINGS:  There is a single micro nodule which is stable in the left lung, a nonspecific finding.  The right lung remains abnormal with pleural thickening, pleural calcification and some pleural fluid.  The right lung also demonstrates some atelectasis versus scarring as well as stable focal consolidation versus volume loss at the lung base.  Lung fields bilaterally demonstrate emphysema.  There are hilar and mediastinal calcifications, compatible with calcified nodes.  No pericardial fluid is present.    On today's study, best seen on the arterial phase imaging are 3 small hyperdense foci, the largest measuring 1 cm.  These are stable compared to the prior CT which describes debility compared to 2015.  An additional subcentimeter hypodensity is stable dating back to 2017, likely benign.  There is no new liver lesion.  The gallbladder demonstrates no abnormality.  The spleen shows granulomas.    Within the body of the pancreas is an ill-defined hypodense lesion which measures 2 cm in greatest dimension, similar compared to prior study.  The upstream pancreatic duct is dilated measuring 4 mm.    The adrenal glands demonstrate no mass.  There is a right renal cyst.  There are small lymph nodes about the upper abdomen mesentery and rhett hepatis.  There is no aneurysmal dilatation of the aorta.    No pelvic lymphadenopathy.  Bladder is not well distended for good evaluation.  Colon demonstrates  diverticulosis.  There are surgical changes along the abdominal wall.  Surgical changes are present at the base of the cecum.  There is some mild mesenteric edema.  Several small soft tissue density nodules within the mesentery of the abdomen on the most recent prior study have decreased in number.    Osseous structures again show sclerotic foci compatible with osseous metastatic disease.       Impression         In this patient with history metastatic pancreatic and prostate cancer, there is a stable pancreatic mass which measures 2 cm.    Overall decrease in size of mesenteric soft tissue densities, which may reflect peritoneal metastatic disease.    Hepatic hyperenhancing foci stable, most compatible with hemangioma based on stability and prior imaging.    stable changes within the right chest.         Oncology History:  1. Metastatic prostate cancer  He is s/p TRUS/bx on 8/7/15 w/ high volume marta 9 (5+4), PSA 25.  He had negative metastatic workup at diagnosis.  However, at time of planned RALP on 9/10/15 he was noted to have BN invasion on cysto and therefore only PLND was done, which confirmed metastatic disease.  He was started on ADT 9/17/15 w/ firmagon followed by Eligard on 10/15/15.    Started Casodex 7/20/17 - scans at that time showed osseus metastatic disease  Started docetaxel 10/6/17 with progression, then found to have below     - on follow-up scans a pancreatic lesion noted concerning for pancreatic cancer as it was an unlikely area for prostate cancer spread-  Biopsy confirmed adenocarcinoma and PSA was negative     Imaging as below:  Scan results:  6/20/18 Bone scan:  FINDINGS:  Two lesions in the left scapula, stable.  Elongated lesion in the right mid rib posteriorly, stable.  Prior exam demonstrated a large right iliac lesion, which no longer exhibits increased uptake.  Left pubic lesion, stable.  There is also now a small lesion in the left iliac bone. Two additional new small foci in the  mid thoracic vertebra.  Both kidneys and the bladder appear unremarkable.  Impression:  Findings consistent with osseous metastatic disease as above, noting 3 new small lesions.     6/21/18 CT scan abdomen and pelvis:  FINDINGS:  Lower chest: There has been interval increase in size in the focal consolidation which is pleural based in the posterior aspect of the right lower lobe.  Today's exam measures 5.1 cm maximum diameter.  Previous exam is measure approximately 3.3 cm.  This is associated with pleural thickening that extends laterally.  There is a small focus of cylinder all coal bronchiectasis in the medial left lower lobe.  The remaining aspects of the lung parenchyma that are visualized show no abnormalities.  The visualized heart and pericardium are normal  Liver: There are 3 hyperenhancing foci along the periphery of the right and left lobes of the liver which are stable from 2015.  The largest measures 1.1 cm in maximum diameter.  Although not characteristic on this exam, previous exam in 2015 shows findings most characteristic of hepatic hemangioma.  The remaining liver parenchyma as well as the hepatic and portal veins are patent.  Gallbladder: Normal.  Pancreas: There is been interval development of an ill-defined hypodense focus within the proximal body of the pancreas measuring 2.1 cm x 1.8 cm.  Spleen: Calcified hemangiomas present.  Otherwise normal.  Adrenal glands: Normal.  Genitourinary: Stable simple cyst in the lower pole of the right kidney measuring 1.6 cm.  The left kidney and bilateral ureters are normal.  The bladder is partially distended and shows wall thickening on the non dependent surface of the bladder.  This is not significantly changed in appearance.  Gastrointestinal: Stomach and small intestines are normal.  Colonic diverticulosis is present without evidence of diverticulitis.  Prostate: Enlarged and nodular causing mass effect on the bladder.  Miscellaneous: There is no  intra-abdominal or pelvic free fluid, free air or lymphadenopathy.  The abdominal aorta tapers normally.  Bilateral fat containing inguinal hernias present.  Osseous and soft tissue structures: Sclerotic metastases involving the right iliac bone and left superior pubic ramus are identified.  The small focus in the left iliac bone seen on yesterday's bone scan is not well seen on this exam.  The soft tissues are within normal limits.  Impression:  1. Interval increase in size in the pleural base, right lower lobe consolidation.  2. Hyperenhancing foci likely represent hemangiomas.  They have been stable since 2015.  Is on the 2015 exam where they demonstrate characteristics most compatible with hemangioma.  3. Bladder wall thickening is not significantly changed.  This may be related to outlet obstruction from the nodular prostate.  4. Interval development of ill-defined hypodense focus measuring 2.1 cm within the proximal body of the pancreas.  This may also be a focus of metastatic disease although is uncommon for the prostate to metastasized to the pancreas.  A dedicated pancreatic CT may be beneficial in further characterizing this lesion.     - on 7/25/18 he came to ER with worsening abdominal pain and concerns for appendicitis  Underwent Laparoscopy Converted to Laparotomy, Peritoneal Biopsy , Ileocecal with Dr. Stephenson  Biopsy concerning for metastatic pancreatic carcinoma     2. Metastatic pancreatic cancer  - started on [place]/Abraxane- note Ca19-9 never elevated  Response as above    Review of Systems   Constitutional: Negative for activity change, appetite change, chills, fatigue, fever and unexpected weight change.   HENT: Positive for dental problem. Negative for congestion, hearing loss, mouth sores, nosebleeds, postnasal drip, sinus pressure, sore throat and trouble swallowing.    Eyes: Negative for visual disturbance.   Respiratory: Negative for cough (rare dry), chest tightness, shortness of breath and  wheezing.    Cardiovascular: Negative for chest pain, palpitations and leg swelling.   Gastrointestinal: Negative for abdominal distention, abdominal pain, blood in stool, constipation, diarrhea and vomiting.        No GERD   Endocrine:        Hot flashes   Genitourinary: Negative for decreased urine volume, difficulty urinating, frequency, hematuria and urgency.        Sexual dysfunction   Musculoskeletal: Negative for arthralgias (better), back pain, gait problem, joint swelling, neck pain and neck stiffness.   Skin: Negative for color change, pallor, rash and wound.   Neurological: Negative for dizziness, weakness, light-headedness, numbness and headaches.   Hematological: Negative for adenopathy. Does not bruise/bleed easily.   Psychiatric/Behavioral: Negative for dysphoric mood and sleep disturbance. The patient is not nervous/anxious.        Objective:      Physical Exam   Constitutional: He is oriented to person, place, and time. He appears well-developed and well-nourished. No distress.   Presents with wife  ECOG=0   HENT:   Head: Normocephalic and atraumatic.   Right Ear: External ear normal.   Left Ear: External ear normal.   Nose: Nose normal.   Mouth/Throat: Oropharynx is clear and moist. No oropharyngeal exudate.   Eyes: Conjunctivae and EOM are normal. Pupils are equal, round, and reactive to light. Right eye exhibits no discharge. Left eye exhibits no discharge. No scleral icterus.   Neck: Normal range of motion. Neck supple. No tracheal deviation present. No thyromegaly present.   Cardiovascular: Normal rate, regular rhythm, normal heart sounds and intact distal pulses. Exam reveals no gallop and no friction rub.   No murmur heard.  Pulmonary/Chest: Effort normal and breath sounds normal. No respiratory distress. He has no wheezes. He has no rales. He exhibits no tenderness.   Abdominal: Soft. Bowel sounds are normal. He exhibits no distension and no mass. There is no tenderness. There is no rebound  and no guarding.   Musculoskeletal: Normal range of motion. He exhibits no edema, tenderness or deformity.   Lymphadenopathy:     He has no cervical adenopathy.   Neurological: He is alert and oriented to person, place, and time. He has normal reflexes. He displays normal reflexes. No cranial nerve deficit. He exhibits normal muscle tone. Coordination normal.   Skin: Skin is warm and dry. No rash noted. He is not diaphoretic. No erythema. No pallor.   Psychiatric: He has a normal mood and affect. His behavior is normal. Judgment and thought content normal.   Nursing note and vitals reviewed.   Labs- reviewed   Assessment:       1. Malignant neoplasm of body of pancreas    2. Peritoneal metastases    3. Prostate cancer metastatic to intrapelvic lymph node    4. Prostate cancer metastatic to bone    5. Encounter for antineoplastic chemotherapy    6. Idiopathic hypotension        Plan:     1-2. Proceed with chemotherapy as above  Receives every q2 weeks for count recovery  Scans after cycle # 4  3-4. Prior Taxotere  5. Counts adequate to proceed  6. Advised to hold BP medications

## 2018-11-01 ENCOUNTER — INFUSION (OUTPATIENT)
Dept: INFUSION THERAPY | Facility: OTHER | Age: 73
End: 2018-11-01
Attending: INTERNAL MEDICINE
Payer: MEDICARE

## 2018-11-01 VITALS
DIASTOLIC BLOOD PRESSURE: 75 MMHG | BODY MASS INDEX: 23.65 KG/M2 | RESPIRATION RATE: 17 BRPM | SYSTOLIC BLOOD PRESSURE: 124 MMHG | OXYGEN SATURATION: 98 % | WEIGHT: 156.06 LBS | HEIGHT: 68 IN | HEART RATE: 96 BPM | TEMPERATURE: 98 F

## 2018-11-01 DIAGNOSIS — C25.1 MALIGNANT NEOPLASM OF BODY OF PANCREAS: Primary | ICD-10-CM

## 2018-11-01 DIAGNOSIS — C61 PROSTATE CANCER METASTATIC TO BONE: ICD-10-CM

## 2018-11-01 DIAGNOSIS — C77.5 PROSTATE CANCER METASTATIC TO INTRAPELVIC LYMPH NODE: ICD-10-CM

## 2018-11-01 DIAGNOSIS — C79.51 PROSTATE CANCER METASTATIC TO BONE: ICD-10-CM

## 2018-11-01 DIAGNOSIS — C61 PROSTATE CANCER METASTATIC TO INTRAPELVIC LYMPH NODE: ICD-10-CM

## 2018-11-01 PROCEDURE — 25000003 PHARM REV CODE 250: Performed by: INTERNAL MEDICINE

## 2018-11-01 PROCEDURE — 96413 CHEMO IV INFUSION 1 HR: CPT

## 2018-11-01 PROCEDURE — 96367 TX/PROPH/DG ADDL SEQ IV INF: CPT

## 2018-11-01 PROCEDURE — 96417 CHEMO IV INFUS EACH ADDL SEQ: CPT

## 2018-11-01 PROCEDURE — 63600175 PHARM REV CODE 636 W HCPCS: Mod: JG | Performed by: INTERNAL MEDICINE

## 2018-11-01 RX ORDER — HEPARIN 100 UNIT/ML
500 SYRINGE INTRAVENOUS
Status: DISCONTINUED | OUTPATIENT
Start: 2018-11-01 | End: 2018-11-01 | Stop reason: HOSPADM

## 2018-11-01 RX ORDER — SODIUM CHLORIDE 0.9 % (FLUSH) 0.9 %
10 SYRINGE (ML) INJECTION
Status: DISCONTINUED | OUTPATIENT
Start: 2018-11-01 | End: 2018-11-01 | Stop reason: HOSPADM

## 2018-11-01 RX ADMIN — GEMCITABINE 2050 MG: 38 INJECTION, SOLUTION INTRAVENOUS at 12:11

## 2018-11-01 RX ADMIN — SODIUM CHLORIDE: 0.9 INJECTION, SOLUTION INTRAVENOUS at 10:11

## 2018-11-01 RX ADMIN — Medication 1 MG: at 10:11

## 2018-11-01 RX ADMIN — PACLITAXEL 260 MG: 100 INJECTION, POWDER, LYOPHILIZED, FOR SUSPENSION INTRAVENOUS at 12:11

## 2018-11-01 NOTE — PLAN OF CARE
Problem: Patient Care Overview  Goal: Plan of Care Review  Outcome: Ongoing (interventions implemented as appropriate)  Pt tolerated Abraxane/Gemzar treatment without issue. VSS. AVS given. Pt to return for next treatment on 11.15.

## 2018-11-13 ENCOUNTER — OFFICE VISIT (OUTPATIENT)
Dept: HEMATOLOGY/ONCOLOGY | Facility: CLINIC | Age: 73
End: 2018-11-13
Payer: MEDICARE

## 2018-11-13 VITALS
RESPIRATION RATE: 16 BRPM | BODY MASS INDEX: 24.06 KG/M2 | OXYGEN SATURATION: 100 % | TEMPERATURE: 98 F | HEART RATE: 88 BPM | WEIGHT: 158.75 LBS | HEIGHT: 68 IN | DIASTOLIC BLOOD PRESSURE: 68 MMHG | SYSTOLIC BLOOD PRESSURE: 121 MMHG

## 2018-11-13 DIAGNOSIS — C79.51 PROSTATE CANCER METASTATIC TO BONE: ICD-10-CM

## 2018-11-13 DIAGNOSIS — T45.1X5A ANTINEOPLASTIC CHEMOTHERAPY INDUCED ANEMIA: ICD-10-CM

## 2018-11-13 DIAGNOSIS — I10 ESSENTIAL HYPERTENSION: ICD-10-CM

## 2018-11-13 DIAGNOSIS — N18.1 TYPE 2 DIABETES MELLITUS WITH STAGE 1 CHRONIC KIDNEY DISEASE, WITHOUT LONG-TERM CURRENT USE OF INSULIN: ICD-10-CM

## 2018-11-13 DIAGNOSIS — E11.22 TYPE 2 DIABETES MELLITUS WITH STAGE 1 CHRONIC KIDNEY DISEASE, WITHOUT LONG-TERM CURRENT USE OF INSULIN: ICD-10-CM

## 2018-11-13 DIAGNOSIS — Z51.11 ENCOUNTER FOR ANTINEOPLASTIC CHEMOTHERAPY: ICD-10-CM

## 2018-11-13 DIAGNOSIS — C25.1 MALIGNANT NEOPLASM OF BODY OF PANCREAS: Primary | ICD-10-CM

## 2018-11-13 DIAGNOSIS — C78.6 PERITONEAL METASTASES: ICD-10-CM

## 2018-11-13 DIAGNOSIS — M79.2 NEUROPATHIC PAIN: ICD-10-CM

## 2018-11-13 DIAGNOSIS — D64.81 ANTINEOPLASTIC CHEMOTHERAPY INDUCED ANEMIA: ICD-10-CM

## 2018-11-13 DIAGNOSIS — C61 PROSTATE CANCER METASTATIC TO BONE: ICD-10-CM

## 2018-11-13 PROCEDURE — 3074F SYST BP LT 130 MM HG: CPT | Mod: CPTII,S$GLB,, | Performed by: INTERNAL MEDICINE

## 2018-11-13 PROCEDURE — 3078F DIAST BP <80 MM HG: CPT | Mod: CPTII,S$GLB,, | Performed by: INTERNAL MEDICINE

## 2018-11-13 PROCEDURE — 99215 OFFICE O/P EST HI 40 MIN: CPT | Mod: S$GLB,,, | Performed by: INTERNAL MEDICINE

## 2018-11-13 PROCEDURE — 99999 PR PBB SHADOW E&M-EST. PATIENT-LVL III: CPT | Mod: PBBFAC,,, | Performed by: INTERNAL MEDICINE

## 2018-11-13 PROCEDURE — 1101F PT FALLS ASSESS-DOCD LE1/YR: CPT | Mod: CPTII,S$GLB,, | Performed by: INTERNAL MEDICINE

## 2018-11-13 PROCEDURE — 3044F HG A1C LEVEL LT 7.0%: CPT | Mod: CPTII,S$GLB,, | Performed by: INTERNAL MEDICINE

## 2018-11-13 RX ORDER — SODIUM CHLORIDE 0.9 % (FLUSH) 0.9 %
10 SYRINGE (ML) INJECTION
Status: CANCELLED | OUTPATIENT
Start: 2018-11-13

## 2018-11-13 RX ORDER — HEPARIN 100 UNIT/ML
500 SYRINGE INTRAVENOUS
Status: CANCELLED | OUTPATIENT
Start: 2018-11-13

## 2018-11-13 RX ORDER — GABAPENTIN 300 MG/1
300 CAPSULE ORAL 3 TIMES DAILY
Qty: 90 CAPSULE | Refills: 2 | Status: SHIPPED | OUTPATIENT
Start: 2018-11-13 | End: 2019-12-09

## 2018-11-13 NOTE — Clinical Note
Return to see Dr. Melendez on 11/27, get CBC, CMP, see Dr. Melendez, then return for chemo (gem/abraxane) on 11/29

## 2018-11-13 NOTE — PROGRESS NOTES
Subjective:       Patient ID: Shady Prakash Jr. is a 73 y.o. male.     Chief Complaint: Follow-up     HPI         Mr. Prakash returns today for follow up of metastatic pancreatic cancer and metastatic prostate cancer. Continues to have neuropathic pain on the dorsal aspect of his left foot, denies other complaints. Noted burning with abraxane. Declines port.        CT C/A/P 8/29/18:          FINDINGS:  There is a single micro nodule which is stable in the left lung, a nonspecific finding.  The right lung remains abnormal with pleural thickening, pleural calcification and some pleural fluid.  The right lung also demonstrates some atelectasis versus scarring as well as stable focal consolidation versus volume loss at the lung base.  Lung fields bilaterally demonstrate emphysema.  There are hilar and mediastinal calcifications, compatible with calcified nodes.  No pericardial fluid is present.    On today's study, best seen on the arterial phase imaging are 3 small hyperdense foci, the largest measuring 1 cm.  These are stable compared to the prior CT which describes debility compared to 2015.  An additional subcentimeter hypodensity is stable dating back to 2017, likely benign.  There is no new liver lesion.  The gallbladder demonstrates no abnormality.  The spleen shows granulomas.    Within the body of the pancreas is an ill-defined hypodense lesion which measures 2 cm in greatest dimension, similar compared to prior study.  The upstream pancreatic duct is dilated measuring 4 mm.    The adrenal glands demonstrate no mass.  There is a right renal cyst.  There are small lymph nodes about the upper abdomen mesentery and rhett hepatis.  There is no aneurysmal dilatation of the aorta.    No pelvic lymphadenopathy.  Bladder is not well distended for good evaluation.  Colon demonstrates diverticulosis.  There are surgical changes along the abdominal wall.  Surgical changes are present at the base of the cecum.  There is  some mild mesenteric edema.  Several small soft tissue density nodules within the mesentery of the abdomen on the most recent prior study have decreased in number.    Osseous structures again show sclerotic foci compatible with osseous metastatic disease.       Impression         In this patient with history metastatic pancreatic and prostate cancer, there is a stable pancreatic mass which measures 2 cm.    Overall decrease in size of mesenteric soft tissue densities, which may reflect peritoneal metastatic disease.    Hepatic hyperenhancing foci stable, most compatible with hemangioma based on stability and prior imaging.    stable changes within the right chest.         Oncology History:  1. Metastatic prostate cancer  He is s/p TRUS/bx on 8/7/15 w/ high volume marta 9 (5+4), PSA 25.  He had negative metastatic workup at diagnosis.  However, at time of planned RALP on 9/10/15 he was noted to have BN invasion on cysto and therefore only PLND was done, which confirmed metastatic disease.  He was started on ADT 9/17/15 w/ firmagon followed by Eligard on 10/15/15.    Started Casodex 7/20/17 - scans at that time showed osseus metastatic disease  Started docetaxel 10/6/17 with progression, then found to have below     - on follow-up scans a pancreatic lesion noted concerning for pancreatic cancer as it was an unlikely area for prostate cancer spread-  Biopsy confirmed adenocarcinoma and PSA was negative     Imaging as below:  Scan results:  6/20/18 Bone scan:  FINDINGS:  Two lesions in the left scapula, stable.  Elongated lesion in the right mid rib posteriorly, stable.  Prior exam demonstrated a large right iliac lesion, which no longer exhibits increased uptake.  Left pubic lesion, stable.  There is also now a small lesion in the left iliac bone. Two additional new small foci in the mid thoracic vertebra.  Both kidneys and the bladder appear unremarkable.  Impression:  Findings consistent with osseous metastatic  disease as above, noting 3 new small lesions.     6/21/18 CT scan abdomen and pelvis:  FINDINGS:  Lower chest: There has been interval increase in size in the focal consolidation which is pleural based in the posterior aspect of the right lower lobe.  Today's exam measures 5.1 cm maximum diameter.  Previous exam is measure approximately 3.3 cm.  This is associated with pleural thickening that extends laterally.  There is a small focus of cylinder all coal bronchiectasis in the medial left lower lobe.  The remaining aspects of the lung parenchyma that are visualized show no abnormalities.  The visualized heart and pericardium are normal  Liver: There are 3 hyperenhancing foci along the periphery of the right and left lobes of the liver which are stable from 2015.  The largest measures 1.1 cm in maximum diameter.  Although not characteristic on this exam, previous exam in 2015 shows findings most characteristic of hepatic hemangioma.  The remaining liver parenchyma as well as the hepatic and portal veins are patent.  Gallbladder: Normal.  Pancreas: There is been interval development of an ill-defined hypodense focus within the proximal body of the pancreas measuring 2.1 cm x 1.8 cm.  Spleen: Calcified hemangiomas present.  Otherwise normal.  Adrenal glands: Normal.  Genitourinary: Stable simple cyst in the lower pole of the right kidney measuring 1.6 cm.  The left kidney and bilateral ureters are normal.  The bladder is partially distended and shows wall thickening on the non dependent surface of the bladder.  This is not significantly changed in appearance.  Gastrointestinal: Stomach and small intestines are normal.  Colonic diverticulosis is present without evidence of diverticulitis.  Prostate: Enlarged and nodular causing mass effect on the bladder.  Miscellaneous: There is no intra-abdominal or pelvic free fluid, free air or lymphadenopathy.  The abdominal aorta tapers normally.  Bilateral fat containing inguinal  hernias present.  Osseous and soft tissue structures: Sclerotic metastases involving the right iliac bone and left superior pubic ramus are identified.  The small focus in the left iliac bone seen on yesterday's bone scan is not well seen on this exam.  The soft tissues are within normal limits.  Impression:  1. Interval increase in size in the pleural base, right lower lobe consolidation.  2. Hyperenhancing foci likely represent hemangiomas.  They have been stable since 2015.  Is on the 2015 exam where they demonstrate characteristics most compatible with hemangioma.  3. Bladder wall thickening is not significantly changed.  This may be related to outlet obstruction from the nodular prostate.  4. Interval development of ill-defined hypodense focus measuring 2.1 cm within the proximal body of the pancreas.  This may also be a focus of metastatic disease although is uncommon for the prostate to metastasized to the pancreas.  A dedicated pancreatic CT may be beneficial in further characterizing this lesion.     - on 7/25/18 he came to ER with worsening abdominal pain and concerns for appendicitis  Underwent Laparoscopy Converted to Laparotomy, Peritoneal Biopsy , Ileocecal with Dr. Stephenson  Biopsy concerning for metastatic pancreatic carcinoma     2. Metastatic pancreatic cancer  - started on [place]/Abraxane- note Ca19-9 never elevated  Response as above     Review of Systems   Constitutional: Negative for activity change, appetite change, chills, fatigue, fever and unexpected weight change.   HENT: Positive for dental problem. Negative for congestion, hearing loss, mouth sores, nosebleeds, postnasal drip, sinus pressure, sore throat and trouble swallowing.    Eyes: Negative for visual disturbance.   Respiratory: Negative for cough (rare dry), chest tightness, shortness of breath and wheezing.    Cardiovascular: Negative for chest pain, palpitations and leg swelling.   Gastrointestinal: Negative for abdominal distention,  abdominal pain, blood in stool, constipation, diarrhea and vomiting.        No GERD   Endocrine:        Hot flashes   Genitourinary: Negative for decreased urine volume, difficulty urinating, frequency, hematuria and urgency.        Sexual dysfunction   Musculoskeletal: Negative for arthralgias (better), back pain, gait problem, joint swelling, neck pain and neck stiffness. +neuropathic pain in the dorsal aspect of the left foot  Skin: Negative for color change, pallor, rash and wound.   Neurological: Negative for dizziness, weakness, light-headedness, numbness and headaches.   Hematological: Negative for adenopathy. Does not bruise/bleed easily.   Psychiatric/Behavioral: Negative for dysphoric mood and sleep disturbance. The patient is not nervous/anxious.        Objective:   Physical Exam   Constitutional: He is oriented to person, place, and time. He appears well-developed and well-nourished. No distress.   Presents with wife  ECOG=0   HENT:   Head: Normocephalic and atraumatic.   Right Ear: External ear normal.   Left Ear: External ear normal.   Nose: Nose normal.   Mouth/Throat: Oropharynx is clear and moist. No oropharyngeal exudate.   Eyes: Conjunctivae and EOM are normal. Pupils are equal, round, and reactive to light. Right eye exhibits no discharge. Left eye exhibits no discharge. No scleral icterus.   Neck: Normal range of motion. Neck supple. No tracheal deviation present. No thyromegaly present.   Cardiovascular: Normal rate, regular rhythm, normal heart sounds and intact distal pulses. Exam reveals no gallop and no friction rub.   No murmur heard.  Pulmonary/Chest: Effort normal and breath sounds normal. No respiratory distress. He has no wheezes. He has no rales. He exhibits no tenderness.   Abdominal: Soft. Bowel sounds are normal. He exhibits no distension and no mass. There is no tenderness. There is no rebound and no guarding.   Musculoskeletal: Normal range of motion. He exhibits no edema,  tenderness or deformity. No redness or swelling in left foot  Lymphadenopathy:     He has no cervical adenopathy.   Neurological: He is alert and oriented to person, place, and time. He has normal reflexes. He displays normal reflexes. No cranial nerve deficit. He exhibits normal muscle tone. Coordination normal.   Skin: Skin is warm and dry. No rash noted. He is not diaphoretic. No erythema. No pallor.   Psychiatric: He has a normal mood and affect. His behavior is normal. Judgment and thought content normal.   Nursing note and vitals reviewed.   Labs- reviewed   Assessment:       1. Malignant neoplasm of body of pancreas    2. Peritoneal metastases    3. Encounter for antineoplastic chemotherapy    4. Neuropathic pain    5. Prostate cancer metastatic to bone    6. Essential hypertension    7. Type 2 diabetes mellitus with stage 1 chronic kidney disease, without long-term current use of insulin    8. Antineoplastic chemotherapy induced anemia        Plan:     1-3.   - Doing well. Cycle 4 day 1 gem/abraxane this Thursday. Has dropped 17% of weight. Recalculate chemo based on current BSA of 1.86  - gem/abraxane every other week  - return in 2 weeks to see Dr Melendez prior to cycle 4 day 15  - restage after cycle 4 day 15 dose    4.  - percocet not helping  - try gabapentin 300 mg. Start 1 cap at bedtime for 2-3 days, then 1 cap bid for 2-3 days, then 1 cap tid    5.  - prior taxotere  - [facility] end [facility]  - check PSA on return    6.  - BP good  - c/w current meds    7.  - BS good  - c/w current meds    8.  - mild. Stable  - monitor    Distress Screening Results: Psychosocial Distress screening score of Distress Score: 0 noted and reviewed. No intervention indicated.

## 2018-11-15 ENCOUNTER — INFUSION (OUTPATIENT)
Dept: INFUSION THERAPY | Facility: OTHER | Age: 73
End: 2018-11-15
Attending: INTERNAL MEDICINE
Payer: MEDICARE

## 2018-11-15 VITALS
BODY MASS INDEX: 24.79 KG/M2 | TEMPERATURE: 98 F | RESPIRATION RATE: 18 BRPM | HEART RATE: 91 BPM | SYSTOLIC BLOOD PRESSURE: 129 MMHG | OXYGEN SATURATION: 100 % | DIASTOLIC BLOOD PRESSURE: 76 MMHG | WEIGHT: 163.56 LBS | HEIGHT: 68 IN

## 2018-11-15 DIAGNOSIS — C61 PROSTATE CANCER METASTATIC TO BONE: ICD-10-CM

## 2018-11-15 DIAGNOSIS — C79.51 PROSTATE CANCER METASTATIC TO BONE: ICD-10-CM

## 2018-11-15 DIAGNOSIS — C25.1 MALIGNANT NEOPLASM OF BODY OF PANCREAS: Primary | ICD-10-CM

## 2018-11-15 DIAGNOSIS — C77.5 PROSTATE CANCER METASTATIC TO INTRAPELVIC LYMPH NODE: ICD-10-CM

## 2018-11-15 DIAGNOSIS — C61 PROSTATE CANCER METASTATIC TO INTRAPELVIC LYMPH NODE: ICD-10-CM

## 2018-11-15 PROCEDURE — 63600175 PHARM REV CODE 636 W HCPCS: Performed by: INTERNAL MEDICINE

## 2018-11-15 PROCEDURE — 96367 TX/PROPH/DG ADDL SEQ IV INF: CPT

## 2018-11-15 PROCEDURE — 96417 CHEMO IV INFUS EACH ADDL SEQ: CPT

## 2018-11-15 PROCEDURE — 96413 CHEMO IV INFUSION 1 HR: CPT

## 2018-11-15 PROCEDURE — 25000003 PHARM REV CODE 250: Performed by: INTERNAL MEDICINE

## 2018-11-15 RX ORDER — SODIUM CHLORIDE 0.9 % (FLUSH) 0.9 %
10 SYRINGE (ML) INJECTION
Status: DISCONTINUED | OUTPATIENT
Start: 2018-11-15 | End: 2018-11-15 | Stop reason: HOSPADM

## 2018-11-15 RX ORDER — HEPARIN 100 UNIT/ML
500 SYRINGE INTRAVENOUS
Status: DISCONTINUED | OUTPATIENT
Start: 2018-11-15 | End: 2018-11-15 | Stop reason: HOSPADM

## 2018-11-15 RX ADMIN — GEMCITABINE 1860 MG: 38 INJECTION, SOLUTION INTRAVENOUS at 12:11

## 2018-11-15 RX ADMIN — SODIUM CHLORIDE: 0.9 INJECTION, SOLUTION INTRAVENOUS at 10:11

## 2018-11-15 RX ADMIN — PACLITAXEL 230 MG: 100 INJECTION, POWDER, LYOPHILIZED, FOR SUSPENSION INTRAVENOUS at 11:11

## 2018-11-15 RX ADMIN — Medication 1 MG: at 10:11

## 2018-11-15 NOTE — PLAN OF CARE
Problem: Patient Care Overview  Goal: Plan of Care Review  Outcome: Ongoing (interventions implemented as appropriate)  Pt tolerated Abraxane/Gemzar without issue. VSS. AVS given. Pt to return 11/29 for next treatment.

## 2018-11-27 ENCOUNTER — OFFICE VISIT (OUTPATIENT)
Dept: HEMATOLOGY/ONCOLOGY | Facility: CLINIC | Age: 73
End: 2018-11-27
Attending: INTERNAL MEDICINE
Payer: MEDICARE

## 2018-11-27 ENCOUNTER — LAB VISIT (OUTPATIENT)
Dept: LAB | Facility: OTHER | Age: 73
End: 2018-11-27
Attending: INTERNAL MEDICINE
Payer: MEDICARE

## 2018-11-27 VITALS
BODY MASS INDEX: 25.07 KG/M2 | DIASTOLIC BLOOD PRESSURE: 66 MMHG | TEMPERATURE: 98 F | OXYGEN SATURATION: 97 % | HEIGHT: 68 IN | HEART RATE: 92 BPM | WEIGHT: 165.38 LBS | SYSTOLIC BLOOD PRESSURE: 120 MMHG | RESPIRATION RATE: 16 BRPM

## 2018-11-27 DIAGNOSIS — C25.1 MALIGNANT NEOPLASM OF BODY OF PANCREAS: Primary | ICD-10-CM

## 2018-11-27 DIAGNOSIS — C61 PROSTATE CANCER METASTATIC TO BONE: ICD-10-CM

## 2018-11-27 DIAGNOSIS — D63.0 ANEMIA IN NEOPLASTIC DISEASE: ICD-10-CM

## 2018-11-27 DIAGNOSIS — C78.6 PERITONEAL METASTASES: ICD-10-CM

## 2018-11-27 DIAGNOSIS — C79.51 PROSTATE CANCER METASTATIC TO BONE: ICD-10-CM

## 2018-11-27 DIAGNOSIS — C25.1 MALIGNANT NEOPLASM OF BODY OF PANCREAS: ICD-10-CM

## 2018-11-27 LAB
ALBUMIN SERPL BCP-MCNC: 3.8 G/DL
ALP SERPL-CCNC: 76 U/L
ALT SERPL W/O P-5'-P-CCNC: 15 U/L
ANION GAP SERPL CALC-SCNC: 10 MMOL/L
AST SERPL-CCNC: 21 U/L
BILIRUB SERPL-MCNC: 0.2 MG/DL
BUN SERPL-MCNC: 13 MG/DL
CALCIUM SERPL-MCNC: 9.9 MG/DL
CHLORIDE SERPL-SCNC: 101 MMOL/L
CO2 SERPL-SCNC: 29 MMOL/L
COMPLEXED PSA SERPL-MCNC: 2 NG/ML
CREAT SERPL-MCNC: 1 MG/DL
ERYTHROCYTE [DISTWIDTH] IN BLOOD BY AUTOMATED COUNT: 18.7 %
EST. GFR  (AFRICAN AMERICAN): >60 ML/MIN/1.73 M^2
EST. GFR  (NON AFRICAN AMERICAN): >60 ML/MIN/1.73 M^2
GLUCOSE SERPL-MCNC: 103 MG/DL
HCT VFR BLD AUTO: 33.1 %
HGB BLD-MCNC: 9.9 G/DL
MCH RBC QN AUTO: 27.2 PG
MCHC RBC AUTO-ENTMCNC: 29.9 G/DL
MCV RBC AUTO: 91 FL
NEUTROPHILS # BLD AUTO: 3.6 K/UL
PLATELET # BLD AUTO: 265 K/UL
PMV BLD AUTO: 9 FL
POTASSIUM SERPL-SCNC: 4.2 MMOL/L
PROT SERPL-MCNC: 7.9 G/DL
RBC # BLD AUTO: 3.64 M/UL
SODIUM SERPL-SCNC: 140 MMOL/L
WBC # BLD AUTO: 5.47 K/UL

## 2018-11-27 PROCEDURE — 85027 COMPLETE CBC AUTOMATED: CPT

## 2018-11-27 PROCEDURE — 84153 ASSAY OF PSA TOTAL: CPT

## 2018-11-27 PROCEDURE — 36415 COLL VENOUS BLD VENIPUNCTURE: CPT

## 2018-11-27 PROCEDURE — 1101F PT FALLS ASSESS-DOCD LE1/YR: CPT | Mod: CPTII,S$GLB,, | Performed by: INTERNAL MEDICINE

## 2018-11-27 PROCEDURE — 3078F DIAST BP <80 MM HG: CPT | Mod: CPTII,S$GLB,, | Performed by: INTERNAL MEDICINE

## 2018-11-27 PROCEDURE — 99999 PR PBB SHADOW E&M-EST. PATIENT-LVL III: CPT | Mod: PBBFAC,,, | Performed by: INTERNAL MEDICINE

## 2018-11-27 PROCEDURE — 3074F SYST BP LT 130 MM HG: CPT | Mod: CPTII,S$GLB,, | Performed by: INTERNAL MEDICINE

## 2018-11-27 PROCEDURE — 99214 OFFICE O/P EST MOD 30 MIN: CPT | Mod: S$GLB,,, | Performed by: INTERNAL MEDICINE

## 2018-11-27 PROCEDURE — 80053 COMPREHEN METABOLIC PANEL: CPT

## 2018-11-27 RX ORDER — HEPARIN 100 UNIT/ML
500 SYRINGE INTRAVENOUS
Status: CANCELLED | OUTPATIENT
Start: 2018-11-27

## 2018-11-27 RX ORDER — SODIUM CHLORIDE 0.9 % (FLUSH) 0.9 %
10 SYRINGE (ML) INJECTION
Status: CANCELLED | OUTPATIENT
Start: 2018-11-27

## 2018-11-27 NOTE — PROGRESS NOTES
Subjective:       Patient ID: Shady Prakash Jr. is a 73 y.o. male.    Chief Complaint: No chief complaint on file.    HPI     Returns for follow-up.  Diagnoses: Metastatic pancreatic cancer and metastatic prostate cancer.  Currently on [place]/Abraxane for the metastatic pancreatic cancer.    Reports eating well- family states increased.  Supplementing with Ensure.  Reports rare pain- fleeting in abdomen.  No nausea or vomiting.  Normal stools.   Normal urination.     Oncology History:  1. Metastatic prostate cancer  He is s/p TRUS/bx on 8/7/15 w/ high volume marta 9 (5+4), PSA 25.  He had negative metastatic workup at diagnosis.  However, at time of planned RALP on 9/10/15 he was noted to have BN invasion on cysto and therefore only PLND was done, which confirmed metastatic disease.  He was started on ADT 9/17/15 w/ firmagon followed by Eligard on 10/15/15.    Started Casodex 7/20/17 - scans at that time showed osseus metastatic disease  Started docetaxel 10/6/17 with progression, then found to have below     - on follow-up scans a pancreatic lesion noted concerning for pancreatic cancer as it was an unlikely area for prostate cancer spread-  Biopsy confirmed adenocarcinoma and PSA was negative     Imaging as below:  Scan results:  6/20/18 Bone scan:  FINDINGS:  Two lesions in the left scapula, stable.  Elongated lesion in the right mid rib posteriorly, stable.  Prior exam demonstrated a large right iliac lesion, which no longer exhibits increased uptake.  Left pubic lesion, stable.  There is also now a small lesion in the left iliac bone. Two additional new small foci in the mid thoracic vertebra.  Both kidneys and the bladder appear unremarkable.  Impression:  Findings consistent with osseous metastatic disease as above, noting 3 new small lesions.     6/21/18 CT scan abdomen and pelvis:  FINDINGS:  Lower chest: There has been interval increase in size in the focal consolidation which is pleural based in the  posterior aspect of the right lower lobe.  Today's exam measures 5.1 cm maximum diameter.  Previous exam is measure approximately 3.3 cm.  This is associated with pleural thickening that extends laterally.  There is a small focus of cylinder all coal bronchiectasis in the medial left lower lobe.  The remaining aspects of the lung parenchyma that are visualized show no abnormalities.  The visualized heart and pericardium are normal  Liver: There are 3 hyperenhancing foci along the periphery of the right and left lobes of the liver which are stable from 2015.  The largest measures 1.1 cm in maximum diameter.  Although not characteristic on this exam, previous exam in 2015 shows findings most characteristic of hepatic hemangioma.  The remaining liver parenchyma as well as the hepatic and portal veins are patent.  Gallbladder: Normal.  Pancreas: There is been interval development of an ill-defined hypodense focus within the proximal body of the pancreas measuring 2.1 cm x 1.8 cm.  Spleen: Calcified hemangiomas present.  Otherwise normal.  Adrenal glands: Normal.  Genitourinary: Stable simple cyst in the lower pole of the right kidney measuring 1.6 cm.  The left kidney and bilateral ureters are normal.  The bladder is partially distended and shows wall thickening on the non dependent surface of the bladder.  This is not significantly changed in appearance.  Gastrointestinal: Stomach and small intestines are normal.  Colonic diverticulosis is present without evidence of diverticulitis.  Prostate: Enlarged and nodular causing mass effect on the bladder.  Miscellaneous: There is no intra-abdominal or pelvic free fluid, free air or lymphadenopathy.  The abdominal aorta tapers normally.  Bilateral fat containing inguinal hernias present.  Osseous and soft tissue structures: Sclerotic metastases involving the right iliac bone and left superior pubic ramus are identified.  The small focus in the left iliac bone seen on  yesterday's bone scan is not well seen on this exam.  The soft tissues are within normal limits.  Impression:  1. Interval increase in size in the pleural base, right lower lobe consolidation.  2. Hyperenhancing foci likely represent hemangiomas.  They have been stable since 2015.  Is on the 2015 exam where they demonstrate characteristics most compatible with hemangioma.  3. Bladder wall thickening is not significantly changed.  This may be related to outlet obstruction from the nodular prostate.  4. Interval development of ill-defined hypodense focus measuring 2.1 cm within the proximal body of the pancreas.  This may also be a focus of metastatic disease although is uncommon for the prostate to metastasized to the pancreas.  A dedicated pancreatic CT may be beneficial in further characterizing this lesion.     - on 7/25/18 he came to ER with worsening abdominal pain and concerns for appendicitis  Underwent Laparoscopy Converted to Laparotomy, Peritoneal Biopsy , Ileocecal with Dr. Stephenson  Biopsy concerning for metastatic pancreatic carcinoma     2. Metastatic pancreatic cancer  - started on [place]/Abraxane- note Ca19-9 never elevated  Response as below      Last scans:  CT C/A/P 8/29/18:          FINDINGS:  There is a single micro nodule which is stable in the left lung, a nonspecific finding.  The right lung remains abnormal with pleural thickening, pleural calcification and some pleural fluid.  The right lung also demonstrates some atelectasis versus scarring as well as stable focal consolidation versus volume loss at the lung base.  Lung fields bilaterally demonstrate emphysema.  There are hilar and mediastinal calcifications, compatible with calcified nodes.  No pericardial fluid is present.    On today's study, best seen on the arterial phase imaging are 3 small hyperdense foci, the largest measuring 1 cm.  These are stable compared to the prior CT which describes debility compared to 2015.  An additional  subcentimeter hypodensity is stable dating back to 2017, likely benign.  There is no new liver lesion.  The gallbladder demonstrates no abnormality.  The spleen shows granulomas.    Within the body of the pancreas is an ill-defined hypodense lesion which measures 2 cm in greatest dimension, similar compared to prior study.  The upstream pancreatic duct is dilated measuring 4 mm.    The adrenal glands demonstrate no mass.  There is a right renal cyst.  There are small lymph nodes about the upper abdomen mesentery and rhett hepatis.  There is no aneurysmal dilatation of the aorta.    No pelvic lymphadenopathy.  Bladder is not well distended for good evaluation.  Colon demonstrates diverticulosis.  There are surgical changes along the abdominal wall.  Surgical changes are present at the base of the cecum.  There is some mild mesenteric edema.  Several small soft tissue density nodules within the mesentery of the abdomen on the most recent prior study have decreased in number.    Osseous structures again show sclerotic foci compatible with osseous metastatic disease.       Impression         In this patient with history metastatic pancreatic and prostate cancer, there is a stable pancreatic mass which measures 2 cm.    Overall decrease in size of mesenteric soft tissue densities, which may reflect peritoneal metastatic disease.    Hepatic hyperenhancing foci stable, most compatible with hemangioma based on stability and prior imaging.    stable changes within the right chest.       Review of Systems   Constitutional: Negative for activity change, appetite change, chills, fatigue, fever and unexpected weight change.   HENT: Positive for dental problem. Negative for congestion, hearing loss, mouth sores, nosebleeds, postnasal drip, sinus pressure, sore throat and trouble swallowing.    Eyes: Negative for visual disturbance.   Respiratory: Negative for cough (rare dry), chest tightness, shortness of breath and wheezing.     Cardiovascular: Negative for chest pain, palpitations and leg swelling.   Gastrointestinal: Negative for abdominal distention, abdominal pain, blood in stool, constipation, diarrhea and vomiting.        No GERD   Endocrine:        Hot flashes   Genitourinary: Negative for decreased urine volume, difficulty urinating, frequency, hematuria and urgency.        Sexual dysfunction   Musculoskeletal: Negative for arthralgias (better), back pain, gait problem, joint swelling, neck pain and neck stiffness.   Skin: Negative for color change, pallor, rash and wound.   Neurological: Negative for dizziness, weakness, light-headedness, numbness and headaches.   Hematological: Negative for adenopathy. Does not bruise/bleed easily.   Psychiatric/Behavioral: Negative for dysphoric mood and sleep disturbance. The patient is not nervous/anxious.        Objective:      Physical Exam   Constitutional: He is oriented to person, place, and time. He appears well-developed and well-nourished. No distress.   Presents with wife  ECOG=0   HENT:   Head: Normocephalic and atraumatic.   Right Ear: External ear normal.   Left Ear: External ear normal.   Nose: Nose normal.   Mouth/Throat: Oropharynx is clear and moist. No oropharyngeal exudate.   Eyes: Conjunctivae and EOM are normal. Pupils are equal, round, and reactive to light. Right eye exhibits no discharge. Left eye exhibits no discharge. No scleral icterus.   Neck: Normal range of motion. Neck supple. No tracheal deviation present. No thyromegaly present.   Cardiovascular: Normal rate, regular rhythm, normal heart sounds and intact distal pulses. Exam reveals no gallop and no friction rub.   No murmur heard.  Pulmonary/Chest: Effort normal and breath sounds normal. No respiratory distress. He has no wheezes. He has no rales. He exhibits no tenderness.   Abdominal: Soft. Bowel sounds are normal. He exhibits no distension and no mass. There is no tenderness. There is no rebound and no  guarding.   Musculoskeletal: Normal range of motion. He exhibits no edema, tenderness or deformity.   Lymphadenopathy:     He has no cervical adenopathy.   Neurological: He is alert and oriented to person, place, and time. He has normal reflexes. He displays normal reflexes. No cranial nerve deficit. He exhibits normal muscle tone. Coordination normal.   Skin: Skin is warm and dry. No rash noted. He is not diaphoretic. No erythema. No pallor.   Psychiatric: He has a normal mood and affect. His behavior is normal. Judgment and thought content normal.   Nursing note and vitals reviewed.   Labs- reviewed   Assessment:       1. Malignant neoplasm of body of pancreas    2. Peritoneal metastases    3. Prostate cancer metastatic to bone    4. Anemia in neoplastic disease        Plan:     1-2. Complete 4th cycle of chemotherapy  Scan post and check   2. Prior Taxotere  Continue Lupron  This treatment on back burner with above  4. Parameters improving

## 2018-11-29 ENCOUNTER — INFUSION (OUTPATIENT)
Dept: INFUSION THERAPY | Facility: OTHER | Age: 73
End: 2018-11-29
Attending: INTERNAL MEDICINE
Payer: MEDICARE

## 2018-11-29 ENCOUNTER — OFFICE VISIT (OUTPATIENT)
Dept: UROLOGY | Facility: CLINIC | Age: 73
End: 2018-11-29
Attending: UROLOGY
Payer: MEDICARE

## 2018-11-29 VITALS
DIASTOLIC BLOOD PRESSURE: 80 MMHG | HEART RATE: 90 BPM | BODY MASS INDEX: 25.07 KG/M2 | WEIGHT: 165.38 LBS | SYSTOLIC BLOOD PRESSURE: 130 MMHG | HEIGHT: 68 IN

## 2018-11-29 VITALS
DIASTOLIC BLOOD PRESSURE: 79 MMHG | OXYGEN SATURATION: 97 % | TEMPERATURE: 98 F | RESPIRATION RATE: 16 BRPM | WEIGHT: 164 LBS | SYSTOLIC BLOOD PRESSURE: 141 MMHG | BODY MASS INDEX: 24.86 KG/M2 | HEART RATE: 83 BPM | HEIGHT: 68 IN

## 2018-11-29 DIAGNOSIS — C79.51 PROSTATE CANCER METASTATIC TO BONE: Primary | ICD-10-CM

## 2018-11-29 DIAGNOSIS — C61 PROSTATE CANCER METASTATIC TO BONE: ICD-10-CM

## 2018-11-29 DIAGNOSIS — C61 PROSTATE CANCER METASTATIC TO INTRAPELVIC LYMPH NODE: ICD-10-CM

## 2018-11-29 DIAGNOSIS — C25.1 MALIGNANT NEOPLASM OF BODY OF PANCREAS: Primary | ICD-10-CM

## 2018-11-29 DIAGNOSIS — C61 PROSTATE CANCER METASTATIC TO BONE: Primary | ICD-10-CM

## 2018-11-29 DIAGNOSIS — C77.5 PROSTATE CANCER METASTATIC TO INTRAPELVIC LYMPH NODE: ICD-10-CM

## 2018-11-29 DIAGNOSIS — C79.51 PROSTATE CANCER METASTATIC TO BONE: ICD-10-CM

## 2018-11-29 PROCEDURE — 63600175 PHARM REV CODE 636 W HCPCS: Performed by: INTERNAL MEDICINE

## 2018-11-29 PROCEDURE — 96367 TX/PROPH/DG ADDL SEQ IV INF: CPT

## 2018-11-29 PROCEDURE — 25000003 PHARM REV CODE 250: Performed by: INTERNAL MEDICINE

## 2018-11-29 PROCEDURE — 99213 OFFICE O/P EST LOW 20 MIN: CPT | Mod: 25,S$GLB,, | Performed by: UROLOGY

## 2018-11-29 PROCEDURE — 96417 CHEMO IV INFUS EACH ADDL SEQ: CPT

## 2018-11-29 PROCEDURE — 96413 CHEMO IV INFUSION 1 HR: CPT

## 2018-11-29 PROCEDURE — 3079F DIAST BP 80-89 MM HG: CPT | Mod: CPTII,S$GLB,, | Performed by: UROLOGY

## 2018-11-29 PROCEDURE — 1101F PT FALLS ASSESS-DOCD LE1/YR: CPT | Mod: CPTII,S$GLB,, | Performed by: UROLOGY

## 2018-11-29 PROCEDURE — 3075F SYST BP GE 130 - 139MM HG: CPT | Mod: CPTII,S$GLB,, | Performed by: UROLOGY

## 2018-11-29 PROCEDURE — 96402 CHEMO HORMON ANTINEOPL SQ/IM: CPT | Mod: S$GLB,,, | Performed by: UROLOGY

## 2018-11-29 RX ORDER — HEPARIN 100 UNIT/ML
500 SYRINGE INTRAVENOUS
Status: DISCONTINUED | OUTPATIENT
Start: 2018-11-29 | End: 2018-11-29 | Stop reason: HOSPADM

## 2018-11-29 RX ORDER — SODIUM CHLORIDE 0.9 % (FLUSH) 0.9 %
10 SYRINGE (ML) INJECTION
Status: DISCONTINUED | OUTPATIENT
Start: 2018-11-29 | End: 2018-11-29 | Stop reason: HOSPADM

## 2018-11-29 RX ADMIN — GEMCITABINE 1860 MG: 38 INJECTION, SOLUTION INTRAVENOUS at 12:11

## 2018-11-29 RX ADMIN — PACLITAXEL 230 MG: 100 INJECTION, POWDER, LYOPHILIZED, FOR SUSPENSION INTRAVENOUS at 12:11

## 2018-11-29 RX ADMIN — SODIUM CHLORIDE: 0.9 INJECTION, SOLUTION INTRAVENOUS at 11:11

## 2018-11-29 RX ADMIN — Medication 1 MG: at 11:11

## 2018-11-29 NOTE — PLAN OF CARE
Problem: Patient Care Overview  Goal: Plan of Care Review  Outcome: Ongoing (interventions implemented as appropriate)  Pt tolerated Abraxane/Gemzar without issue. VSS. AVS given.

## 2018-11-29 NOTE — PROGRESS NOTES
"Subjective:      Shady Prakash Jr. is a 73 y.o. male who returns today regarding his metastatic prostate cancer.      He is here today for 6 mos FU and Eligard. Full history below.    Undergoing further chemotherapy now for metastatic pancreatic cancer. Overall says he is doing well with few SEs from treatment.    He has no voiding c/o today    Prostate Cancer History  He is s/p TRUS/bx on 8/7/15 w/ high volume marta 9 (5+4), PSA 25.  He had negative metastatic workup.  At time of planned RALP on 9/10/15 he was noted to have BN invasion on cysto and therefore only PLND was done, which confirmed metastatic disease.  He was started on ADT 9/17/15 w/ firmagon followed by Eligard on 10/15/15.  Received last Eligard 10/17/17. Casodex added for rising PSA, which persisted. Completed pelvic radiation November 2017. Completed taxotere in 2018.     The following portions of the patient's history were reviewed and updated as appropriate: allergies, current medications, past family history, past medical history, past social history, past surgical history and problem list.    Review of Systems  A comprehensive multipoint review of systems was negative except as otherwise stated in the HPI.     Objective:   Vitals:   /80 (BP Location: Left arm, Patient Position: Sitting, BP Method: Large (Automatic))   Pulse 90   Ht 5' 8" (1.727 m)   Wt 75 kg (165 lb 5.5 oz)   BMI 25.14 kg/m²     Physical Exam   General: alert and oriented, no acute distress  Respiratory: Symmetric expansion, non-labored breathing  Neuro: no gross deficits  Psych: normal judgment and insight, normal mood/affect and non-anxious    Lab Review   Urinalysis demonstrates + for RBCs    Component PSA DIAGNOSTIC   Latest Ref Rng & Units 0.00 - 4.00 ng/mL   11/27/2018 2.0   8/24/2018 6.7   6/8/2018 7.3   3/6/2018 2.7   1/8/2018 0.92   12/5/2017 1.3   9/27/2017 13.7 (H)   9/1/2017 9.4 (H)   7/3/2017 5.0 (H)   10/7/2016 0.89   4/8/2016 0.73   1/4/2016 1.5 "   10/9/2015 6.6 (H)   7/7/2015 25.2 (H)     Assessment and Plan:   Prostate cancer metastatic to intrapelvic lymph node and bones  -- 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 Melendez as scheduled   -- FU PSA per Dr. Melendez  -- FU 6 mos for next Eligard    Anterior urethral stricture, unspecified stricture type  -- S/p cysto dilation in OR at time of PLND in September 2015  -- Voiding well now - will monitor

## 2018-12-06 DIAGNOSIS — G89.3 NEOPLASM RELATED PAIN: ICD-10-CM

## 2018-12-06 RX ORDER — OXYCODONE AND ACETAMINOPHEN 5; 325 MG/1; MG/1
1 TABLET ORAL EVERY 4 HOURS PRN
Qty: 90 TABLET | Refills: 0 | Status: SHIPPED | OUTPATIENT
Start: 2018-12-06 | End: 2019-03-04 | Stop reason: SDUPTHER

## 2018-12-06 NOTE — TELEPHONE ENCOUNTER
----- Message from Aleksandra Tan sent at 12/6/2018  9:10 AM CST -----  Contact: Pt   Can the clinic reply in [facility]:No       Please refill the medication(s) listed below. Please call the patient when the prescription(s) is ready for  at the phone number [phone]    Medication #1:oxyCODONE-acetaminophen (PERCOCET) 5-325 mg per tablet    Medication #2:      Preferred Pharmacy: [facility]

## 2018-12-10 ENCOUNTER — TELEPHONE (OUTPATIENT)
Dept: HEMATOLOGY/ONCOLOGY | Facility: CLINIC | Age: 73
End: 2018-12-10

## 2018-12-10 ENCOUNTER — HOSPITAL ENCOUNTER (OUTPATIENT)
Dept: RADIOLOGY | Facility: OTHER | Age: 73
Discharge: HOME | End: 2018-12-10
Attending: INTERNAL MEDICINE
Payer: MEDICARE

## 2018-12-10 DIAGNOSIS — C25.1 MALIGNANT NEOPLASM OF BODY OF PANCREAS: ICD-10-CM

## 2018-12-10 DIAGNOSIS — C78.6 PERITONEAL METASTASES: ICD-10-CM

## 2018-12-10 PROCEDURE — 74176 CT ABD & PELVIS W/O CONTRAST: CPT | Mod: 26,,, | Performed by: RADIOLOGY

## 2018-12-10 PROCEDURE — 74176 CT ABD & PELVIS W/O CONTRAST: CPT | Mod: TC

## 2018-12-10 PROCEDURE — 71250 CT THORAX DX C-: CPT | Mod: 26,,, | Performed by: RADIOLOGY

## 2018-12-10 PROCEDURE — 71250 CT THORAX DX C-: CPT | Mod: TC

## 2018-12-10 NOTE — TELEPHONE ENCOUNTER
Returned call to Loly with [facility].   Loly stated unable to get IV on pt and asked if okay to do CT without contrast- per MD okay.   Loly verbalized understanding.         ----- Message from David Munoz sent at 12/10/2018  1:07 PM CST -----  Contact: Loly- CT scan   Will like office to know that they're not able to complete iv on pt. Will like to know may they do scan without contrast?    Pt is now waiting     Ext:[phone]

## 2018-12-11 ENCOUNTER — OFFICE VISIT (OUTPATIENT)
Dept: HEMATOLOGY/ONCOLOGY | Facility: CLINIC | Age: 73
End: 2018-12-11
Attending: INTERNAL MEDICINE
Payer: MEDICARE

## 2018-12-11 VITALS
TEMPERATURE: 99 F | RESPIRATION RATE: 16 BRPM | SYSTOLIC BLOOD PRESSURE: 117 MMHG | DIASTOLIC BLOOD PRESSURE: 70 MMHG | HEART RATE: 101 BPM | HEIGHT: 68 IN | BODY MASS INDEX: 24.26 KG/M2 | WEIGHT: 160.06 LBS | OXYGEN SATURATION: 98 %

## 2018-12-11 DIAGNOSIS — C79.51 PROSTATE CANCER METASTATIC TO BONE: Primary | ICD-10-CM

## 2018-12-11 DIAGNOSIS — C61 PROSTATE CANCER METASTATIC TO BONE: Primary | ICD-10-CM

## 2018-12-11 DIAGNOSIS — C78.6 PERITONEAL METASTASES: ICD-10-CM

## 2018-12-11 DIAGNOSIS — C25.1 MALIGNANT NEOPLASM OF BODY OF PANCREAS: ICD-10-CM

## 2018-12-11 DIAGNOSIS — C78.7 LIVER METASTASES: ICD-10-CM

## 2018-12-11 PROCEDURE — 3074F SYST BP LT 130 MM HG: CPT | Mod: CPTII,S$GLB,, | Performed by: INTERNAL MEDICINE

## 2018-12-11 PROCEDURE — 3078F DIAST BP <80 MM HG: CPT | Mod: CPTII,S$GLB,, | Performed by: INTERNAL MEDICINE

## 2018-12-11 PROCEDURE — 99999 PR PBB SHADOW E&M-EST. PATIENT-LVL V: CPT | Mod: PBBFAC,,, | Performed by: INTERNAL MEDICINE

## 2018-12-11 PROCEDURE — 1101F PT FALLS ASSESS-DOCD LE1/YR: CPT | Mod: CPTII,S$GLB,, | Performed by: INTERNAL MEDICINE

## 2018-12-11 PROCEDURE — 99214 OFFICE O/P EST MOD 30 MIN: CPT | Mod: S$GLB,,, | Performed by: INTERNAL MEDICINE

## 2018-12-11 RX ORDER — FLUOROURACIL 50 MG/ML
400 INJECTION, SOLUTION INTRAVENOUS
Status: CANCELLED | OUTPATIENT
Start: 2018-12-18

## 2018-12-11 RX ORDER — HEPARIN 100 UNIT/ML
500 SYRINGE INTRAVENOUS
Status: CANCELLED | OUTPATIENT
Start: 2018-12-21

## 2018-12-11 RX ORDER — HEPARIN 100 UNIT/ML
500 SYRINGE INTRAVENOUS
Status: CANCELLED | OUTPATIENT
Start: 2018-12-18

## 2018-12-11 RX ORDER — DIPHENHYDRAMINE HYDROCHLORIDE 50 MG/ML
50 INJECTION INTRAMUSCULAR; INTRAVENOUS ONCE AS NEEDED
Status: CANCELLED | OUTPATIENT
Start: 2018-12-18 | End: 2018-12-18

## 2018-12-11 RX ORDER — SODIUM CHLORIDE 0.9 % (FLUSH) 0.9 %
10 SYRINGE (ML) INJECTION
Status: CANCELLED | OUTPATIENT
Start: 2018-12-21

## 2018-12-11 RX ORDER — SODIUM CHLORIDE 0.9 % (FLUSH) 0.9 %
10 SYRINGE (ML) INJECTION
Status: CANCELLED | OUTPATIENT
Start: 2018-12-18

## 2018-12-11 RX ORDER — EPINEPHRINE 0.3 MG/.3ML
0.3 INJECTION SUBCUTANEOUS ONCE AS NEEDED
Status: CANCELLED | OUTPATIENT
Start: 2018-12-18 | End: 2018-12-18

## 2018-12-11 NOTE — Clinical Note
- port [facility] FOLFOX here next week- already consented and has labs and exam done- RTC 2 weeks after FOLFOX cycle #1 with labs and EP and chemo

## 2018-12-11 NOTE — PROGRESS NOTES
Subjective:       Patient ID: Shady Prakash Jr. is a 73 y.o. male.    Chief Complaint: No chief complaint on file.    HPI     Returns for follow-up.  He is present with family- we discussed progression on repeat scans (see below) and reviewed options    CT scans 12/10/18:  Pancreatic mass has increased in size now measuring 3.6 cm compared to 2.0 cm on the prior examination.  Increasing peritoneal metastases with the largest located along the lesser curvature of the stomach measuring 2.2 cm in greatest dimension.  Sclerotic bone lesions.  There is increased sclerosis along the margin of the sclerotic bone lesion within the roof of the left acetabulum and progression of the osseous metastatic disease should be considered.  Multiple stable hepatic hypodense lesions.  Probable rounded atelectasis within the right lung with pleural thickening and parenchymal scarring on the right.  4 mm nodule within the right lower lobe (image 80, series 2) which may represent an interval change.  Continued follow-up is recommended.    Clinically he continues to do well  He denies pain  Weight stable and eating well  No nausea or vomiting  Denies abdominal issues- does have a ventral hernia that gets larger at times  No diarrhea  Rare constipation    Diagnoses: Metastatic pancreatic cancer and metastatic prostate cancer.  Most recently on [place]/Abraxane for the metastatic pancreatic cancer.    Oncology History:  1. Metastatic prostate cancer  He is s/p TRUS/bx on 8/7/15 w/ high volume marta 9 (5+4), PSA 25.  He had negative metastatic workup at diagnosis.  However, at time of planned RALP on 9/10/15 he was noted to have BN invasion on cysto and therefore only PLND was done, which confirmed metastatic disease.  He was started on ADT 9/17/15 w/ firmagon followed by Eligard on 10/15/15.    Started Casodex 7/20/17 - scans at that time showed osseus metastatic disease  Started docetaxel 10/6/17 with progression, then found to have  below     - on follow-up scans a pancreatic lesion noted concerning for pancreatic cancer as it was an unlikely area for prostate cancer spread-  Biopsy confirmed adenocarcinoma and PSA was negative     Imaging as below:  Scan results:  6/20/18 Bone scan:  FINDINGS:  Two lesions in the left scapula, stable.  Elongated lesion in the right mid rib posteriorly, stable.  Prior exam demonstrated a large right iliac lesion, which no longer exhibits increased uptake.  Left pubic lesion, stable.  There is also now a small lesion in the left iliac bone. Two additional new small foci in the mid thoracic vertebra.  Both kidneys and the bladder appear unremarkable.  Impression:  Findings consistent with osseous metastatic disease as above, noting 3 new small lesions.     6/21/18 CT scan abdomen and pelvis:  FINDINGS:  Lower chest: There has been interval increase in size in the focal consolidation which is pleural based in the posterior aspect of the right lower lobe.  Today's exam measures 5.1 cm maximum diameter.  Previous exam is measure approximately 3.3 cm.  This is associated with pleural thickening that extends laterally.  There is a small focus of cylinder all coal bronchiectasis in the medial left lower lobe.  The remaining aspects of the lung parenchyma that are visualized show no abnormalities.  The visualized heart and pericardium are normal  Liver: There are 3 hyperenhancing foci along the periphery of the right and left lobes of the liver which are stable from 2015.  The largest measures 1.1 cm in maximum diameter.  Although not characteristic on this exam, previous exam in 2015 shows findings most characteristic of hepatic hemangioma.  The remaining liver parenchyma as well as the hepatic and portal veins are patent.  Gallbladder: Normal.  Pancreas: There is been interval development of an ill-defined hypodense focus within the proximal body of the pancreas measuring 2.1 cm x 1.8 cm.  Spleen: Calcified  hemangiomas present.  Otherwise normal.  Adrenal glands: Normal.  Genitourinary: Stable simple cyst in the lower pole of the right kidney measuring 1.6 cm.  The left kidney and bilateral ureters are normal.  The bladder is partially distended and shows wall thickening on the non dependent surface of the bladder.  This is not significantly changed in appearance.  Gastrointestinal: Stomach and small intestines are normal.  Colonic diverticulosis is present without evidence of diverticulitis.  Prostate: Enlarged and nodular causing mass effect on the bladder.  Miscellaneous: There is no intra-abdominal or pelvic free fluid, free air or lymphadenopathy.  The abdominal aorta tapers normally.  Bilateral fat containing inguinal hernias present.  Osseous and soft tissue structures: Sclerotic metastases involving the right iliac bone and left superior pubic ramus are identified.  The small focus in the left iliac bone seen on yesterday's bone scan is not well seen on this exam.  The soft tissues are within normal limits.  Impression:  1. Interval increase in size in the pleural base, right lower lobe consolidation.  2. Hyperenhancing foci likely represent hemangiomas.  They have been stable since 2015.  Is on the 2015 exam where they demonstrate characteristics most compatible with hemangioma.  3. Bladder wall thickening is not significantly changed.  This may be related to outlet obstruction from the nodular prostate.  4. Interval development of ill-defined hypodense focus measuring 2.1 cm within the proximal body of the pancreas.  This may also be a focus of metastatic disease although is uncommon for the prostate to metastasized to the pancreas.  A dedicated pancreatic CT may be beneficial in further characterizing this lesion.     - on 7/25/18 he came to ER with worsening abdominal pain and concerns for appendicitis  Underwent Laparoscopy Converted to Laparotomy, Peritoneal Biopsy , Ileocecal with Dr. Stephenson  Biopsy  concerning for metastatic pancreatic carcinoma     2. Metastatic pancreatic cancer  - started on [place]/Abraxane- note Ca19-9 never elevated  Response initially  Now with progression                Review of Systems   Constitutional: Negative for activity change, appetite change, chills, fatigue, fever and unexpected weight change.   HENT: Positive for dental problem. Negative for congestion, hearing loss, mouth sores, nosebleeds, postnasal drip, sinus pressure, sore throat and trouble swallowing.    Eyes: Negative for visual disturbance.   Respiratory: Negative for cough (rare dry), chest tightness, shortness of breath and wheezing.    Cardiovascular: Negative for chest pain, palpitations and leg swelling.   Gastrointestinal: Negative for abdominal distention, abdominal pain, blood in stool, constipation, diarrhea and vomiting.        No GERD   Endocrine:        Hot flashes   Genitourinary: Negative for decreased urine volume, difficulty urinating, frequency, hematuria and urgency.        Sexual dysfunction   Musculoskeletal: Negative for arthralgias (better), back pain, gait problem, joint swelling, neck pain and neck stiffness.   Skin: Negative for color change, pallor, rash and wound.   Neurological: Negative for dizziness, weakness, light-headedness, numbness and headaches.   Hematological: Negative for adenopathy. Does not bruise/bleed easily.   Psychiatric/Behavioral: Negative for dysphoric mood and sleep disturbance. The patient is not nervous/anxious.        Objective:      Physical Exam   Constitutional: He is oriented to person, place, and time. He appears well-developed and well-nourished. No distress.   Presents with wife  ECOG=0   HENT:   Head: Normocephalic and atraumatic.   Right Ear: External ear normal.   Left Ear: External ear normal.   Nose: Nose normal.   Mouth/Throat: Oropharynx is clear and moist. No oropharyngeal exudate.   Eyes: Conjunctivae and EOM are normal. Pupils are equal, round, and  reactive to light. Right eye exhibits no discharge. Left eye exhibits no discharge. No scleral icterus.   Neck: Normal range of motion. Neck supple. No tracheal deviation present. No thyromegaly present.   Cardiovascular: Normal rate, regular rhythm, normal heart sounds and intact distal pulses. Exam reveals no gallop and no friction rub.   No murmur heard.  Pulmonary/Chest: Effort normal and breath sounds normal. No respiratory distress. He has no wheezes. He has no rales. He exhibits no tenderness.   Abdominal: Soft. Bowel sounds are normal. He exhibits no distension and no mass. There is no tenderness. There is no rebound and no guarding.   Musculoskeletal: Normal range of motion. He exhibits no edema, tenderness or deformity.   Lymphadenopathy:     He has no cervical adenopathy.   Neurological: He is alert and oriented to person, place, and time. He has normal reflexes. He displays normal reflexes. No cranial nerve deficit. He exhibits normal muscle tone. Coordination normal.   Skin: Skin is warm and dry. No rash noted. He is not diaphoretic. No erythema. No pallor.   Psychiatric: He has a normal mood and affect. His behavior is normal. Judgment and thought content normal.   Nursing note and vitals reviewed.    Labs- reviewed  Assessment:       1. Prostate cancer metastatic to bone    2. Liver metastases    3. Peritoneal metastases    4. Malignant neoplasm of body of pancreas        Plan:     1. Therapy stopped with below  It is hormonally resistant at this point  2-4. Progression on scans  We discussed that his performance status remains good  He opts for continued therapy and will initiate FOLFOX as we feel FOLFIRINOX is too strong  Drugs reviewed today and consented for chemotherapy  Needs a port and patient agrees  All questions answered  Aware this Is palliative  30 minutes total

## 2018-12-12 ENCOUNTER — TELEPHONE (OUTPATIENT)
Dept: HEMATOLOGY/ONCOLOGY | Facility: CLINIC | Age: 73
End: 2018-12-12

## 2018-12-12 DIAGNOSIS — C78.7 LIVER METASTASES: Primary | ICD-10-CM

## 2018-12-12 NOTE — TELEPHONE ENCOUNTER
Called and spoke with Mr Prakash. Patient scheduled for his port placement on 12/19/2018 @ 12:00pm. Informed Mr Prakash he will have to hold his ASA 81 mg. Patient states he took his dose today. I informed Mr Prakash he is to hold his ASA starting tomorrow. He's to resume taking his ASA after his procedure. Informed patient he will need someone to transport him home after his procedure. He will not be allow to drive himself home. Mr Prakash will need to fast after midnight the night before his procedure. Informed Mr Prakash, I will mail all of the above information to him. Mr Prakash voiced verbal understanding.

## 2018-12-18 DIAGNOSIS — C78.7 LIVER METASTASES: Primary | ICD-10-CM

## 2018-12-18 RX ORDER — MIDAZOLAM HYDROCHLORIDE 1 MG/ML
1 INJECTION INTRAMUSCULAR; INTRAVENOUS
Status: CANCELLED | OUTPATIENT
Start: 2018-12-18

## 2018-12-18 RX ORDER — FENTANYL CITRATE 50 UG/ML
50 INJECTION, SOLUTION INTRAMUSCULAR; INTRAVENOUS
Status: CANCELLED | OUTPATIENT
Start: 2018-12-18

## 2018-12-19 ENCOUNTER — HOSPITAL ENCOUNTER (OUTPATIENT)
Facility: HOSPITAL | Age: 73
Discharge: HOME | End: 2018-12-19
Attending: INTERNAL MEDICINE | Admitting: INTERNAL MEDICINE
Payer: MEDICARE

## 2018-12-19 VITALS
HEART RATE: 80 BPM | TEMPERATURE: 98 F | OXYGEN SATURATION: 100 % | DIASTOLIC BLOOD PRESSURE: 80 MMHG | BODY MASS INDEX: 22.73 KG/M2 | RESPIRATION RATE: 18 BRPM | WEIGHT: 150 LBS | HEIGHT: 68 IN | SYSTOLIC BLOOD PRESSURE: 139 MMHG

## 2018-12-19 DIAGNOSIS — C78.7 LIVER METASTASIS: ICD-10-CM

## 2018-12-19 DIAGNOSIS — C25.1 MALIGNANT NEOPLASM OF BODY OF PANCREAS: ICD-10-CM

## 2018-12-19 DIAGNOSIS — C78.7 LIVER METASTASES: ICD-10-CM

## 2018-12-19 DIAGNOSIS — C78.6 PERITONEAL METASTASES: ICD-10-CM

## 2018-12-19 LAB — POCT GLUCOSE: 134 MG/DL (ref 70–110)

## 2018-12-19 PROCEDURE — 25000003 PHARM REV CODE 250: Performed by: FAMILY MEDICINE

## 2018-12-19 PROCEDURE — 82962 GLUCOSE BLOOD TEST: CPT

## 2018-12-19 PROCEDURE — 63600175 PHARM REV CODE 636 W HCPCS: Performed by: RADIOLOGY

## 2018-12-19 RX ORDER — CEFAZOLIN SODIUM 1 G/3ML
1 INJECTION, POWDER, FOR SOLUTION INTRAMUSCULAR; INTRAVENOUS
Status: DISCONTINUED | OUTPATIENT
Start: 2018-12-19 | End: 2018-12-19 | Stop reason: HOSPADM

## 2018-12-19 RX ORDER — HEPARIN 100 UNIT/ML
SYRINGE INTRAVENOUS CODE/TRAUMA/SEDATION MEDICATION
Status: COMPLETED | OUTPATIENT
Start: 2018-12-19 | End: 2018-12-19

## 2018-12-19 RX ORDER — MIDAZOLAM HYDROCHLORIDE 1 MG/ML
INJECTION INTRAMUSCULAR; INTRAVENOUS CODE/TRAUMA/SEDATION MEDICATION
Status: COMPLETED | OUTPATIENT
Start: 2018-12-19 | End: 2018-12-19

## 2018-12-19 RX ORDER — CEFAZOLIN SODIUM 1 G/50ML
SOLUTION INTRAVENOUS
Status: COMPLETED | OUTPATIENT
Start: 2018-12-19 | End: 2018-12-19

## 2018-12-19 RX ORDER — FENTANYL CITRATE 50 UG/ML
INJECTION, SOLUTION INTRAMUSCULAR; INTRAVENOUS CODE/TRAUMA/SEDATION MEDICATION
Status: COMPLETED | OUTPATIENT
Start: 2018-12-19 | End: 2018-12-19

## 2018-12-19 RX ORDER — SODIUM CHLORIDE 9 MG/ML
500 INJECTION, SOLUTION INTRAVENOUS ONCE
Status: COMPLETED | OUTPATIENT
Start: 2018-12-19 | End: 2018-12-19

## 2018-12-19 RX ADMIN — SODIUM CHLORIDE 500 ML: 0.9 INJECTION, SOLUTION INTRAVENOUS at 01:12

## 2018-12-19 RX ADMIN — HEPARIN SODIUM (PORCINE) LOCK FLUSH IV SOLN 100 UNIT/ML 5 ML: 100 SOLUTION at 02:12

## 2018-12-19 RX ADMIN — FENTANYL CITRATE 25 MCG: 50 INJECTION, SOLUTION INTRAMUSCULAR; INTRAVENOUS at 02:12

## 2018-12-19 RX ADMIN — MIDAZOLAM HYDROCHLORIDE 1 MG: 1 INJECTION, SOLUTION INTRAMUSCULAR; INTRAVENOUS at 01:12

## 2018-12-19 RX ADMIN — MIDAZOLAM HYDROCHLORIDE 0.5 MG: 1 INJECTION, SOLUTION INTRAMUSCULAR; INTRAVENOUS at 02:12

## 2018-12-19 RX ADMIN — CEFAZOLIN SODIUM 1 G: 1 SOLUTION INTRAVENOUS at 01:12

## 2018-12-19 RX ADMIN — FENTANYL CITRATE 50 MCG: 50 INJECTION, SOLUTION INTRAMUSCULAR; INTRAVENOUS at 01:12

## 2018-12-19 NOTE — PROGRESS NOTES
Port placement is complete. Pt tolerated well. Discharge instructions and handouts provided. Pt verbalized understanding. Pt refused wheelchair, ambulated out of facility. Gait steady.

## 2018-12-19 NOTE — DISCHARGE SUMMARY
Radiology Discharge Summary      Hospital Course: No complications    Admit Date: 12/19/2018  Discharge Date: 12/19/2018     Instructions Given to Patient: Yes  Diet: Resume prior diet  Activity: activity as tolerated and no driving for today    Description of Condition on Discharge: Stable  Vital Signs (Most Recent): Temp: 98.1 °F (36.7 °C) (12/19/18 1236)  Pulse: 77 (12/19/18 1445)  Resp: 18 (12/19/18 1445)  BP: 128/74 (12/19/18 1445)  SpO2: 100 % (12/19/18 1445)    Discharge Disposition: Home    Discharge Diagnosis: Pancreatic cancer s/p port placement. Follow up as scheduled.    Bryan Sam M.D.  Diagnostic and Interventional Radiologist  Department of Radiology  Pager: [phone]

## 2018-12-19 NOTE — DISCHARGE INSTRUCTIONS
"For scheduling: Call Cony at [phone]    For questions or concerns call: MARICEL MON-FRI 8 AM- 5PM: [phone].   **After hours and weekends: Call [phone] and ask for "Radiology Resident on call".    For immediate concerns that are not emergent, you may call our radiology clinic at: [phone]      "

## 2018-12-19 NOTE — H&P
Radiology History & Physical      SUBJECTIVE:     Chief Complaint: need for long term IV access    History of Present Illness:  Shady Prakash Jr. is a 73 y.o. male who presents for port placement related to an enlarging pancreatic mass, and peritoneal metastatic disease as well as metastatic prostate cancer.     Past Medical History:   Diagnosis Date    Allergy     Arthritis     Cancer     prostate    Diabetes mellitus     Hyperlipidemia     Hypertension     Prostate cancer      Past Surgical History:   Procedure Laterality Date    CYSTOSCOPY-FLEXIBLE N/A 9/10/2015    Performed by Matteo Juarez [facility] at [facility] OR    ENDOSCOPIC ULTRASOUND OF UPPER GASTROINTESTINAL TRACT N/A 7/5/2018    Procedure: ULTRASOUND, ENDOSCOPIC, UPPER GI TRACT;  Surgeon: Wiliam Ayoub MD;  [facility]  Service: Endoscopy;  Laterality: N/A;    EYE SURGERY Right     x9    LAPAROSCOPIC APPENDECTOMY N/A 7/18/2018    Procedure: Laparoscopy Converted to Laparotomy, Peritoneal Biopsy , Ileocecal Resection;  Surgeon: Rolan Pierce MD;  Location: [facility] OR [address] [place] [address];  Service: General;  Laterality: N/A;  converted to open @1633      Laparoscopy Converted to Laparotomy, Peritoneal Biopsy , Ileocecal Resection N/A 7/18/2018    [facility]    LYMPH NODE DISSECTION      ROBOT ASSISTED LAPAROSCOPIC LYMPHADENECTOMY-PELVIC N/A 9/10/2015    Performed by Matteo Juarez MD at [facility] OR    ULTRASOUND, ENDOSCOPIC, UPPER GI TRACT N/A 7/5/2018    Per[facility]    UPPER GASTROINTESTINAL ENDOSCOPY         Home Meds:   Prior to Admission medications    Medication Sig Start Date End Date Taking? Authorizing Provider   aspirin (ECOTRIN) 81 MG EC tablet Take 81 mg by mouth once daily. States not taken in over 1 month as of 9/2/15   Yes Historical Provider, MD   docusate sodium (COLACE) 100 MG capsule Take 1 capsule (100 mg total) by mouth 2 (two) times  daily. 7/16/18  Yes Prashant Alex,    docusate sodium (COLACE) 50 MG capsule Take 1 capsule (50 mg total) by mouth 2 (two) times daily. 7/25/18  Yes Bernice Peter PA-C   dronabinol (MARINOL) 2.5 MG capsule Take 1 capsule (2.5 mg total) by mouth 2 (two) times daily before meals. 7/10/18  Yes Marlene Echavarria MD   furosemide (LASIX) 20 MG tablet Take 1 tablet (20 mg total) by mouth daily as needed (leg swelling). 9/25/18 9/25/19 Yes Marlene Echavarria MD   gabapentin (NEURONTIN) 300 MG capsule Take 1 capsule by mouth once a day for 2 days, then 1 capsule 2 times a day for 2 days, then 1 capsule 3 times daily thereafter. 11/13/18 11/13/19 Yes Marlene Echavarria MD   lisinopril-hydrochlorothiazide (PRINZIDE,ZESTORETIC) 20-25 mg Tab Take 1 tablet by mouth once daily.  6/22/15  Yes Historical Provider, MD   lovastatin (MEVACOR) 20 MG tablet  5/30/17  Yes Historical Provider, MD   metformin (GLUCOPHAGE) 1000 MG tablet Take 1,000 mg by mouth 2 (two) times daily.  7/3/15  Yes Historical Provider, MD   oxyCODONE-acetaminophen (PERCOCET) 5-325 mg per tablet Take 1 tablet by mouth every 4 (four) hours as needed. 12/6/18  Yes Maday Melendez MD   calcium-vitamin D3 (OYSTER SHELL CALCIUM-VIT D3) 500 mg(1,250mg) -200 unit per tablet Take 1 tablet by mouth 2 (two) times daily. 9/1/17 9/1/18  Maday Melendez MD   diphth,pertus,acell,,tetanus (BOOSTRIX) 2.5-8-5 Lf-mcg-Lf/0.5mL Susp Inject into the muscle. 7/17/18   Rigoberto Soto, PharmD   glimepiride (AMARYL) 2 MG tablet Take 2 mg by mouth 2 (two) times daily.  3/21/16   Historical Provider, MD   ondansetron (ZOFRAN-ODT) 4 MG TbDL Take 1 tablet (4 mg total) by mouth every 6 (six) hours as needed (nausea). 7/5/18   Dinora Andersen MD   papaverine 30 mg/mL injection Add Phentolamine 1 mg/cc  Add PGE1 10 mcg/cc    SIG:  Bring to office for test dose in physician office 2/21/18   Fran Umaña MD   potassium chloride SA (K-DUR,KLOR-CON) 20 MEQ tablet Take 1 tablet (20 mEq total) by mouth  2 (two) times daily. 9/11/18 9/11/19  Maday Melendez MD   promethazine (PHENERGAN) 25 MG tablet Take 1 tablet (25 mg total) by mouth every 6 (six) hours as needed for Nausea. 6/19/18   Maday Melendez MD   sildenafil (REVATIO) 20 mg Tab Take 1 tablet (20 mg total) by mouth As instructed. Take 2-5 tablets as needed. 5/29/18 5/29/19  Matteo Juarez MD   triamcinolone acetonide 0.025% (KENALOG) 0.025 % cream Apply topically 2 (two) times daily as needed For itching 9/25/18   Marlene Echavarria MD   enzalutamide 40 mg Cap Take 4 capsules (160 mg) by mouth once daily. 7/17/18 8/16/18  Marlene Echavarria MD     Anticoagulants/Antiplatelets: aspirin 81 held 5 days    Allergies: Review of patient's allergies indicates:  No Known Allergies  Sedation History:  no adverse reactions    Review of Systems:   Hematological: no known coagulopathies  Respiratory: no shortness of breath  Cardiovascular: no chest pain  Gastrointestinal: no abdominal pain  Genito-Urinary: no dysuria  Musculoskeletal: negative  Neurological: no TIA or stroke symptoms         OBJECTIVE:     Vital Signs (Most Recent)  Temp: 98.1 °F (36.7 °C) (12/19/18 1236)  Pulse: 81 (12/19/18 1236)  Resp: 17 (12/19/18 1236)  BP: (!) 149/73 (12/19/18 1236)  SpO2: 98 % (12/19/18 1236)    Physical Exam:  ASA: 3  Mallampati: 2    General: no acute distress  Mental Status: alert and oriented to person, place and time  HEENT: normocephalic, atraumatic  Chest: unlabored breathing  Heart: no heaves  Abdomen: nondistended  Extremity: moves all extremities    Laboratory  Lab Results   Component Value Date    INR 1.0 12/19/2018       Lab Results   Component Value Date    WBC 6.41 12/10/2018    HGB 10.3 (L) 12/10/2018    HCT 34.7 (L) 12/10/2018    MCV 90 12/10/2018     12/10/2018      Lab Results   Component Value Date     (H) 12/10/2018     12/10/2018    K 4.3 12/10/2018    CL 99 12/10/2018    CO2 32 (H) 12/10/2018    BUN 14 12/10/2018    CREATININE 0.9 12/10/2018     CALCIUM 10.4 12/10/2018    MG 2.4 07/29/2018    ALT 19 12/10/2018    AST 20 12/10/2018    ALBUMIN 3.8 12/10/2018    BILITOT 0.1 12/10/2018       ASSESSMENT/PLAN:     Sedation Plan: moderate  Patient will undergo R chest port placement.    Jordi Webb MD  Radiology

## 2018-12-19 NOTE — PROCEDURES
Radiology Post-Procedure Note    Pre Op Diagnosis: Pancreatic cancer    Post Op Diagnosis: Same    Procedure: PORT placement    Procedure performed by: Bryan Sam MD    Written Informed Consent Obtained: Yes    Specimen Removed: No    Estimated Blood Loss: Minimal    Findings:   Using realtime U/S guidance an 8 Fr port catheter was placed into the right IJ vein with tip of the catheter in the SVC.    Port is ready for use.     Bryan Sam M.D.  Diagnostic and Interventional Radiologist  Department of Radiology  Pager: [phone]

## 2018-12-19 NOTE — PROGRESS NOTES
Port placement complete.  Pt tolerated procedure well. NAD noted.  VSS.  Dsg to right chest CDI.  Pt to be transferred to [facility] and report to be given at bedside.

## 2018-12-20 ENCOUNTER — INFUSION (OUTPATIENT)
Dept: INFUSION THERAPY | Facility: OTHER | Age: 73
End: 2018-12-20
Attending: INTERNAL MEDICINE
Payer: MEDICARE

## 2018-12-20 VITALS
HEIGHT: 68 IN | OXYGEN SATURATION: 97 % | TEMPERATURE: 98 F | SYSTOLIC BLOOD PRESSURE: 120 MMHG | RESPIRATION RATE: 18 BRPM | DIASTOLIC BLOOD PRESSURE: 68 MMHG | HEART RATE: 96 BPM | WEIGHT: 164.25 LBS | BODY MASS INDEX: 24.89 KG/M2

## 2018-12-20 DIAGNOSIS — C79.51 PROSTATE CANCER METASTATIC TO BONE: ICD-10-CM

## 2018-12-20 DIAGNOSIS — C25.1 MALIGNANT NEOPLASM OF BODY OF PANCREAS: Primary | ICD-10-CM

## 2018-12-20 DIAGNOSIS — C77.5 PROSTATE CANCER METASTATIC TO INTRAPELVIC LYMPH NODE: ICD-10-CM

## 2018-12-20 DIAGNOSIS — C61 PROSTATE CANCER METASTATIC TO BONE: ICD-10-CM

## 2018-12-20 DIAGNOSIS — C61 PROSTATE CANCER METASTATIC TO INTRAPELVIC LYMPH NODE: ICD-10-CM

## 2018-12-20 PROCEDURE — 96367 TX/PROPH/DG ADDL SEQ IV INF: CPT

## 2018-12-20 PROCEDURE — 96413 CHEMO IV INFUSION 1 HR: CPT

## 2018-12-20 PROCEDURE — 96415 CHEMO IV INFUSION ADDL HR: CPT

## 2018-12-20 PROCEDURE — 96416 CHEMO PROLONG INFUSE W/PUMP: CPT

## 2018-12-20 PROCEDURE — 96411 CHEMO IV PUSH ADDL DRUG: CPT

## 2018-12-20 PROCEDURE — 63600175 PHARM REV CODE 636 W HCPCS: Performed by: INTERNAL MEDICINE

## 2018-12-20 PROCEDURE — 25000003 PHARM REV CODE 250: Performed by: INTERNAL MEDICINE

## 2018-12-20 PROCEDURE — 96368 THER/DIAG CONCURRENT INF: CPT

## 2018-12-20 RX ORDER — DIPHENHYDRAMINE HYDROCHLORIDE 50 MG/ML
50 INJECTION INTRAMUSCULAR; INTRAVENOUS ONCE AS NEEDED
Status: DISCONTINUED | OUTPATIENT
Start: 2018-12-20 | End: 2018-12-20 | Stop reason: HOSPADM

## 2018-12-20 RX ORDER — SODIUM CHLORIDE 0.9 % (FLUSH) 0.9 %
10 SYRINGE (ML) INJECTION
Status: DISCONTINUED | OUTPATIENT
Start: 2018-12-20 | End: 2018-12-20 | Stop reason: HOSPADM

## 2018-12-20 RX ORDER — HEPARIN 100 UNIT/ML
500 SYRINGE INTRAVENOUS
Status: DISCONTINUED | OUTPATIENT
Start: 2018-12-20 | End: 2018-12-20 | Stop reason: HOSPADM

## 2018-12-20 RX ORDER — EPINEPHRINE 0.3 MG/.3ML
0.3 INJECTION SUBCUTANEOUS ONCE AS NEEDED
Status: DISCONTINUED | OUTPATIENT
Start: 2018-12-20 | End: 2018-12-20 | Stop reason: HOSPADM

## 2018-12-20 RX ORDER — FLUOROURACIL 50 MG/ML
400 INJECTION, SOLUTION INTRAVENOUS
Status: COMPLETED | OUTPATIENT
Start: 2018-12-20 | End: 2018-12-20

## 2018-12-20 RX ADMIN — FLUOROURACIL 750 MG: 50 INJECTION, SOLUTION INTRAVENOUS at 04:12

## 2018-12-20 RX ADMIN — LEUCOVORIN CALCIUM 750 MG: 350 INJECTION, POWDER, LYOPHILIZED, FOR SOLUTION INTRAMUSCULAR; INTRAVENOUS at 02:12

## 2018-12-20 RX ADMIN — DEXAMETHASONE SODIUM PHOSPHATE: 4 INJECTION, SOLUTION INTRAMUSCULAR; INTRAVENOUS at 01:12

## 2018-12-20 RX ADMIN — OXALIPLATIN 159 MG: 5 INJECTION, SOLUTION INTRAVENOUS at 02:12

## 2018-12-20 RX ADMIN — FLUOROURACIL 4490 MG: 50 INJECTION, SOLUTION INTRAVENOUS at 04:12

## 2018-12-20 NOTE — PLAN OF CARE
Problem: Adult Inpatient Plan of Care  Goal: Plan of Care Review  Outcome: Ongoing (interventions implemented as appropriate)  New start FOLFOX, patient tolerated well. VSS, NAD. Education provided. D/C'd home with wife. 5-FU pump due to finish ~2pm Saturday 12/22.

## 2018-12-22 ENCOUNTER — INFUSION (OUTPATIENT)
Dept: INFUSION THERAPY | Facility: HOSPITAL | Age: 73
End: 2018-12-22
Attending: INTERNAL MEDICINE
Payer: MEDICARE

## 2018-12-22 VITALS
RESPIRATION RATE: 18 BRPM | DIASTOLIC BLOOD PRESSURE: 66 MMHG | TEMPERATURE: 99 F | SYSTOLIC BLOOD PRESSURE: 127 MMHG | HEART RATE: 94 BPM

## 2018-12-22 DIAGNOSIS — C61 PROSTATE CANCER METASTATIC TO BONE: ICD-10-CM

## 2018-12-22 DIAGNOSIS — C79.51 PROSTATE CANCER METASTATIC TO BONE: ICD-10-CM

## 2018-12-22 DIAGNOSIS — C25.1 MALIGNANT NEOPLASM OF BODY OF PANCREAS: Primary | ICD-10-CM

## 2018-12-22 DIAGNOSIS — C77.5 PROSTATE CANCER METASTATIC TO INTRAPELVIC LYMPH NODE: ICD-10-CM

## 2018-12-22 DIAGNOSIS — C61 PROSTATE CANCER METASTATIC TO INTRAPELVIC LYMPH NODE: ICD-10-CM

## 2018-12-22 PROCEDURE — 25000003 PHARM REV CODE 250: Performed by: INTERNAL MEDICINE

## 2018-12-22 PROCEDURE — 96523 IRRIG DRUG DELIVERY DEVICE: CPT

## 2018-12-22 PROCEDURE — A4216 STERILE WATER/SALINE, 10 ML: HCPCS | Performed by: INTERNAL MEDICINE

## 2018-12-22 PROCEDURE — 63600175 PHARM REV CODE 636 W HCPCS: Performed by: INTERNAL MEDICINE

## 2018-12-22 RX ORDER — HEPARIN 100 UNIT/ML
500 SYRINGE INTRAVENOUS
Status: DISCONTINUED | OUTPATIENT
Start: 2018-12-22 | End: 2018-12-22 | Stop reason: HOSPADM

## 2018-12-22 RX ORDER — SODIUM CHLORIDE 0.9 % (FLUSH) 0.9 %
10 SYRINGE (ML) INJECTION
Status: DISCONTINUED | OUTPATIENT
Start: 2018-12-22 | End: 2018-12-22 | Stop reason: HOSPADM

## 2018-12-22 RX ADMIN — Medication 10 ML: at 01:12

## 2018-12-22 RX ADMIN — HEPARIN 500 UNITS: 100 SYRINGE at 01:12

## 2018-12-22 NOTE — NURSING
VS stable. Chemotherapy completed, ambulatory CAD pump disconnected at this time. Right chest port flushed with normal saline and heparin and de-accessed. Site covered with band-aid, C/D/I. RTC 1/2/19. Pt ambulated independently out of clinic, no distress noted, accompanied by wife.

## 2018-12-26 ENCOUNTER — TELEPHONE (OUTPATIENT)
Dept: HEMATOLOGY/ONCOLOGY | Facility: CLINIC | Age: 73
End: 2018-12-26

## 2018-12-26 DIAGNOSIS — K59.00 CONSTIPATION, UNSPECIFIED CONSTIPATION TYPE: Primary | ICD-10-CM

## 2018-12-26 RX ORDER — LACTULOSE 10 G/15ML
20 SOLUTION ORAL EVERY 6 HOURS PRN
Qty: 900 ML | Refills: 2 | Status: SHIPPED | OUTPATIENT
Start: 2018-12-26 | End: 2019-05-22

## 2018-12-26 NOTE — TELEPHONE ENCOUNTER
"Message fwd to MD (Dr. Echavarria).         ----- Message from Syeda Arriaza RN sent at 12/26/2018  2:41 PM CST -----  Patient called with complaints of constipation, seeking advice on what he can take. Stated that he has tried "pills" and "a big bottle of liquid" with no relief.   Please advise.     "

## 2018-12-26 NOTE — TELEPHONE ENCOUNTER
"Called pt wife and informed of below- rx for lactulose sent to [facility] and instructions for taking.   Pt wife verbalized understanding and knows to call if any further complaints.         ----- Message from Marlene Echavarria MD sent at 12/26/2018  3:17 PM CST -----  Sent lactulose prescription to [facility]. Please ask patient to take 30 mL every 6 hours until he has a bowel movement. Then take it on an as-needed basis.   ----- Message -----  From: Mala Rodriguez  Sent: 12/26/2018   2:43 PM  To: MD Dr. Jericho Cotto this is one of Dr. Melendez's [employer] pts- can you help Syeda with recommendations?   Thanks    ----- Message -----  From: Syeda Arriaza RN  Sent: 12/26/2018   2:41 PM  To: Nora MARTINEZ Staff    Patient called with complaints of constipation, seeking advice on what he can take. Stated that he has tried "pills" and "a big bottle of liquid" with no relief.   Please advise.         "

## 2019-01-02 ENCOUNTER — OFFICE VISIT (OUTPATIENT)
Dept: HEMATOLOGY/ONCOLOGY | Facility: CLINIC | Age: 74
End: 2019-01-02
Payer: MEDICARE

## 2019-01-02 ENCOUNTER — INFUSION (OUTPATIENT)
Dept: INFUSION THERAPY | Facility: OTHER | Age: 74
End: 2019-01-02
Attending: INTERNAL MEDICINE
Payer: MEDICARE

## 2019-01-02 VITALS
HEART RATE: 98 BPM | HEART RATE: 98 BPM | DIASTOLIC BLOOD PRESSURE: 71 MMHG | RESPIRATION RATE: 18 BRPM | BODY MASS INDEX: 23.45 KG/M2 | TEMPERATURE: 99 F | RESPIRATION RATE: 17 BRPM | SYSTOLIC BLOOD PRESSURE: 118 MMHG | BODY MASS INDEX: 23.45 KG/M2 | WEIGHT: 154.75 LBS | TEMPERATURE: 99 F | OXYGEN SATURATION: 99 % | SYSTOLIC BLOOD PRESSURE: 118 MMHG | HEIGHT: 68 IN | DIASTOLIC BLOOD PRESSURE: 71 MMHG | HEIGHT: 68 IN | OXYGEN SATURATION: 99 % | WEIGHT: 154.75 LBS

## 2019-01-02 DIAGNOSIS — T45.1X5A ANTINEOPLASTIC CHEMOTHERAPY INDUCED ANEMIA: ICD-10-CM

## 2019-01-02 DIAGNOSIS — C61 PROSTATE CANCER METASTATIC TO INTRAPELVIC LYMPH NODE: ICD-10-CM

## 2019-01-02 DIAGNOSIS — D64.81 ANTINEOPLASTIC CHEMOTHERAPY INDUCED ANEMIA: ICD-10-CM

## 2019-01-02 DIAGNOSIS — C79.51 PROSTATE CANCER METASTATIC TO BONE: Primary | ICD-10-CM

## 2019-01-02 DIAGNOSIS — R63.0 ANOREXIA: ICD-10-CM

## 2019-01-02 DIAGNOSIS — C25.1 MALIGNANT NEOPLASM OF BODY OF PANCREAS: ICD-10-CM

## 2019-01-02 DIAGNOSIS — C61 PROSTATE CANCER METASTATIC TO BONE: Primary | ICD-10-CM

## 2019-01-02 DIAGNOSIS — C78.6 PERITONEAL METASTASES: ICD-10-CM

## 2019-01-02 DIAGNOSIS — C61 PROSTATE CANCER: ICD-10-CM

## 2019-01-02 DIAGNOSIS — C77.5 PROSTATE CANCER METASTATIC TO INTRAPELVIC LYMPH NODE: ICD-10-CM

## 2019-01-02 DIAGNOSIS — C78.7 LIVER METASTASES: ICD-10-CM

## 2019-01-02 DIAGNOSIS — C25.1 MALIGNANT NEOPLASM OF BODY OF PANCREAS: Primary | ICD-10-CM

## 2019-01-02 DIAGNOSIS — Z51.11 ENCOUNTER FOR ANTINEOPLASTIC CHEMOTHERAPY: ICD-10-CM

## 2019-01-02 DIAGNOSIS — G62.9 PERIPHERAL POLYNEUROPATHY: ICD-10-CM

## 2019-01-02 LAB
ERYTHROCYTE [DISTWIDTH] IN BLOOD BY AUTOMATED COUNT: 16.7 %
HCT VFR BLD AUTO: 33 %
HGB BLD-MCNC: 10.3 G/DL
MCH RBC QN AUTO: 27.2 PG
MCHC RBC AUTO-ENTMCNC: 31.2 G/DL
MCV RBC AUTO: 87 FL
NEUTROPHILS # BLD AUTO: 2.3 K/UL
PLATELET # BLD AUTO: 191 K/UL
PMV BLD AUTO: 9.1 FL
RBC # BLD AUTO: 3.79 M/UL
WBC # BLD AUTO: 3.75 K/UL

## 2019-01-02 PROCEDURE — 96367 TX/PROPH/DG ADDL SEQ IV INF: CPT

## 2019-01-02 PROCEDURE — 96411 CHEMO IV PUSH ADDL DRUG: CPT

## 2019-01-02 PROCEDURE — 85027 COMPLETE CBC AUTOMATED: CPT | Mod: 59

## 2019-01-02 PROCEDURE — 96413 CHEMO IV INFUSION 1 HR: CPT

## 2019-01-02 PROCEDURE — 3078F DIAST BP <80 MM HG: CPT | Mod: CPTII,S$GLB,, | Performed by: INTERNAL MEDICINE

## 2019-01-02 PROCEDURE — 96415 CHEMO IV INFUSION ADDL HR: CPT

## 2019-01-02 PROCEDURE — 99999 PR PBB SHADOW E&M-EST. PATIENT-LVL III: ICD-10-PCS | Mod: PBBFAC,,, | Performed by: INTERNAL MEDICINE

## 2019-01-02 PROCEDURE — 3074F PR MOST RECENT SYSTOLIC BLOOD PRESSURE < 130 MM HG: ICD-10-PCS | Mod: CPTII,S$GLB,, | Performed by: INTERNAL MEDICINE

## 2019-01-02 PROCEDURE — 3074F SYST BP LT 130 MM HG: CPT | Mod: CPTII,S$GLB,, | Performed by: INTERNAL MEDICINE

## 2019-01-02 PROCEDURE — 99215 OFFICE O/P EST HI 40 MIN: CPT | Mod: S$GLB,,, | Performed by: INTERNAL MEDICINE

## 2019-01-02 PROCEDURE — 96416 CHEMO PROLONG INFUSE W/PUMP: CPT

## 2019-01-02 PROCEDURE — 3078F PR MOST RECENT DIASTOLIC BLOOD PRESSURE < 80 MM HG: ICD-10-PCS | Mod: CPTII,S$GLB,, | Performed by: INTERNAL MEDICINE

## 2019-01-02 PROCEDURE — 96368 THER/DIAG CONCURRENT INF: CPT

## 2019-01-02 PROCEDURE — 36591 DRAW BLOOD OFF VENOUS DEVICE: CPT

## 2019-01-02 PROCEDURE — 63600175 PHARM REV CODE 636 W HCPCS: Performed by: INTERNAL MEDICINE

## 2019-01-02 PROCEDURE — 25000003 PHARM REV CODE 250: Performed by: INTERNAL MEDICINE

## 2019-01-02 PROCEDURE — 99999 PR PBB SHADOW E&M-EST. PATIENT-LVL III: CPT | Mod: PBBFAC,,, | Performed by: INTERNAL MEDICINE

## 2019-01-02 PROCEDURE — 96417 CHEMO IV INFUS EACH ADDL SEQ: CPT

## 2019-01-02 PROCEDURE — 99215 PR OFFICE/OUTPT VISIT, EST, LEVL V, 40-54 MIN: ICD-10-PCS | Mod: S$GLB,,, | Performed by: INTERNAL MEDICINE

## 2019-01-02 PROCEDURE — 1101F PT FALLS ASSESS-DOCD LE1/YR: CPT | Mod: CPTII,S$GLB,, | Performed by: INTERNAL MEDICINE

## 2019-01-02 PROCEDURE — 1101F PR PT FALLS ASSESS DOC 0-1 FALLS W/OUT INJ PAST YR: ICD-10-PCS | Mod: CPTII,S$GLB,, | Performed by: INTERNAL MEDICINE

## 2019-01-02 RX ORDER — DIPHENHYDRAMINE HYDROCHLORIDE 50 MG/ML
50 INJECTION INTRAMUSCULAR; INTRAVENOUS ONCE AS NEEDED
Status: CANCELLED | OUTPATIENT
Start: 2019-01-02 | End: 2019-01-02

## 2019-01-02 RX ORDER — EPINEPHRINE 0.3 MG/.3ML
0.3 INJECTION SUBCUTANEOUS ONCE AS NEEDED
Status: CANCELLED | OUTPATIENT
Start: 2019-01-02 | End: 2019-01-02

## 2019-01-02 RX ORDER — FLUOROURACIL 50 MG/ML
400 INJECTION, SOLUTION INTRAVENOUS
Status: COMPLETED | OUTPATIENT
Start: 2019-01-02 | End: 2019-01-02

## 2019-01-02 RX ORDER — HEPARIN 100 UNIT/ML
500 SYRINGE INTRAVENOUS
Status: CANCELLED | OUTPATIENT
Start: 2019-01-04

## 2019-01-02 RX ORDER — HEPARIN 100 UNIT/ML
500 SYRINGE INTRAVENOUS
Status: DISCONTINUED | OUTPATIENT
Start: 2019-01-02 | End: 2019-01-02 | Stop reason: HOSPADM

## 2019-01-02 RX ORDER — CYPROHEPTADINE HYDROCHLORIDE 4 MG/1
4 TABLET ORAL 3 TIMES DAILY
Qty: 90 TABLET | Refills: 2 | Status: SHIPPED | OUTPATIENT
Start: 2019-01-02

## 2019-01-02 RX ORDER — FLUOROURACIL 50 MG/ML
400 INJECTION, SOLUTION INTRAVENOUS
Status: CANCELLED | OUTPATIENT
Start: 2019-01-02

## 2019-01-02 RX ORDER — SODIUM CHLORIDE 0.9 % (FLUSH) 0.9 %
10 SYRINGE (ML) INJECTION
Status: CANCELLED | OUTPATIENT
Start: 2019-01-02

## 2019-01-02 RX ORDER — SODIUM CHLORIDE 0.9 % (FLUSH) 0.9 %
10 SYRINGE (ML) INJECTION
Status: CANCELLED | OUTPATIENT
Start: 2019-01-04

## 2019-01-02 RX ORDER — EPINEPHRINE 0.3 MG/.3ML
0.3 INJECTION SUBCUTANEOUS ONCE AS NEEDED
Status: DISCONTINUED | OUTPATIENT
Start: 2019-01-02 | End: 2019-01-02 | Stop reason: HOSPADM

## 2019-01-02 RX ORDER — HEPARIN 100 UNIT/ML
500 SYRINGE INTRAVENOUS
Status: CANCELLED | OUTPATIENT
Start: 2019-01-02

## 2019-01-02 RX ORDER — SODIUM CHLORIDE 0.9 % (FLUSH) 0.9 %
10 SYRINGE (ML) INJECTION
Status: DISCONTINUED | OUTPATIENT
Start: 2019-01-02 | End: 2019-01-02 | Stop reason: HOSPADM

## 2019-01-02 RX ORDER — DIPHENHYDRAMINE HYDROCHLORIDE 50 MG/ML
50 INJECTION INTRAMUSCULAR; INTRAVENOUS ONCE AS NEEDED
Status: DISCONTINUED | OUTPATIENT
Start: 2019-01-02 | End: 2019-01-02 | Stop reason: HOSPADM

## 2019-01-02 RX ADMIN — FLUOROURACIL 750 MG: 50 INJECTION, SOLUTION INTRAVENOUS at 04:01

## 2019-01-02 RX ADMIN — FLUOROURACIL 4490 MG: 50 INJECTION, SOLUTION INTRAVENOUS at 05:01

## 2019-01-02 RX ADMIN — OXALIPLATIN 159 MG: 5 INJECTION, SOLUTION INTRAVENOUS at 02:01

## 2019-01-02 RX ADMIN — DEXAMETHASONE SODIUM PHOSPHATE: 4 INJECTION, SOLUTION INTRAMUSCULAR; INTRAVENOUS at 01:01

## 2019-01-02 RX ADMIN — LEUCOVORIN CALCIUM 750 MG: 350 INJECTION, POWDER, LYOPHILIZED, FOR SOLUTION INTRAMUSCULAR; INTRAVENOUS at 02:01

## 2019-01-02 RX ADMIN — DEXTROSE: 5 SOLUTION INTRAVENOUS at 01:01

## 2019-01-02 NOTE — PLAN OF CARE
Problem: Adult Inpatient Plan of Care  Goal: Plan of Care Review  Outcome: Ongoing (interventions implemented as appropriate)  C2 FOLFOX administered, patient tolerated well. VSS, NAD. D/C'd home with 5-fu pump, due to finish ~4:30pm Friday 1/4/18.

## 2019-01-02 NOTE — PROGRESS NOTES
Subjective:       Patient ID: Shady Prakash Jr. is a 73 y.o. male.     Chief Complaint: follow up for metastatic pancreatic cancer     HPI      Returns for follow-up.  Tolerated FOLFOX well. Did have more fatigue and poor appetite. 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 No other complaints.      CT scans 12/10/18:  Pancreatic mass has increased in size now measuring 3.6 cm compared to 2.0 cm on the prior examination.  Increasing peritoneal metastases with the largest located along the lesser curvature of the stomach measuring 2.2 cm in greatest dimension.  Sclerotic bone lesions.  There is increased sclerosis along the margin of the sclerotic bone lesion within the roof of the left acetabulum and progression of the osseous metastatic disease should be considered.  Multiple stable hepatic hypodense lesions.  Probable rounded atelectasis within the right lung with pleural thickening and parenchymal scarring on the right.  4 mm nodule within the right lower lobe (image 80, series 2) which may represent an interval change.  Continued follow-up is recommended.    Started FOLFOX on 12/20/18.        Diagnoses: Metastatic pancreatic cancer and metastatic prostate cancer.  Most recently on [place]/Abraxane for the metastatic pancreatic cancer.  Progression noted on CT scan 12/10/18  Started FOLFOX on 12/20/18     Oncology History:  1. Metastatic prostate cancer  He is s/p TRUS/bx on 8/7/15 w/ high volume marta 9 (5+4), PSA 25.  He had negative metastatic workup at diagnosis.  However, at time of planned RALP on 9/10/15 he was noted to have BN invasion on cysto and therefore only PLND was done, which confirmed metastatic disease.  He was started on ADT 9/17/15 w/ firmagon followed by Eligard on 10/15/15.    Started Casodex 7/20/17 - scans at that time showed osseus metastatic disease  Started docetaxel 10/6/17 with progression, then found to have below     - on  follow-up scans a pancreatic lesion noted concerning for pancreatic cancer as it was an unlikely area for prostate cancer spread-  Biopsy confirmed adenocarcinoma and PSA was negative     Imaging as below:  Scan results:  6/20/18 Bone scan:  FINDINGS:  Two lesions in the left scapula, stable.  Elongated lesion in the right mid rib posteriorly, stable.  Prior exam demonstrated a large right iliac lesion, which no longer exhibits increased uptake.  Left pubic lesion, stable.  There is also now a small lesion in the left iliac bone. Two additional new small foci in the mid thoracic vertebra.  Both kidneys and the bladder appear unremarkable.  Impression:  Findings consistent with osseous metastatic disease as above, noting 3 new small lesions.     6/21/18 CT scan abdomen and pelvis:  FINDINGS:  Lower chest: There has been interval increase in size in the focal consolidation which is pleural based in the posterior aspect of the right lower lobe.  Today's exam measures 5.1 cm maximum diameter.  Previous exam is measure approximately 3.3 cm.  This is associated with pleural thickening that extends laterally.  There is a small focus of cylinder all coal bronchiectasis in the medial left lower lobe.  The remaining aspects of the lung parenchyma that are visualized show no abnormalities.  The visualized heart and pericardium are normal  Liver: There are 3 hyperenhancing foci along the periphery of the right and left lobes of the liver which are stable from 2015.  The largest measures 1.1 cm in maximum diameter.  Although not characteristic on this exam, previous exam in 2015 shows findings most characteristic of hepatic hemangioma.  The remaining liver parenchyma as well as the hepatic and portal veins are patent.  Gallbladder: Normal.  Pancreas: There is been interval development of an ill-defined hypodense focus within the proximal body of the pancreas measuring 2.1 cm x 1.8 cm.  Spleen: Calcified hemangiomas present.   Otherwise normal.  Adrenal glands: Normal.  Genitourinary: Stable simple cyst in the lower pole of the right kidney measuring 1.6 cm.  The left kidney and bilateral ureters are normal.  The bladder is partially distended and shows wall thickening on the non dependent surface of the bladder.  This is not significantly changed in appearance.  Gastrointestinal: Stomach and small intestines are normal.  Colonic diverticulosis is present without evidence of diverticulitis.  Prostate: Enlarged and nodular causing mass effect on the bladder.  Miscellaneous: There is no intra-abdominal or pelvic free fluid, free air or lymphadenopathy.  The abdominal aorta tapers normally.  Bilateral fat containing inguinal hernias present.  Osseous and soft tissue structures: Sclerotic metastases involving the right iliac bone and left superior pubic ramus are identified.  The small focus in the left iliac bone seen on yesterday's bone scan is not well seen on this exam.  The soft tissues are within normal limits.  Impression:  1. Interval increase in size in the pleural base, right lower lobe consolidation.  2. Hyperenhancing foci likely represent hemangiomas.  They have been stable since 2015.  Is on the 2015 exam where they demonstrate characteristics most compatible with hemangioma.  3. Bladder wall thickening is not significantly changed.  This may be related to outlet obstruction from the nodular prostate.  4. Interval development of ill-defined hypodense focus measuring 2.1 cm within the proximal body of the pancreas.  This may also be a focus of metastatic disease although is uncommon for the prostate to metastasized to the pancreas.  A dedicated pancreatic CT may be beneficial in further characterizing this lesion.     - on 7/25/18 he came to ER with worsening abdominal pain and concerns for appendicitis  Underwent Laparoscopy Converted to Laparotomy, Peritoneal Biopsy , Ileocecal with Dr. Stephenson  Biopsy concerning for  "metastatic pancreatic carcinoma     2. Metastatic pancreatic cancer  - started on [place]/Abraxane- note Ca19-9 never elevated  Response initially  Now with progression  FOLFOX started 12/20/18     ROS:  Constitutional: Negative for activity change, appetite change, chills, fatigue, fever and unexpected weight change.   HENT:  Negative for congestion, hearing loss, mouth sores, nosebleeds, postnasal drip, sinus pressure, sore throat and trouble swallowing.    Eyes: Negative for visual disturbance.   Respiratory: Negative for cough, chest tightness, shortness of breath and wheezing.    Cardiovascular: Negative for chest pain, palpitations and leg swelling.   Gastrointestinal: +paraumbilical pain, Negative for abdominal distention, blood in stool, constipation, diarrhea and vomiting.        No GERD   Endocrine:        Hot flashes   Genitourinary: Negative for decreased urine volume, difficulty urinating, frequency, hematuria and urgency.        Sexual dysfunction   Musculoskeletal: Negative for arthralgias (better), back pain, gait problem, joint swelling, neck pain and neck stiffness.   Skin: Negative for color change, pallor, rash and wound.   Neurological: Negative for dizziness, weakness, light-headedness, numbness and headaches. +"tightness" feeing in hands  Hematological: Negative for adenopathy. Does not bruise/bleed easily.   Psychiatric/Behavioral: Negative for dysphoric mood and sleep disturbance. The patient is not nervous/anxious.        Objective:   Physical Exam   Constitutional: He is oriented to person, place, and time. He appears well-developed and well-nourished. No distress.   Presents with wife  ECOG=0   HENT:   Head: Normocephalic and atraumatic.   Right Ear: External ear normal.   Left Ear: External ear normal.   Nose: Nose normal.   Mouth/Throat: Oropharynx is clear and moist. No oropharyngeal exudate.   Eyes: Conjunctivae and EOM are normal. Pupils are equal, round, and reactive to light. Right eye " exhibits no discharge. Left eye exhibits no discharge. No scleral icterus.   Neck: Normal range of motion. Neck supple. No tracheal deviation present. No thyromegaly present.   Cardiovascular: Normal rate, regular rhythm, normal heart sounds and intact distal pulses. Exam reveals no gallop and no friction rub.   No murmur heard.  Pulmonary/Chest: Effort normal and breath sounds normal. No respiratory distress. He has no wheezes. He has no rales. He exhibits no tenderness.   Abdominal: Soft. Bowel sounds are normal. He exhibits no distension. +hardness and a deep lesion palpated in the mid part of the surgical wound.   Musculoskeletal: Normal range of motion. He exhibits no edema, tenderness or deformity.   Lymphadenopathy:     He has no cervical adenopathy.   Neurological: He is alert and oriented to person, place, and time. He has normal reflexes. He displays normal reflexes. No cranial nerve deficit. He exhibits normal muscle tone. Coordination normal.   Skin: Skin is warm and dry. No rash noted. He is not diaphoretic. No erythema. No pallor.   Psychiatric: He has a normal mood and affect. His behavior is normal. Judgment and thought content normal.   Nursing note and vitals reviewed.    Labs- reviewed  Assessment:       1. Malignant neoplasm of body of pancreas    2. Liver metastases    3. Peritoneal metastases    4. Encounter for antineoplastic chemotherapy    5. Anorexia    6. Antineoplastic chemotherapy induced anemia    7. Prostate cancer    8. Peripheral polyneuropathy        Plan:     1-4  - on FOLFOX. Tolerating it relatively well  - cycle 2 today    5.  - periactin tid    6.  - mild. Monitor    7.  - Therapy stopped. Treat pancreatic cancer  - It is hormonally resistant at this point    8.  - from abraxane  - improving. monitor      RTC in 2 weeks

## 2019-01-04 ENCOUNTER — INFUSION (OUTPATIENT)
Dept: INFUSION THERAPY | Facility: OTHER | Age: 74
End: 2019-01-04
Attending: INTERNAL MEDICINE
Payer: MEDICARE

## 2019-01-04 VITALS
DIASTOLIC BLOOD PRESSURE: 70 MMHG | OXYGEN SATURATION: 97 % | RESPIRATION RATE: 18 BRPM | SYSTOLIC BLOOD PRESSURE: 134 MMHG | HEART RATE: 93 BPM | TEMPERATURE: 97 F

## 2019-01-04 DIAGNOSIS — C79.51 PROSTATE CANCER METASTATIC TO BONE: ICD-10-CM

## 2019-01-04 DIAGNOSIS — C25.1 MALIGNANT NEOPLASM OF BODY OF PANCREAS: Primary | ICD-10-CM

## 2019-01-04 DIAGNOSIS — C61 PROSTATE CANCER METASTATIC TO INTRAPELVIC LYMPH NODE: ICD-10-CM

## 2019-01-04 DIAGNOSIS — C77.5 PROSTATE CANCER METASTATIC TO INTRAPELVIC LYMPH NODE: ICD-10-CM

## 2019-01-04 DIAGNOSIS — C61 PROSTATE CANCER METASTATIC TO BONE: ICD-10-CM

## 2019-01-04 PROCEDURE — 96522 REFILL/MAINT PUMP/RESVR SYST: CPT

## 2019-01-04 PROCEDURE — A4216 STERILE WATER/SALINE, 10 ML: HCPCS | Performed by: INTERNAL MEDICINE

## 2019-01-04 PROCEDURE — 96521 REFILL/MAINT PORTABLE PUMP: CPT

## 2019-01-04 PROCEDURE — 63600175 PHARM REV CODE 636 W HCPCS: Performed by: INTERNAL MEDICINE

## 2019-01-04 PROCEDURE — 25000003 PHARM REV CODE 250: Performed by: INTERNAL MEDICINE

## 2019-01-04 RX ORDER — HEPARIN 100 UNIT/ML
500 SYRINGE INTRAVENOUS
Status: DISCONTINUED | OUTPATIENT
Start: 2019-01-04 | End: 2019-01-04 | Stop reason: HOSPADM

## 2019-01-04 RX ORDER — LIDOCAINE AND PRILOCAINE 25; 25 MG/G; MG/G
CREAM TOPICAL
Qty: 30 G | Refills: 2 | Status: SHIPPED | OUTPATIENT
Start: 2019-01-04

## 2019-01-04 RX ORDER — SODIUM CHLORIDE 0.9 % (FLUSH) 0.9 %
10 SYRINGE (ML) INJECTION
Status: DISCONTINUED | OUTPATIENT
Start: 2019-01-04 | End: 2019-01-04 | Stop reason: HOSPADM

## 2019-01-04 RX ADMIN — Medication 10 ML: at 02:01

## 2019-01-04 RX ADMIN — HEPARIN 500 UNITS: 100 SYRINGE at 02:01

## 2019-01-04 NOTE — PLAN OF CARE
Problem: Adult Inpatient Plan of Care  Goal: Plan of Care Review  Outcome: Ongoing (interventions implemented as appropriate)  Pt here for pump d/c. Pt tolerated 5FU without issue. VSS. AVS given.

## 2019-01-15 ENCOUNTER — OFFICE VISIT (OUTPATIENT)
Dept: HEMATOLOGY/ONCOLOGY | Facility: CLINIC | Age: 74
End: 2019-01-15
Attending: INTERNAL MEDICINE
Payer: MEDICARE

## 2019-01-15 ENCOUNTER — LAB VISIT (OUTPATIENT)
Dept: LAB | Facility: OTHER | Age: 74
End: 2019-01-15
Attending: INTERNAL MEDICINE
Payer: MEDICARE

## 2019-01-15 VITALS
HEART RATE: 90 BPM | OXYGEN SATURATION: 99 % | HEIGHT: 68 IN | WEIGHT: 157.19 LBS | DIASTOLIC BLOOD PRESSURE: 64 MMHG | SYSTOLIC BLOOD PRESSURE: 108 MMHG | BODY MASS INDEX: 23.82 KG/M2 | RESPIRATION RATE: 18 BRPM

## 2019-01-15 DIAGNOSIS — C79.51 PROSTATE CANCER METASTATIC TO BONE: ICD-10-CM

## 2019-01-15 DIAGNOSIS — C25.1 MALIGNANT NEOPLASM OF BODY OF PANCREAS: Primary | ICD-10-CM

## 2019-01-15 DIAGNOSIS — C61 PROSTATE CANCER METASTATIC TO BONE: ICD-10-CM

## 2019-01-15 DIAGNOSIS — C25.1 MALIGNANT NEOPLASM OF BODY OF PANCREAS: ICD-10-CM

## 2019-01-15 DIAGNOSIS — D70.1 CHEMOTHERAPY-INDUCED NEUTROPENIA: ICD-10-CM

## 2019-01-15 DIAGNOSIS — T45.1X5A CHEMOTHERAPY-INDUCED NEUTROPENIA: ICD-10-CM

## 2019-01-15 DIAGNOSIS — C78.6 PERITONEAL METASTASES: ICD-10-CM

## 2019-01-15 LAB
ALBUMIN SERPL BCP-MCNC: 4.1 G/DL
ALP SERPL-CCNC: 65 U/L
ALT SERPL W/O P-5'-P-CCNC: 21 U/L
ANION GAP SERPL CALC-SCNC: 11 MMOL/L
AST SERPL-CCNC: 23 U/L
BILIRUB SERPL-MCNC: 0.4 MG/DL
BUN SERPL-MCNC: 15 MG/DL
CALCIUM SERPL-MCNC: 10 MG/DL
CHLORIDE SERPL-SCNC: 102 MMOL/L
CO2 SERPL-SCNC: 26 MMOL/L
CREAT SERPL-MCNC: 1.1 MG/DL
ERYTHROCYTE [DISTWIDTH] IN BLOOD BY AUTOMATED COUNT: 16.7 %
EST. GFR  (AFRICAN AMERICAN): >60 ML/MIN/1.73 M^2
EST. GFR  (NON AFRICAN AMERICAN): >60 ML/MIN/1.73 M^2
GLUCOSE SERPL-MCNC: 128 MG/DL
HCT VFR BLD AUTO: 32.9 %
HGB BLD-MCNC: 10.3 G/DL
MCH RBC QN AUTO: 27.3 PG
MCHC RBC AUTO-ENTMCNC: 31.3 G/DL
MCV RBC AUTO: 87 FL
NEUTROPHILS # BLD AUTO: 1.6 K/UL
PLATELET # BLD AUTO: 141 K/UL
PMV BLD AUTO: 9.1 FL
POTASSIUM SERPL-SCNC: 4.1 MMOL/L
PROT SERPL-MCNC: 7.7 G/DL
RBC # BLD AUTO: 3.77 M/UL
SODIUM SERPL-SCNC: 139 MMOL/L
WBC # BLD AUTO: 3.05 K/UL

## 2019-01-15 PROCEDURE — 85027 COMPLETE CBC AUTOMATED: CPT

## 2019-01-15 PROCEDURE — 1101F PR PT FALLS ASSESS DOC 0-1 FALLS W/OUT INJ PAST YR: ICD-10-PCS | Mod: CPTII,S$GLB,, | Performed by: INTERNAL MEDICINE

## 2019-01-15 PROCEDURE — 99499 UNLISTED E&M SERVICE: CPT | Mod: S$GLB,,, | Performed by: INTERNAL MEDICINE

## 2019-01-15 PROCEDURE — 99214 PR OFFICE/OUTPT VISIT, EST, LEVL IV, 30-39 MIN: ICD-10-PCS | Mod: S$GLB,,, | Performed by: INTERNAL MEDICINE

## 2019-01-15 PROCEDURE — 99999 PR PBB SHADOW E&M-EST. PATIENT-LVL III: CPT | Mod: PBBFAC,,, | Performed by: INTERNAL MEDICINE

## 2019-01-15 PROCEDURE — 99499 RISK ADDL DX/OHS AUDIT: ICD-10-PCS | Mod: S$GLB,,, | Performed by: INTERNAL MEDICINE

## 2019-01-15 PROCEDURE — 99999 PR PBB SHADOW E&M-EST. PATIENT-LVL III: ICD-10-PCS | Mod: PBBFAC,,, | Performed by: INTERNAL MEDICINE

## 2019-01-15 PROCEDURE — 3074F PR MOST RECENT SYSTOLIC BLOOD PRESSURE < 130 MM HG: ICD-10-PCS | Mod: CPTII,S$GLB,, | Performed by: INTERNAL MEDICINE

## 2019-01-15 PROCEDURE — 3074F SYST BP LT 130 MM HG: CPT | Mod: CPTII,S$GLB,, | Performed by: INTERNAL MEDICINE

## 2019-01-15 PROCEDURE — 1101F PT FALLS ASSESS-DOCD LE1/YR: CPT | Mod: CPTII,S$GLB,, | Performed by: INTERNAL MEDICINE

## 2019-01-15 PROCEDURE — 36415 COLL VENOUS BLD VENIPUNCTURE: CPT

## 2019-01-15 PROCEDURE — 99214 OFFICE O/P EST MOD 30 MIN: CPT | Mod: S$GLB,,, | Performed by: INTERNAL MEDICINE

## 2019-01-15 PROCEDURE — 80053 COMPREHEN METABOLIC PANEL: CPT

## 2019-01-15 PROCEDURE — 3078F DIAST BP <80 MM HG: CPT | Mod: CPTII,S$GLB,, | Performed by: INTERNAL MEDICINE

## 2019-01-15 PROCEDURE — 3078F PR MOST RECENT DIASTOLIC BLOOD PRESSURE < 80 MM HG: ICD-10-PCS | Mod: CPTII,S$GLB,, | Performed by: INTERNAL MEDICINE

## 2019-01-15 RX ORDER — LACTULOSE 10 G/15ML
SOLUTION ORAL
COMMUNITY
Start: 2018-12-26 | End: 2019-05-22 | Stop reason: SDUPTHER

## 2019-01-15 RX ORDER — SODIUM CHLORIDE 0.9 % (FLUSH) 0.9 %
10 SYRINGE (ML) INJECTION
Status: CANCELLED | OUTPATIENT
Start: 2019-01-18

## 2019-01-15 RX ORDER — METOPROLOL TARTRATE 25 MG/1
TABLET, FILM COATED ORAL
COMMUNITY
Start: 2019-01-10

## 2019-01-15 RX ORDER — DIPHENHYDRAMINE HYDROCHLORIDE 50 MG/ML
50 INJECTION INTRAMUSCULAR; INTRAVENOUS ONCE AS NEEDED
Status: CANCELLED | OUTPATIENT
Start: 2019-01-16 | End: 2019-01-16

## 2019-01-15 RX ORDER — FLUOROURACIL 50 MG/ML
400 INJECTION, SOLUTION INTRAVENOUS
Status: CANCELLED | OUTPATIENT
Start: 2019-01-16

## 2019-01-15 RX ORDER — HEPARIN 100 UNIT/ML
500 SYRINGE INTRAVENOUS
Status: CANCELLED | OUTPATIENT
Start: 2019-01-16

## 2019-01-15 RX ORDER — HEPARIN 100 UNIT/ML
500 SYRINGE INTRAVENOUS
Status: CANCELLED | OUTPATIENT
Start: 2019-01-18

## 2019-01-15 RX ORDER — EPINEPHRINE 0.3 MG/.3ML
0.3 INJECTION SUBCUTANEOUS ONCE AS NEEDED
Status: CANCELLED | OUTPATIENT
Start: 2019-01-16 | End: 2019-01-16

## 2019-01-15 RX ORDER — SODIUM CHLORIDE 0.9 % (FLUSH) 0.9 %
10 SYRINGE (ML) INJECTION
Status: CANCELLED | OUTPATIENT
Start: 2019-01-16

## 2019-01-15 NOTE — PROGRESS NOTES
Subjective:       Patient ID: Shady Prakash Jr. is a 73 y.o. male.    Chief Complaint: Follow-up    HPI     Returns for follow-up.  Presents after cycle # 2 FOLFOX  Reports did well overall  Noted one day of fatigue- states he felt better after smoking marijuana  He did note some neuropathy in feet but gone now  No fevers, chills or infectious complaints  No mouth sores  Mild gum soreness  Notes swallowing better  Eating well overall- had a couple days where appetite decreased  No nausea or vomiting  No pain issues     Last CT scans 12/10/18:  Pancreatic mass has increased in size now measuring 3.6 cm compared to 2.0 cm on the prior examination.  Increasing peritoneal metastases with the largest located along the lesser curvature of the stomach measuring 2.2 cm in greatest dimension.  Sclerotic bone lesions.  There is increased sclerosis along the margin of the sclerotic bone lesion within the roof of the left acetabulum and progression of the osseous metastatic disease should be considered.  Multiple stable hepatic hypodense lesions.  Probable rounded atelectasis within the right lung with pleural thickening and parenchymal scarring on the right.  4 mm nodule within the right lower lobe (image 80, series 2) which may represent an interval change.  Continued follow-up is recommended.     Diagnoses: Metastatic pancreatic cancer and metastatic prostate cancer.  Most recently on [place]/Abraxane for the metastatic pancreatic cancer.  Progression noted on CT scan 12/10/18  Started FOLFOX on 12/20/18     Oncology History:  1. Metastatic prostate cancer  He is s/p TRUS/bx on 8/7/15 w/ high volume marta 9 (5+4), PSA 25.  He had negative metastatic workup at diagnosis.  However, at time of planned RALP on 9/10/15 he was noted to have BN invasion on cysto and therefore only PLND was done, which confirmed metastatic disease.  He was started on ADT 9/17/15 w/ firmagon followed by Eligard on 10/15/15.    Started Casodex 7/20/17 -  scans at that time showed osseus metastatic disease  Started docetaxel 10/6/17 with progression, then found to have below     - on follow-up scans a pancreatic lesion noted concerning for pancreatic cancer as it was an unlikely area for prostate cancer spread-  Biopsy confirmed adenocarcinoma and PSA was negative     Imaging as below:  Scan results:  6/20/18 Bone scan:  FINDINGS:  Two lesions in the left scapula, stable.  Elongated lesion in the right mid rib posteriorly, stable.  Prior exam demonstrated a large right iliac lesion, which no longer exhibits increased uptake.  Left pubic lesion, stable.  There is also now a small lesion in the left iliac bone. Two additional new small foci in the mid thoracic vertebra.  Both kidneys and the bladder appear unremarkable.  Impression:  Findings consistent with osseous metastatic disease as above, noting 3 new small lesions.     6/21/18 CT scan abdomen and pelvis:  FINDINGS:  Lower chest: There has been interval increase in size in the focal consolidation which is pleural based in the posterior aspect of the right lower lobe.  Today's exam measures 5.1 cm maximum diameter.  Previous exam is measure approximately 3.3 cm.  This is associated with pleural thickening that extends laterally.  There is a small focus of cylinder all coal bronchiectasis in the medial left lower lobe.  The remaining aspects of the lung parenchyma that are visualized show no abnormalities.  The visualized heart and pericardium are normal  Liver: There are 3 hyperenhancing foci along the periphery of the right and left lobes of the liver which are stable from 2015.  The largest measures 1.1 cm in maximum diameter.  Although not characteristic on this exam, previous exam in 2015 shows findings most characteristic of hepatic hemangioma.  The remaining liver parenchyma as well as the hepatic and portal veins are patent.  Gallbladder: Normal.  Pancreas: There is been interval development of an  ill-defined hypodense focus within the proximal body of the pancreas measuring 2.1 cm x 1.8 cm.  Spleen: Calcified hemangiomas present.  Otherwise normal.  Adrenal glands: Normal.  Genitourinary: Stable simple cyst in the lower pole of the right kidney measuring 1.6 cm.  The left kidney and bilateral ureters are normal.  The bladder is partially distended and shows wall thickening on the non dependent surface of the bladder.  This is not significantly changed in appearance.  Gastrointestinal: Stomach and small intestines are normal.  Colonic diverticulosis is present without evidence of diverticulitis.  Prostate: Enlarged and nodular causing mass effect on the bladder.  Miscellaneous: There is no intra-abdominal or pelvic free fluid, free air or lymphadenopathy.  The abdominal aorta tapers normally.  Bilateral fat containing inguinal hernias present.  Osseous and soft tissue structures: Sclerotic metastases involving the right iliac bone and left superior pubic ramus are identified.  The small focus in the left iliac bone seen on yesterday's bone scan is not well seen on this exam.  The soft tissues are within normal limits.  Impression:  1. Interval increase in size in the pleural base, right lower lobe consolidation.  2. Hyperenhancing foci likely represent hemangiomas.  They have been stable since 2015.  Is on the 2015 exam where they demonstrate characteristics most compatible with hemangioma.  3. Bladder wall thickening is not significantly changed.  This may be related to outlet obstruction from the nodular prostate.  4. Interval development of ill-defined hypodense focus measuring 2.1 cm within the proximal body of the pancreas.  This may also be a focus of metastatic disease although is uncommon for the prostate to metastasized to the pancreas.  A dedicated pancreatic CT may be beneficial in further characterizing this lesion.     - on 7/25/18 he came to ER with worsening abdominal pain and concerns for  appendicitis  Underwent Laparoscopy Converted to Laparotomy, Peritoneal Biopsy , Ileocecal with Dr. Stephenson  Biopsy concerning for metastatic pancreatic carcinoma     2. Metastatic pancreatic cancer  - started on [place]/Abraxane- note Ca19-9 never elevated  Response initially  Now with progression  FOLFOX started 12/20/18    Review of Systems   Constitutional: Negative for activity change, appetite change, chills, fatigue, fever and unexpected weight change.   HENT: Positive for dental problem. Negative for congestion, hearing loss, mouth sores, nosebleeds, postnasal drip, sinus pressure, sore throat and trouble swallowing.    Eyes: Negative for visual disturbance.   Respiratory: Negative for cough (rare dry), chest tightness, shortness of breath and wheezing.    Cardiovascular: Negative for chest pain, palpitations and leg swelling.   Gastrointestinal: Negative for abdominal distention, abdominal pain, blood in stool, constipation, diarrhea and vomiting.        No GERD   Endocrine:        Hot flashes   Genitourinary: Negative for decreased urine volume, difficulty urinating, frequency, hematuria and urgency.   Musculoskeletal: Negative for arthralgias (better), back pain, gait problem, joint swelling, neck pain and neck stiffness.   Skin: Negative for color change, pallor, rash and wound.   Neurological: Positive for numbness. Negative for dizziness, weakness, light-headedness and headaches.   Hematological: Negative for adenopathy. Does not bruise/bleed easily.   Psychiatric/Behavioral: Negative for dysphoric mood and sleep disturbance. The patient is not nervous/anxious.        Objective:      Physical Exam   Constitutional: He is oriented to person, place, and time. He appears well-developed and well-nourished. No distress.   Presents with wife  ECOG=0   HENT:   Head: Normocephalic and atraumatic.   Right Ear: External ear normal.   Left Ear: External ear normal.   Nose: Nose normal.   Mouth/Throat: Oropharynx is  clear and moist. No oropharyngeal exudate.   Eyes: Conjunctivae and EOM are normal. Pupils are equal, round, and reactive to light. Right eye exhibits no discharge. Left eye exhibits no discharge. No scleral icterus.   Neck: Normal range of motion. Neck supple. No tracheal deviation present. No thyromegaly present.   Cardiovascular: Normal rate, regular rhythm, normal heart sounds and intact distal pulses. Exam reveals no gallop and no friction rub.   No murmur heard.  Pulmonary/Chest: Effort normal and breath sounds normal. No respiratory distress. He has no wheezes. He has no rales. He exhibits no tenderness.   Abdominal: Soft. Bowel sounds are normal. He exhibits no distension and no mass. There is no tenderness. There is no rebound and no guarding.   Musculoskeletal: Normal range of motion. He exhibits no edema, tenderness or deformity.   Lymphadenopathy:     He has no cervical adenopathy.   Neurological: He is alert and oriented to person, place, and time. He has normal reflexes. He displays normal reflexes. No cranial nerve deficit. He exhibits normal muscle tone. Coordination normal.   Skin: Skin is warm and dry. No rash noted. He is not diaphoretic. No erythema. No pallor.   Psychiatric: He has a normal mood and affect. His behavior is normal. Judgment and thought content normal.   Nursing note and vitals reviewed.    Labs- reviewed  Assessment:       1. Malignant neoplasm of body of pancreas    2. Peritoneal metastases    3. Prostate cancer metastatic to bone    4. Chemotherapy-induced neutropenia        Plan:       1-2. Proceed with cysle # 3 of FOLFOX  Dose adjust Oxaliplatin with neuropathy   3. Therapy on hold with above  4. Neulasta    25 minutes total

## 2019-01-15 NOTE — PROGRESS NOTES
Patient, Shady Prakash Jr. (MRN #[id number]), presented with a recent Platelet count less than 150 K/uL consistent with the definition of thrombocytopenia (ICD10 - D69.6).    Platelets   Date Value Ref Range Status   01/15/2019 141 (L) 150 - 350 K/uL Final     The patient's thrombocytopenia was monitored, evaluated, addressed and/or treated. This addendum to the medical record is made on 01/15/2019.

## 2019-01-16 ENCOUNTER — INFUSION (OUTPATIENT)
Dept: INFUSION THERAPY | Facility: OTHER | Age: 74
End: 2019-01-16
Attending: INTERNAL MEDICINE
Payer: MEDICARE

## 2019-01-16 VITALS
HEIGHT: 68 IN | DIASTOLIC BLOOD PRESSURE: 71 MMHG | WEIGHT: 157.63 LBS | HEART RATE: 82 BPM | SYSTOLIC BLOOD PRESSURE: 104 MMHG | OXYGEN SATURATION: 97 % | RESPIRATION RATE: 18 BRPM | BODY MASS INDEX: 23.89 KG/M2 | TEMPERATURE: 99 F

## 2019-01-16 DIAGNOSIS — C61 PROSTATE CANCER METASTATIC TO INTRAPELVIC LYMPH NODE: ICD-10-CM

## 2019-01-16 DIAGNOSIS — C77.5 PROSTATE CANCER METASTATIC TO INTRAPELVIC LYMPH NODE: ICD-10-CM

## 2019-01-16 DIAGNOSIS — C79.51 PROSTATE CANCER METASTATIC TO BONE: ICD-10-CM

## 2019-01-16 DIAGNOSIS — C61 PROSTATE CANCER METASTATIC TO BONE: ICD-10-CM

## 2019-01-16 DIAGNOSIS — C25.1 MALIGNANT NEOPLASM OF BODY OF PANCREAS: Primary | ICD-10-CM

## 2019-01-16 PROCEDURE — 96416 CHEMO PROLONG INFUSE W/PUMP: CPT

## 2019-01-16 PROCEDURE — 96367 TX/PROPH/DG ADDL SEQ IV INF: CPT

## 2019-01-16 PROCEDURE — 25000003 PHARM REV CODE 250: Performed by: INTERNAL MEDICINE

## 2019-01-16 PROCEDURE — 96368 THER/DIAG CONCURRENT INF: CPT

## 2019-01-16 PROCEDURE — 63600175 PHARM REV CODE 636 W HCPCS: Performed by: INTERNAL MEDICINE

## 2019-01-16 PROCEDURE — 96413 CHEMO IV INFUSION 1 HR: CPT

## 2019-01-16 PROCEDURE — 96415 CHEMO IV INFUSION ADDL HR: CPT

## 2019-01-16 PROCEDURE — 96411 CHEMO IV PUSH ADDL DRUG: CPT

## 2019-01-16 RX ORDER — DIPHENHYDRAMINE HYDROCHLORIDE 50 MG/ML
50 INJECTION INTRAMUSCULAR; INTRAVENOUS ONCE AS NEEDED
Status: DISCONTINUED | OUTPATIENT
Start: 2019-01-16 | End: 2019-01-16 | Stop reason: HOSPADM

## 2019-01-16 RX ORDER — HEPARIN 100 UNIT/ML
500 SYRINGE INTRAVENOUS
Status: DISCONTINUED | OUTPATIENT
Start: 2019-01-16 | End: 2019-01-16 | Stop reason: HOSPADM

## 2019-01-16 RX ORDER — EPINEPHRINE 0.3 MG/.3ML
0.3 INJECTION SUBCUTANEOUS ONCE AS NEEDED
Status: DISCONTINUED | OUTPATIENT
Start: 2019-01-16 | End: 2019-01-16 | Stop reason: HOSPADM

## 2019-01-16 RX ORDER — SODIUM CHLORIDE 0.9 % (FLUSH) 0.9 %
10 SYRINGE (ML) INJECTION
Status: DISCONTINUED | OUTPATIENT
Start: 2019-01-16 | End: 2019-01-16 | Stop reason: HOSPADM

## 2019-01-16 RX ORDER — FLUOROURACIL 50 MG/ML
400 INJECTION, SOLUTION INTRAVENOUS
Status: COMPLETED | OUTPATIENT
Start: 2019-01-16 | End: 2019-01-16

## 2019-01-16 RX ADMIN — OXALIPLATIN 131 MG: 5 INJECTION, SOLUTION INTRAVENOUS at 11:01

## 2019-01-16 RX ADMIN — DEXTROSE: 5 SOLUTION INTRAVENOUS at 10:01

## 2019-01-16 RX ADMIN — DEXAMETHASONE SODIUM PHOSPHATE: 4 INJECTION, SOLUTION INTRAMUSCULAR; INTRAVENOUS at 11:01

## 2019-01-16 RX ADMIN — FLUOROURACIL 750 MG: 50 INJECTION, SOLUTION INTRAVENOUS at 01:01

## 2019-01-16 RX ADMIN — DEXTROSE 750 MG: 5 SOLUTION INTRAVENOUS at 11:01

## 2019-01-16 RX ADMIN — FLUOROURACIL 4490 MG: 50 INJECTION, SOLUTION INTRAVENOUS at 01:01

## 2019-01-16 NOTE — PLAN OF CARE
Problem: Adult Inpatient Plan of Care  Goal: Plan of Care Review  Outcome: Ongoing (interventions implemented as appropriate)  Pt tolerated FOLFOX without issue. Pt connected to 5FU home pump. VSS. AVS given.

## 2019-01-17 DIAGNOSIS — C25.9 METASTASIS FROM PANCREATIC CANCER: Primary | ICD-10-CM

## 2019-01-17 DIAGNOSIS — C79.9 METASTASIS FROM PANCREATIC CANCER: Primary | ICD-10-CM

## 2019-01-18 ENCOUNTER — INFUSION (OUTPATIENT)
Dept: INFUSION THERAPY | Facility: OTHER | Age: 74
End: 2019-01-18
Attending: INTERNAL MEDICINE
Payer: MEDICARE

## 2019-01-18 VITALS
SYSTOLIC BLOOD PRESSURE: 114 MMHG | RESPIRATION RATE: 17 BRPM | OXYGEN SATURATION: 99 % | DIASTOLIC BLOOD PRESSURE: 69 MMHG | TEMPERATURE: 98 F | HEART RATE: 100 BPM

## 2019-01-18 DIAGNOSIS — C61 PROSTATE CANCER METASTATIC TO BONE: ICD-10-CM

## 2019-01-18 DIAGNOSIS — C61 PROSTATE CANCER METASTATIC TO INTRAPELVIC LYMPH NODE: ICD-10-CM

## 2019-01-18 DIAGNOSIS — C79.51 PROSTATE CANCER METASTATIC TO BONE: ICD-10-CM

## 2019-01-18 DIAGNOSIS — C77.5 PROSTATE CANCER METASTATIC TO INTRAPELVIC LYMPH NODE: ICD-10-CM

## 2019-01-18 DIAGNOSIS — C25.1 MALIGNANT NEOPLASM OF BODY OF PANCREAS: Primary | ICD-10-CM

## 2019-01-18 PROCEDURE — 63600175 PHARM REV CODE 636 W HCPCS: Performed by: INTERNAL MEDICINE

## 2019-01-18 PROCEDURE — 25000003 PHARM REV CODE 250: Performed by: INTERNAL MEDICINE

## 2019-01-18 PROCEDURE — A4216 STERILE WATER/SALINE, 10 ML: HCPCS | Performed by: INTERNAL MEDICINE

## 2019-01-18 PROCEDURE — 96521 REFILL/MAINT PORTABLE PUMP: CPT

## 2019-01-18 RX ORDER — HEPARIN 100 UNIT/ML
500 SYRINGE INTRAVENOUS
Status: DISCONTINUED | OUTPATIENT
Start: 2019-01-18 | End: 2019-01-18 | Stop reason: HOSPADM

## 2019-01-18 RX ORDER — SODIUM CHLORIDE 0.9 % (FLUSH) 0.9 %
10 SYRINGE (ML) INJECTION
Status: DISCONTINUED | OUTPATIENT
Start: 2019-01-18 | End: 2019-01-18 | Stop reason: HOSPADM

## 2019-01-18 RX ADMIN — Medication 10 ML: at 11:01

## 2019-01-18 RX ADMIN — HEPARIN 500 UNITS: 100 SYRINGE at 11:01

## 2019-01-18 NOTE — PLAN OF CARE
Problem: Adult Inpatient Plan of Care  Goal: Plan of Care Review  Outcome: Ongoing (interventions implemented as appropriate)  5-FU pump d/c'd, patient tolerated well. Authorization pending for [facility], Dr. Echavarria aware. Per Dr. Echavarria, hold rosa until authorization is approved, then have patient come back to receive. Patient verbalized understanding, d/c'd home with wife.

## 2019-01-28 ENCOUNTER — HOSPITAL ENCOUNTER (OUTPATIENT)
Dept: RADIOLOGY | Facility: OTHER | Age: 74
Discharge: HOME | End: 2019-01-28
Attending: INTERNAL MEDICINE
Payer: MEDICARE

## 2019-01-28 DIAGNOSIS — C25.9 METASTASIS FROM PANCREATIC CANCER: ICD-10-CM

## 2019-01-28 DIAGNOSIS — C79.9 METASTASIS FROM PANCREATIC CANCER: ICD-10-CM

## 2019-01-28 PROCEDURE — 74177 CT ABD & PELVIS W/CONTRAST: CPT | Mod: TC

## 2019-01-28 PROCEDURE — 25500020 PHARM REV CODE 255: Performed by: INTERNAL MEDICINE

## 2019-01-28 PROCEDURE — 71260 CT THORAX DX C+: CPT | Mod: 26,,, | Performed by: RADIOLOGY

## 2019-01-28 PROCEDURE — 71260 CT CHEST ABDOMEN PELVIS WITH CONTRAST (XPD): ICD-10-PCS | Mod: 26,,, | Performed by: RADIOLOGY

## 2019-01-28 PROCEDURE — 74177 CT ABD & PELVIS W/CONTRAST: CPT | Mod: 26,,, | Performed by: RADIOLOGY

## 2019-01-28 PROCEDURE — 74177 CT CHEST ABDOMEN PELVIS WITH CONTRAST (XPD): ICD-10-PCS | Mod: 26,,, | Performed by: RADIOLOGY

## 2019-01-28 RX ADMIN — IOHEXOL 75 ML: 350 INJECTION, SOLUTION INTRAVENOUS at 11:01

## 2019-01-29 ENCOUNTER — OFFICE VISIT (OUTPATIENT)
Dept: HEMATOLOGY/ONCOLOGY | Facility: CLINIC | Age: 74
End: 2019-01-29
Attending: INTERNAL MEDICINE
Payer: MEDICARE

## 2019-01-29 VITALS
WEIGHT: 153.69 LBS | HEIGHT: 68 IN | TEMPERATURE: 97 F | RESPIRATION RATE: 16 BRPM | SYSTOLIC BLOOD PRESSURE: 113 MMHG | HEART RATE: 86 BPM | OXYGEN SATURATION: 99 % | DIASTOLIC BLOOD PRESSURE: 57 MMHG | BODY MASS INDEX: 23.29 KG/M2

## 2019-01-29 DIAGNOSIS — C78.7 LIVER METASTASES: ICD-10-CM

## 2019-01-29 DIAGNOSIS — C25.1 MALIGNANT NEOPLASM OF BODY OF PANCREAS: Primary | ICD-10-CM

## 2019-01-29 DIAGNOSIS — C79.51 PROSTATE CANCER METASTATIC TO BONE: ICD-10-CM

## 2019-01-29 DIAGNOSIS — D70.1 CHEMOTHERAPY INDUCED NEUTROPENIA: ICD-10-CM

## 2019-01-29 DIAGNOSIS — D63.0 ANEMIA IN NEOPLASTIC DISEASE: ICD-10-CM

## 2019-01-29 DIAGNOSIS — C61 PROSTATE CANCER METASTATIC TO BONE: ICD-10-CM

## 2019-01-29 DIAGNOSIS — T45.1X5A CHEMOTHERAPY INDUCED NEUTROPENIA: ICD-10-CM

## 2019-01-29 DIAGNOSIS — C78.6 PERITONEAL METASTASES: ICD-10-CM

## 2019-01-29 PROCEDURE — 99214 PR OFFICE/OUTPT VISIT, EST, LEVL IV, 30-39 MIN: ICD-10-PCS | Mod: S$GLB,,, | Performed by: INTERNAL MEDICINE

## 2019-01-29 PROCEDURE — 3078F DIAST BP <80 MM HG: CPT | Mod: CPTII,S$GLB,, | Performed by: INTERNAL MEDICINE

## 2019-01-29 PROCEDURE — 1101F PR PT FALLS ASSESS DOC 0-1 FALLS W/OUT INJ PAST YR: ICD-10-PCS | Mod: CPTII,S$GLB,, | Performed by: INTERNAL MEDICINE

## 2019-01-29 PROCEDURE — 99214 OFFICE O/P EST MOD 30 MIN: CPT | Mod: S$GLB,,, | Performed by: INTERNAL MEDICINE

## 2019-01-29 PROCEDURE — 99999 PR PBB SHADOW E&M-EST. PATIENT-LVL III: ICD-10-PCS | Mod: PBBFAC,,, | Performed by: INTERNAL MEDICINE

## 2019-01-29 PROCEDURE — 3074F PR MOST RECENT SYSTOLIC BLOOD PRESSURE < 130 MM HG: ICD-10-PCS | Mod: CPTII,S$GLB,, | Performed by: INTERNAL MEDICINE

## 2019-01-29 PROCEDURE — 3078F PR MOST RECENT DIASTOLIC BLOOD PRESSURE < 80 MM HG: ICD-10-PCS | Mod: CPTII,S$GLB,, | Performed by: INTERNAL MEDICINE

## 2019-01-29 PROCEDURE — 99999 PR PBB SHADOW E&M-EST. PATIENT-LVL III: CPT | Mod: PBBFAC,,, | Performed by: INTERNAL MEDICINE

## 2019-01-29 PROCEDURE — 1101F PT FALLS ASSESS-DOCD LE1/YR: CPT | Mod: CPTII,S$GLB,, | Performed by: INTERNAL MEDICINE

## 2019-01-29 PROCEDURE — 3074F SYST BP LT 130 MM HG: CPT | Mod: CPTII,S$GLB,, | Performed by: INTERNAL MEDICINE

## 2019-01-29 RX ORDER — SODIUM CHLORIDE 0.9 % (FLUSH) 0.9 %
10 SYRINGE (ML) INJECTION
Status: CANCELLED | OUTPATIENT
Start: 2019-01-30

## 2019-01-29 RX ORDER — FLUOROURACIL 50 MG/ML
400 INJECTION, SOLUTION INTRAVENOUS
Status: CANCELLED | OUTPATIENT
Start: 2019-01-30

## 2019-01-29 RX ORDER — HEPARIN 100 UNIT/ML
500 SYRINGE INTRAVENOUS
Status: CANCELLED | OUTPATIENT
Start: 2019-02-01

## 2019-01-29 RX ORDER — HEPARIN 100 UNIT/ML
500 SYRINGE INTRAVENOUS
Status: CANCELLED | OUTPATIENT
Start: 2019-01-30

## 2019-01-29 RX ORDER — EPINEPHRINE 0.3 MG/.3ML
0.3 INJECTION SUBCUTANEOUS ONCE AS NEEDED
Status: CANCELLED | OUTPATIENT
Start: 2019-01-30 | End: 2019-01-30

## 2019-01-29 RX ORDER — SODIUM CHLORIDE 0.9 % (FLUSH) 0.9 %
10 SYRINGE (ML) INJECTION
Status: CANCELLED | OUTPATIENT
Start: 2019-02-01

## 2019-01-29 RX ORDER — DIPHENHYDRAMINE HYDROCHLORIDE 50 MG/ML
50 INJECTION INTRAMUSCULAR; INTRAVENOUS ONCE AS NEEDED
Status: CANCELLED | OUTPATIENT
Start: 2019-01-30 | End: 2019-01-30

## 2019-01-29 NOTE — PROGRESS NOTES
Subjective:       Patient ID: Shady Prakash Jr. is a 73 y.o. male.    Chief Complaint: Follow-up    HPI     Returns for follow-up of metastatic pancreatic cancer and metastatic prostate cancer  Currently his treatment is focused on the metastatic pancreatic cancer  He had scans completed yesterday to assess response that show improvement.    Presents after cycle # 3 FOLFOX  Reports doing well. Active and enjoys fishing.  Denies fatigue  He did note some neuropathy in feet but gone now  No fevers, chills or infectious complaints  No mouth sores  Mild gum soreness still noted  Notes swallowing better  Eating well overall  No nausea or vomiting  No pain issues      Oncology History:  1. Metastatic prostate cancer  He is s/p TRUS/bx on 8/7/15 w/ high volume marta 9 (5+4), PSA 25.  He had negative metastatic workup at diagnosis.  However, at time of planned RALP on 9/10/15 he was noted to have BN invasion on cysto and therefore only PLND was done, which confirmed metastatic disease.  He was started on ADT 9/17/15 w/ firmagon followed by Eligard on 10/15/15.    Started Casodex 7/20/17 - scans at that time showed osseus metastatic disease  Started docetaxel 10/6/17 with progression, then found to have below     - on follow-up scans a pancreatic lesion noted concerning for pancreatic cancer as it was an unlikely area for prostate cancer spread-  Biopsy confirmed adenocarcinoma and PSA was negative     Imaging as below:  Scan results:  6/20/18 Bone scan:  FINDINGS:  Two lesions in the left scapula, stable.  Elongated lesion in the right mid rib posteriorly, stable.  Prior exam demonstrated a large right iliac lesion, which no longer exhibits increased uptake.  Left pubic lesion, stable.  There is also now a small lesion in the left iliac bone. Two additional new small foci in the mid thoracic vertebra.  Both kidneys and the bladder appear unremarkable.  Impression:  Findings consistent with osseous metastatic disease as  above, noting 3 new small lesions.     6/21/18 CT scan abdomen and pelvis:  FINDINGS:  Lower chest: There has been interval increase in size in the focal consolidation which is pleural based in the posterior aspect of the right lower lobe.  Today's exam measures 5.1 cm maximum diameter.  Previous exam is measure approximately 3.3 cm.  This is associated with pleural thickening that extends laterally.  There is a small focus of cylinder all coal bronchiectasis in the medial left lower lobe.  The remaining aspects of the lung parenchyma that are visualized show no abnormalities.  The visualized heart and pericardium are normal  Liver: There are 3 hyperenhancing foci along the periphery of the right and left lobes of the liver which are stable from 2015.  The largest measures 1.1 cm in maximum diameter.  Although not characteristic on this exam, previous exam in 2015 shows findings most characteristic of hepatic hemangioma.  The remaining liver parenchyma as well as the hepatic and portal veins are patent.  Gallbladder: Normal.  Pancreas: There is been interval development of an ill-defined hypodense focus within the proximal body of the pancreas measuring 2.1 cm x 1.8 cm.  Spleen: Calcified hemangiomas present.  Otherwise normal.  Adrenal glands: Normal.  Genitourinary: Stable simple cyst in the lower pole of the right kidney measuring 1.6 cm.  The left kidney and bilateral ureters are normal.  The bladder is partially distended and shows wall thickening on the non dependent surface of the bladder.  This is not significantly changed in appearance.  Gastrointestinal: Stomach and small intestines are normal.  Colonic diverticulosis is present without evidence of diverticulitis.  Prostate: Enlarged and nodular causing mass effect on the bladder.  Miscellaneous: There is no intra-abdominal or pelvic free fluid, free air or lymphadenopathy.  The abdominal aorta tapers normally.  Bilateral fat containing inguinal hernias  present.  Osseous and soft tissue structures: Sclerotic metastases involving the right iliac bone and left superior pubic ramus are identified.  The small focus in the left iliac bone seen on yesterday's bone scan is not well seen on this exam.  The soft tissues are within normal limits.  Impression:  1. Interval increase in size in the pleural base, right lower lobe consolidation.  2. Hyperenhancing foci likely represent hemangiomas.  They have been stable since 2015.  Is on the 2015 exam where they demonstrate characteristics most compatible with hemangioma.  3. Bladder wall thickening is not significantly changed.  This may be related to outlet obstruction from the nodular prostate.  4. Interval development of ill-defined hypodense focus measuring 2.1 cm within the proximal body of the pancreas.  This may also be a focus of metastatic disease although is uncommon for the prostate to metastasized to the pancreas.  A dedicated pancreatic CT may be beneficial in further characterizing this lesion.     - on 7/25/18 he came to ER with worsening abdominal pain and concerns for appendicitis  Underwent Laparoscopy Converted to Laparotomy, Peritoneal Biopsy , Ileocecal with Dr. Stephenson  Biopsy concerning for metastatic pancreatic carcinoma     2. Metastatic pancreatic cancer  - started on [place]/Abraxane- note Ca19-9 never elevated  Response initially  Now with progression  FOLFOX started 12/20/18    Review of Systems   Constitutional: Negative for activity change, appetite change, chills, fatigue, fever and unexpected weight change.   HENT: Positive for dental problem. Negative for congestion, hearing loss, mouth sores, nosebleeds, postnasal drip, sinus pressure, sore throat and trouble swallowing.    Eyes: Negative for visual disturbance.   Respiratory: Negative for cough (rare dry), chest tightness, shortness of breath and wheezing.    Cardiovascular: Negative for chest pain, palpitations and leg swelling.    Gastrointestinal: Negative for abdominal distention, abdominal pain, blood in stool, constipation, diarrhea and vomiting.        No GERD   Endocrine:        Hot flashes   Genitourinary: Negative for decreased urine volume, difficulty urinating, frequency, hematuria and urgency.   Musculoskeletal: Negative for arthralgias (better), back pain, gait problem, joint swelling, neck pain and neck stiffness.   Skin: Negative for color change, pallor, rash and wound.   Neurological: Positive for numbness. Negative for dizziness, weakness, light-headedness and headaches.   Hematological: Negative for adenopathy. Does not bruise/bleed easily.   Psychiatric/Behavioral: Negative for dysphoric mood and sleep disturbance. The patient is not nervous/anxious.        Objective:      Physical Exam   Constitutional: He is oriented to person, place, and time. He appears well-developed and well-nourished. No distress.   Presents with his wife  ECOG=0   HENT:   Head: Normocephalic and atraumatic.   Right Ear: External ear normal.   Left Ear: External ear normal.   Nose: Nose normal.   Mouth/Throat: Oropharynx is clear and moist. No oropharyngeal exudate.   Eyes: Conjunctivae and EOM are normal. Pupils are equal, round, and reactive to light. Right eye exhibits no discharge. Left eye exhibits no discharge. No scleral icterus.   Neck: Normal range of motion. Neck supple. No tracheal deviation present. No thyromegaly present.   Cardiovascular: Normal rate, regular rhythm, normal heart sounds and intact distal pulses. Exam reveals no gallop and no friction rub.   No murmur heard.  Pulmonary/Chest: Effort normal and breath sounds normal. No respiratory distress. He has no wheezes. He has no rales. He exhibits no tenderness.   Abdominal: Soft. Bowel sounds are normal. He exhibits no distension and no mass. There is no tenderness. There is no rebound and no guarding.   Musculoskeletal: Normal range of motion. He exhibits no edema, tenderness  or deformity.   Lymphadenopathy:     He has no cervical adenopathy.   Neurological: He is alert and oriented to person, place, and time. He has normal reflexes. He displays normal reflexes. No cranial nerve deficit. He exhibits normal muscle tone. Coordination normal.   Skin: Skin is warm and dry. No rash noted. He is not diaphoretic. No erythema. No pallor.   Psychiatric: He has a normal mood and affect. His behavior is normal. Judgment and thought content normal.   Nursing note and vitals reviewed.    Labs- reviewed  Assessment:       1. Malignant neoplasm of body of pancreas    2. Liver metastases    3. Peritoneal metastases    4. Prostate cancer metastatic to bone    5. Anemia in neoplastic disease    6. Chemotherapy induced neutropenia        Plan:     1-3. Response on scans  Proceed with 3 additional cycles of FOLFOX and restage post  Counts adequate to proceed with cycle # 4 tomorrow  Knows to call for any issues  4. Therapy on hold with prior response and above  5. Parameters stable, monitor  6. Monitor, ANC adequate to proceed, Neulasta again

## 2019-01-30 ENCOUNTER — INFUSION (OUTPATIENT)
Dept: INFUSION THERAPY | Facility: OTHER | Age: 74
End: 2019-01-30
Attending: INTERNAL MEDICINE
Payer: MEDICARE

## 2019-01-30 VITALS
DIASTOLIC BLOOD PRESSURE: 72 MMHG | HEIGHT: 68 IN | WEIGHT: 159.81 LBS | HEART RATE: 71 BPM | OXYGEN SATURATION: 96 % | RESPIRATION RATE: 16 BRPM | TEMPERATURE: 98 F | BODY MASS INDEX: 24.22 KG/M2 | SYSTOLIC BLOOD PRESSURE: 151 MMHG

## 2019-01-30 DIAGNOSIS — C61 PROSTATE CANCER METASTATIC TO INTRAPELVIC LYMPH NODE: ICD-10-CM

## 2019-01-30 DIAGNOSIS — C79.51 PROSTATE CANCER METASTATIC TO BONE: ICD-10-CM

## 2019-01-30 DIAGNOSIS — C25.1 MALIGNANT NEOPLASM OF BODY OF PANCREAS: Primary | ICD-10-CM

## 2019-01-30 DIAGNOSIS — C77.5 PROSTATE CANCER METASTATIC TO INTRAPELVIC LYMPH NODE: ICD-10-CM

## 2019-01-30 DIAGNOSIS — C61 PROSTATE CANCER METASTATIC TO BONE: ICD-10-CM

## 2019-01-30 PROCEDURE — 63600175 PHARM REV CODE 636 W HCPCS: Mod: JG | Performed by: INTERNAL MEDICINE

## 2019-01-30 PROCEDURE — 25000003 PHARM REV CODE 250: Performed by: INTERNAL MEDICINE

## 2019-01-30 PROCEDURE — 96415 CHEMO IV INFUSION ADDL HR: CPT

## 2019-01-30 PROCEDURE — 96411 CHEMO IV PUSH ADDL DRUG: CPT

## 2019-01-30 PROCEDURE — 96416 CHEMO PROLONG INFUSE W/PUMP: CPT

## 2019-01-30 PROCEDURE — 96413 CHEMO IV INFUSION 1 HR: CPT

## 2019-01-30 PROCEDURE — 96368 THER/DIAG CONCURRENT INF: CPT

## 2019-01-30 PROCEDURE — 96367 TX/PROPH/DG ADDL SEQ IV INF: CPT

## 2019-01-30 RX ORDER — FLUOROURACIL 50 MG/ML
400 INJECTION, SOLUTION INTRAVENOUS
Status: COMPLETED | OUTPATIENT
Start: 2019-01-30 | End: 2019-01-30

## 2019-01-30 RX ORDER — SODIUM CHLORIDE 0.9 % (FLUSH) 0.9 %
10 SYRINGE (ML) INJECTION
Status: DISCONTINUED | OUTPATIENT
Start: 2019-01-30 | End: 2019-01-30 | Stop reason: HOSPADM

## 2019-01-30 RX ORDER — DIPHENHYDRAMINE HYDROCHLORIDE 50 MG/ML
50 INJECTION INTRAMUSCULAR; INTRAVENOUS ONCE AS NEEDED
Status: DISCONTINUED | OUTPATIENT
Start: 2019-01-30 | End: 2019-01-30 | Stop reason: HOSPADM

## 2019-01-30 RX ORDER — HEPARIN 100 UNIT/ML
500 SYRINGE INTRAVENOUS
Status: DISCONTINUED | OUTPATIENT
Start: 2019-01-30 | End: 2019-01-30 | Stop reason: HOSPADM

## 2019-01-30 RX ORDER — EPINEPHRINE 0.3 MG/.3ML
0.3 INJECTION SUBCUTANEOUS ONCE AS NEEDED
Status: DISCONTINUED | OUTPATIENT
Start: 2019-01-30 | End: 2019-01-30 | Stop reason: HOSPADM

## 2019-01-30 RX ADMIN — DEXTROSE: 5 SOLUTION INTRAVENOUS at 10:01

## 2019-01-30 RX ADMIN — FLUOROURACIL 750 MG: 50 INJECTION, SOLUTION INTRAVENOUS at 01:01

## 2019-01-30 RX ADMIN — LEUCOVORIN CALCIUM 750 MG: 350 INJECTION, POWDER, LYOPHILIZED, FOR SOLUTION INTRAMUSCULAR; INTRAVENOUS at 11:01

## 2019-01-30 RX ADMIN — FLUOROURACIL 4490 MG: 50 INJECTION, SOLUTION INTRAVENOUS at 01:01

## 2019-01-30 RX ADMIN — OXALIPLATIN 131 MG: 5 INJECTION, SOLUTION INTRAVENOUS at 11:01

## 2019-01-30 RX ADMIN — DEXAMETHASONE SODIUM PHOSPHATE: 4 INJECTION, SOLUTION INTRAMUSCULAR; INTRAVENOUS at 10:01

## 2019-01-30 NOTE — PLAN OF CARE
Problem: Adult Inpatient Plan of Care  Goal: Plan of Care Review  Outcome: Ongoing (interventions implemented as appropriate)  FOLFOX administered, patient tolerated well. VSS, NAD. D/C'd home with self and 5-FU pump in place - due to finish ~11am Friday 2/1/19.

## 2019-02-01 ENCOUNTER — INFUSION (OUTPATIENT)
Dept: INFUSION THERAPY | Facility: OTHER | Age: 74
End: 2019-02-01
Attending: INTERNAL MEDICINE
Payer: MEDICARE

## 2019-02-01 VITALS
RESPIRATION RATE: 17 BRPM | OXYGEN SATURATION: 99 % | TEMPERATURE: 98 F | SYSTOLIC BLOOD PRESSURE: 114 MMHG | HEART RATE: 88 BPM | DIASTOLIC BLOOD PRESSURE: 65 MMHG

## 2019-02-01 DIAGNOSIS — C61 PROSTATE CANCER METASTATIC TO INTRAPELVIC LYMPH NODE: ICD-10-CM

## 2019-02-01 DIAGNOSIS — C79.51 PROSTATE CANCER METASTATIC TO BONE: ICD-10-CM

## 2019-02-01 DIAGNOSIS — C25.1 MALIGNANT NEOPLASM OF BODY OF PANCREAS: Primary | ICD-10-CM

## 2019-02-01 DIAGNOSIS — C77.5 PROSTATE CANCER METASTATIC TO INTRAPELVIC LYMPH NODE: ICD-10-CM

## 2019-02-01 DIAGNOSIS — C61 PROSTATE CANCER METASTATIC TO BONE: ICD-10-CM

## 2019-02-01 PROCEDURE — A4216 STERILE WATER/SALINE, 10 ML: HCPCS | Performed by: INTERNAL MEDICINE

## 2019-02-01 PROCEDURE — 25000003 PHARM REV CODE 250: Performed by: INTERNAL MEDICINE

## 2019-02-01 PROCEDURE — 96377 APPLICATON ON-BODY INJECTOR: CPT

## 2019-02-01 PROCEDURE — 96521 REFILL/MAINT PORTABLE PUMP: CPT

## 2019-02-01 PROCEDURE — 63600175 PHARM REV CODE 636 W HCPCS: Performed by: INTERNAL MEDICINE

## 2019-02-01 RX ORDER — HEPARIN 100 UNIT/ML
500 SYRINGE INTRAVENOUS
Status: DISCONTINUED | OUTPATIENT
Start: 2019-02-01 | End: 2019-02-01 | Stop reason: HOSPADM

## 2019-02-01 RX ORDER — SODIUM CHLORIDE 0.9 % (FLUSH) 0.9 %
10 SYRINGE (ML) INJECTION
Status: DISCONTINUED | OUTPATIENT
Start: 2019-02-01 | End: 2019-02-01 | Stop reason: HOSPADM

## 2019-02-01 RX ADMIN — PEGFILGRASTIM 6 MG: KIT SUBCUTANEOUS at 11:02

## 2019-02-01 RX ADMIN — Medication 10 ML: at 11:02

## 2019-02-01 RX ADMIN — HEPARIN SODIUM (PORCINE) LOCK FLUSH IV SOLN 100 UNIT/ML 500 UNITS: 100 SOLUTION at 11:02

## 2019-02-01 NOTE — PLAN OF CARE
Problem: Adult Inpatient Plan of Care  Goal: Plan of Care Review  Outcome: Ongoing (interventions implemented as appropriate)  Pt here for pump d/c. Neulasta OBI applied. VSS. AVS given, pt d/c home with wife.

## 2019-02-12 ENCOUNTER — NURSE TRIAGE (OUTPATIENT)
Dept: ADMINISTRATIVE | Facility: CLINIC | Age: 74
End: 2019-02-12

## 2019-02-12 ENCOUNTER — HOSPITAL ENCOUNTER (EMERGENCY)
Facility: OTHER | Age: 74
Discharge: HOME | End: 2019-02-12
Attending: EMERGENCY MEDICINE
Payer: MEDICARE

## 2019-02-12 ENCOUNTER — TELEPHONE (OUTPATIENT)
Dept: HEMATOLOGY/ONCOLOGY | Facility: CLINIC | Age: 74
End: 2019-02-12

## 2019-02-12 VITALS
TEMPERATURE: 98 F | BODY MASS INDEX: 22.73 KG/M2 | RESPIRATION RATE: 15 BRPM | HEART RATE: 70 BPM | HEIGHT: 68 IN | OXYGEN SATURATION: 100 % | WEIGHT: 150 LBS | DIASTOLIC BLOOD PRESSURE: 77 MMHG | SYSTOLIC BLOOD PRESSURE: 113 MMHG

## 2019-02-12 DIAGNOSIS — E86.0 DEHYDRATION: Primary | ICD-10-CM

## 2019-02-12 DIAGNOSIS — R42 DIZZINESS: ICD-10-CM

## 2019-02-12 LAB
ALBUMIN SERPL BCP-MCNC: 3.9 G/DL
ALP SERPL-CCNC: 86 U/L
ALT SERPL W/O P-5'-P-CCNC: 23 U/L
ANION GAP SERPL CALC-SCNC: 12 MMOL/L
ANISOCYTOSIS BLD QL SMEAR: SLIGHT
AST SERPL-CCNC: 28 U/L
BASOPHILS # BLD AUTO: ABNORMAL K/UL
BASOPHILS NFR BLD: 1 %
BILIRUB SERPL-MCNC: 0.5 MG/DL
BUN SERPL-MCNC: 13 MG/DL
CALCIUM SERPL-MCNC: 9.9 MG/DL
CHLORIDE SERPL-SCNC: 104 MMOL/L
CO2 SERPL-SCNC: 24 MMOL/L
CREAT SERPL-MCNC: 1.2 MG/DL
DIFFERENTIAL METHOD: ABNORMAL
EOSINOPHIL # BLD AUTO: ABNORMAL K/UL
EOSINOPHIL NFR BLD: 1 %
ERYTHROCYTE [DISTWIDTH] IN BLOOD BY AUTOMATED COUNT: 19.2 %
EST. GFR  (AFRICAN AMERICAN): >60 ML/MIN/1.73 M^2
EST. GFR  (NON AFRICAN AMERICAN): >60 ML/MIN/1.73 M^2
GLUCOSE SERPL-MCNC: 158 MG/DL
HCT VFR BLD AUTO: 31.5 %
HGB BLD-MCNC: 9.9 G/DL
LYMPHOCYTES # BLD AUTO: ABNORMAL K/UL
LYMPHOCYTES NFR BLD: 12 %
MAGNESIUM SERPL-MCNC: 2.5 MG/DL
MCH RBC QN AUTO: 27.5 PG
MCHC RBC AUTO-ENTMCNC: 31.4 G/DL
MCV RBC AUTO: 88 FL
METAMYELOCYTES NFR BLD MANUAL: 2 %
MONOCYTES # BLD AUTO: ABNORMAL K/UL
MONOCYTES NFR BLD: 11 %
MYELOCYTES NFR BLD MANUAL: 1 %
NEUTROPHILS NFR BLD: 52 %
NEUTS BAND NFR BLD MANUAL: 20 %
PLATELET # BLD AUTO: 150 K/UL
PLATELET BLD QL SMEAR: ABNORMAL
PMV BLD AUTO: 9.6 FL
POTASSIUM SERPL-SCNC: 4.2 MMOL/L
PROT SERPL-MCNC: 7.3 G/DL
RBC # BLD AUTO: 3.6 M/UL
SODIUM SERPL-SCNC: 140 MMOL/L
WBC # BLD AUTO: 9.05 K/UL

## 2019-02-12 PROCEDURE — 96360 HYDRATION IV INFUSION INIT: CPT

## 2019-02-12 PROCEDURE — 80053 COMPREHEN METABOLIC PANEL: CPT

## 2019-02-12 PROCEDURE — 85027 COMPLETE CBC AUTOMATED: CPT

## 2019-02-12 PROCEDURE — 93010 ELECTROCARDIOGRAM REPORT: CPT | Mod: ,,, | Performed by: INTERNAL MEDICINE

## 2019-02-12 PROCEDURE — 85007 BL SMEAR W/DIFF WBC COUNT: CPT

## 2019-02-12 PROCEDURE — 99284 EMERGENCY DEPT VISIT MOD MDM: CPT | Mod: 25

## 2019-02-12 PROCEDURE — 93005 ELECTROCARDIOGRAM TRACING: CPT

## 2019-02-12 PROCEDURE — 83735 ASSAY OF MAGNESIUM: CPT

## 2019-02-12 PROCEDURE — 25000003 PHARM REV CODE 250: Performed by: EMERGENCY MEDICINE

## 2019-02-12 PROCEDURE — 93010 EKG 12-LEAD: ICD-10-PCS | Mod: ,,, | Performed by: INTERNAL MEDICINE

## 2019-02-12 RX ADMIN — SODIUM CHLORIDE 1000 ML: 0.9 INJECTION, SOLUTION INTRAVENOUS at 10:02

## 2019-02-12 NOTE — TELEPHONE ENCOUNTER
Reason for Disposition   Diabetes   Blood glucose  mg/dl (3.5 -13 mmol/l)    Protocols used: ST DIZZINESS-A-OH, ST DIABETES - HIGH BLOOD SUGAR-A-OH    Pt SQD=057 at approx 7 am and current XVY=629. Pt c/o feeling lightheaded and nauseated after eating at approx 7 am. Current QW=183/59, HR=85, and temp=95.5   . Family states BP is baseline for pt and pt no longer takes BP and diuretic medication. Family advised per protocol and family verbalizes understanding. Message sent to MD staff.

## 2019-02-12 NOTE — ED NOTES
Pt reports he is feeling much better. Denies any dizziness or pain. States he is ready to go and asking how much longer he will be here. MD notified.

## 2019-02-12 NOTE — ED PROVIDER NOTES
Encounter Date: 2/12/2019    SCRIBE #1 NOTE: I, Salvador Camejo, am scribing for, and in the presence of, Dr. Gallagher.       History     Chief Complaint   Patient presents with    Dizziness     Pt c/o dizziness & nausea started at 8 am. CbG 131 before eating 190 after eating. Denies blurred vision & H/A. Pt currently undergoing chemo for pancreatic & prostate cancer. Next chemo tommorrow. Pt reports decreased appetite.     Time seen by provider: 10:09 AM    This is a 73 y.o. male with a history of metastatic prostate and pancreatic cancer who presents with complaint of dizziness that began this morning. The patient reports that this morning he was eating breakfast that began to get dizzy and nauseous. He was trying to eat a banana and drink an ensure, but wasn't able to eat much of it. He last chemotherapy treatment was approximately two weeks ago and his next treatment is tomorrow.  He states he gets fatigue and lack of appetite and occasional dizzy after each chemo session.  He was still able to go drop-off his grandchildren to school after getting dizzy. He denies fever, sore throat, chest pain, shortness of breath, diarrhea, gait problems, and dysuria.       The history is provided by the patient.     Review of patient's allergies indicates:  No Known Allergies  Past Medical History:   Diagnosis Date    Allergy     Arthritis     Cancer     prostate    Diabetes mellitus     Hyperlipidemia     Hypertension     Prostate cancer      Past Surgical History:   Procedure Laterality Date    CYSTOSCOPY-FLEXIBLE N/A 9/10/2015    [facility]    EYE SURGERY Right     x9    [id number]-L-CATH N/A 12/19/2018    Performed by [facility] at [facility] OR 2ND FLR    Laparoscopy Converted to Laparotomy, Peritoneal Biopsy , Ileocecal Resection N/A 7/18/2018    Performed by Rolan Pierce [facility] at [facility] OR 2ND FLR    LYMPH NODE DISSECTION      ROBOT ASSISTED LAPAROSCOPIC  LYMPHADENECTOMY-PELVIC N/A 9/10/2015    [facility] OR    ULTRASOUND, ENDOSCOPIC, UPPER GI TRACT N/A 2018    Performed by Wiliam Ayoub MD at [facility] ENDO ([address] [place] [address])    UPPER GASTROINTESTINAL ENDOSCOPY       Family History   Problem Relation Age of Onset    Hypertension Father     Hypertension Mother     Diabetes Sister     Heart disease Brother     Diabetes Maternal Aunt     Cancer Maternal Uncle     Cancer Maternal Grandfather     No Known Problems Paternal Grandmother     No Known Problems Paternal Grandfather     Prostate cancer Brother     Diabetes Sister     Diabetes Sister     Diabetes Sister     Heart disease Brother      Social History     Tobacco Use    Smoking status: Former Smoker     Packs/day: 4.00     Years: 60.00     Pack years: 240.00     Types: Cigarettes, Cigars     Start date: 1957     Last attempt to quit: 2016     Years since quittin.2    Smokeless tobacco: Never Used   Substance Use Topics    Alcohol use: No     Comment: pt stopped drinking 2018    Drug use: No     Comment: Used to use cocaine in past      Review of Systems   Constitutional: Negative for fever.   HENT: Negative for congestion.    Eyes: Negative for redness.   Respiratory: Negative for shortness of breath.    Cardiovascular: Negative for chest pain.   Gastrointestinal: Positive for nausea. Negative for abdominal pain.   Genitourinary: Negative for dysuria.   Skin: Negative for rash.   Neurological: Positive for dizziness. Negative for headaches.   Psychiatric/Behavioral: Negative for confusion.       Physical Exam     Initial Vitals [19 0954]   BP Pulse Resp Temp SpO2   137/75 102 18 98.1 °F (36.7 °C) 99 %      MAP       --         Physical Exam    Nursing note and vitals reviewed.  Constitutional: He appears well-developed and well-nourished. He is not diaphoretic. He appears ill (chronically). No distress.   HENT:   Head: Normocephalic and atraumatic.    Mouth/Throat: Mucous membranes are dry.   Eyes: Conjunctivae and EOM are normal. Pupils are equal, round, and reactive to light.   Neck: Normal range of motion. Neck supple.   Cardiovascular: Normal rate, regular rhythm, normal heart sounds and normal pulses.   No murmur heard.  Pulmonary/Chest: Breath sounds normal. No respiratory distress. He has no wheezes. He has no rhonchi. He has no rales.   Abdominal: Soft. There is no tenderness. There is no rebound and no guarding.   Musculoskeletal: Normal range of motion.   Neurological: He is alert and oriented to person, place, and time. He has normal strength. No cranial nerve deficit.   Skin: Skin is warm and dry. No rash and no abscess noted. No erythema. No pallor.   Psychiatric: He has a normal mood and affect. His behavior is normal. Thought content normal.         ED Course   Procedures  Labs Reviewed   CBC W/ AUTO DIFFERENTIAL - Abnormal; Notable for the following components:       Result Value    RBC 3.60 (*)     Hemoglobin 9.9 (*)     Hematocrit 31.5 (*)     MCHC 31.4 (*)     RDW 19.2 (*)     Lymph% 12.0 (*)     All other components within normal limits   COMPREHENSIVE METABOLIC PANEL - Abnormal; Notable for the following components:    Glucose 158 (*)     All other components within normal limits   MAGNESIUM     EKG Readings: (Independently Interpreted)   Normal sinus rhythm at a rate of 74 bpm. No ischemia or arrhythmia.       Imaging Results    None          Medical Decision Making:   Initial Assessment:       73-year-old male with history of metastatic prostate and pancreatic cancer on chemo presents after episode of dizziness and nausea this morning.  He is on chemo every 2 weeks, due for next session tomorrow, with recent imaging showing decrease in size of malignant lesions. He usually gets poor appetite and fatigue after chemo, but usually not as dizzy and nauseous as he was this morning.  He was still able to ambulate normally and dropped his  grandkids off to school, but had to lie down when he came home due to dizziness.  Patient appears slightly dehydrated on exam and admits to chronic poor hydration and p.o. Intake, with slight drop in BP with orthostatic vitals.  No fevers or recent illness to suggest infectious cause.  Symptoms not consistent with vertigo, and no ataxia or current neuro deficits to suggest CVA or brain mets.  No palpitations or chest pain/SOB to suggest cardiac etiology.      Basic labs checked given comorbidities with chronic anemia, normal WBC and CMP.  EKG with no sign of arrhythmia or ischemia.  Patient treated with IVF in ED and remained asymptomatic with no dizziness or nausea.  He is ambulatory in the ED at baseline with no ataxia or orthostatic dizziness.  Patient requesting discharge since he has been asymptomatic since ED arrival, and this is reasonable since no concerning exam or workup findings.  He will increase p.o. hydration, follow up with Oncology Dr. Melendez for next chemo session tomorrow, and return to the ED for any worsening dizziness, persistent nausea, or other concerns.      Clinical Tests:   Lab Tests: Ordered and Reviewed  Medical Tests: Ordered and Reviewed            Scribe Attestation:   Scribe #1: I performed the above scribed service and the documentation accurately describes the services I performed. I attest to the accuracy of the note.    Attending Attestation:           Physician Attestation for Scribe:  Physician Attestation Statement for Scribe #1: I, Dr. Gallagher, reviewed documentation, as scribed by Salvador Camejo in my presence, and it is both accurate and complete.                    Clinical Impression:     1. Dehydration    2. Dizziness                                 Koffi Gallagher MD  02/12/19 [id number]

## 2019-02-12 NOTE — ED NOTES
Pt sitting in bed watching tv with adult female at bedside. Respirations even and unlabored, appears in no acute distress. Remains on continuous cardiac monitor, pulse ox, BP cycling q 30. Bed in lowest, locked position, side rails up x 2, call light within reach. Denies any needs at this time.

## 2019-02-12 NOTE — ED TRIAGE NOTES
Pt reports episode of dizziness and nausea after eating a banana and drinking ensure this morning. Denies any dizziness or nausea at this time. Denies any complaints. Currently on chemo. Reports he has had the symptoms before secondary to chemo. AAO X 3, answers questions appropriately, appears in no acute distress. Placed on continuous cardiac monitor, pulse ox, BP cycling q 30.

## 2019-02-12 NOTE — TELEPHONE ENCOUNTER
ISRAEL Vo Staff 3 minutes ago (9:30 AM)      Please see note and contact pt's wife. Pt is to have callback within 1 hour per protocol. Thank you.     Routing Comment       Vanessa Wilson RN 22 minutes ago (9:11 AM)      Carmelita Tan RN 22 minutes ago (9:11 AM)            Reason for Disposition   Diabetes   Blood glucose  mg/dl (3.5 -13 mmol/l)    Protocols used: ST DIZZINESS-A-OH, ST DIABETES - HIGH BLOOD SUGAR-A-OH     Pt BGJ=861 at approx 7 am and current AVQ=427. Pt c/o feeling lightheaded and nauseated after eating at approx 7 am. Current UL=894/59, HR=85, and temp=95.5   . Family states BP is baseline for pt and pt no longer takes BP and diuretic medication. Family advised per protocol and family verbalizes understanding. Message sent to MD staff.           S/w MD and MD advised pt go to [facility] ED for eval.   S/w pt daughter and advised of ED recommendation.   Pt daughter verbalized understanding.

## 2019-02-13 ENCOUNTER — INFUSION (OUTPATIENT)
Dept: INFUSION THERAPY | Facility: OTHER | Age: 74
End: 2019-02-13
Attending: INTERNAL MEDICINE
Payer: MEDICARE

## 2019-02-13 ENCOUNTER — OFFICE VISIT (OUTPATIENT)
Dept: HEMATOLOGY/ONCOLOGY | Facility: CLINIC | Age: 74
End: 2019-02-13
Attending: INTERNAL MEDICINE
Payer: MEDICARE

## 2019-02-13 VITALS
BODY MASS INDEX: 22.38 KG/M2 | OXYGEN SATURATION: 98 % | TEMPERATURE: 98 F | WEIGHT: 147.69 LBS | RESPIRATION RATE: 16 BRPM | SYSTOLIC BLOOD PRESSURE: 127 MMHG | DIASTOLIC BLOOD PRESSURE: 73 MMHG | HEART RATE: 80 BPM | HEIGHT: 68 IN

## 2019-02-13 DIAGNOSIS — C61 PROSTATE CANCER METASTATIC TO INTRAPELVIC LYMPH NODE: ICD-10-CM

## 2019-02-13 DIAGNOSIS — C79.51 PROSTATE CANCER METASTATIC TO BONE: ICD-10-CM

## 2019-02-13 DIAGNOSIS — C77.5 PROSTATE CANCER METASTATIC TO INTRAPELVIC LYMPH NODE: Primary | ICD-10-CM

## 2019-02-13 DIAGNOSIS — C25.1 MALIGNANT NEOPLASM OF BODY OF PANCREAS: ICD-10-CM

## 2019-02-13 DIAGNOSIS — C78.7 LIVER METASTASIS: ICD-10-CM

## 2019-02-13 DIAGNOSIS — C77.5 PROSTATE CANCER METASTATIC TO INTRAPELVIC LYMPH NODE: ICD-10-CM

## 2019-02-13 DIAGNOSIS — C61 PROSTATE CANCER METASTATIC TO BONE: ICD-10-CM

## 2019-02-13 DIAGNOSIS — C61 PROSTATE CANCER METASTATIC TO INTRAPELVIC LYMPH NODE: Primary | ICD-10-CM

## 2019-02-13 DIAGNOSIS — C78.6 PERITONEAL METASTASES: ICD-10-CM

## 2019-02-13 DIAGNOSIS — C25.1 MALIGNANT NEOPLASM OF BODY OF PANCREAS: Primary | ICD-10-CM

## 2019-02-13 PROCEDURE — 3074F PR MOST RECENT SYSTOLIC BLOOD PRESSURE < 130 MM HG: ICD-10-PCS | Mod: CPTII,S$GLB,, | Performed by: INTERNAL MEDICINE

## 2019-02-13 PROCEDURE — 99214 OFFICE O/P EST MOD 30 MIN: CPT | Mod: S$GLB,,, | Performed by: INTERNAL MEDICINE

## 2019-02-13 PROCEDURE — 63600175 PHARM REV CODE 636 W HCPCS: Mod: JG | Performed by: INTERNAL MEDICINE

## 2019-02-13 PROCEDURE — 99999 PR PBB SHADOW E&M-EST. PATIENT-LVL III: CPT | Mod: PBBFAC,,, | Performed by: INTERNAL MEDICINE

## 2019-02-13 PROCEDURE — 3078F PR MOST RECENT DIASTOLIC BLOOD PRESSURE < 80 MM HG: ICD-10-PCS | Mod: CPTII,S$GLB,, | Performed by: INTERNAL MEDICINE

## 2019-02-13 PROCEDURE — 96411 CHEMO IV PUSH ADDL DRUG: CPT

## 2019-02-13 PROCEDURE — 96416 CHEMO PROLONG INFUSE W/PUMP: CPT

## 2019-02-13 PROCEDURE — 99999 PR PBB SHADOW E&M-EST. PATIENT-LVL III: ICD-10-PCS | Mod: PBBFAC,,, | Performed by: INTERNAL MEDICINE

## 2019-02-13 PROCEDURE — 96367 TX/PROPH/DG ADDL SEQ IV INF: CPT

## 2019-02-13 PROCEDURE — 3074F SYST BP LT 130 MM HG: CPT | Mod: CPTII,S$GLB,, | Performed by: INTERNAL MEDICINE

## 2019-02-13 PROCEDURE — 3078F DIAST BP <80 MM HG: CPT | Mod: CPTII,S$GLB,, | Performed by: INTERNAL MEDICINE

## 2019-02-13 PROCEDURE — 96415 CHEMO IV INFUSION ADDL HR: CPT

## 2019-02-13 PROCEDURE — 99214 PR OFFICE/OUTPT VISIT, EST, LEVL IV, 30-39 MIN: ICD-10-PCS | Mod: S$GLB,,, | Performed by: INTERNAL MEDICINE

## 2019-02-13 PROCEDURE — 96413 CHEMO IV INFUSION 1 HR: CPT

## 2019-02-13 PROCEDURE — 96368 THER/DIAG CONCURRENT INF: CPT

## 2019-02-13 PROCEDURE — 1101F PT FALLS ASSESS-DOCD LE1/YR: CPT | Mod: CPTII,S$GLB,, | Performed by: INTERNAL MEDICINE

## 2019-02-13 PROCEDURE — 25000003 PHARM REV CODE 250: Performed by: INTERNAL MEDICINE

## 2019-02-13 PROCEDURE — 1101F PR PT FALLS ASSESS DOC 0-1 FALLS W/OUT INJ PAST YR: ICD-10-PCS | Mod: CPTII,S$GLB,, | Performed by: INTERNAL MEDICINE

## 2019-02-13 RX ORDER — HEPARIN 100 UNIT/ML
500 SYRINGE INTRAVENOUS
Status: CANCELLED | OUTPATIENT
Start: 2019-02-13

## 2019-02-13 RX ORDER — FLUOROURACIL 50 MG/ML
400 INJECTION, SOLUTION INTRAVENOUS
Status: COMPLETED | OUTPATIENT
Start: 2019-02-13 | End: 2019-02-13

## 2019-02-13 RX ORDER — DIPHENHYDRAMINE HYDROCHLORIDE 50 MG/ML
50 INJECTION INTRAMUSCULAR; INTRAVENOUS ONCE AS NEEDED
Status: CANCELLED | OUTPATIENT
Start: 2019-02-13 | End: 2019-02-13

## 2019-02-13 RX ORDER — EPINEPHRINE 0.3 MG/.3ML
0.3 INJECTION SUBCUTANEOUS ONCE AS NEEDED
Status: CANCELLED | OUTPATIENT
Start: 2019-02-13 | End: 2019-02-13

## 2019-02-13 RX ORDER — SODIUM CHLORIDE 0.9 % (FLUSH) 0.9 %
10 SYRINGE (ML) INJECTION
Status: CANCELLED | OUTPATIENT
Start: 2019-02-13

## 2019-02-13 RX ORDER — EPINEPHRINE 0.3 MG/.3ML
0.3 INJECTION SUBCUTANEOUS ONCE AS NEEDED
Status: DISCONTINUED | OUTPATIENT
Start: 2019-02-13 | End: 2019-02-13 | Stop reason: HOSPADM

## 2019-02-13 RX ORDER — SODIUM CHLORIDE 0.9 % (FLUSH) 0.9 %
10 SYRINGE (ML) INJECTION
Status: CANCELLED | OUTPATIENT
Start: 2019-02-15

## 2019-02-13 RX ORDER — DIPHENHYDRAMINE HYDROCHLORIDE 50 MG/ML
50 INJECTION INTRAMUSCULAR; INTRAVENOUS ONCE AS NEEDED
Status: DISCONTINUED | OUTPATIENT
Start: 2019-02-13 | End: 2019-02-13 | Stop reason: HOSPADM

## 2019-02-13 RX ORDER — FLUOROURACIL 50 MG/ML
400 INJECTION, SOLUTION INTRAVENOUS
Status: CANCELLED | OUTPATIENT
Start: 2019-02-13

## 2019-02-13 RX ORDER — HEPARIN 100 UNIT/ML
500 SYRINGE INTRAVENOUS
Status: DISCONTINUED | OUTPATIENT
Start: 2019-02-13 | End: 2019-02-13 | Stop reason: HOSPADM

## 2019-02-13 RX ORDER — HEPARIN 100 UNIT/ML
500 SYRINGE INTRAVENOUS
Status: CANCELLED | OUTPATIENT
Start: 2019-02-15

## 2019-02-13 RX ORDER — SODIUM CHLORIDE 0.9 % (FLUSH) 0.9 %
10 SYRINGE (ML) INJECTION
Status: DISCONTINUED | OUTPATIENT
Start: 2019-02-13 | End: 2019-02-13 | Stop reason: HOSPADM

## 2019-02-13 RX ADMIN — OXALIPLATIN 131 MG: 5 INJECTION, SOLUTION INTRAVENOUS at 10:02

## 2019-02-13 RX ADMIN — FLUOROURACIL 4490 MG: 5 INJECTION, SOLUTION INTRAVENOUS at 12:02

## 2019-02-13 RX ADMIN — DEXTROSE: 5 SOLUTION INTRAVENOUS at 09:02

## 2019-02-13 RX ADMIN — LEUCOVORIN CALCIUM 750 MG: 350 INJECTION, POWDER, LYOPHILIZED, FOR SOLUTION INTRAMUSCULAR; INTRAVENOUS at 10:02

## 2019-02-13 RX ADMIN — DEXAMETHASONE SODIUM PHOSPHATE: 4 INJECTION, SOLUTION INTRAMUSCULAR; INTRAVENOUS at 09:02

## 2019-02-13 RX ADMIN — FLUOROURACIL 750 MG: 50 INJECTION, SOLUTION INTRAVENOUS at 12:02

## 2019-02-13 NOTE — PROGRESS NOTES
Subjective:       Patient ID: Shady Prakash Jr. is a 73 y.o. male.    Chief Complaint: Follow-up    HPI     Returns for follow-up of metastatic pancreatic cancer and metastatic prostate cancer  Currently his treatment is focused on the metastatic pancreatic cancer  Recent scans with response    Yesterday her went to the ER for acute dizziness and nausea    labs looked ok and he felt better after fluids  He thinks his BG increased and caused side effects  Reports low appetite despite marinol (and marijuana)  Otherwise rare nausea    Presents for cycle # 5 FOLFOX  Reports doing well. Active and enjoys fishing.  Denies fatigue  He did note some neuropathy in feet but gone now  No fevers, chills or infectious complaints  No mouth sores  Mild gum soreness still noted  Notes swallowing better  Eating well overall  No nausea or vomiting  No pain issues     Oncology History:  1. Metastatic prostate cancer  He is s/p TRUS/bx on 8/7/15 w/ high volume marta 9 (5+4), PSA 25.  He had negative metastatic workup at diagnosis.  However, at time of planned RALP on 9/10/15 he was noted to have BN invasion on cysto and therefore only PLND was done, which confirmed metastatic disease.  He was started on ADT 9/17/15 w/ firmagon followed by Eligard on 10/15/15.    Started Casodex 7/20/17 - scans at that time showed osseus metastatic disease  Started docetaxel 10/6/17 with progression, then found to have below     - on follow-up scans a pancreatic lesion noted concerning for pancreatic cancer as it was an unlikely area for prostate cancer spread-  Biopsy confirmed adenocarcinoma and PSA was negative     Imaging as below:  Scan results:  6/20/18 Bone scan:  FINDINGS:  Two lesions in the left scapula, stable.  Elongated lesion in the right mid rib posteriorly, stable.  Prior exam demonstrated a large right iliac lesion, which no longer exhibits increased uptake.  Left pubic lesion, stable.  There is also now a small lesion in the left  iliac bone. Two additional new small foci in the mid thoracic vertebra.  Both kidneys and the bladder appear unremarkable.  Impression:  Findings consistent with osseous metastatic disease as above, noting 3 new small lesions.     6/21/18 CT scan abdomen and pelvis:  FINDINGS:  Lower chest: There has been interval increase in size in the focal consolidation which is pleural based in the posterior aspect of the right lower lobe.  Today's exam measures 5.1 cm maximum diameter.  Previous exam is measure approximately 3.3 cm.  This is associated with pleural thickening that extends laterally.  There is a small focus of cylinder all coal bronchiectasis in the medial left lower lobe.  The remaining aspects of the lung parenchyma that are visualized show no abnormalities.  The visualized heart and pericardium are normal  Liver: There are 3 hyperenhancing foci along the periphery of the right and left lobes of the liver which are stable from 2015.  The largest measures 1.1 cm in maximum diameter.  Although not characteristic on this exam, previous exam in 2015 shows findings most characteristic of hepatic hemangioma.  The remaining liver parenchyma as well as the hepatic and portal veins are patent.  Gallbladder: Normal.  Pancreas: There is been interval development of an ill-defined hypodense focus within the proximal body of the pancreas measuring 2.1 cm x 1.8 cm.  Spleen: Calcified hemangiomas present.  Otherwise normal.  Adrenal glands: Normal.  Genitourinary: Stable simple cyst in the lower pole of the right kidney measuring 1.6 cm.  The left kidney and bilateral ureters are normal.  The bladder is partially distended and shows wall thickening on the non dependent surface of the bladder.  This is not significantly changed in appearance.  Gastrointestinal: Stomach and small intestines are normal.  Colonic diverticulosis is present without evidence of diverticulitis.  Prostate: Enlarged and nodular causing mass effect on  the bladder.  Miscellaneous: There is no intra-abdominal or pelvic free fluid, free air or lymphadenopathy.  The abdominal aorta tapers normally.  Bilateral fat containing inguinal hernias present.  Osseous and soft tissue structures: Sclerotic metastases involving the right iliac bone and left superior pubic ramus are identified.  The small focus in the left iliac bone seen on yesterday's bone scan is not well seen on this exam.  The soft tissues are within normal limits.  Impression:  1. Interval increase in size in the pleural base, right lower lobe consolidation.  2. Hyperenhancing foci likely represent hemangiomas.  They have been stable since 2015.  Is on the 2015 exam where they demonstrate characteristics most compatible with hemangioma.  3. Bladder wall thickening is not significantly changed.  This may be related to outlet obstruction from the nodular prostate.  4. Interval development of ill-defined hypodense focus measuring 2.1 cm within the proximal body of the pancreas.  This may also be a focus of metastatic disease although is uncommon for the prostate to metastasized to the pancreas.  A dedicated pancreatic CT may be beneficial in further characterizing this lesion.     - on 7/25/18 he came to ER with worsening abdominal pain and concerns for appendicitis  Underwent Laparoscopy Converted to Laparotomy, Peritoneal Biopsy , Ileocecal with Dr. Stephenson  Biopsy concerning for metastatic pancreatic carcinoma     2. Metastatic pancreatic cancer  - started on [place]/Abraxane- note Ca19-9 never elevated  Response initially  Now with progression  FOLFOX started 12/20/18    Review of Systems   Constitutional: Positive for unexpected weight change. Negative for activity change, appetite change, chills, fatigue and fever.   HENT: Positive for dental problem. Negative for congestion, hearing loss, mouth sores, nosebleeds, postnasal drip, sinus pressure, sore throat and trouble swallowing.    Eyes: Negative for  visual disturbance.   Respiratory: Negative for cough (rare dry), chest tightness, shortness of breath and wheezing.    Cardiovascular: Negative for chest pain, palpitations and leg swelling.   Gastrointestinal: Negative for abdominal distention, abdominal pain, blood in stool, constipation, diarrhea, nausea and vomiting.        No GERD   Endocrine:        Hot flashes   Genitourinary: Negative for decreased urine volume, difficulty urinating, frequency, hematuria and urgency.   Musculoskeletal: Negative for arthralgias (better), back pain, gait problem, joint swelling, neck pain and neck stiffness.   Skin: Negative for color change, pallor, rash and wound.   Neurological: Positive for numbness (feet). Negative for dizziness, weakness, light-headedness and headaches.   Hematological: Negative for adenopathy. Does not bruise/bleed easily.   Psychiatric/Behavioral: Negative for dysphoric mood and sleep disturbance. The patient is not nervous/anxious.        Objective:      Physical Exam   Constitutional: He is oriented to person, place, and time. He appears well-developed. No distress.   Presents with his wife  ECOG=0  Thinner   HENT:   Head: Normocephalic and atraumatic.   Right Ear: External ear normal.   Left Ear: External ear normal.   Nose: Nose normal.   Mouth/Throat: Oropharynx is clear and moist. No oropharyngeal exudate.   Eyes: Conjunctivae and EOM are normal. Pupils are equal, round, and reactive to light. Right eye exhibits no discharge. Left eye exhibits no discharge. No scleral icterus.   Neck: Normal range of motion. Neck supple. No tracheal deviation present. No thyromegaly present.   Cardiovascular: Normal rate, regular rhythm, normal heart sounds and intact distal pulses. Exam reveals no gallop and no friction rub.   No murmur heard.  Pulmonary/Chest: Effort normal and breath sounds normal. No respiratory distress. He has no wheezes. He has no rales. He exhibits no tenderness.   Abdominal: Soft. Bowel  sounds are normal. He exhibits mass. He exhibits no distension. There is no tenderness. There is no rebound and no guarding.   Musculoskeletal: Normal range of motion. He exhibits no edema, tenderness or deformity.   Lymphadenopathy:     He has no cervical adenopathy.   Neurological: He is alert and oriented to person, place, and time. He has normal reflexes. He displays normal reflexes. No cranial nerve deficit. He exhibits normal muscle tone. Coordination normal.   Skin: Skin is warm and dry. No rash noted. He is not diaphoretic. No erythema. No pallor.   Psychiatric: He has a normal mood and affect. His behavior is normal. Judgment and thought content normal.   Nursing note and vitals reviewed.    Labs- reviewed  Assessment:       1. Prostate cancer metastatic to intrapelvic lymph node    2. Malignant neoplasm of body of pancreas    3. Peritoneal metastases    4. Liver metastasis        Plan:       1. Had progressed but treatment stopped with development of below  2-4. Proceed with cycle # 5 of FOLFOX  We will restage after cycle # 6  We discussed nutrition and management of blood sugar and maintaining calorie intake    We discussed prognosis again and reviewed advanced care planning  He plans to get his daughter to help him with the paperwork     Advance Care Planning I initiated the process of advance care planning today and explained the importance of this process to the patient.  Then the patient received detailed information about the importance of designating a Health Care Power of  (HCPOA). he was instructed to communicate with this person about their wishes for future healthcare, should he become sick and lose decision-making capacity. The patient has not previously appointed a HCPOA. After our discussion, the patient has decided to complete a HCPOA and has appointed his significant other. He will complete the paperwork with his daughter.  I spent a total time of 15 minutes discussing this issue  with the patient.

## 2019-02-13 NOTE — PLAN OF CARE
Problem: Adult Inpatient Plan of Care  Goal: Plan of Care Review  Outcome: Ongoing (interventions implemented as appropriate)  Pt tolerated FOLFOX, pt connected to 5FU home pump. VSS. AVS given, pt instructed to return 2/15/19 for pump d/c.

## 2019-02-15 ENCOUNTER — INFUSION (OUTPATIENT)
Dept: INFUSION THERAPY | Facility: OTHER | Age: 74
End: 2019-02-15
Attending: INTERNAL MEDICINE
Payer: MEDICARE

## 2019-02-15 VITALS
TEMPERATURE: 98 F | DIASTOLIC BLOOD PRESSURE: 66 MMHG | RESPIRATION RATE: 16 BRPM | OXYGEN SATURATION: 100 % | SYSTOLIC BLOOD PRESSURE: 119 MMHG | HEART RATE: 92 BPM

## 2019-02-15 DIAGNOSIS — C79.51 PROSTATE CANCER METASTATIC TO BONE: ICD-10-CM

## 2019-02-15 DIAGNOSIS — C61 PROSTATE CANCER METASTATIC TO INTRAPELVIC LYMPH NODE: ICD-10-CM

## 2019-02-15 DIAGNOSIS — C25.1 MALIGNANT NEOPLASM OF BODY OF PANCREAS: Primary | ICD-10-CM

## 2019-02-15 DIAGNOSIS — C77.5 PROSTATE CANCER METASTATIC TO INTRAPELVIC LYMPH NODE: ICD-10-CM

## 2019-02-15 DIAGNOSIS — C61 PROSTATE CANCER METASTATIC TO BONE: ICD-10-CM

## 2019-02-15 PROCEDURE — A4216 STERILE WATER/SALINE, 10 ML: HCPCS | Performed by: INTERNAL MEDICINE

## 2019-02-15 PROCEDURE — 25000003 PHARM REV CODE 250: Performed by: INTERNAL MEDICINE

## 2019-02-15 PROCEDURE — 96521 REFILL/MAINT PORTABLE PUMP: CPT

## 2019-02-15 PROCEDURE — 96377 APPLICATON ON-BODY INJECTOR: CPT

## 2019-02-15 PROCEDURE — 63600175 PHARM REV CODE 636 W HCPCS: Mod: JG | Performed by: INTERNAL MEDICINE

## 2019-02-15 RX ORDER — SODIUM CHLORIDE 0.9 % (FLUSH) 0.9 %
10 SYRINGE (ML) INJECTION
Status: DISCONTINUED | OUTPATIENT
Start: 2019-02-15 | End: 2019-02-15 | Stop reason: HOSPADM

## 2019-02-15 RX ORDER — HEPARIN 100 UNIT/ML
500 SYRINGE INTRAVENOUS
Status: DISCONTINUED | OUTPATIENT
Start: 2019-02-15 | End: 2019-02-15 | Stop reason: HOSPADM

## 2019-02-15 RX ADMIN — PEGFILGRASTIM 6 MG: KIT SUBCUTANEOUS at 10:02

## 2019-02-15 RX ADMIN — Medication 10 ML: at 10:02

## 2019-02-15 RX ADMIN — HEPARIN SODIUM (PORCINE) LOCK FLUSH IV SOLN 100 UNIT/ML 500 UNITS: 100 SOLUTION at 10:02

## 2019-02-15 NOTE — PLAN OF CARE
Problem: Adult Inpatient Plan of Care  Goal: Plan of Care Review  Outcome: Ongoing (interventions implemented as appropriate)  Pump d/c'd and neulasta OBI placed. VSS, NAD. D/C'd home with wife.

## 2019-02-25 NOTE — PROGRESS NOTES
Subjective:       Patient ID: Shady Prakash Jr. is a 73 y.o. male.     Chief Complaint: Follow-up     HPI      Returns for follow-up of metastatic pancreatic cancer and metastatic prostate cancer  Currently his treatment is focused on the metastatic pancreatic cancer  Recent scans with response       Presents for cycle # 6 FOLFOX  He is doing well, active. He continues to have peeling of both palms. No pain or erythema. Some neuropathy in feet which is mild.   Denies fatigue  No fevers, chills or infectious complaints  No mouth sores  Eating well overall  No nausea or vomiting  No pain issues     Oncology History:  1. Metastatic prostate cancer  He is s/p TRUS/bx on 8/7/15 w/ high volume marta 9 (5+4), PSA 25.  He had negative metastatic workup at diagnosis.  However, at time of planned RALP on 9/10/15 he was noted to have BN invasion on cysto and therefore only PLND was done, which confirmed metastatic disease.  He was started on ADT 9/17/15 w/ firmagon followed by Eligard on 10/15/15.    Started Casodex 7/20/17 - scans at that time showed osseus metastatic disease  Started docetaxel 10/6/17 with progression, then found to have below     - on follow-up scans a pancreatic lesion noted concerning for pancreatic cancer as it was an unlikely area for prostate cancer spread-  Biopsy confirmed adenocarcinoma and PSA was negative     Imaging as below:  Scan results:  6/20/18 Bone scan:  FINDINGS:  Two lesions in the left scapula, stable.  Elongated lesion in the right mid rib posteriorly, stable.  Prior exam demonstrated a large right iliac lesion, which no longer exhibits increased uptake.  Left pubic lesion, stable.  There is also now a small lesion in the left iliac bone. Two additional new small foci in the mid thoracic vertebra.  Both kidneys and the bladder appear unremarkable.  Impression:  Findings consistent with osseous metastatic disease as above, noting 3 new small lesions.     6/21/18 CT scan abdomen and  pelvis:  FINDINGS:  Lower chest: There has been interval increase in size in the focal consolidation which is pleural based in the posterior aspect of the right lower lobe.  Today's exam measures 5.1 cm maximum diameter.  Previous exam is measure approximately 3.3 cm.  This is associated with pleural thickening that extends laterally.  There is a small focus of cylinder all coal bronchiectasis in the medial left lower lobe.  The remaining aspects of the lung parenchyma that are visualized show no abnormalities.  The visualized heart and pericardium are normal  Liver: There are 3 hyperenhancing foci along the periphery of the right and left lobes of the liver which are stable from 2015.  The largest measures 1.1 cm in maximum diameter.  Although not characteristic on this exam, previous exam in 2015 shows findings most characteristic of hepatic hemangioma.  The remaining liver parenchyma as well as the hepatic and portal veins are patent.  Gallbladder: Normal.  Pancreas: There is been interval development of an ill-defined hypodense focus within the proximal body of the pancreas measuring 2.1 cm x 1.8 cm.  Spleen: Calcified hemangiomas present.  Otherwise normal.  Adrenal glands: Normal.  Genitourinary: Stable simple cyst in the lower pole of the right kidney measuring 1.6 cm.  The left kidney and bilateral ureters are normal.  The bladder is partially distended and shows wall thickening on the non dependent surface of the bladder.  This is not significantly changed in appearance.  Gastrointestinal: Stomach and small intestines are normal.  Colonic diverticulosis is present without evidence of diverticulitis.  Prostate: Enlarged and nodular causing mass effect on the bladder.  Miscellaneous: There is no intra-abdominal or pelvic free fluid, free air or lymphadenopathy.  The abdominal aorta tapers normally.  Bilateral fat containing inguinal hernias present.  Osseous and soft tissue structures: Sclerotic metastases  involving the right iliac bone and left superior pubic ramus are identified.  The small focus in the left iliac bone seen on yesterday's bone scan is not well seen on this exam.  The soft tissues are within normal limits.  Impression:  1. Interval increase in size in the pleural base, right lower lobe consolidation.  2. Hyperenhancing foci likely represent hemangiomas.  They have been stable since 2015.  Is on the 2015 exam where they demonstrate characteristics most compatible with hemangioma.  3. Bladder wall thickening is not significantly changed.  This may be related to outlet obstruction from the nodular prostate.  4. Interval development of ill-defined hypodense focus measuring 2.1 cm within the proximal body of the pancreas.  This may also be a focus of metastatic disease although is uncommon for the prostate to metastasized to the pancreas.  A dedicated pancreatic CT may be beneficial in further characterizing this lesion.     - on 7/25/18 he came to ER with worsening abdominal pain and concerns for appendicitis  Underwent Laparoscopy Converted to Laparotomy, Peritoneal Biopsy , Ileocecal with Dr. Stephenson  Biopsy concerning for metastatic pancreatic carcinoma     2. Metastatic pancreatic cancer  - started on [place]/Abraxane- note Ca19-9 never elevated  Response initially  Now with progression  FOLFOX started 12/20/18     Review of Systems   Constitutional:Negative for activity change, appetite change, chills, fatigue and fever.   HENT: Negative for congestion, hearing loss, mouth sores, nosebleeds, postnasal drip, sinus pressure, sore throat and trouble swallowing.    Eyes: Negative for visual disturbance.   Respiratory: Negative for cough, chest tightness, shortness of breath and wheezing.    Cardiovascular: Negative for chest pain, palpitations and leg swelling.   Gastrointestinal: Negative for abdominal distention, abdominal pain, blood in stool, constipation, diarrhea, nausea and vomiting.        No GERD    Genitourinary: Negative for decreased urine volume, difficulty urinating, frequency, hematuria and urgency.   Musculoskeletal: Negative for arthralgias, back pain, gait problem, joint swelling, neck pain and neck stiffness.   Skin: Negative for color change, pallor, rash and wound.   Neurological: Positive for numbness (feet). Negative for dizziness, weakness, light-headedness and headaches.   Hematological: Negative for adenopathy. Does not bruise/bleed easily.   Psychiatric/Behavioral: Negative for dysphoric mood and sleep disturbance. The patient is not nervous/anxious.        Objective:   Physical Exam   Constitutional: He is oriented to person, place, and time. He appears well-developed. No distress.   Presents with his wife  ECOG=0  HENT:   Head: Normocephalic and atraumatic.   Right Ear: External ear normal.   Left Ear: External ear normal.   Nose: Nose normal.   Mouth/Throat: Oropharynx is clear and moist. No oropharyngeal exudate.   Eyes: Conjunctivae and EOM are normal. Pupils are equal, round, and reactive to light. Right eye exhibits no discharge. Left eye exhibits no discharge. No scleral icterus.   Neck: Normal range of motion. Neck supple. No tracheal deviation present. No thyromegaly present.   Cardiovascular: Normal rate, regular rhythm, normal heart sounds and intact distal pulses. Exam reveals no gallop and no friction rub.   No murmur heard.  Pulmonary/Chest: Effort normal and breath sounds normal. No respiratory distress. He has no wheezes. He has no rales. He exhibits no tenderness.   Abdominal: Soft. Bowel sounds are normal. He exhibits mass in the incision line. He exhibits no distension. There is no tenderness. There is no rebound and no guarding.   Musculoskeletal: Normal range of motion. He exhibits no edema, tenderness or deformity.   Lymphadenopathy:     He has no cervical adenopathy.   Neurological: He is alert and oriented to person, place, and time. He has normal reflexes. He  displays normal reflexes. No cranial nerve deficit. He exhibits normal muscle tone. Coordination normal.   Skin: Skin is warm and dry. No rash noted. He is not diaphoretic. No erythema. No pallor.   Psychiatric: He has a normal mood and affect. His behavior is normal. Judgment and thought content normal.   Nursing note and vitals reviewed.    Labs- reviewed  Assessment:       1. Malignant neoplasm of body of pancreas    2. Liver metastases    3. Peritoneal metastases    4. Encounter for antineoplastic chemotherapy    5. Prostate cancer metastatic to bone    6. Anemia in neoplastic disease    7. Type 2 diabetes mellitus with stage 1 chronic kidney disease, without long-term current use of insulin    8. Essential hypertension    9. Hand foot syndrome    10. Chemotherapy-induced thrombocytopenia        Plan:       1-4  - Mr Prakash is a 74 yo man with metastatic pancreatic cancer on second-line chemo with FOLFOX. Doing well  - cycle 6 FOLFOX today  - return in 2 weeks with restaging CT scan then see Dr. Melendez prior to cycle 7    5.  - last Lupron at Urologist' office in Nov 2018. Next due in May    6.  - stable. Monitor    7.  - BS good  - c/w current meds    8.  - BP meds have been on hold due to hypotension  - continue to hold    9.  - grade 1. Asked patient to keep it moisturized with hand cream    10.  - mild. Adequate for chemo  - monitor

## 2019-02-26 ENCOUNTER — OFFICE VISIT (OUTPATIENT)
Dept: HEMATOLOGY/ONCOLOGY | Facility: CLINIC | Age: 74
End: 2019-02-26
Payer: MEDICARE

## 2019-02-26 ENCOUNTER — TELEPHONE (OUTPATIENT)
Dept: HEMATOLOGY/ONCOLOGY | Facility: CLINIC | Age: 74
End: 2019-02-26

## 2019-02-26 ENCOUNTER — INFUSION (OUTPATIENT)
Dept: INFUSION THERAPY | Facility: OTHER | Age: 74
End: 2019-02-26
Attending: INTERNAL MEDICINE
Payer: MEDICARE

## 2019-02-26 VITALS
HEART RATE: 91 BPM | SYSTOLIC BLOOD PRESSURE: 102 MMHG | WEIGHT: 146.38 LBS | TEMPERATURE: 98 F | RESPIRATION RATE: 16 BRPM | OXYGEN SATURATION: 100 % | DIASTOLIC BLOOD PRESSURE: 61 MMHG | HEIGHT: 68 IN | BODY MASS INDEX: 22.19 KG/M2

## 2019-02-26 DIAGNOSIS — E11.22 TYPE 2 DIABETES MELLITUS WITH STAGE 1 CHRONIC KIDNEY DISEASE, WITHOUT LONG-TERM CURRENT USE OF INSULIN: ICD-10-CM

## 2019-02-26 DIAGNOSIS — C25.1 MALIGNANT NEOPLASM OF BODY OF PANCREAS: Primary | ICD-10-CM

## 2019-02-26 DIAGNOSIS — C79.51 PROSTATE CANCER METASTATIC TO BONE: ICD-10-CM

## 2019-02-26 DIAGNOSIS — D69.59 CHEMOTHERAPY-INDUCED THROMBOCYTOPENIA: ICD-10-CM

## 2019-02-26 DIAGNOSIS — C78.7 LIVER METASTASES: ICD-10-CM

## 2019-02-26 DIAGNOSIS — C78.6 PERITONEAL METASTASES: ICD-10-CM

## 2019-02-26 DIAGNOSIS — I10 ESSENTIAL HYPERTENSION: ICD-10-CM

## 2019-02-26 DIAGNOSIS — N18.1 TYPE 2 DIABETES MELLITUS WITH STAGE 1 CHRONIC KIDNEY DISEASE, WITHOUT LONG-TERM CURRENT USE OF INSULIN: ICD-10-CM

## 2019-02-26 DIAGNOSIS — C61 PROSTATE CANCER METASTATIC TO INTRAPELVIC LYMPH NODE: ICD-10-CM

## 2019-02-26 DIAGNOSIS — L27.1 HAND FOOT SYNDROME: ICD-10-CM

## 2019-02-26 DIAGNOSIS — C61 PROSTATE CANCER METASTATIC TO BONE: ICD-10-CM

## 2019-02-26 DIAGNOSIS — T45.1X5A CHEMOTHERAPY-INDUCED THROMBOCYTOPENIA: ICD-10-CM

## 2019-02-26 DIAGNOSIS — C77.5 PROSTATE CANCER METASTATIC TO INTRAPELVIC LYMPH NODE: ICD-10-CM

## 2019-02-26 DIAGNOSIS — D63.0 ANEMIA IN NEOPLASTIC DISEASE: ICD-10-CM

## 2019-02-26 DIAGNOSIS — Z51.11 ENCOUNTER FOR ANTINEOPLASTIC CHEMOTHERAPY: ICD-10-CM

## 2019-02-26 PROCEDURE — 96368 THER/DIAG CONCURRENT INF: CPT

## 2019-02-26 PROCEDURE — 96411 CHEMO IV PUSH ADDL DRUG: CPT

## 2019-02-26 PROCEDURE — 96413 CHEMO IV INFUSION 1 HR: CPT

## 2019-02-26 PROCEDURE — 96361 HYDRATE IV INFUSION ADD-ON: CPT

## 2019-02-26 PROCEDURE — 63600175 PHARM REV CODE 636 W HCPCS: Performed by: INTERNAL MEDICINE

## 2019-02-26 PROCEDURE — 99999 PR PBB SHADOW E&M-EST. PATIENT-LVL III: ICD-10-PCS | Mod: PBBFAC,,, | Performed by: INTERNAL MEDICINE

## 2019-02-26 PROCEDURE — 99215 PR OFFICE/OUTPT VISIT, EST, LEVL V, 40-54 MIN: ICD-10-PCS | Mod: S$GLB,,, | Performed by: INTERNAL MEDICINE

## 2019-02-26 PROCEDURE — 3044F HG A1C LEVEL LT 7.0%: CPT | Mod: CPTII,S$GLB,, | Performed by: INTERNAL MEDICINE

## 2019-02-26 PROCEDURE — 1101F PR PT FALLS ASSESS DOC 0-1 FALLS W/OUT INJ PAST YR: ICD-10-PCS | Mod: CPTII,S$GLB,, | Performed by: INTERNAL MEDICINE

## 2019-02-26 PROCEDURE — 96415 CHEMO IV INFUSION ADDL HR: CPT

## 2019-02-26 PROCEDURE — 25000003 PHARM REV CODE 250: Performed by: INTERNAL MEDICINE

## 2019-02-26 PROCEDURE — 1101F PT FALLS ASSESS-DOCD LE1/YR: CPT | Mod: CPTII,S$GLB,, | Performed by: INTERNAL MEDICINE

## 2019-02-26 PROCEDURE — 96367 TX/PROPH/DG ADDL SEQ IV INF: CPT

## 2019-02-26 PROCEDURE — 99215 OFFICE O/P EST HI 40 MIN: CPT | Mod: S$GLB,,, | Performed by: INTERNAL MEDICINE

## 2019-02-26 PROCEDURE — 3074F SYST BP LT 130 MM HG: CPT | Mod: CPTII,S$GLB,, | Performed by: INTERNAL MEDICINE

## 2019-02-26 PROCEDURE — 3078F DIAST BP <80 MM HG: CPT | Mod: CPTII,S$GLB,, | Performed by: INTERNAL MEDICINE

## 2019-02-26 PROCEDURE — 3078F PR MOST RECENT DIASTOLIC BLOOD PRESSURE < 80 MM HG: ICD-10-PCS | Mod: CPTII,S$GLB,, | Performed by: INTERNAL MEDICINE

## 2019-02-26 PROCEDURE — 3044F PR MOST RECENT HEMOGLOBIN A1C LEVEL <7.0%: ICD-10-PCS | Mod: CPTII,S$GLB,, | Performed by: INTERNAL MEDICINE

## 2019-02-26 PROCEDURE — 99999 PR PBB SHADOW E&M-EST. PATIENT-LVL III: CPT | Mod: PBBFAC,,, | Performed by: INTERNAL MEDICINE

## 2019-02-26 PROCEDURE — 3074F PR MOST RECENT SYSTOLIC BLOOD PRESSURE < 130 MM HG: ICD-10-PCS | Mod: CPTII,S$GLB,, | Performed by: INTERNAL MEDICINE

## 2019-02-26 PROCEDURE — 96416 CHEMO PROLONG INFUSE W/PUMP: CPT

## 2019-02-26 RX ORDER — SODIUM CHLORIDE 0.9 % (FLUSH) 0.9 %
10 SYRINGE (ML) INJECTION
Status: CANCELLED | OUTPATIENT
Start: 2019-03-01

## 2019-02-26 RX ORDER — FLUOROURACIL 50 MG/ML
400 INJECTION, SOLUTION INTRAVENOUS
Status: CANCELLED | OUTPATIENT
Start: 2019-02-27

## 2019-02-26 RX ORDER — SODIUM CHLORIDE 9 MG/ML
INJECTION, SOLUTION INTRAVENOUS CONTINUOUS
Status: ACTIVE | OUTPATIENT
Start: 2019-02-26 | End: 2019-02-26

## 2019-02-26 RX ORDER — SODIUM CHLORIDE 0.9 % (FLUSH) 0.9 %
10 SYRINGE (ML) INJECTION
Status: CANCELLED | OUTPATIENT
Start: 2019-02-27

## 2019-02-26 RX ORDER — FLUOROURACIL 50 MG/ML
400 INJECTION, SOLUTION INTRAVENOUS
Status: COMPLETED | OUTPATIENT
Start: 2019-02-26 | End: 2019-02-26

## 2019-02-26 RX ORDER — DIPHENHYDRAMINE HYDROCHLORIDE 50 MG/ML
50 INJECTION INTRAMUSCULAR; INTRAVENOUS ONCE AS NEEDED
Status: CANCELLED | OUTPATIENT
Start: 2019-02-27 | End: 2019-02-27

## 2019-02-26 RX ORDER — HEPARIN 100 UNIT/ML
500 SYRINGE INTRAVENOUS
Status: CANCELLED | OUTPATIENT
Start: 2019-02-27

## 2019-02-26 RX ORDER — EPINEPHRINE 0.3 MG/.3ML
0.3 INJECTION SUBCUTANEOUS ONCE AS NEEDED
Status: CANCELLED | OUTPATIENT
Start: 2019-02-27 | End: 2019-02-27

## 2019-02-26 RX ORDER — EPINEPHRINE 0.3 MG/.3ML
0.3 INJECTION SUBCUTANEOUS ONCE AS NEEDED
Status: DISCONTINUED | OUTPATIENT
Start: 2019-02-26 | End: 2019-02-26 | Stop reason: HOSPADM

## 2019-02-26 RX ORDER — DIPHENHYDRAMINE HYDROCHLORIDE 50 MG/ML
50 INJECTION INTRAMUSCULAR; INTRAVENOUS ONCE AS NEEDED
Status: DISCONTINUED | OUTPATIENT
Start: 2019-02-26 | End: 2019-02-26 | Stop reason: HOSPADM

## 2019-02-26 RX ORDER — HEPARIN 100 UNIT/ML
500 SYRINGE INTRAVENOUS
Status: CANCELLED | OUTPATIENT
Start: 2019-03-01

## 2019-02-26 RX ORDER — SODIUM CHLORIDE 9 MG/ML
INJECTION, SOLUTION INTRAVENOUS CONTINUOUS
Status: CANCELLED
Start: 2019-02-27

## 2019-02-26 RX ORDER — SODIUM CHLORIDE 0.9 % (FLUSH) 0.9 %
10 SYRINGE (ML) INJECTION
Status: DISCONTINUED | OUTPATIENT
Start: 2019-02-26 | End: 2019-02-26 | Stop reason: HOSPADM

## 2019-02-26 RX ORDER — HEPARIN 100 UNIT/ML
500 SYRINGE INTRAVENOUS
Status: DISCONTINUED | OUTPATIENT
Start: 2019-02-26 | End: 2019-02-26 | Stop reason: HOSPADM

## 2019-02-26 RX ADMIN — OXALIPLATIN 131 MG: 5 INJECTION, SOLUTION INTRAVENOUS at 12:02

## 2019-02-26 RX ADMIN — FLUOROURACIL 4490 MG: 50 INJECTION, SOLUTION INTRAVENOUS at 02:02

## 2019-02-26 RX ADMIN — DEXAMETHASONE SODIUM PHOSPHATE: 4 INJECTION, SOLUTION INTRAMUSCULAR; INTRAVENOUS at 12:02

## 2019-02-26 RX ADMIN — SODIUM CHLORIDE: 0.9 INJECTION, SOLUTION INTRAVENOUS at 11:02

## 2019-02-26 RX ADMIN — LEUCOVORIN CALCIUM 750 MG: 350 INJECTION, POWDER, LYOPHILIZED, FOR SOLUTION INTRAMUSCULAR; INTRAVENOUS at 12:02

## 2019-02-26 RX ADMIN — DEXTROSE: 5 SOLUTION INTRAVENOUS at 11:02

## 2019-02-26 RX ADMIN — FLUOROURACIL 750 MG: 50 INJECTION, SOLUTION INTRAVENOUS at 02:02

## 2019-02-26 NOTE — Clinical Note
Get CBC, CMP, CT C/A/P on 3/12, then see Dr Melendez on 3/13 and get chemo (FOLFOX), return on 3/15 to remove pump

## 2019-02-26 NOTE — PLAN OF CARE
Problem: Adult Inpatient Plan of Care  Goal: Plan of Care Review  Outcome: Ongoing (interventions implemented as appropriate)  Pt tolerated FOLFOX and 5FU pump hookup. VSS. AVS given. Pt d/c home, instructed to return 2/28/19 for pump d/c.

## 2019-02-26 NOTE — TELEPHONE ENCOUNTER
Mr Prakash requesting you speak with his daughter. Her name is Vanessa Wilson. She can be reached @ [phone]--work, call first, then call her cell @ [phone].

## 2019-02-28 ENCOUNTER — INFUSION (OUTPATIENT)
Dept: INFUSION THERAPY | Facility: OTHER | Age: 74
End: 2019-02-28
Attending: INTERNAL MEDICINE
Payer: MEDICARE

## 2019-02-28 VITALS
OXYGEN SATURATION: 100 % | RESPIRATION RATE: 18 BRPM | TEMPERATURE: 99 F | HEART RATE: 80 BPM | SYSTOLIC BLOOD PRESSURE: 124 MMHG | DIASTOLIC BLOOD PRESSURE: 66 MMHG

## 2019-02-28 DIAGNOSIS — C25.1 MALIGNANT NEOPLASM OF BODY OF PANCREAS: Primary | ICD-10-CM

## 2019-02-28 DIAGNOSIS — C77.5 PROSTATE CANCER METASTATIC TO INTRAPELVIC LYMPH NODE: ICD-10-CM

## 2019-02-28 DIAGNOSIS — C61 PROSTATE CANCER METASTATIC TO BONE: ICD-10-CM

## 2019-02-28 DIAGNOSIS — C61 PROSTATE CANCER METASTATIC TO INTRAPELVIC LYMPH NODE: ICD-10-CM

## 2019-02-28 DIAGNOSIS — C79.51 PROSTATE CANCER METASTATIC TO BONE: ICD-10-CM

## 2019-02-28 PROCEDURE — 96521 REFILL/MAINT PORTABLE PUMP: CPT

## 2019-02-28 PROCEDURE — 96377 APPLICATON ON-BODY INJECTOR: CPT

## 2019-02-28 PROCEDURE — 25000003 PHARM REV CODE 250: Performed by: INTERNAL MEDICINE

## 2019-02-28 PROCEDURE — A4216 STERILE WATER/SALINE, 10 ML: HCPCS | Performed by: INTERNAL MEDICINE

## 2019-02-28 PROCEDURE — 63600175 PHARM REV CODE 636 W HCPCS: Performed by: INTERNAL MEDICINE

## 2019-02-28 RX ORDER — SODIUM CHLORIDE 0.9 % (FLUSH) 0.9 %
10 SYRINGE (ML) INJECTION
Status: DISCONTINUED | OUTPATIENT
Start: 2019-02-28 | End: 2019-02-28 | Stop reason: HOSPADM

## 2019-02-28 RX ORDER — HEPARIN 100 UNIT/ML
500 SYRINGE INTRAVENOUS
Status: DISCONTINUED | OUTPATIENT
Start: 2019-02-28 | End: 2019-02-28 | Stop reason: HOSPADM

## 2019-02-28 RX ADMIN — PEGFILGRASTIM 6 MG: KIT SUBCUTANEOUS at 01:02

## 2019-02-28 RX ADMIN — HEPARIN SODIUM (PORCINE) LOCK FLUSH IV SOLN 100 UNIT/ML 500 UNITS: 100 SOLUTION at 01:02

## 2019-02-28 RX ADMIN — Medication 10 ML: at 01:02

## 2019-02-28 NOTE — PLAN OF CARE
Problem: Adult Inpatient Plan of Care  Goal: Plan of Care Review  Outcome: Ongoing (interventions implemented as appropriate)  Pt here for pump d/c. Neulasta OBI applied. VSS. AVS given. Pt d/c home, instructed to return 3/13/19 for next cycle.

## 2019-03-04 DIAGNOSIS — G89.3 NEOPLASM RELATED PAIN: ICD-10-CM

## 2019-03-04 RX ORDER — OXYCODONE AND ACETAMINOPHEN 5; 325 MG/1; MG/1
1 TABLET ORAL EVERY 4 HOURS PRN
Qty: 90 TABLET | Refills: 0 | Status: SHIPPED | OUTPATIENT
Start: 2019-03-04 | End: 2019-07-24 | Stop reason: SDUPTHER

## 2019-03-04 NOTE — TELEPHONE ENCOUNTER
----- Message from Aletha Hernandez sent at 3/4/2019  1:03 PM CST -----  Contact: self  Pt needs a refill on his pain medication.  He uses [facility].    Pt can be reached at [phone]

## 2019-03-07 ENCOUNTER — TELEPHONE (OUTPATIENT)
Dept: HEMATOLOGY/ONCOLOGY | Facility: CLINIC | Age: 74
End: 2019-03-07

## 2019-03-07 DIAGNOSIS — K13.79 MOUTH SORES: Primary | ICD-10-CM

## 2019-03-07 RX ORDER — VALACYCLOVIR HYDROCHLORIDE 500 MG/1
500 TABLET, FILM COATED ORAL 2 TIMES DAILY
Qty: 10 TABLET | Refills: 0 | Status: SHIPPED | OUTPATIENT
Start: 2019-03-07 | End: 2019-03-12

## 2019-03-07 NOTE — TELEPHONE ENCOUNTER
Returned call to pt.   Pt stated that he has mouth sores in his mouth.   Pt denies any fevers or any other symptoms.   Pt does not have magic mouthwash rx- has been using oragel and salt.   Advised pt to d/c salt as can irritate.   Informed pt would see if Dr. Melendez would send in rx as typically resolves.   Pt verbalized understanding.       Message fwd.           ----- Message from Avani Obando sent at 3/7/2019  8:16 AM CST -----  Contact: Pt   Pt has concern with his mouth. Please contact him at [phone]

## 2019-03-07 NOTE — TELEPHONE ENCOUNTER
Call to pt wife and informed of below:    MD Mala Ring   Caller: Unspecified (Today,  8:28 AM)             Sent 1. Magic Mouthwash   2. Valtrex   To his pharmacy      Pt wife verbalized understanding and knows to call if rx does not resolve mouth sores.

## 2019-03-12 ENCOUNTER — HOSPITAL ENCOUNTER (OUTPATIENT)
Dept: RADIOLOGY | Facility: OTHER | Age: 74
Discharge: HOME | End: 2019-03-12
Attending: INTERNAL MEDICINE
Payer: MEDICARE

## 2019-03-12 DIAGNOSIS — C25.1 MALIGNANT NEOPLASM OF BODY OF PANCREAS: ICD-10-CM

## 2019-03-12 PROCEDURE — 74178 CT CHEST ABDOMEN PELVIS W W/O CONTRAST (XPD): ICD-10-PCS | Mod: 26,,, | Performed by: RADIOLOGY

## 2019-03-12 PROCEDURE — 71260 CT CHEST ABDOMEN PELVIS W W/O CONTRAST (XPD): ICD-10-PCS | Mod: 26,,, | Performed by: RADIOLOGY

## 2019-03-12 PROCEDURE — 74178 CT ABD&PLV WO CNTR FLWD CNTR: CPT | Mod: 26,,, | Performed by: RADIOLOGY

## 2019-03-12 PROCEDURE — 25500020 PHARM REV CODE 255: Performed by: INTERNAL MEDICINE

## 2019-03-12 PROCEDURE — 74178 CT ABD&PLV WO CNTR FLWD CNTR: CPT | Mod: TC

## 2019-03-12 PROCEDURE — 71260 CT THORAX DX C+: CPT | Mod: 26,,, | Performed by: RADIOLOGY

## 2019-03-12 RX ADMIN — IOHEXOL 75 ML: 350 INJECTION, SOLUTION INTRAVENOUS at 10:03

## 2019-03-13 ENCOUNTER — OFFICE VISIT (OUTPATIENT)
Dept: HEMATOLOGY/ONCOLOGY | Facility: CLINIC | Age: 74
End: 2019-03-13
Attending: INTERNAL MEDICINE
Payer: MEDICARE

## 2019-03-13 ENCOUNTER — INFUSION (OUTPATIENT)
Dept: INFUSION THERAPY | Facility: OTHER | Age: 74
End: 2019-03-13
Attending: INTERNAL MEDICINE
Payer: MEDICARE

## 2019-03-13 VITALS
SYSTOLIC BLOOD PRESSURE: 83 MMHG | RESPIRATION RATE: 16 BRPM | HEART RATE: 67 BPM | WEIGHT: 145.94 LBS | TEMPERATURE: 98 F | DIASTOLIC BLOOD PRESSURE: 54 MMHG | OXYGEN SATURATION: 100 % | BODY MASS INDEX: 22.12 KG/M2 | HEIGHT: 68 IN

## 2019-03-13 VITALS — SYSTOLIC BLOOD PRESSURE: 102 MMHG | HEART RATE: 61 BPM | DIASTOLIC BLOOD PRESSURE: 66 MMHG

## 2019-03-13 DIAGNOSIS — K13.79 MOUTH SORE: ICD-10-CM

## 2019-03-13 DIAGNOSIS — R20.0 BILATERAL LEG NUMBNESS: ICD-10-CM

## 2019-03-13 DIAGNOSIS — C78.6 PERITONEAL METASTASES: ICD-10-CM

## 2019-03-13 DIAGNOSIS — I95.2 HYPOTENSION DUE TO DRUGS: ICD-10-CM

## 2019-03-13 DIAGNOSIS — C78.7 LIVER METASTASIS: ICD-10-CM

## 2019-03-13 DIAGNOSIS — C61 PROSTATE CANCER METASTATIC TO INTRAPELVIC LYMPH NODE: ICD-10-CM

## 2019-03-13 DIAGNOSIS — C77.5 PROSTATE CANCER METASTATIC TO INTRAPELVIC LYMPH NODE: ICD-10-CM

## 2019-03-13 DIAGNOSIS — C61 PROSTATE CANCER METASTATIC TO BONE: Primary | ICD-10-CM

## 2019-03-13 DIAGNOSIS — Z87.19 HISTORY OF DENTAL PROBLEMS: ICD-10-CM

## 2019-03-13 DIAGNOSIS — C25.1 MALIGNANT NEOPLASM OF BODY OF PANCREAS: Primary | ICD-10-CM

## 2019-03-13 DIAGNOSIS — C25.1 MALIGNANT NEOPLASM OF BODY OF PANCREAS: ICD-10-CM

## 2019-03-13 DIAGNOSIS — C61 PROSTATE CANCER METASTATIC TO BONE: ICD-10-CM

## 2019-03-13 DIAGNOSIS — C79.51 PROSTATE CANCER METASTATIC TO BONE: Primary | ICD-10-CM

## 2019-03-13 DIAGNOSIS — C79.51 PROSTATE CANCER METASTATIC TO BONE: ICD-10-CM

## 2019-03-13 PROBLEM — I95.9 HYPOTENSION: Status: ACTIVE | Noted: 2019-03-13

## 2019-03-13 PROCEDURE — 96368 THER/DIAG CONCURRENT INF: CPT

## 2019-03-13 PROCEDURE — 96361 HYDRATE IV INFUSION ADD-ON: CPT

## 2019-03-13 PROCEDURE — 1101F PT FALLS ASSESS-DOCD LE1/YR: CPT | Mod: CPTII,S$GLB,, | Performed by: INTERNAL MEDICINE

## 2019-03-13 PROCEDURE — 99214 PR OFFICE/OUTPT VISIT, EST, LEVL IV, 30-39 MIN: ICD-10-PCS | Mod: S$GLB,,, | Performed by: INTERNAL MEDICINE

## 2019-03-13 PROCEDURE — 25000003 PHARM REV CODE 250: Performed by: INTERNAL MEDICINE

## 2019-03-13 PROCEDURE — 1101F PR PT FALLS ASSESS DOC 0-1 FALLS W/OUT INJ PAST YR: ICD-10-PCS | Mod: CPTII,S$GLB,, | Performed by: INTERNAL MEDICINE

## 2019-03-13 PROCEDURE — 3074F PR MOST RECENT SYSTOLIC BLOOD PRESSURE < 130 MM HG: ICD-10-PCS | Mod: CPTII,S$GLB,, | Performed by: INTERNAL MEDICINE

## 2019-03-13 PROCEDURE — 96411 CHEMO IV PUSH ADDL DRUG: CPT

## 2019-03-13 PROCEDURE — 99999 PR PBB SHADOW E&M-EST. PATIENT-LVL V: ICD-10-PCS | Mod: PBBFAC,,, | Performed by: INTERNAL MEDICINE

## 2019-03-13 PROCEDURE — 96367 TX/PROPH/DG ADDL SEQ IV INF: CPT

## 2019-03-13 PROCEDURE — 99999 PR PBB SHADOW E&M-EST. PATIENT-LVL V: CPT | Mod: PBBFAC,,, | Performed by: INTERNAL MEDICINE

## 2019-03-13 PROCEDURE — 3078F DIAST BP <80 MM HG: CPT | Mod: CPTII,S$GLB,, | Performed by: INTERNAL MEDICINE

## 2019-03-13 PROCEDURE — 96413 CHEMO IV INFUSION 1 HR: CPT

## 2019-03-13 PROCEDURE — 99214 OFFICE O/P EST MOD 30 MIN: CPT | Mod: S$GLB,,, | Performed by: INTERNAL MEDICINE

## 2019-03-13 PROCEDURE — 96416 CHEMO PROLONG INFUSE W/PUMP: CPT

## 2019-03-13 PROCEDURE — 63600175 PHARM REV CODE 636 W HCPCS: Mod: JG | Performed by: INTERNAL MEDICINE

## 2019-03-13 PROCEDURE — 3074F SYST BP LT 130 MM HG: CPT | Mod: CPTII,S$GLB,, | Performed by: INTERNAL MEDICINE

## 2019-03-13 PROCEDURE — 96415 CHEMO IV INFUSION ADDL HR: CPT

## 2019-03-13 PROCEDURE — 3078F PR MOST RECENT DIASTOLIC BLOOD PRESSURE < 80 MM HG: ICD-10-PCS | Mod: CPTII,S$GLB,, | Performed by: INTERNAL MEDICINE

## 2019-03-13 RX ORDER — DIPHENHYDRAMINE HYDROCHLORIDE 50 MG/ML
50 INJECTION INTRAMUSCULAR; INTRAVENOUS ONCE AS NEEDED
Status: CANCELLED | OUTPATIENT
Start: 2019-03-13 | End: 2019-03-13

## 2019-03-13 RX ORDER — DIPHENHYDRAMINE HYDROCHLORIDE 50 MG/ML
50 INJECTION INTRAMUSCULAR; INTRAVENOUS ONCE AS NEEDED
Status: DISCONTINUED | OUTPATIENT
Start: 2019-03-13 | End: 2019-03-13 | Stop reason: HOSPADM

## 2019-03-13 RX ORDER — SODIUM CHLORIDE 0.9 % (FLUSH) 0.9 %
10 SYRINGE (ML) INJECTION
Status: CANCELLED | OUTPATIENT
Start: 2019-03-13

## 2019-03-13 RX ORDER — EPINEPHRINE 0.3 MG/.3ML
0.3 INJECTION SUBCUTANEOUS ONCE AS NEEDED
Status: CANCELLED | OUTPATIENT
Start: 2019-03-13 | End: 2019-03-13

## 2019-03-13 RX ORDER — HEPARIN 100 UNIT/ML
500 SYRINGE INTRAVENOUS
Status: DISCONTINUED | OUTPATIENT
Start: 2019-03-13 | End: 2019-03-13 | Stop reason: HOSPADM

## 2019-03-13 RX ORDER — LIDOCAINE HYDROCHLORIDE 20 MG/ML
SOLUTION ORAL; TOPICAL
COMMUNITY
Start: 2019-03-07

## 2019-03-13 RX ORDER — HEPARIN 100 UNIT/ML
500 SYRINGE INTRAVENOUS
Status: CANCELLED | OUTPATIENT
Start: 2019-03-15

## 2019-03-13 RX ORDER — SODIUM CHLORIDE 9 MG/ML
INJECTION, SOLUTION INTRAVENOUS CONTINUOUS
Status: CANCELLED
Start: 2019-03-13

## 2019-03-13 RX ORDER — SODIUM CHLORIDE 0.9 % (FLUSH) 0.9 %
10 SYRINGE (ML) INJECTION
Status: CANCELLED | OUTPATIENT
Start: 2019-03-15

## 2019-03-13 RX ORDER — EPINEPHRINE 0.3 MG/.3ML
0.3 INJECTION SUBCUTANEOUS ONCE AS NEEDED
Status: DISCONTINUED | OUTPATIENT
Start: 2019-03-13 | End: 2019-03-13 | Stop reason: HOSPADM

## 2019-03-13 RX ORDER — SODIUM CHLORIDE 0.9 % (FLUSH) 0.9 %
10 SYRINGE (ML) INJECTION
Status: DISCONTINUED | OUTPATIENT
Start: 2019-03-13 | End: 2019-03-13 | Stop reason: HOSPADM

## 2019-03-13 RX ORDER — SODIUM CHLORIDE 9 MG/ML
INJECTION, SOLUTION INTRAVENOUS ONCE
Status: COMPLETED | OUTPATIENT
Start: 2019-03-13 | End: 2019-03-13

## 2019-03-13 RX ORDER — FLUOROURACIL 50 MG/ML
400 INJECTION, SOLUTION INTRAVENOUS
Status: CANCELLED | OUTPATIENT
Start: 2019-03-13

## 2019-03-13 RX ORDER — HEPARIN 100 UNIT/ML
500 SYRINGE INTRAVENOUS
Status: CANCELLED | OUTPATIENT
Start: 2019-03-13

## 2019-03-13 RX ORDER — FLUOROURACIL 50 MG/ML
400 INJECTION, SOLUTION INTRAVENOUS
Status: COMPLETED | OUTPATIENT
Start: 2019-03-13 | End: 2019-03-13

## 2019-03-13 RX ADMIN — SODIUM CHLORIDE: 0.9 INJECTION, SOLUTION INTRAVENOUS at 09:03

## 2019-03-13 RX ADMIN — FLUOROURACIL 4270 MG: 50 INJECTION, SOLUTION INTRAVENOUS at 01:03

## 2019-03-13 RX ADMIN — DEXTROSE: 5 SOLUTION INTRAVENOUS at 10:03

## 2019-03-13 RX ADMIN — OXALIPLATIN 125 MG: 5 INJECTION, SOLUTION INTRAVENOUS at 10:03

## 2019-03-13 RX ADMIN — DEXAMETHASONE SODIUM PHOSPHATE: 4 INJECTION, SOLUTION INTRAMUSCULAR; INTRAVENOUS at 10:03

## 2019-03-13 RX ADMIN — LEUCOVORIN CALCIUM 710 MG: 350 INJECTION, POWDER, LYOPHILIZED, FOR SOLUTION INTRAMUSCULAR; INTRAVENOUS at 10:03

## 2019-03-13 RX ADMIN — FLUOROURACIL 710 MG: 50 INJECTION, SOLUTION INTRAVENOUS at 01:03

## 2019-03-13 NOTE — PLAN OF CARE
Problem: Adult Inpatient Plan of Care  Goal: Plan of Care Review  Outcome: Ongoing (interventions implemented as appropriate)  Pt tolerated FOLFOX. Pt connected to 5FU home pump. VSS. AVS given. Pt d/c home with instructions to return 3/15/19 for pump d/c.

## 2019-03-13 NOTE — PROGRESS NOTES
Subjective:       Patient ID: Shady Prakash Jr. is a 73 y.o. male.    Chief Complaint: Follow-up    HPI     Returns for follow-up of metastatic pancreatic cancer and metastatic prostate cancer  Currently his treatment is focused on the metastatic pancreatic cancer  Recent scans with response  He states felling well  However, in the interval wife notes not feeling as well  Not eating as well  Notes no pain  Did have a mouth sore but no improvement, likely related to dental issues  Denies abdominal pain, no back pain  Does report numbness in legs, thighs  Denies weakness  No nausea or vomiting.    Today his BP is low but his BP was 101/50 at home pre medications and he took the BP medications regardless as he thought the [employer] RN emphasized not missing for any reason. Denies dizziness but states tired.    Presents for cycle # 7 FOLFOX     Oncology History:  1. Metastatic prostate cancer  He is s/p TRUS/bx on 8/7/15 w/ high volume marta 9 (5+4), PSA 25.  He had negative metastatic workup at diagnosis.  However, at time of planned RALP on 9/10/15 he was noted to have BN invasion on cysto and therefore only PLND was done, which confirmed metastatic disease.  He was started on ADT 9/17/15 w/ firmagon followed by Eligard on 10/15/15.    Started Casodex 7/20/17 - scans at that time showed osseus metastatic disease  Started Docetaxel 10/6/17 with progression, then found to have below     - on follow-up scans a pancreatic lesion noted concerning for pancreatic cancer as it was an unlikely area for prostate cancer spread-  Biopsy confirmed adenocarcinoma and PSA was negative     Imaging as below:  Scan results:  6/20/18 Bone scan:  FINDINGS:  Two lesions in the left scapula, stable.  Elongated lesion in the right mid rib posteriorly, stable.  Prior exam demonstrated a large right iliac lesion, which no longer exhibits increased uptake.  Left pubic lesion, stable.  There is also now a small lesion in the left iliac bone. Two  additional new small foci in the mid thoracic vertebra.  Both kidneys and the bladder appear unremarkable.  Impression:  Findings consistent with osseous metastatic disease as above, noting 3 new small lesions.     6/21/18 CT scan abdomen and pelvis:  FINDINGS:  Lower chest: There has been interval increase in size in the focal consolidation which is pleural based in the posterior aspect of the right lower lobe.  Today's exam measures 5.1 cm maximum diameter.  Previous exam is measure approximately 3.3 cm.  This is associated with pleural thickening that extends laterally.  There is a small focus of cylinder all coal bronchiectasis in the medial left lower lobe.  The remaining aspects of the lung parenchyma that are visualized show no abnormalities.  The visualized heart and pericardium are normal  Liver: There are 3 hyperenhancing foci along the periphery of the right and left lobes of the liver which are stable from 2015.  The largest measures 1.1 cm in maximum diameter.  Although not characteristic on this exam, previous exam in 2015 shows findings most characteristic of hepatic hemangioma.  The remaining liver parenchyma as well as the hepatic and portal veins are patent.  Gallbladder: Normal.  Pancreas: There is been interval development of an ill-defined hypodense focus within the proximal body of the pancreas measuring 2.1 cm x 1.8 cm.  Spleen: Calcified hemangiomas present.  Otherwise normal.  Adrenal glands: Normal.  Genitourinary: Stable simple cyst in the lower pole of the right kidney measuring 1.6 cm.  The left kidney and bilateral ureters are normal.  The bladder is partially distended and shows wall thickening on the non dependent surface of the bladder.  This is not significantly changed in appearance.  Gastrointestinal: Stomach and small intestines are normal.  Colonic diverticulosis is present without evidence of diverticulitis.  Prostate: Enlarged and nodular causing mass effect on the  bladder.  Miscellaneous: There is no intra-abdominal or pelvic free fluid, free air or lymphadenopathy.  The abdominal aorta tapers normally.  Bilateral fat containing inguinal hernias present.  Osseous and soft tissue structures: Sclerotic metastases involving the right iliac bone and left superior pubic ramus are identified.  The small focus in the left iliac bone seen on yesterday's bone scan is not well seen on this exam.  The soft tissues are within normal limits.  Impression:  1. Interval increase in size in the pleural base, right lower lobe consolidation.  2. Hyperenhancing foci likely represent hemangiomas.  They have been stable since 2015.  Is on the 2015 exam where they demonstrate characteristics most compatible with hemangioma.  3. Bladder wall thickening is not significantly changed.  This may be related to outlet obstruction from the nodular prostate.  4. Interval development of ill-defined hypodense focus measuring 2.1 cm within the proximal body of the pancreas.  This may also be a focus of metastatic disease although is uncommon for the prostate to metastasized to the pancreas.  A dedicated pancreatic CT may be beneficial in further characterizing this lesion.     - on 7/25/18 he came to ER with worsening abdominal pain and concerns for appendicitis  Underwent Laparoscopy Converted to Laparotomy, Peritoneal Biopsy , Ileocecal with Dr. Stephenson  Biopsy concerning for metastatic pancreatic carcinoma     2. Metastatic pancreatic cancer  - started on [place]/Abraxane- note Ca19-9 never elevated  Response initially  Now with progression  FOLFOX started 12/20/18    Re imaging yesterday stable.    Review of Systems   Constitutional: Positive for fatigue and unexpected weight change. Negative for activity change, appetite change, chills and fever.   HENT: Positive for dental problem and mouth sores. Negative for congestion, hearing loss, nosebleeds, postnasal drip, sinus pressure, sore throat and trouble  swallowing.    Eyes: Negative for visual disturbance.   Respiratory: Negative for cough (rare dry), chest tightness, shortness of breath and wheezing.    Cardiovascular: Negative for chest pain, palpitations and leg swelling.   Gastrointestinal: Negative for abdominal distention, abdominal pain, blood in stool, constipation, diarrhea, nausea and vomiting.        No GERD   Endocrine:        Hot flashes   Genitourinary: Negative for decreased urine volume, difficulty urinating, frequency, hematuria and urgency.   Musculoskeletal: Negative for arthralgias (better), back pain, gait problem, joint swelling, neck pain and neck stiffness.   Skin: Negative for color change, pallor, rash and wound.   Neurological: Positive for numbness (feet). Negative for dizziness, weakness, light-headedness and headaches.   Hematological: Negative for adenopathy. Does not bruise/bleed easily.   Psychiatric/Behavioral: Negative for dysphoric mood and sleep disturbance. The patient is not nervous/anxious.        Objective:      Physical Exam   Constitutional: He is oriented to person, place, and time. He appears well-developed and well-nourished. No distress.   Presents with his wife  ECOG=0   HENT:   Head: Normocephalic and atraumatic.   Right Ear: External ear normal.   Left Ear: External ear normal.   Nose: Nose normal.   Mouth/Throat: Oropharynx is clear and moist. No oropharyngeal exudate.   Poor dentition, 1 mouth sore from trauma at rt molar area   Eyes: Conjunctivae and EOM are normal. Pupils are equal, round, and reactive to light. Right eye exhibits no discharge. Left eye exhibits no discharge. No scleral icterus.   Neck: Normal range of motion. Neck supple. No tracheal deviation present. No thyromegaly present.   Cardiovascular: Normal rate, regular rhythm, normal heart sounds and intact distal pulses. Exam reveals no gallop and no friction rub.   No murmur heard.  Pulmonary/Chest: Effort normal and breath sounds normal. No  respiratory distress. He has no wheezes. He has no rales. He exhibits no tenderness.   Abdominal: Soft. Bowel sounds are normal. He exhibits mass. He exhibits no distension. There is no tenderness. There is no rebound and no guarding.   Musculoskeletal: Normal range of motion. He exhibits no edema, tenderness or deformity.   Lymphadenopathy:     He has no cervical adenopathy.   Neurological: He is alert and oriented to person, place, and time. He has normal reflexes. He displays normal reflexes. No cranial nerve deficit. He exhibits normal muscle tone. Coordination normal.   Skin: Skin is warm and dry. No rash noted. He is not diaphoretic. No erythema. No pallor.   Psychiatric: He has a normal mood and affect. His behavior is normal. Judgment and thought content normal.   Nursing note and vitals reviewed.    Labs- reviewed  Assessment:       1. Prostate cancer metastatic to bone    2. Liver metastasis    3. Peritoneal metastases    4. Hypotension due to drugs    5. Mouth sore    6. History of dental problems    7. Malignant neoplasm of body of pancreas        Plan:       1. Stable, not treating currently with focus on metastatic pancreatic cancer  We will do spine MRI however with numbness complaints  2,3, 7. Proceed with cycle # 7 of FOLFOX  Restaging stable.  We again discussed nutrition and management of blood sugar and maintaining calorie intake  We discussed prognosis again and he turned in his advanced care planning forms today  5,6. Mouth sore related to dental issues  Wife to try and get a dental appointment and let us know if she needs help    > 25 minutes total

## 2019-03-15 ENCOUNTER — INFUSION (OUTPATIENT)
Dept: INFUSION THERAPY | Facility: OTHER | Age: 74
End: 2019-03-15
Attending: INTERNAL MEDICINE
Payer: MEDICARE

## 2019-03-15 VITALS
RESPIRATION RATE: 17 BRPM | TEMPERATURE: 98 F | SYSTOLIC BLOOD PRESSURE: 130 MMHG | OXYGEN SATURATION: 100 % | HEART RATE: 89 BPM | DIASTOLIC BLOOD PRESSURE: 59 MMHG

## 2019-03-15 DIAGNOSIS — C25.1 MALIGNANT NEOPLASM OF BODY OF PANCREAS: Primary | ICD-10-CM

## 2019-03-15 DIAGNOSIS — C77.5 PROSTATE CANCER METASTATIC TO INTRAPELVIC LYMPH NODE: ICD-10-CM

## 2019-03-15 DIAGNOSIS — C79.51 PROSTATE CANCER METASTATIC TO BONE: ICD-10-CM

## 2019-03-15 DIAGNOSIS — C61 PROSTATE CANCER METASTATIC TO BONE: ICD-10-CM

## 2019-03-15 DIAGNOSIS — C61 PROSTATE CANCER METASTATIC TO INTRAPELVIC LYMPH NODE: ICD-10-CM

## 2019-03-15 PROCEDURE — 96377 APPLICATON ON-BODY INJECTOR: CPT

## 2019-03-15 PROCEDURE — 96521 REFILL/MAINT PORTABLE PUMP: CPT

## 2019-03-15 PROCEDURE — A4216 STERILE WATER/SALINE, 10 ML: HCPCS | Performed by: INTERNAL MEDICINE

## 2019-03-15 PROCEDURE — 63600175 PHARM REV CODE 636 W HCPCS: Mod: JG | Performed by: INTERNAL MEDICINE

## 2019-03-15 PROCEDURE — 25000003 PHARM REV CODE 250: Performed by: INTERNAL MEDICINE

## 2019-03-15 RX ORDER — SODIUM CHLORIDE 0.9 % (FLUSH) 0.9 %
10 SYRINGE (ML) INJECTION
Status: DISCONTINUED | OUTPATIENT
Start: 2019-03-15 | End: 2019-03-15 | Stop reason: HOSPADM

## 2019-03-15 RX ORDER — HEPARIN 100 UNIT/ML
500 SYRINGE INTRAVENOUS
Status: DISCONTINUED | OUTPATIENT
Start: 2019-03-15 | End: 2019-03-15 | Stop reason: HOSPADM

## 2019-03-15 RX ADMIN — HEPARIN 500 UNITS: 100 SYRINGE at 10:03

## 2019-03-15 RX ADMIN — Medication 10 ML: at 10:03

## 2019-03-15 RX ADMIN — PEGFILGRASTIM 6 MG: KIT SUBCUTANEOUS at 10:03

## 2019-03-15 NOTE — PLAN OF CARE
Problem: Adult Inpatient Plan of Care  Goal: Plan of Care Review  Outcome: Ongoing (interventions implemented as appropriate)  Pt here for pump d/c. Pt tolerated 5FU without issue. Neulasta OBI applied. VSS. AVS given. Pt d/c home with instructions to return 3/27/19 for next cycle.

## 2019-03-18 ENCOUNTER — HOSPITAL ENCOUNTER (OUTPATIENT)
Dept: RADIOLOGY | Facility: OTHER | Age: 74
Discharge: HOME | End: 2019-03-18
Attending: INTERNAL MEDICINE
Payer: MEDICARE

## 2019-03-18 DIAGNOSIS — C25.1 MALIGNANT NEOPLASM OF BODY OF PANCREAS: ICD-10-CM

## 2019-03-18 DIAGNOSIS — C61 PROSTATE CANCER METASTATIC TO BONE: ICD-10-CM

## 2019-03-18 DIAGNOSIS — R20.0 BILATERAL LEG NUMBNESS: ICD-10-CM

## 2019-03-18 DIAGNOSIS — C79.51 PROSTATE CANCER METASTATIC TO BONE: ICD-10-CM

## 2019-03-18 PROCEDURE — 72158 MRI LUMBAR SPINE W/O & W/DYE: CPT | Mod: 26,,, | Performed by: RADIOLOGY

## 2019-03-18 PROCEDURE — 72158 MRI LUMBAR SPINE W/O & W/DYE: CPT | Mod: TC

## 2019-03-18 PROCEDURE — 72157 MRI CHEST SPINE W/O & W/DYE: CPT | Mod: TC

## 2019-03-18 PROCEDURE — 72157 MRI THORACIC SPINE W WO CONTRAST: ICD-10-PCS | Mod: 26,,, | Performed by: RADIOLOGY

## 2019-03-18 PROCEDURE — 72158 MRI LUMBAR SPINE W WO CONTRAST: ICD-10-PCS | Mod: 26,,, | Performed by: RADIOLOGY

## 2019-03-18 PROCEDURE — 72157 MRI CHEST SPINE W/O & W/DYE: CPT | Mod: 26,,, | Performed by: RADIOLOGY

## 2019-03-18 PROCEDURE — 25500020 PHARM REV CODE 255: Performed by: INTERNAL MEDICINE

## 2019-03-18 PROCEDURE — A9585 GADOBUTROL INJECTION: HCPCS | Performed by: INTERNAL MEDICINE

## 2019-03-18 RX ORDER — GADOBUTROL 604.72 MG/ML
7 INJECTION INTRAVENOUS
Status: COMPLETED | OUTPATIENT
Start: 2019-03-18 | End: 2019-03-18

## 2019-03-18 RX ADMIN — GADOBUTROL 7 ML: 604.72 INJECTION INTRAVENOUS at 09:03

## 2019-03-26 ENCOUNTER — LAB VISIT (OUTPATIENT)
Dept: LAB | Facility: OTHER | Age: 74
End: 2019-03-26
Attending: INTERNAL MEDICINE
Payer: MEDICARE

## 2019-03-26 DIAGNOSIS — C25.1 MALIGNANT NEOPLASM OF BODY OF PANCREAS: ICD-10-CM

## 2019-03-26 LAB
ALBUMIN SERPL BCP-MCNC: 3.3 G/DL (ref 3.5–5.2)
ALP SERPL-CCNC: 89 U/L (ref 55–135)
ALT SERPL W/O P-5'-P-CCNC: 14 U/L (ref 10–44)
ANION GAP SERPL CALC-SCNC: 9 MMOL/L (ref 8–16)
ANISOCYTOSIS BLD QL SMEAR: SLIGHT
AST SERPL-CCNC: 16 U/L (ref 10–40)
BASO STIPL BLD QL SMEAR: ABNORMAL
BASOPHILS # BLD AUTO: 0.04 K/UL (ref 0–0.2)
BASOPHILS NFR BLD: 0.6 % (ref 0–1.9)
BILIRUB SERPL-MCNC: 0.6 MG/DL (ref 0.1–1)
BUN SERPL-MCNC: 10 MG/DL (ref 8–23)
CALCIUM SERPL-MCNC: 8.8 MG/DL (ref 8.7–10.5)
CHLORIDE SERPL-SCNC: 107 MMOL/L (ref 95–110)
CO2 SERPL-SCNC: 24 MMOL/L (ref 23–29)
CREAT SERPL-MCNC: 0.9 MG/DL (ref 0.5–1.4)
DACRYOCYTES BLD QL SMEAR: ABNORMAL
DIFFERENTIAL METHOD: ABNORMAL
EOSINOPHIL # BLD AUTO: 0 K/UL (ref 0–0.5)
EOSINOPHIL NFR BLD: 0.5 % (ref 0–8)
ERYTHROCYTE [DISTWIDTH] IN BLOOD BY AUTOMATED COUNT: 24.2 % (ref 11.5–14.5)
EST. GFR  (AFRICAN AMERICAN): >60 ML/MIN/1.73 M^2
EST. GFR  (NON AFRICAN AMERICAN): >60 ML/MIN/1.73 M^2
GIANT PLATELETS BLD QL SMEAR: PRESENT
GLUCOSE SERPL-MCNC: 155 MG/DL (ref 70–110)
HCT VFR BLD AUTO: 21.9 % (ref 40–54)
HGB BLD-MCNC: 6.7 G/DL (ref 14–18)
HYPOCHROMIA BLD QL SMEAR: ABNORMAL
LYMPHOCYTES # BLD AUTO: 0.7 K/UL (ref 1–4.8)
LYMPHOCYTES NFR BLD: 11.2 % (ref 18–48)
MCH RBC QN AUTO: 29.4 PG (ref 27–31)
MCHC RBC AUTO-ENTMCNC: 30.6 G/DL (ref 32–36)
MCV RBC AUTO: 96 FL (ref 82–98)
MONOCYTES # BLD AUTO: 1.1 K/UL (ref 0.3–1)
MONOCYTES NFR BLD: 17.3 % (ref 4–15)
NEUTROPHILS # BLD AUTO: 4.5 K/UL (ref 1.8–7.7)
NEUTROPHILS NFR BLD: 70.4 % (ref 38–73)
OVALOCYTES BLD QL SMEAR: ABNORMAL
PLATELET # BLD AUTO: 74 K/UL (ref 150–350)
PLATELET BLD QL SMEAR: ABNORMAL
PMV BLD AUTO: ABNORMAL FL (ref 9.2–12.9)
POIKILOCYTOSIS BLD QL SMEAR: SLIGHT
POLYCHROMASIA BLD QL SMEAR: ABNORMAL
POTASSIUM SERPL-SCNC: 2.9 MMOL/L (ref 3.5–5.1)
PROT SERPL-MCNC: 5.9 G/DL (ref 6–8.4)
RBC # BLD AUTO: 2.28 M/UL (ref 4.6–6.2)
SCHISTOCYTES BLD QL SMEAR: PRESENT
SODIUM SERPL-SCNC: 140 MMOL/L (ref 136–145)
TOXIC GRANULES BLD QL SMEAR: PRESENT
WBC # BLD AUTO: 6.58 K/UL (ref 3.9–12.7)

## 2019-03-26 PROCEDURE — 80053 COMPREHEN METABOLIC PANEL: CPT

## 2019-03-26 PROCEDURE — 85025 COMPLETE CBC W/AUTO DIFF WBC: CPT

## 2019-03-26 PROCEDURE — 36415 COLL VENOUS BLD VENIPUNCTURE: CPT

## 2019-03-27 ENCOUNTER — OFFICE VISIT (OUTPATIENT)
Dept: HEMATOLOGY/ONCOLOGY | Facility: CLINIC | Age: 74
End: 2019-03-27
Attending: INTERNAL MEDICINE
Payer: MEDICARE

## 2019-03-27 ENCOUNTER — LAB VISIT (OUTPATIENT)
Dept: LAB | Facility: OTHER | Age: 74
End: 2019-03-27
Attending: INTERNAL MEDICINE
Payer: MEDICARE

## 2019-03-27 VITALS
SYSTOLIC BLOOD PRESSURE: 101 MMHG | TEMPERATURE: 98 F | OXYGEN SATURATION: 100 % | HEART RATE: 82 BPM | WEIGHT: 144.81 LBS | BODY MASS INDEX: 21.95 KG/M2 | HEIGHT: 68 IN | DIASTOLIC BLOOD PRESSURE: 64 MMHG | RESPIRATION RATE: 17 BRPM

## 2019-03-27 DIAGNOSIS — C61 PROSTATE CANCER METASTATIC TO BONE: Primary | ICD-10-CM

## 2019-03-27 DIAGNOSIS — E87.6 HYPOKALEMIA: ICD-10-CM

## 2019-03-27 DIAGNOSIS — C78.7 LIVER METASTASES: ICD-10-CM

## 2019-03-27 DIAGNOSIS — C77.5 PROSTATE CANCER METASTATIC TO INTRAPELVIC LYMPH NODE: ICD-10-CM

## 2019-03-27 DIAGNOSIS — D63.0 ANEMIA IN NEOPLASTIC DISEASE: ICD-10-CM

## 2019-03-27 DIAGNOSIS — C79.51 PROSTATE CANCER METASTATIC TO BONE: Primary | ICD-10-CM

## 2019-03-27 DIAGNOSIS — D63.0 ANEMIA IN NEOPLASTIC DISEASE: Primary | ICD-10-CM

## 2019-03-27 DIAGNOSIS — D69.59 CHEMOTHERAPY-INDUCED THROMBOCYTOPENIA: ICD-10-CM

## 2019-03-27 DIAGNOSIS — C61 PROSTATE CANCER METASTATIC TO INTRAPELVIC LYMPH NODE: ICD-10-CM

## 2019-03-27 DIAGNOSIS — C78.6 PERITONEAL METASTASES: ICD-10-CM

## 2019-03-27 DIAGNOSIS — T45.1X5A CHEMOTHERAPY-INDUCED THROMBOCYTOPENIA: ICD-10-CM

## 2019-03-27 LAB
ABO + RH BLD: NORMAL
BLD GP AB SCN CELLS X3 SERPL QL: NORMAL

## 2019-03-27 PROCEDURE — 3074F SYST BP LT 130 MM HG: CPT | Mod: CPTII,S$GLB,, | Performed by: INTERNAL MEDICINE

## 2019-03-27 PROCEDURE — 99214 PR OFFICE/OUTPT VISIT, EST, LEVL IV, 30-39 MIN: ICD-10-PCS | Mod: S$GLB,,, | Performed by: INTERNAL MEDICINE

## 2019-03-27 PROCEDURE — 99999 PR PBB SHADOW E&M-EST. PATIENT-LVL III: CPT | Mod: PBBFAC,,, | Performed by: INTERNAL MEDICINE

## 2019-03-27 PROCEDURE — 36415 COLL VENOUS BLD VENIPUNCTURE: CPT

## 2019-03-27 PROCEDURE — 99999 PR PBB SHADOW E&M-EST. PATIENT-LVL III: ICD-10-PCS | Mod: PBBFAC,,, | Performed by: INTERNAL MEDICINE

## 2019-03-27 PROCEDURE — 99214 OFFICE O/P EST MOD 30 MIN: CPT | Mod: S$GLB,,, | Performed by: INTERNAL MEDICINE

## 2019-03-27 PROCEDURE — 86850 RBC ANTIBODY SCREEN: CPT

## 2019-03-27 PROCEDURE — 3078F PR MOST RECENT DIASTOLIC BLOOD PRESSURE < 80 MM HG: ICD-10-PCS | Mod: CPTII,S$GLB,, | Performed by: INTERNAL MEDICINE

## 2019-03-27 PROCEDURE — 86920 COMPATIBILITY TEST SPIN: CPT

## 2019-03-27 PROCEDURE — 3078F DIAST BP <80 MM HG: CPT | Mod: CPTII,S$GLB,, | Performed by: INTERNAL MEDICINE

## 2019-03-27 PROCEDURE — 1101F PT FALLS ASSESS-DOCD LE1/YR: CPT | Mod: CPTII,S$GLB,, | Performed by: INTERNAL MEDICINE

## 2019-03-27 PROCEDURE — 3074F PR MOST RECENT SYSTOLIC BLOOD PRESSURE < 130 MM HG: ICD-10-PCS | Mod: CPTII,S$GLB,, | Performed by: INTERNAL MEDICINE

## 2019-03-27 PROCEDURE — 1101F PR PT FALLS ASSESS DOC 0-1 FALLS W/OUT INJ PAST YR: ICD-10-PCS | Mod: CPTII,S$GLB,, | Performed by: INTERNAL MEDICINE

## 2019-03-27 RX ORDER — DIPHENHYDRAMINE HCL 25 MG
25 CAPSULE ORAL
Status: CANCELLED | OUTPATIENT
Start: 2019-03-27

## 2019-03-27 RX ORDER — HYDROCODONE BITARTRATE AND ACETAMINOPHEN 500; 5 MG/1; MG/1
TABLET ORAL ONCE
Status: CANCELLED | OUTPATIENT
Start: 2019-03-27 | End: 2019-03-27

## 2019-03-27 RX ORDER — ACETAMINOPHEN 325 MG/1
650 TABLET ORAL
Status: CANCELLED | OUTPATIENT
Start: 2019-03-27

## 2019-03-27 NOTE — Clinical Note
- Transfude tomorrow- recheck cbc and cmp on Monday and chemo if adequate- RTC 2 weeks later (wed) with cbc, cmp EP and chemo

## 2019-03-27 NOTE — PROGRESS NOTES
Subjective:       Patient ID: Shady Prakash Jr. is a 73 y.o. male.    Chief Complaint: No chief complaint on file.    HPI     Returns for follow-up of metastatic pancreatic cancer and metastatic prostate cancer  Feels better. Notes that dizziness is gone. Blood pressure now being monitored and knows not to take BP meds if low as we previously discussed.  No headaches.  No fevers, chills, or infections.  Still gets occasional mouth sores- reports better now.  No abdominal pain. No N/V. No constipation or diarrhea.    Currently his treatment is focused on the metastatic pancreatic cancer  Recent scans with response  MRIs for back pain revealed arthritis     We discussed labs reveals more anemia and a thrombocytopenia- chemotherapy will need to be held  No bleeding noted. Reports normal stools  His wide has noted some fatigue but he states he tries to remain active and denies    Presents for cycle # 8 FOLFOX     Oncology History:  1. Metastatic prostate cancer  He is s/p TRUS/bx on 8/7/15 w/ high volume marta 9 (5+4), PSA 25.  He had negative metastatic workup at diagnosis.  However, at time of planned RALP on 9/10/15 he was noted to have BN invasion on cysto and therefore only PLND was done, which confirmed metastatic disease.  He was started on ADT 9/17/15 w/ firmagon followed by Eligard on 10/15/15.    Started Casodex 7/20/17 - scans at that time showed osseus metastatic disease  Started Docetaxel 10/6/17 with progression, then found to have below     - on follow-up scans a pancreatic lesion noted concerning for pancreatic cancer as it was an unlikely area for prostate cancer spread-  Biopsy confirmed adenocarcinoma and PSA was negative     Imaging as below:  Scan results:  6/20/18 Bone scan:  FINDINGS:  Two lesions in the left scapula, stable.  Elongated lesion in the right mid rib posteriorly, stable.  Prior exam demonstrated a large right iliac lesion, which no longer exhibits increased uptake.  Left pubic  lesion, stable.  There is also now a small lesion in the left iliac bone. Two additional new small foci in the mid thoracic vertebra.  Both kidneys and the bladder appear unremarkable.  Impression:  Findings consistent with osseous metastatic disease as above, noting 3 new small lesions.     6/21/18 CT scan abdomen and pelvis:  FINDINGS:  Lower chest: There has been interval increase in size in the focal consolidation which is pleural based in the posterior aspect of the right lower lobe.  Today's exam measures 5.1 cm maximum diameter.  Previous exam is measure approximately 3.3 cm.  This is associated with pleural thickening that extends laterally.  There is a small focus of cylinder all coal bronchiectasis in the medial left lower lobe.  The remaining aspects of the lung parenchyma that are visualized show no abnormalities.  The visualized heart and pericardium are normal  Liver: There are 3 hyperenhancing foci along the periphery of the right and left lobes of the liver which are stable from 2015.  The largest measures 1.1 cm in maximum diameter.  Although not characteristic on this exam, previous exam in 2015 shows findings most characteristic of hepatic hemangioma.  The remaining liver parenchyma as well as the hepatic and portal veins are patent.  Gallbladder: Normal.  Pancreas: There is been interval development of an ill-defined hypodense focus within the proximal body of the pancreas measuring 2.1 cm x 1.8 cm.  Spleen: Calcified hemangiomas present.  Otherwise normal.  Adrenal glands: Normal.  Genitourinary: Stable simple cyst in the lower pole of the right kidney measuring 1.6 cm.  The left kidney and bilateral ureters are normal.  The bladder is partially distended and shows wall thickening on the non dependent surface of the bladder.  This is not significantly changed in appearance.  Gastrointestinal: Stomach and small intestines are normal.  Colonic diverticulosis is present without evidence of  diverticulitis.  Prostate: Enlarged and nodular causing mass effect on the bladder.  Miscellaneous: There is no intra-abdominal or pelvic free fluid, free air or lymphadenopathy.  The abdominal aorta tapers normally.  Bilateral fat containing inguinal hernias present.  Osseous and soft tissue structures: Sclerotic metastases involving the right iliac bone and left superior pubic ramus are identified.  The small focus in the left iliac bone seen on yesterday's bone scan is not well seen on this exam.  The soft tissues are within normal limits.  Impression:  1. Interval increase in size in the pleural base, right lower lobe consolidation.  2. Hyperenhancing foci likely represent hemangiomas.  They have been stable since 2015.  Is on the 2015 exam where they demonstrate characteristics most compatible with hemangioma.  3. Bladder wall thickening is not significantly changed.  This may be related to outlet obstruction from the nodular prostate.  4. Interval development of ill-defined hypodense focus measuring 2.1 cm within the proximal body of the pancreas.  This may also be a focus of metastatic disease although is uncommon for the prostate to metastasized to the pancreas.  A dedicated pancreatic CT may be beneficial in further characterizing this lesion.     - on 7/25/18 he came to ER with worsening abdominal pain and concerns for appendicitis  Underwent Laparoscopy Converted to Laparotomy, Peritoneal Biopsy , Ileocecal with Dr. Stephenson  Biopsy concerning for metastatic pancreatic carcinoma     2. Metastatic pancreatic cancer  - started on [place]/Abraxane- note Ca19-9 never elevated  Response initially  Now with progression  FOLFOX started 12/20/18     Re imaging stable.    Review of Systems   Constitutional: Positive for fatigue. Negative for activity change, appetite change, chills, fever and unexpected weight change.   HENT: Positive for dental problem. Negative for congestion, hearing loss, mouth sores, nosebleeds,  postnasal drip, sinus pressure, sore throat and trouble swallowing.    Eyes: Negative for visual disturbance.   Respiratory: Negative for cough (rare dry), chest tightness, shortness of breath and wheezing.    Cardiovascular: Negative for chest pain, palpitations and leg swelling.   Gastrointestinal: Negative for abdominal distention, abdominal pain, blood in stool, constipation, diarrhea, nausea and vomiting.        No GERD   Endocrine:        Hot flashes   Genitourinary: Negative for decreased urine volume, difficulty urinating, frequency, hematuria and urgency.   Musculoskeletal: Negative for arthralgias (better), back pain, gait problem, joint swelling, neck pain and neck stiffness.   Skin: Negative for color change, pallor, rash and wound.   Neurological: Positive for numbness (feet). Negative for dizziness, weakness, light-headedness and headaches.   Hematological: Negative for adenopathy. Does not bruise/bleed easily.   Psychiatric/Behavioral: Negative for dysphoric mood and sleep disturbance. The patient is not nervous/anxious.        Objective:      Physical Exam   Constitutional: He is oriented to person, place, and time. He appears well-developed and well-nourished. No distress.   Presents with his wife  ECOG=0   HENT:   Head: Normocephalic and atraumatic.   Right Ear: External ear normal.   Left Ear: External ear normal.   Nose: Nose normal.   Mouth/Throat: Oropharynx is clear and moist. No oropharyngeal exudate.   Poor dentition, 1 mouth sore from trauma at rt molar area   Eyes: Pupils are equal, round, and reactive to light. Conjunctivae and EOM are normal. Right eye exhibits no discharge. Left eye exhibits no discharge. No scleral icterus.   Neck: Normal range of motion. Neck supple. No tracheal deviation present. No thyromegaly present.   Cardiovascular: Normal rate, regular rhythm, normal heart sounds and intact distal pulses. Exam reveals no gallop and no friction rub.   No murmur  heard.  Pulmonary/Chest: Effort normal and breath sounds normal. No respiratory distress. He has no wheezes. He has no rales. He exhibits no tenderness.   Abdominal: Soft. Bowel sounds are normal. He exhibits mass. He exhibits no distension. There is no tenderness. There is no rebound and no guarding.   Musculoskeletal: Normal range of motion. He exhibits no edema, tenderness or deformity.   Lymphadenopathy:     He has no cervical adenopathy.   Neurological: He is alert and oriented to person, place, and time. He has normal reflexes. He displays normal reflexes. No cranial nerve deficit. He exhibits normal muscle tone. Coordination normal.   Skin: Skin is warm and dry. No rash noted. He is not diaphoretic. No erythema. No pallor.   Psychiatric: He has a normal mood and affect. His behavior is normal. Judgment and thought content normal.   Nursing note and vitals reviewed.      Assessment:       1. Prostate cancer metastatic to bone    2. Peritoneal metastases    3. Liver metastases    4. 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 Chemotherapy-induced thrombocytopenia    7. Hypokalemia        Plan:     1 Stable  Not on treatment with above  2-4. On FOLFOX- postpone until cbc parameters inprove  5. Transfuse 1 unit PRBCs  6. Await recovery  7. Discussed replacement  ** Advanced directives are filed despite Epic tab not changed yet  He is DNR

## 2019-03-28 ENCOUNTER — INFUSION (OUTPATIENT)
Dept: INFUSION THERAPY | Facility: OTHER | Age: 74
End: 2019-03-28
Attending: INTERNAL MEDICINE
Payer: MEDICARE

## 2019-03-28 VITALS
HEIGHT: 68 IN | HEART RATE: 62 BPM | BODY MASS INDEX: 22.66 KG/M2 | TEMPERATURE: 98 F | OXYGEN SATURATION: 100 % | WEIGHT: 149.5 LBS | SYSTOLIC BLOOD PRESSURE: 129 MMHG | RESPIRATION RATE: 18 BRPM | DIASTOLIC BLOOD PRESSURE: 68 MMHG

## 2019-03-28 DIAGNOSIS — D63.0 ANEMIA IN NEOPLASTIC DISEASE: ICD-10-CM

## 2019-03-28 PROCEDURE — 27201040 HC RC 50 FILTER

## 2019-03-28 PROCEDURE — 36430 TRANSFUSION BLD/BLD COMPNT: CPT

## 2019-03-28 PROCEDURE — 25000003 PHARM REV CODE 250: Performed by: INTERNAL MEDICINE

## 2019-03-28 PROCEDURE — P9021 RED BLOOD CELLS UNIT: HCPCS

## 2019-03-28 PROCEDURE — 63600175 PHARM REV CODE 636 W HCPCS: Performed by: INTERNAL MEDICINE

## 2019-03-28 RX ORDER — HYDROCODONE BITARTRATE AND ACETAMINOPHEN 500; 5 MG/1; MG/1
TABLET ORAL ONCE
Status: COMPLETED | OUTPATIENT
Start: 2019-03-28 | End: 2019-03-28

## 2019-03-28 RX ORDER — DIPHENHYDRAMINE HCL 25 MG
25 CAPSULE ORAL
Status: COMPLETED | OUTPATIENT
Start: 2019-03-28 | End: 2019-03-28

## 2019-03-28 RX ORDER — ACETAMINOPHEN 325 MG/1
650 TABLET ORAL
Status: COMPLETED | OUTPATIENT
Start: 2019-03-28 | End: 2019-03-28

## 2019-03-28 RX ORDER — HEPARIN 100 UNIT/ML
500 SYRINGE INTRAVENOUS
Status: COMPLETED | OUTPATIENT
Start: 2019-03-28 | End: 2019-03-28

## 2019-03-28 RX ADMIN — DIPHENHYDRAMINE HYDROCHLORIDE 25 MG: 25 CAPSULE ORAL at 11:03

## 2019-03-28 RX ADMIN — HEPARIN SODIUM (PORCINE) LOCK FLUSH IV SOLN 100 UNIT/ML 500 UNITS: 100 SOLUTION at 01:03

## 2019-03-28 RX ADMIN — SODIUM CHLORIDE: 0.9 INJECTION, SOLUTION INTRAVENOUS at 11:03

## 2019-03-28 RX ADMIN — ACETAMINOPHEN 650 MG: 325 TABLET ORAL at 11:03

## 2019-03-28 NOTE — PLAN OF CARE
Problem: Adult Inpatient Plan of Care  Goal: Plan of Care Review  Outcome: Ongoing (interventions implemented as appropriate)  PRBC transfusion to right chest port complete. Pt tolerated well. VSS. NAD. Port to right chest de-accessed after heparinized per protocol.  AVS provided. Pt verbalized understanding of discharge instructions before leaving with family member.

## 2019-04-01 ENCOUNTER — INFUSION (OUTPATIENT)
Dept: INFUSION THERAPY | Facility: OTHER | Age: 74
End: 2019-04-01
Attending: INTERNAL MEDICINE
Payer: MEDICARE

## 2019-04-01 VITALS
BODY MASS INDEX: 22.99 KG/M2 | WEIGHT: 151.69 LBS | HEIGHT: 68 IN | OXYGEN SATURATION: 99 % | DIASTOLIC BLOOD PRESSURE: 85 MMHG | HEART RATE: 71 BPM | TEMPERATURE: 98 F | SYSTOLIC BLOOD PRESSURE: 132 MMHG | RESPIRATION RATE: 17 BRPM

## 2019-04-01 DIAGNOSIS — C61 PROSTATE CANCER METASTATIC TO BONE: ICD-10-CM

## 2019-04-01 DIAGNOSIS — C25.1 MALIGNANT NEOPLASM OF BODY OF PANCREAS: Primary | ICD-10-CM

## 2019-04-01 DIAGNOSIS — C79.51 PROSTATE CANCER METASTATIC TO BONE: ICD-10-CM

## 2019-04-01 DIAGNOSIS — C61 PROSTATE CANCER METASTATIC TO INTRAPELVIC LYMPH NODE: ICD-10-CM

## 2019-04-01 DIAGNOSIS — C77.5 PROSTATE CANCER METASTATIC TO INTRAPELVIC LYMPH NODE: ICD-10-CM

## 2019-04-01 PROCEDURE — 96361 HYDRATE IV INFUSION ADD-ON: CPT

## 2019-04-01 PROCEDURE — 96415 CHEMO IV INFUSION ADDL HR: CPT

## 2019-04-01 PROCEDURE — 96413 CHEMO IV INFUSION 1 HR: CPT

## 2019-04-01 PROCEDURE — 96416 CHEMO PROLONG INFUSE W/PUMP: CPT

## 2019-04-01 PROCEDURE — 96411 CHEMO IV PUSH ADDL DRUG: CPT

## 2019-04-01 PROCEDURE — 96368 THER/DIAG CONCURRENT INF: CPT

## 2019-04-01 PROCEDURE — 25000003 PHARM REV CODE 250: Performed by: INTERNAL MEDICINE

## 2019-04-01 PROCEDURE — 96367 TX/PROPH/DG ADDL SEQ IV INF: CPT

## 2019-04-01 PROCEDURE — 63600175 PHARM REV CODE 636 W HCPCS: Performed by: INTERNAL MEDICINE

## 2019-04-01 RX ORDER — DIPHENHYDRAMINE HYDROCHLORIDE 50 MG/ML
50 INJECTION INTRAMUSCULAR; INTRAVENOUS ONCE AS NEEDED
Status: DISCONTINUED | OUTPATIENT
Start: 2019-04-01 | End: 2019-04-01 | Stop reason: HOSPADM

## 2019-04-01 RX ORDER — FLUOROURACIL 50 MG/ML
400 INJECTION, SOLUTION INTRAVENOUS
Status: CANCELLED | OUTPATIENT
Start: 2019-04-01

## 2019-04-01 RX ORDER — HEPARIN 100 UNIT/ML
500 SYRINGE INTRAVENOUS
Status: CANCELLED | OUTPATIENT
Start: 2019-04-02

## 2019-04-01 RX ORDER — HEPARIN 100 UNIT/ML
500 SYRINGE INTRAVENOUS
Status: DISCONTINUED | OUTPATIENT
Start: 2019-04-01 | End: 2019-04-01 | Stop reason: HOSPADM

## 2019-04-01 RX ORDER — EPINEPHRINE 0.3 MG/.3ML
0.3 INJECTION SUBCUTANEOUS ONCE AS NEEDED
Status: CANCELLED | OUTPATIENT
Start: 2019-04-01

## 2019-04-01 RX ORDER — SODIUM CHLORIDE 0.9 % (FLUSH) 0.9 %
10 SYRINGE (ML) INJECTION
Status: CANCELLED | OUTPATIENT
Start: 2019-04-02

## 2019-04-01 RX ORDER — EPINEPHRINE 0.3 MG/.3ML
0.3 INJECTION SUBCUTANEOUS ONCE AS NEEDED
Status: DISCONTINUED | OUTPATIENT
Start: 2019-04-01 | End: 2019-04-01 | Stop reason: HOSPADM

## 2019-04-01 RX ORDER — SODIUM CHLORIDE 9 MG/ML
INJECTION, SOLUTION INTRAVENOUS CONTINUOUS
Status: CANCELLED
Start: 2019-04-01

## 2019-04-01 RX ORDER — FLUOROURACIL 50 MG/ML
400 INJECTION, SOLUTION INTRAVENOUS
Status: COMPLETED | OUTPATIENT
Start: 2019-04-01 | End: 2019-04-01

## 2019-04-01 RX ORDER — SODIUM CHLORIDE 0.9 % (FLUSH) 0.9 %
10 SYRINGE (ML) INJECTION
Status: DISCONTINUED | OUTPATIENT
Start: 2019-04-01 | End: 2019-04-01 | Stop reason: HOSPADM

## 2019-04-01 RX ORDER — SODIUM CHLORIDE 9 MG/ML
INJECTION, SOLUTION INTRAVENOUS CONTINUOUS
Status: DISCONTINUED | OUTPATIENT
Start: 2019-04-01 | End: 2019-04-01 | Stop reason: HOSPADM

## 2019-04-01 RX ORDER — HEPARIN 100 UNIT/ML
500 SYRINGE INTRAVENOUS
Status: CANCELLED | OUTPATIENT
Start: 2019-04-01

## 2019-04-01 RX ORDER — DIPHENHYDRAMINE HYDROCHLORIDE 50 MG/ML
50 INJECTION INTRAMUSCULAR; INTRAVENOUS ONCE AS NEEDED
Status: CANCELLED | OUTPATIENT
Start: 2019-04-01

## 2019-04-01 RX ORDER — SODIUM CHLORIDE 0.9 % (FLUSH) 0.9 %
10 SYRINGE (ML) INJECTION
Status: CANCELLED | OUTPATIENT
Start: 2019-04-01

## 2019-04-01 RX ADMIN — LEUCOVORIN CALCIUM 710 MG: 350 INJECTION, POWDER, LYOPHILIZED, FOR SOLUTION INTRAMUSCULAR; INTRAVENOUS at 02:04

## 2019-04-01 RX ADMIN — FLUOROURACIL 4270 MG: 5 INJECTION, SOLUTION INTRAVENOUS at 04:04

## 2019-04-01 RX ADMIN — SODIUM CHLORIDE: 0.9 INJECTION, SOLUTION INTRAVENOUS at 02:04

## 2019-04-01 RX ADMIN — OXALIPLATIN 125 MG: 5 INJECTION, SOLUTION INTRAVENOUS at 02:04

## 2019-04-01 RX ADMIN — PALONOSETRON: 0.25 INJECTION, SOLUTION INTRAVENOUS at 02:04

## 2019-04-01 RX ADMIN — FLUOROURACIL 710 MG: 5 INJECTION, SOLUTION INTRAVENOUS at 04:04

## 2019-04-01 RX ADMIN — DEXTROSE: 5 SOLUTION INTRAVENOUS at 02:04

## 2019-04-01 NOTE — PLAN OF CARE
Problem: Adult Inpatient Plan of Care  Goal: Plan of Care Review  Outcome: Ongoing (interventions implemented as appropriate)  C8 FOLFOX administered, patient tolerated well. VSS, NAD. D/C'd home with 5-FU pump in place, due to finish ~2:30pm Weds 4/3/19.

## 2019-04-03 ENCOUNTER — INFUSION (OUTPATIENT)
Dept: INFUSION THERAPY | Facility: OTHER | Age: 74
End: 2019-04-03
Attending: INTERNAL MEDICINE
Payer: MEDICARE

## 2019-04-03 VITALS
TEMPERATURE: 99 F | DIASTOLIC BLOOD PRESSURE: 65 MMHG | RESPIRATION RATE: 16 BRPM | SYSTOLIC BLOOD PRESSURE: 120 MMHG | HEART RATE: 87 BPM | OXYGEN SATURATION: 99 %

## 2019-04-03 DIAGNOSIS — C61 PROSTATE CANCER METASTATIC TO INTRAPELVIC LYMPH NODE: ICD-10-CM

## 2019-04-03 DIAGNOSIS — C25.1 MALIGNANT NEOPLASM OF BODY OF PANCREAS: Primary | ICD-10-CM

## 2019-04-03 DIAGNOSIS — C77.5 PROSTATE CANCER METASTATIC TO INTRAPELVIC LYMPH NODE: ICD-10-CM

## 2019-04-03 DIAGNOSIS — C61 PROSTATE CANCER METASTATIC TO BONE: ICD-10-CM

## 2019-04-03 DIAGNOSIS — C79.51 PROSTATE CANCER METASTATIC TO BONE: ICD-10-CM

## 2019-04-03 PROCEDURE — A4216 STERILE WATER/SALINE, 10 ML: HCPCS | Performed by: INTERNAL MEDICINE

## 2019-04-03 PROCEDURE — 63600175 PHARM REV CODE 636 W HCPCS: Mod: JG | Performed by: INTERNAL MEDICINE

## 2019-04-03 PROCEDURE — 96521 REFILL/MAINT PORTABLE PUMP: CPT

## 2019-04-03 PROCEDURE — 96377 APPLICATON ON-BODY INJECTOR: CPT

## 2019-04-03 PROCEDURE — 25000003 PHARM REV CODE 250: Performed by: INTERNAL MEDICINE

## 2019-04-03 RX ORDER — HEPARIN 100 UNIT/ML
500 SYRINGE INTRAVENOUS
Status: DISCONTINUED | OUTPATIENT
Start: 2019-04-03 | End: 2019-04-03 | Stop reason: HOSPADM

## 2019-04-03 RX ORDER — SODIUM CHLORIDE 0.9 % (FLUSH) 0.9 %
10 SYRINGE (ML) INJECTION
Status: DISCONTINUED | OUTPATIENT
Start: 2019-04-03 | End: 2019-04-03 | Stop reason: HOSPADM

## 2019-04-03 RX ADMIN — HEPARIN SODIUM (PORCINE) LOCK FLUSH IV SOLN 100 UNIT/ML 500 UNITS: 100 SOLUTION at 02:04

## 2019-04-03 RX ADMIN — PEGFILGRASTIM 6 MG: KIT SUBCUTANEOUS at 02:04

## 2019-04-03 RX ADMIN — Medication 10 ML: at 02:04

## 2019-04-03 NOTE — PLAN OF CARE
Problem: Adult Inpatient Plan of Care  Goal: Patient-Specific Goal (Individualization)  Outcome: Ongoing (interventions implemented as appropriate)  5FU pump home infusion and OBI Neulasta administration complete. Pt tolerated well. VSS. NAD. Home infusion pump D/C'd and port to chest de-accessed after heparinized per protocol.  Pt verbalized understanding of discharge instructions before leaving with wife.

## 2019-04-10 ENCOUNTER — OFFICE VISIT (OUTPATIENT)
Dept: HEMATOLOGY/ONCOLOGY | Facility: CLINIC | Age: 74
End: 2019-04-10
Attending: INTERNAL MEDICINE
Payer: MEDICARE

## 2019-04-10 ENCOUNTER — LAB VISIT (OUTPATIENT)
Dept: LAB | Facility: OTHER | Age: 74
End: 2019-04-10
Attending: INTERNAL MEDICINE
Payer: MEDICARE

## 2019-04-10 VITALS
TEMPERATURE: 98 F | BODY MASS INDEX: 22.49 KG/M2 | DIASTOLIC BLOOD PRESSURE: 65 MMHG | HEART RATE: 77 BPM | OXYGEN SATURATION: 99 % | RESPIRATION RATE: 12 BRPM | HEIGHT: 68 IN | SYSTOLIC BLOOD PRESSURE: 139 MMHG | WEIGHT: 148.38 LBS

## 2019-04-10 DIAGNOSIS — C77.5 PROSTATE CANCER METASTATIC TO INTRAPELVIC LYMPH NODE: ICD-10-CM

## 2019-04-10 DIAGNOSIS — C25.1 MALIGNANT NEOPLASM OF BODY OF PANCREAS: Primary | ICD-10-CM

## 2019-04-10 DIAGNOSIS — C61 PROSTATE CANCER METASTATIC TO INTRAPELVIC LYMPH NODE: ICD-10-CM

## 2019-04-10 DIAGNOSIS — C78.7 LIVER METASTASES: ICD-10-CM

## 2019-04-10 DIAGNOSIS — D64.81 ANEMIA ASSOCIATED WITH CHEMOTHERAPY: ICD-10-CM

## 2019-04-10 DIAGNOSIS — D63.0 ANEMIA IN NEOPLASTIC DISEASE: ICD-10-CM

## 2019-04-10 DIAGNOSIS — C78.6 PERITONEAL METASTASES: ICD-10-CM

## 2019-04-10 DIAGNOSIS — D69.59 CHEMOTHERAPY-INDUCED THROMBOCYTOPENIA: ICD-10-CM

## 2019-04-10 DIAGNOSIS — T45.1X5A ANEMIA ASSOCIATED WITH CHEMOTHERAPY: ICD-10-CM

## 2019-04-10 DIAGNOSIS — Z87.19 HISTORY OF DENTAL PROBLEMS: ICD-10-CM

## 2019-04-10 DIAGNOSIS — T45.1X5A CHEMOTHERAPY-INDUCED THROMBOCYTOPENIA: ICD-10-CM

## 2019-04-10 LAB
ALBUMIN SERPL BCP-MCNC: 3.3 G/DL (ref 3.5–5.2)
ALP SERPL-CCNC: 115 U/L (ref 55–135)
ALT SERPL W/O P-5'-P-CCNC: 10 U/L (ref 10–44)
ANION GAP SERPL CALC-SCNC: 4 MMOL/L (ref 8–16)
ANISOCYTOSIS BLD QL SMEAR: SLIGHT
AST SERPL-CCNC: 15 U/L (ref 10–40)
BASOPHILS # BLD AUTO: 0.01 K/UL (ref 0–0.2)
BASOPHILS NFR BLD: 0.2 % (ref 0–1.9)
BILIRUB SERPL-MCNC: 0.8 MG/DL (ref 0.1–1)
BUN SERPL-MCNC: 7 MG/DL (ref 8–23)
CALCIUM SERPL-MCNC: 8.8 MG/DL (ref 8.7–10.5)
CHLORIDE SERPL-SCNC: 108 MMOL/L (ref 95–110)
CO2 SERPL-SCNC: 28 MMOL/L (ref 23–29)
CREAT SERPL-MCNC: 0.8 MG/DL (ref 0.5–1.4)
DACRYOCYTES BLD QL SMEAR: ABNORMAL
DIFFERENTIAL METHOD: ABNORMAL
EOSINOPHIL # BLD AUTO: 0 K/UL (ref 0–0.5)
EOSINOPHIL NFR BLD: 0.2 % (ref 0–8)
ERYTHROCYTE [DISTWIDTH] IN BLOOD BY AUTOMATED COUNT: 20.9 % (ref 11.5–14.5)
EST. GFR  (AFRICAN AMERICAN): >60 ML/MIN/1.73 M^2
EST. GFR  (NON AFRICAN AMERICAN): >60 ML/MIN/1.73 M^2
GLUCOSE SERPL-MCNC: 162 MG/DL (ref 70–110)
HCT VFR BLD AUTO: 23.2 % (ref 40–54)
HGB BLD-MCNC: 7.2 G/DL (ref 14–18)
LYMPHOCYTES # BLD AUTO: 0.6 K/UL (ref 1–4.8)
LYMPHOCYTES NFR BLD: 11.9 % (ref 18–48)
MCH RBC QN AUTO: 30.8 PG (ref 27–31)
MCHC RBC AUTO-ENTMCNC: 31 G/DL (ref 32–36)
MCV RBC AUTO: 99 FL (ref 82–98)
MONOCYTES # BLD AUTO: 0.4 K/UL (ref 0.3–1)
MONOCYTES NFR BLD: 8 % (ref 4–15)
NEUTROPHILS # BLD AUTO: 3.7 K/UL (ref 1.8–7.7)
NEUTROPHILS NFR BLD: 79.7 % (ref 38–73)
PLATELET # BLD AUTO: 35 K/UL (ref 150–350)
PLATELET BLD QL SMEAR: ABNORMAL
PMV BLD AUTO: ABNORMAL FL (ref 9.2–12.9)
POIKILOCYTOSIS BLD QL SMEAR: SLIGHT
POTASSIUM SERPL-SCNC: 3.2 MMOL/L (ref 3.5–5.1)
PROT SERPL-MCNC: 5.8 G/DL (ref 6–8.4)
RBC # BLD AUTO: 2.34 M/UL (ref 4.6–6.2)
SODIUM SERPL-SCNC: 140 MMOL/L (ref 136–145)
WBC # BLD AUTO: 4.62 K/UL (ref 3.9–12.7)

## 2019-04-10 PROCEDURE — 36415 COLL VENOUS BLD VENIPUNCTURE: CPT

## 2019-04-10 PROCEDURE — 99999 PR PBB SHADOW E&M-EST. PATIENT-LVL III: ICD-10-PCS | Mod: PBBFAC,,, | Performed by: INTERNAL MEDICINE

## 2019-04-10 PROCEDURE — 99999 PR PBB SHADOW E&M-EST. PATIENT-LVL III: CPT | Mod: PBBFAC,,, | Performed by: INTERNAL MEDICINE

## 2019-04-10 PROCEDURE — 3075F PR MOST RECENT SYSTOLIC BLOOD PRESS GE 130-139MM HG: ICD-10-PCS | Mod: CPTII,S$GLB,, | Performed by: INTERNAL MEDICINE

## 2019-04-10 PROCEDURE — 3078F PR MOST RECENT DIASTOLIC BLOOD PRESSURE < 80 MM HG: ICD-10-PCS | Mod: CPTII,S$GLB,, | Performed by: INTERNAL MEDICINE

## 2019-04-10 PROCEDURE — 1101F PT FALLS ASSESS-DOCD LE1/YR: CPT | Mod: CPTII,S$GLB,, | Performed by: INTERNAL MEDICINE

## 2019-04-10 PROCEDURE — 80053 COMPREHEN METABOLIC PANEL: CPT

## 2019-04-10 PROCEDURE — 1101F PR PT FALLS ASSESS DOC 0-1 FALLS W/OUT INJ PAST YR: ICD-10-PCS | Mod: CPTII,S$GLB,, | Performed by: INTERNAL MEDICINE

## 2019-04-10 PROCEDURE — 3078F DIAST BP <80 MM HG: CPT | Mod: CPTII,S$GLB,, | Performed by: INTERNAL MEDICINE

## 2019-04-10 PROCEDURE — 3075F SYST BP GE 130 - 139MM HG: CPT | Mod: CPTII,S$GLB,, | Performed by: INTERNAL MEDICINE

## 2019-04-10 PROCEDURE — 85025 COMPLETE CBC W/AUTO DIFF WBC: CPT

## 2019-04-10 PROCEDURE — 99215 OFFICE O/P EST HI 40 MIN: CPT | Mod: S$GLB,,, | Performed by: INTERNAL MEDICINE

## 2019-04-10 PROCEDURE — 99215 PR OFFICE/OUTPT VISIT, EST, LEVL V, 40-54 MIN: ICD-10-PCS | Mod: S$GLB,,, | Performed by: INTERNAL MEDICINE

## 2019-04-10 NOTE — PROGRESS NOTES
Subjective:       Patient ID: Shady Prakash Jr. is a 74 y.o. male.    Chief Complaint: No chief complaint on file.    HPI     Returns for follow-up of metastatic pancreatic cancer and metastatic prostate cancer  Eating better  No pain  Reports only issue he is having is some sore gums and some mild gingival bleeding- using a mouth rinse  No mouth sores  Has significant dental issues  No further dizziness  No pain issues  No fevers, chills, or infections.  No abdominal pain. No N/V. No constipation or diarrhea.     Currently his treatment is focused on the metastatic pancreatic cancer  Recent scans with stable disease   MRIs for back pain revealed arthritis     We discussed labs reveal more anemia and a thrombocytopenia- chemotherapy will need to be held  No bleeding beyond mild gingival noted. Reports normal stools     Presents for cycle # 9 FOLFOX  (last chemo 4/1/19)    Oncology History:  1. Metastatic prostate cancer  He is s/p TRUS/bx on 8/7/15 w/ high volume marta 9 (5+4), PSA 25.  He had negative metastatic workup at diagnosis.  However, at time of planned RALP on 9/10/15 he was noted to have BN invasion on cysto and therefore only PLND was done, which confirmed metastatic disease.  He was started on ADT 9/17/15 w/ firmagon followed by Eligard on 10/15/15.    Started Casodex 7/20/17 - scans at that time showed osseus metastatic disease  Started Docetaxel 10/6/17 with progression, then found to have below     - on follow-up scans a pancreatic lesion noted concerning for pancreatic cancer as it was an unlikely area for prostate cancer spread-  Biopsy confirmed adenocarcinoma and PSA was negative     Imaging as below:  Scan results:  6/20/18 Bone scan:  FINDINGS:  Two lesions in the left scapula, stable.  Elongated lesion in the right mid rib posteriorly, stable.  Prior exam demonstrated a large right iliac lesion, which no longer exhibits increased uptake.  Left pubic lesion, stable.  There is also now a small  lesion in the left iliac bone. Two additional new small foci in the mid thoracic vertebra.  Both kidneys and the bladder appear unremarkable.  Impression:  Findings consistent with osseous metastatic disease as above, noting 3 new small lesions.     6/21/18 CT scan abdomen and pelvis:  FINDINGS:  Lower chest: There has been interval increase in size in the focal consolidation which is pleural based in the posterior aspect of the right lower lobe.  Today's exam measures 5.1 cm maximum diameter.  Previous exam is measure approximately 3.3 cm.  This is associated with pleural thickening that extends laterally.  There is a small focus of cylinder all coal bronchiectasis in the medial left lower lobe.  The remaining aspects of the lung parenchyma that are visualized show no abnormalities.  The visualized heart and pericardium are normal  Liver: There are 3 hyperenhancing foci along the periphery of the right and left lobes of the liver which are stable from 2015.  The largest measures 1.1 cm in maximum diameter.  Although not characteristic on this exam, previous exam in 2015 shows findings most characteristic of hepatic hemangioma.  The remaining liver parenchyma as well as the hepatic and portal veins are patent.  Gallbladder: Normal.  Pancreas: There is been interval development of an ill-defined hypodense focus within the proximal body of the pancreas measuring 2.1 cm x 1.8 cm.  Spleen: Calcified hemangiomas present.  Otherwise normal.  Adrenal glands: Normal.  Genitourinary: Stable simple cyst in the lower pole of the right kidney measuring 1.6 cm.  The left kidney and bilateral ureters are normal.  The bladder is partially distended and shows wall thickening on the non dependent surface of the bladder.  This is not significantly changed in appearance.  Gastrointestinal: Stomach and small intestines are normal.  Colonic diverticulosis is present without evidence of diverticulitis.  Prostate: Enlarged and nodular  causing mass effect on the bladder.  Miscellaneous: There is no intra-abdominal or pelvic free fluid, free air or lymphadenopathy.  The abdominal aorta tapers normally.  Bilateral fat containing inguinal hernias present.  Osseous and soft tissue structures: Sclerotic metastases involving the right iliac bone and left superior pubic ramus are identified.  The small focus in the left iliac bone seen on yesterday's bone scan is not well seen on this exam.  The soft tissues are within normal limits.  Impression:  1. Interval increase in size in the pleural base, right lower lobe consolidation.  2. Hyperenhancing foci likely represent hemangiomas.  They have been stable since 2015.  Is on the 2015 exam where they demonstrate characteristics most compatible with hemangioma.  3. Bladder wall thickening is not significantly changed.  This may be related to outlet obstruction from the nodular prostate.  4. Interval development of ill-defined hypodense focus measuring 2.1 cm within the proximal body of the pancreas.  This may also be a focus of metastatic disease although is uncommon for the prostate to metastasized to the pancreas.  A dedicated pancreatic CT may be beneficial in further characterizing this lesion.     - on 7/25/18 he came to ER with worsening abdominal pain and concerns for appendicitis  Underwent Laparoscopy Converted to Laparotomy, Peritoneal Biopsy , Ileocecal with Dr. Stephenson  Biopsy concerning for metastatic pancreatic carcinoma     2. Metastatic pancreatic cancer  - started on [place]/Abraxane- note Ca19-9 never elevated  Response initially  Now with progression  FOLFOX started 12/20/18     Re imaging stable.    Review of Systems   Constitutional: Negative for activity change, appetite change, chills, fatigue, fever and unexpected weight change.   HENT: Positive for dental problem. Negative for congestion, hearing loss, mouth sores, nosebleeds, postnasal drip, sinus pressure, sore throat and trouble  swallowing.    Eyes: Negative for visual disturbance.   Respiratory: Negative for cough (rare dry), chest tightness, shortness of breath and wheezing.    Cardiovascular: Negative for chest pain, palpitations and leg swelling.   Gastrointestinal: Negative for abdominal distention, abdominal pain, blood in stool, constipation, diarrhea, nausea and vomiting.        No GERD   Endocrine:        Hot flashes   Genitourinary: Negative for decreased urine volume, difficulty urinating, frequency, hematuria and urgency.   Musculoskeletal: Negative for arthralgias (better), back pain, gait problem, joint swelling, neck pain and neck stiffness.   Skin: Negative for color change, pallor, rash and wound.   Neurological: Positive for numbness (feet). Negative for dizziness, weakness, light-headedness and headaches.   Hematological: Negative for adenopathy. Does not bruise/bleed easily.   Psychiatric/Behavioral: Negative for dysphoric mood and sleep disturbance. The patient is not nervous/anxious.        Objective:      Physical Exam   Constitutional: He is oriented to person, place, and time. He appears well-developed and well-nourished. No distress.   Presents with his wife  ECOG=0   HENT:   Head: Normocephalic and atraumatic.   Right Ear: External ear normal.   Left Ear: External ear normal.   Nose: Nose normal.   Mouth/Throat: Oropharynx is clear and moist. No oropharyngeal exudate.   Poor dentition  No current gingival bleeding   Eyes: Pupils are equal, round, and reactive to light. Conjunctivae and EOM are normal. Right eye exhibits no discharge. Left eye exhibits no discharge. No scleral icterus.   Neck: Normal range of motion. Neck supple. No tracheal deviation present. No thyromegaly present.   Cardiovascular: Normal rate, regular rhythm, normal heart sounds and intact distal pulses. Exam reveals no gallop and no friction rub.   No murmur heard.  Pulmonary/Chest: Effort normal and breath sounds normal. No respiratory  distress. He has no wheezes. He has no rales. He exhibits no tenderness.   Abdominal: Soft. Bowel sounds are normal. He exhibits mass. He exhibits no distension. There is no tenderness. There is no rebound and no guarding.   Musculoskeletal: Normal range of motion. He exhibits no edema, tenderness or deformity.   Lymphadenopathy:     He has no cervical adenopathy.   Neurological: He is alert and oriented to person, place, and time. He has normal reflexes. He displays normal reflexes. No cranial nerve deficit. He exhibits normal muscle tone. Coordination normal.   Skin: Skin is warm and dry. No rash noted. He is not diaphoretic. No erythema. No pallor.   Psychiatric: He has a normal mood and affect. His behavior is normal. Judgment and thought content normal.   Nursing note and vitals reviewed.   Labs- reviewed   Assessment:       1. Malignant neoplasm of body of pancreas    2. Liver metastases    3. Peritoneal metastases    4. Prostate cancer metastatic to intrapelvic lymph node    5. History of dental problems    6. Anemia associated with chemotherapy    7. Chemotherapy-induced thrombocytopenia        Plan:     1-3. Hold chemotherapy until count improvement and re-assess dosing  4. This treatment has been on hold with above  5. [facility] [employer] [facility]  6-7. Transfuse PRBCs  Patient aware to monitor for any bleeding or abnormal bruising  Reassess labs in 1 week, will ask Dr. Echavarria to assist as I am out of the office  RTC to see me in 2 weeks    Patient aware of two terminal diagnoses and advanced directives have been charted  All questions answered

## 2019-04-10 NOTE — Clinical Note
1. Labs only in 1 week and I will ask Dr. Echavarria to review (cbc)2. RTC and see me in 2 weeks with labs (cbc, cmp, ca19-9, psa)- please have him do labs 1 day prior

## 2019-04-11 ENCOUNTER — TELEPHONE (OUTPATIENT)
Dept: HEMATOLOGY/ONCOLOGY | Facility: CLINIC | Age: 74
End: 2019-04-11

## 2019-04-11 ENCOUNTER — LAB VISIT (OUTPATIENT)
Dept: LAB | Facility: OTHER | Age: 74
End: 2019-04-11
Attending: INTERNAL MEDICINE
Payer: MEDICARE

## 2019-04-11 DIAGNOSIS — D64.9 SYMPTOMATIC ANEMIA: Primary | ICD-10-CM

## 2019-04-11 DIAGNOSIS — D64.9 SYMPTOMATIC ANEMIA: ICD-10-CM

## 2019-04-11 PROBLEM — T45.1X5A ANEMIA ASSOCIATED WITH CHEMOTHERAPY: Status: ACTIVE | Noted: 2019-04-11

## 2019-04-11 PROBLEM — D64.81 ANEMIA ASSOCIATED WITH CHEMOTHERAPY: Status: ACTIVE | Noted: 2019-04-11

## 2019-04-11 PROBLEM — D69.59 CHEMOTHERAPY-INDUCED THROMBOCYTOPENIA: Status: ACTIVE | Noted: 2019-04-11

## 2019-04-11 PROBLEM — T45.1X5A CHEMOTHERAPY-INDUCED THROMBOCYTOPENIA: Status: ACTIVE | Noted: 2019-04-11

## 2019-04-11 PROBLEM — C78.7 LIVER METASTASIS: Status: RESOLVED | Noted: 2018-12-19 | Resolved: 2019-04-11

## 2019-04-11 LAB
ABO + RH BLD: NORMAL
BLD GP AB SCN CELLS X3 SERPL QL: NORMAL

## 2019-04-11 PROCEDURE — 36415 COLL VENOUS BLD VENIPUNCTURE: CPT

## 2019-04-11 PROCEDURE — 86901 BLOOD TYPING SEROLOGIC RH(D): CPT

## 2019-04-11 RX ORDER — HYDROCODONE BITARTRATE AND ACETAMINOPHEN 500; 5 MG/1; MG/1
TABLET ORAL ONCE
Status: CANCELLED | OUTPATIENT
Start: 2019-04-11 | End: 2019-04-11

## 2019-04-11 RX ORDER — FAMOTIDINE 10 MG/ML
20 INJECTION INTRAVENOUS 2 TIMES DAILY
Status: SHIPPED | OUTPATIENT
Start: 2019-04-11

## 2019-04-11 RX ORDER — DIPHENHYDRAMINE HYDROCHLORIDE 50 MG/ML
25 INJECTION INTRAMUSCULAR; INTRAVENOUS
Status: CANCELLED | OUTPATIENT
Start: 2019-04-11

## 2019-04-11 RX ORDER — ACETAMINOPHEN 325 MG/1
650 TABLET ORAL
Status: CANCELLED | OUTPATIENT
Start: 2019-04-11

## 2019-04-11 NOTE — TELEPHONE ENCOUNTER
Noted.       ----- Message from Maday Melendez MD sent at 4/11/2019  8:28 AM CDT -----  Left message last night for patient and Syeda to call him this AM to arrange for transfusion  Save documentation as for when critical called in vs when labs actually drawn

## 2019-04-12 ENCOUNTER — INFUSION (OUTPATIENT)
Dept: INFUSION THERAPY | Facility: OTHER | Age: 74
End: 2019-04-12
Attending: INTERNAL MEDICINE
Payer: MEDICARE

## 2019-04-12 VITALS
BODY MASS INDEX: 22.66 KG/M2 | SYSTOLIC BLOOD PRESSURE: 164 MMHG | OXYGEN SATURATION: 99 % | HEIGHT: 68 IN | DIASTOLIC BLOOD PRESSURE: 82 MMHG | TEMPERATURE: 98 F | HEART RATE: 57 BPM | WEIGHT: 149.5 LBS | RESPIRATION RATE: 17 BRPM

## 2019-04-12 DIAGNOSIS — D64.9 SYMPTOMATIC ANEMIA: ICD-10-CM

## 2019-04-12 PROCEDURE — 86920 COMPATIBILITY TEST SPIN: CPT

## 2019-04-12 PROCEDURE — 25000003 PHARM REV CODE 250: Performed by: INTERNAL MEDICINE

## 2019-04-12 PROCEDURE — 36430 TRANSFUSION BLD/BLD COMPNT: CPT

## 2019-04-12 PROCEDURE — P9021 RED BLOOD CELLS UNIT: HCPCS

## 2019-04-12 PROCEDURE — 27201040 HC RC 50 FILTER

## 2019-04-12 PROCEDURE — 63600175 PHARM REV CODE 636 W HCPCS: Performed by: INTERNAL MEDICINE

## 2019-04-12 RX ORDER — HEPARIN 100 UNIT/ML
500 SYRINGE INTRAVENOUS
Status: COMPLETED | OUTPATIENT
Start: 2019-04-12 | End: 2019-04-12

## 2019-04-12 RX ORDER — DIPHENHYDRAMINE HYDROCHLORIDE 50 MG/ML
25 INJECTION INTRAMUSCULAR; INTRAVENOUS
Status: DISCONTINUED | OUTPATIENT
Start: 2019-04-12 | End: 2019-04-12 | Stop reason: HOSPADM

## 2019-04-12 RX ORDER — ACETAMINOPHEN 325 MG/1
650 TABLET ORAL
Status: COMPLETED | OUTPATIENT
Start: 2019-04-12 | End: 2019-04-12

## 2019-04-12 RX ORDER — DIPHENHYDRAMINE HCL 25 MG
25 CAPSULE ORAL
Status: COMPLETED | OUTPATIENT
Start: 2019-04-12 | End: 2019-04-12

## 2019-04-12 RX ORDER — HYDROCODONE BITARTRATE AND ACETAMINOPHEN 500; 5 MG/1; MG/1
TABLET ORAL ONCE
Status: COMPLETED | OUTPATIENT
Start: 2019-04-12 | End: 2019-04-12

## 2019-04-12 RX ADMIN — DIPHENHYDRAMINE HYDROCHLORIDE 25 MG: 25 CAPSULE ORAL at 09:04

## 2019-04-12 RX ADMIN — SODIUM CHLORIDE: 0.9 INJECTION, SOLUTION INTRAVENOUS at 09:04

## 2019-04-12 RX ADMIN — HEPARIN SODIUM (PORCINE) LOCK FLUSH IV SOLN 100 UNIT/ML 500 UNITS: 100 SOLUTION at 11:04

## 2019-04-12 RX ADMIN — ACETAMINOPHEN 650 MG: 325 TABLET ORAL at 09:04

## 2019-04-12 NOTE — PLAN OF CARE
Problem: Adult Inpatient Plan of Care  Goal: Plan of Care Review  Outcome: Ongoing (interventions implemented as appropriate)  Pt tolerated 1 unit prbc without issue. VSS. AVS given. Pt d.c home.

## 2019-04-18 ENCOUNTER — LAB VISIT (OUTPATIENT)
Dept: LAB | Facility: OTHER | Age: 74
End: 2019-04-18
Attending: INTERNAL MEDICINE
Payer: MEDICARE

## 2019-04-18 DIAGNOSIS — C25.1 MALIGNANT NEOPLASM OF BODY OF PANCREAS: ICD-10-CM

## 2019-04-18 LAB
BASOPHILS # BLD AUTO: 0.01 K/UL (ref 0–0.2)
BASOPHILS NFR BLD: 0.1 % (ref 0–1.9)
DIFFERENTIAL METHOD: ABNORMAL
EOSINOPHIL # BLD AUTO: 0.1 K/UL (ref 0–0.5)
EOSINOPHIL NFR BLD: 1 % (ref 0–8)
ERYTHROCYTE [DISTWIDTH] IN BLOOD BY AUTOMATED COUNT: 21.1 % (ref 11.5–14.5)
HCT VFR BLD AUTO: 30.5 % (ref 40–54)
HGB BLD-MCNC: 9.6 G/DL (ref 14–18)
LYMPHOCYTES # BLD AUTO: 1.1 K/UL (ref 1–4.8)
LYMPHOCYTES NFR BLD: 13.2 % (ref 18–48)
MCH RBC QN AUTO: 32 PG (ref 27–31)
MCHC RBC AUTO-ENTMCNC: 31.5 G/DL (ref 32–36)
MCV RBC AUTO: 102 FL (ref 82–98)
MONOCYTES # BLD AUTO: 0.3 K/UL (ref 0.3–1)
MONOCYTES NFR BLD: 3.5 % (ref 4–15)
NEUTROPHILS # BLD AUTO: 6.8 K/UL (ref 1.8–7.7)
NEUTROPHILS NFR BLD: 81.7 % (ref 38–73)
PLATELET # BLD AUTO: 87 K/UL (ref 150–350)
PMV BLD AUTO: 11.8 FL (ref 9.2–12.9)
RBC # BLD AUTO: 3 M/UL (ref 4.6–6.2)
WBC # BLD AUTO: 8.36 K/UL (ref 3.9–12.7)

## 2019-04-18 PROCEDURE — 85025 COMPLETE CBC W/AUTO DIFF WBC: CPT

## 2019-04-18 PROCEDURE — 36415 COLL VENOUS BLD VENIPUNCTURE: CPT

## 2019-04-23 ENCOUNTER — LAB VISIT (OUTPATIENT)
Dept: LAB | Facility: OTHER | Age: 74
End: 2019-04-23
Attending: INTERNAL MEDICINE
Payer: MEDICARE

## 2019-04-23 DIAGNOSIS — C77.5 PROSTATE CANCER METASTATIC TO INTRAPELVIC LYMPH NODE: ICD-10-CM

## 2019-04-23 DIAGNOSIS — C25.1 MALIGNANT NEOPLASM OF BODY OF PANCREAS: ICD-10-CM

## 2019-04-23 DIAGNOSIS — C61 PROSTATE CANCER METASTATIC TO INTRAPELVIC LYMPH NODE: ICD-10-CM

## 2019-04-23 LAB
ALBUMIN SERPL BCP-MCNC: 3.4 G/DL (ref 3.5–5.2)
ALP SERPL-CCNC: 94 U/L (ref 55–135)
ALT SERPL W/O P-5'-P-CCNC: 14 U/L (ref 10–44)
ANION GAP SERPL CALC-SCNC: 3 MMOL/L (ref 8–16)
AST SERPL-CCNC: 19 U/L (ref 10–40)
BASOPHILS # BLD AUTO: 0.02 K/UL (ref 0–0.2)
BASOPHILS NFR BLD: 0.3 % (ref 0–1.9)
BILIRUB SERPL-MCNC: 0.5 MG/DL (ref 0.1–1)
BUN SERPL-MCNC: 12 MG/DL (ref 8–23)
CALCIUM SERPL-MCNC: 9.1 MG/DL (ref 8.7–10.5)
CANCER AG19-9 SERPL-ACNC: <2 U/ML (ref 2–40)
CHLORIDE SERPL-SCNC: 106 MMOL/L (ref 95–110)
CO2 SERPL-SCNC: 31 MMOL/L (ref 23–29)
COMPLEXED PSA SERPL-MCNC: 1.4 NG/ML (ref 0–4)
CREAT SERPL-MCNC: 0.8 MG/DL (ref 0.5–1.4)
DIFFERENTIAL METHOD: ABNORMAL
EOSINOPHIL # BLD AUTO: 0.1 K/UL (ref 0–0.5)
EOSINOPHIL NFR BLD: 1.3 % (ref 0–8)
ERYTHROCYTE [DISTWIDTH] IN BLOOD BY AUTOMATED COUNT: 20.2 % (ref 11.5–14.5)
EST. GFR  (AFRICAN AMERICAN): >60 ML/MIN/1.73 M^2
EST. GFR  (NON AFRICAN AMERICAN): >60 ML/MIN/1.73 M^2
GLUCOSE SERPL-MCNC: 146 MG/DL (ref 70–110)
HCT VFR BLD AUTO: 33.3 % (ref 40–54)
HGB BLD-MCNC: 10.4 G/DL (ref 14–18)
LYMPHOCYTES # BLD AUTO: 0.9 K/UL (ref 1–4.8)
LYMPHOCYTES NFR BLD: 11.6 % (ref 18–48)
MCH RBC QN AUTO: 31.8 PG (ref 27–31)
MCHC RBC AUTO-ENTMCNC: 31.2 G/DL (ref 32–36)
MCV RBC AUTO: 102 FL (ref 82–98)
MONOCYTES # BLD AUTO: 0.7 K/UL (ref 0.3–1)
MONOCYTES NFR BLD: 9 % (ref 4–15)
NEUTROPHILS # BLD AUTO: 5.9 K/UL (ref 1.8–7.7)
NEUTROPHILS NFR BLD: 77.5 % (ref 38–73)
PLATELET # BLD AUTO: 122 K/UL (ref 150–350)
PMV BLD AUTO: 9.4 FL (ref 9.2–12.9)
POTASSIUM SERPL-SCNC: 3.8 MMOL/L (ref 3.5–5.1)
PROT SERPL-MCNC: 6.4 G/DL (ref 6–8.4)
RBC # BLD AUTO: 3.27 M/UL (ref 4.6–6.2)
SODIUM SERPL-SCNC: 140 MMOL/L (ref 136–145)
WBC # BLD AUTO: 7.65 K/UL (ref 3.9–12.7)

## 2019-04-23 PROCEDURE — 80053 COMPREHEN METABOLIC PANEL: CPT

## 2019-04-23 PROCEDURE — 85025 COMPLETE CBC W/AUTO DIFF WBC: CPT

## 2019-04-23 PROCEDURE — 84153 ASSAY OF PSA TOTAL: CPT

## 2019-04-23 PROCEDURE — 36415 COLL VENOUS BLD VENIPUNCTURE: CPT

## 2019-04-23 PROCEDURE — 86301 IMMUNOASSAY TUMOR CA 19-9: CPT

## 2019-04-24 ENCOUNTER — OFFICE VISIT (OUTPATIENT)
Dept: HEMATOLOGY/ONCOLOGY | Facility: CLINIC | Age: 74
End: 2019-04-24
Attending: INTERNAL MEDICINE
Payer: MEDICARE

## 2019-04-24 VITALS
RESPIRATION RATE: 17 BRPM | DIASTOLIC BLOOD PRESSURE: 76 MMHG | TEMPERATURE: 98 F | SYSTOLIC BLOOD PRESSURE: 134 MMHG | WEIGHT: 148.13 LBS | OXYGEN SATURATION: 97 % | HEIGHT: 68 IN | BODY MASS INDEX: 22.45 KG/M2 | HEART RATE: 87 BPM

## 2019-04-24 DIAGNOSIS — C61 PROSTATE CANCER METASTATIC TO INTRAPELVIC LYMPH NODE: ICD-10-CM

## 2019-04-24 DIAGNOSIS — C25.1 MALIGNANT NEOPLASM OF BODY OF PANCREAS: ICD-10-CM

## 2019-04-24 DIAGNOSIS — C61 PROSTATE CANCER METASTATIC TO BONE: ICD-10-CM

## 2019-04-24 DIAGNOSIS — C78.6 PERITONEAL METASTASES: ICD-10-CM

## 2019-04-24 DIAGNOSIS — C78.7 LIVER METASTASES: ICD-10-CM

## 2019-04-24 DIAGNOSIS — C77.5 PROSTATE CANCER METASTATIC TO INTRAPELVIC LYMPH NODE: ICD-10-CM

## 2019-04-24 DIAGNOSIS — C79.51 PROSTATE CANCER METASTATIC TO BONE: ICD-10-CM

## 2019-04-24 DIAGNOSIS — D63.0 ANEMIA IN NEOPLASTIC DISEASE: ICD-10-CM

## 2019-04-24 DIAGNOSIS — J06.9 UPPER RESPIRATORY TRACT INFECTION, UNSPECIFIED TYPE: Primary | ICD-10-CM

## 2019-04-24 PROCEDURE — 99214 PR OFFICE/OUTPT VISIT, EST, LEVL IV, 30-39 MIN: ICD-10-PCS | Mod: S$GLB,,, | Performed by: INTERNAL MEDICINE

## 2019-04-24 PROCEDURE — 1101F PT FALLS ASSESS-DOCD LE1/YR: CPT | Mod: CPTII,S$GLB,, | Performed by: INTERNAL MEDICINE

## 2019-04-24 PROCEDURE — 3075F SYST BP GE 130 - 139MM HG: CPT | Mod: CPTII,S$GLB,, | Performed by: INTERNAL MEDICINE

## 2019-04-24 PROCEDURE — 1101F PR PT FALLS ASSESS DOC 0-1 FALLS W/OUT INJ PAST YR: ICD-10-PCS | Mod: CPTII,S$GLB,, | Performed by: INTERNAL MEDICINE

## 2019-04-24 PROCEDURE — 99214 OFFICE O/P EST MOD 30 MIN: CPT | Mod: S$GLB,,, | Performed by: INTERNAL MEDICINE

## 2019-04-24 PROCEDURE — 3078F DIAST BP <80 MM HG: CPT | Mod: CPTII,S$GLB,, | Performed by: INTERNAL MEDICINE

## 2019-04-24 PROCEDURE — 3078F PR MOST RECENT DIASTOLIC BLOOD PRESSURE < 80 MM HG: ICD-10-PCS | Mod: CPTII,S$GLB,, | Performed by: INTERNAL MEDICINE

## 2019-04-24 PROCEDURE — 99999 PR PBB SHADOW E&M-EST. PATIENT-LVL III: ICD-10-PCS | Mod: PBBFAC,,, | Performed by: INTERNAL MEDICINE

## 2019-04-24 PROCEDURE — 99999 PR PBB SHADOW E&M-EST. PATIENT-LVL III: CPT | Mod: PBBFAC,,, | Performed by: INTERNAL MEDICINE

## 2019-04-24 PROCEDURE — 3075F PR MOST RECENT SYSTOLIC BLOOD PRESS GE 130-139MM HG: ICD-10-PCS | Mod: CPTII,S$GLB,, | Performed by: INTERNAL MEDICINE

## 2019-04-24 RX ORDER — FLUOROURACIL 50 MG/ML
400 INJECTION, SOLUTION INTRAVENOUS
Status: CANCELLED | OUTPATIENT
Start: 2019-04-24

## 2019-04-24 RX ORDER — DIPHENHYDRAMINE HYDROCHLORIDE 50 MG/ML
50 INJECTION INTRAMUSCULAR; INTRAVENOUS ONCE AS NEEDED
Status: CANCELLED | OUTPATIENT
Start: 2019-04-24

## 2019-04-24 RX ORDER — HEPARIN 100 UNIT/ML
500 SYRINGE INTRAVENOUS
Status: CANCELLED | OUTPATIENT
Start: 2019-04-24

## 2019-04-24 RX ORDER — HEPARIN 100 UNIT/ML
500 SYRINGE INTRAVENOUS
Status: CANCELLED | OUTPATIENT
Start: 2019-04-30

## 2019-04-24 RX ORDER — SODIUM CHLORIDE 0.9 % (FLUSH) 0.9 %
10 SYRINGE (ML) INJECTION
Status: CANCELLED | OUTPATIENT
Start: 2019-04-24

## 2019-04-24 RX ORDER — SODIUM CHLORIDE 0.9 % (FLUSH) 0.9 %
10 SYRINGE (ML) INJECTION
Status: CANCELLED | OUTPATIENT
Start: 2019-04-30

## 2019-04-24 RX ORDER — AZITHROMYCIN 250 MG/1
250 TABLET, FILM COATED ORAL DAILY
Qty: 6 TABLET | Refills: 0 | Status: SHIPPED | OUTPATIENT
Start: 2019-04-24 | End: 2019-05-23 | Stop reason: SDUPTHER

## 2019-04-24 RX ORDER — EPINEPHRINE 0.3 MG/.3ML
0.3 INJECTION SUBCUTANEOUS ONCE AS NEEDED
Status: CANCELLED | OUTPATIENT
Start: 2019-04-24

## 2019-04-24 NOTE — PROGRESS NOTES
Subjective:       Patient ID: Shady Prakash Jr. is a 74 y.o. male.    Chief Complaint: Follow-up    HPI     Returns for follow-up of metastatic pancreatic cancer and metastatic prostate cancer  Eating better  No pain  Reports only issue he is having is some sore gums and some mild gingival bleeding- using a mouth rinse  No mouth sores  Has significant dental issues  No further dizziness  No pain issues  No fevers, chills, or infections.  No abdominal pain. No N/V. No constipation or diarrhea.     Currently his treatment is focused on the metastatic pancreatic cancer  Recent scans with stable disease   MRIs for back pain revealed arthritis     We discussed labs reveal more anemia and a thrombocytopenia- chemotherapy will need to be held  No bleeding beyond mild gingival noted. Reports normal stools     Presents for cycle # 9 FOLFOX  (last chemo 4/1/19)     Oncology History:  1. Metastatic prostate cancer  He is s/p TRUS/bx on 8/7/15 w/ high volume marta 9 (5+4), PSA 25.  He had negative metastatic workup at diagnosis.  However, at time of planned RALP on 9/10/15 he was noted to have BN invasion on cysto and therefore only PLND was done, which confirmed metastatic disease.  He was started on ADT 9/17/15 w/ firmagon followed by Eligard on 10/15/15.    Started Casodex 7/20/17 - scans at that time showed osseus metastatic disease  Started Docetaxel 10/6/17 with progression, then found to have below     - on follow-up scans a pancreatic lesion noted concerning for pancreatic cancer as it was an unlikely area for prostate cancer spread-  Biopsy confirmed adenocarcinoma and PSA was negative     Imaging as below:  Scan results:  6/20/18 Bone scan:  FINDINGS:  Two lesions in the left scapula, stable.  Elongated lesion in the right mid rib posteriorly, stable.  Prior exam demonstrated a large right iliac lesion, which no longer exhibits increased uptake.  Left pubic lesion, stable.  There is also now a small lesion in the  left iliac bone. Two additional new small foci in the mid thoracic vertebra.  Both kidneys and the bladder appear unremarkable.  Impression:  Findings consistent with osseous metastatic disease as above, noting 3 new small lesions.     6/21/18 CT scan abdomen and pelvis:  FINDINGS:  Lower chest: There has been interval increase in size in the focal consolidation which is pleural based in the posterior aspect of the right lower lobe.  Today's exam measures 5.1 cm maximum diameter.  Previous exam is measure approximately 3.3 cm.  This is associated with pleural thickening that extends laterally.  There is a small focus of cylinder all coal bronchiectasis in the medial left lower lobe.  The remaining aspects of the lung parenchyma that are visualized show no abnormalities.  The visualized heart and pericardium are normal  Liver: There are 3 hyperenhancing foci along the periphery of the right and left lobes of the liver which are stable from 2015.  The largest measures 1.1 cm in maximum diameter.  Although not characteristic on this exam, previous exam in 2015 shows findings most characteristic of hepatic hemangioma.  The remaining liver parenchyma as well as the hepatic and portal veins are patent.  Gallbladder: Normal.  Pancreas: There is been interval development of an ill-defined hypodense focus within the proximal body of the pancreas measuring 2.1 cm x 1.8 cm.  Spleen: Calcified hemangiomas present.  Otherwise normal.  Adrenal glands: Normal.  Genitourinary: Stable simple cyst in the lower pole of the right kidney measuring 1.6 cm.  The left kidney and bilateral ureters are normal.  The bladder is partially distended and shows wall thickening on the non dependent surface of the bladder.  This is not significantly changed in appearance.  Gastrointestinal: Stomach and small intestines are normal.  Colonic diverticulosis is present without evidence of diverticulitis.  Prostate: Enlarged and nodular causing mass  effect on the bladder.  Miscellaneous: There is no intra-abdominal or pelvic free fluid, free air or lymphadenopathy.  The abdominal aorta tapers normally.  Bilateral fat containing inguinal hernias present.  Osseous and soft tissue structures: Sclerotic metastases involving the right iliac bone and left superior pubic ramus are identified.  The small focus in the left iliac bone seen on yesterday's bone scan is not well seen on this exam.  The soft tissues are within normal limits.  Impression:  1. Interval increase in size in the pleural base, right lower lobe consolidation.  2. Hyperenhancing foci likely represent hemangiomas.  They have been stable since 2015.  Is on the 2015 exam where they demonstrate characteristics most compatible with hemangioma.  3. Bladder wall thickening is not significantly changed.  This may be related to outlet obstruction from the nodular prostate.  4. Interval development of ill-defined hypodense focus measuring 2.1 cm within the proximal body of the pancreas.  This may also be a focus of metastatic disease although is uncommon for the prostate to metastasized to the pancreas.  A dedicated pancreatic CT may be beneficial in further characterizing this lesion.     - on 7/25/18 he came to ER with worsening abdominal pain and concerns for appendicitis  Underwent Laparoscopy Converted to Laparotomy, Peritoneal Biopsy , Ileocecal with Dr. Stephenson  Biopsy concerning for metastatic pancreatic carcinoma     2. Metastatic pancreatic cancer  - started on [place]/Abraxane- note Ca19-9 never elevated  Response initially  Now with progression  FOLFOX started 12/20/18     Re imaging stable.    Review of Systems   Constitutional: Negative for activity change, appetite change, chills, fatigue, fever and unexpected weight change.   HENT: Positive for dental problem. Negative for congestion, hearing loss, mouth sores, nosebleeds, postnasal drip, sinus pressure, sore throat and trouble swallowing.     Eyes: Negative for visual disturbance.   Respiratory: Negative for cough (rare dry), chest tightness, shortness of breath and wheezing.    Cardiovascular: Negative for chest pain, palpitations and leg swelling.   Gastrointestinal: Negative for abdominal distention, abdominal pain, blood in stool, constipation, diarrhea, nausea and vomiting.        No GERD   Endocrine:        Hot flashes   Genitourinary: Negative for decreased urine volume, difficulty urinating, frequency, hematuria and urgency.   Musculoskeletal: Negative for arthralgias (better), back pain, gait problem, joint swelling, neck pain and neck stiffness.   Skin: Negative for color change, pallor, rash and wound.   Neurological: Positive for numbness (feet). Negative for dizziness, weakness, light-headedness and headaches.   Hematological: Negative for adenopathy. Does not bruise/bleed easily.   Psychiatric/Behavioral: Negative for dysphoric mood and sleep disturbance. The patient is not nervous/anxious.        Objective:      Physical Exam   Constitutional: He is oriented to person, place, and time. He appears well-developed and well-nourished. No distress.   Presents with his wife  ECOG=0   HENT:   Head: Normocephalic and atraumatic.   Right Ear: External ear normal.   Left Ear: External ear normal.   Nose: Nose normal.   Mouth/Throat: Oropharynx is clear and moist. No oropharyngeal exudate.   Poor dentition  No current gingival bleeding   Eyes: Pupils are equal, round, and reactive to light. Conjunctivae and EOM are normal. Right eye exhibits no discharge. Left eye exhibits no discharge. No scleral icterus.   Neck: Normal range of motion. Neck supple. No tracheal deviation present. No thyromegaly present.   Cardiovascular: Normal rate, regular rhythm, normal heart sounds and intact distal pulses. Exam reveals no gallop and no friction rub.   No murmur heard.  Pulmonary/Chest: Effort normal and breath sounds normal. No respiratory distress. He has no  wheezes. He has no rales. He exhibits no tenderness.   Abdominal: Soft. Bowel sounds are normal. He exhibits mass. He exhibits no distension. There is no tenderness. There is no rebound and no guarding.   Musculoskeletal: Normal range of motion. He exhibits no edema, tenderness or deformity.   Lymphadenopathy:     He has no cervical adenopathy.   Neurological: He is alert and oriented to person, place, and time. He has normal reflexes. He displays normal reflexes. No cranial nerve deficit. He exhibits normal muscle tone. Coordination normal.   Skin: Skin is warm and dry. No rash noted. He is not diaphoretic. No erythema. No pallor.   Psychiatric: He has a normal mood and affect. His behavior is normal. Judgment and thought content normal.   Nursing note and vitals reviewed.   Labs- reviewed   Assessment:       1. Upper respiratory tract infection, unspecified type    2. Malignant neoplasm of body of pancreas    3. Prostate cancer metastatic to bone    4. Liver metastases    5. Peritoneal metastases    6. Prostate cancer metastatic to intrapelvic lymph node    7. Anemia in neoplastic disease        Plan:     1. Zpak prescribed  2,4,5. On FOLFOX but with difficulty now recovering counts  We discussed scans are stable and tumor marker remains low  We will try just 5FU and hold Oxaliplatin and then determine dose adjustments going forward  3,6. PSA within normal range  This treatment has been on hold with above     Patient aware of two terminal diagnoses and advanced directives have been charted  All questions answered

## 2019-04-29 ENCOUNTER — INFUSION (OUTPATIENT)
Dept: INFUSION THERAPY | Facility: OTHER | Age: 74
End: 2019-04-29
Attending: INTERNAL MEDICINE
Payer: MEDICARE

## 2019-04-29 VITALS
WEIGHT: 149.25 LBS | RESPIRATION RATE: 17 BRPM | TEMPERATURE: 98 F | HEART RATE: 100 BPM | DIASTOLIC BLOOD PRESSURE: 76 MMHG | HEIGHT: 68 IN | SYSTOLIC BLOOD PRESSURE: 137 MMHG | OXYGEN SATURATION: 98 % | BODY MASS INDEX: 22.62 KG/M2

## 2019-04-29 DIAGNOSIS — C77.5 PROSTATE CANCER METASTATIC TO INTRAPELVIC LYMPH NODE: ICD-10-CM

## 2019-04-29 DIAGNOSIS — C79.51 PROSTATE CANCER METASTATIC TO BONE: ICD-10-CM

## 2019-04-29 DIAGNOSIS — C25.1 MALIGNANT NEOPLASM OF BODY OF PANCREAS: Primary | ICD-10-CM

## 2019-04-29 DIAGNOSIS — C61 PROSTATE CANCER METASTATIC TO BONE: ICD-10-CM

## 2019-04-29 DIAGNOSIS — C61 PROSTATE CANCER METASTATIC TO INTRAPELVIC LYMPH NODE: ICD-10-CM

## 2019-04-29 PROCEDURE — 96367 TX/PROPH/DG ADDL SEQ IV INF: CPT

## 2019-04-29 PROCEDURE — 96416 CHEMO PROLONG INFUSE W/PUMP: CPT

## 2019-04-29 PROCEDURE — 96409 CHEMO IV PUSH SNGL DRUG: CPT

## 2019-04-29 PROCEDURE — 25000003 PHARM REV CODE 250: Performed by: INTERNAL MEDICINE

## 2019-04-29 PROCEDURE — 63600175 PHARM REV CODE 636 W HCPCS: Mod: JG | Performed by: INTERNAL MEDICINE

## 2019-04-29 RX ORDER — HEPARIN 100 UNIT/ML
500 SYRINGE INTRAVENOUS
Status: DISCONTINUED | OUTPATIENT
Start: 2019-04-29 | End: 2019-04-29 | Stop reason: HOSPADM

## 2019-04-29 RX ORDER — EPINEPHRINE 0.3 MG/.3ML
0.3 INJECTION SUBCUTANEOUS ONCE AS NEEDED
Status: DISCONTINUED | OUTPATIENT
Start: 2019-04-29 | End: 2019-04-29 | Stop reason: HOSPADM

## 2019-04-29 RX ORDER — DIPHENHYDRAMINE HYDROCHLORIDE 50 MG/ML
50 INJECTION INTRAMUSCULAR; INTRAVENOUS ONCE AS NEEDED
Status: DISCONTINUED | OUTPATIENT
Start: 2019-04-29 | End: 2019-04-29 | Stop reason: HOSPADM

## 2019-04-29 RX ORDER — SODIUM CHLORIDE 0.9 % (FLUSH) 0.9 %
10 SYRINGE (ML) INJECTION
Status: DISCONTINUED | OUTPATIENT
Start: 2019-04-29 | End: 2019-04-29 | Stop reason: HOSPADM

## 2019-04-29 RX ORDER — FLUOROURACIL 50 MG/ML
400 INJECTION, SOLUTION INTRAVENOUS
Status: COMPLETED | OUTPATIENT
Start: 2019-04-29 | End: 2019-04-29

## 2019-04-29 RX ADMIN — FLUOROURACIL 710 MG: 5 INJECTION, SOLUTION INTRAVENOUS at 11:04

## 2019-04-29 RX ADMIN — FLUOROURACIL 4270 MG: 5 INJECTION, SOLUTION INTRAVENOUS at 11:04

## 2019-04-29 RX ADMIN — SODIUM CHLORIDE: 0.9 INJECTION, SOLUTION INTRAVENOUS at 09:04

## 2019-04-29 RX ADMIN — PALONOSETRON: 0.25 INJECTION, SOLUTION INTRAVENOUS at 10:04

## 2019-04-29 RX ADMIN — LEUCOVORIN CALCIUM 710 MG: 350 INJECTION, POWDER, LYOPHILIZED, FOR SOLUTION INTRAMUSCULAR; INTRAVENOUS at 10:04

## 2019-04-29 NOTE — PLAN OF CARE
Problem: Adult Inpatient Plan of Care  Goal: Patient-Specific Goal (Individualization)  5fu infusion complete. Pt tolerated well. VSS. NAD. Home infusion pump started to right chest port.  AVS provided. Pt verbalized understanding of discharge instructions before leaving with family member.

## 2019-05-01 ENCOUNTER — INFUSION (OUTPATIENT)
Dept: INFUSION THERAPY | Facility: OTHER | Age: 74
End: 2019-05-01
Attending: INTERNAL MEDICINE
Payer: MEDICARE

## 2019-05-01 VITALS
DIASTOLIC BLOOD PRESSURE: 74 MMHG | RESPIRATION RATE: 18 BRPM | OXYGEN SATURATION: 98 % | TEMPERATURE: 99 F | SYSTOLIC BLOOD PRESSURE: 119 MMHG | HEART RATE: 97 BPM

## 2019-05-01 DIAGNOSIS — C79.51 PROSTATE CANCER METASTATIC TO BONE: ICD-10-CM

## 2019-05-01 DIAGNOSIS — C77.5 PROSTATE CANCER METASTATIC TO INTRAPELVIC LYMPH NODE: ICD-10-CM

## 2019-05-01 DIAGNOSIS — C25.1 MALIGNANT NEOPLASM OF BODY OF PANCREAS: Primary | ICD-10-CM

## 2019-05-01 DIAGNOSIS — C61 PROSTATE CANCER METASTATIC TO BONE: ICD-10-CM

## 2019-05-01 DIAGNOSIS — C61 PROSTATE CANCER METASTATIC TO INTRAPELVIC LYMPH NODE: ICD-10-CM

## 2019-05-01 PROCEDURE — A4216 STERILE WATER/SALINE, 10 ML: HCPCS | Performed by: INTERNAL MEDICINE

## 2019-05-01 PROCEDURE — 96521 REFILL/MAINT PORTABLE PUMP: CPT

## 2019-05-01 PROCEDURE — 63600175 PHARM REV CODE 636 W HCPCS: Performed by: INTERNAL MEDICINE

## 2019-05-01 PROCEDURE — 25000003 PHARM REV CODE 250: Performed by: INTERNAL MEDICINE

## 2019-05-01 RX ORDER — HEPARIN 100 UNIT/ML
500 SYRINGE INTRAVENOUS
Status: DISCONTINUED | OUTPATIENT
Start: 2019-05-01 | End: 2019-05-01 | Stop reason: HOSPADM

## 2019-05-01 RX ORDER — SODIUM CHLORIDE 0.9 % (FLUSH) 0.9 %
10 SYRINGE (ML) INJECTION
Status: DISCONTINUED | OUTPATIENT
Start: 2019-05-01 | End: 2019-05-01 | Stop reason: HOSPADM

## 2019-05-01 RX ADMIN — Medication 10 ML: at 09:05

## 2019-05-01 RX ADMIN — HEPARIN 500 UNITS: 100 SYRINGE at 09:05

## 2019-05-01 NOTE — PLAN OF CARE
Problem: Adult Inpatient Plan of Care  Goal: Patient-Specific Goal (Individualization)  Outcome: Ongoing (interventions implemented as appropriate)  Home infusion complete. Pt tolerated well. VSS. NAD. Infusion pump DC'd and Port to right chest de-accessed after heparinized per protocol.  AVS provided. Pt verbalized understanding of discharge instructions before leaving with family member.

## 2019-05-08 ENCOUNTER — OFFICE VISIT (OUTPATIENT)
Dept: HEMATOLOGY/ONCOLOGY | Facility: CLINIC | Age: 74
End: 2019-05-08
Attending: INTERNAL MEDICINE
Payer: MEDICARE

## 2019-05-08 VITALS
TEMPERATURE: 97 F | BODY MASS INDEX: 22.05 KG/M2 | DIASTOLIC BLOOD PRESSURE: 70 MMHG | OXYGEN SATURATION: 100 % | HEART RATE: 94 BPM | RESPIRATION RATE: 17 BRPM | SYSTOLIC BLOOD PRESSURE: 113 MMHG | HEIGHT: 68 IN | WEIGHT: 145.5 LBS

## 2019-05-08 DIAGNOSIS — C78.7 LIVER METASTASES: ICD-10-CM

## 2019-05-08 DIAGNOSIS — C25.1 MALIGNANT NEOPLASM OF BODY OF PANCREAS: Primary | ICD-10-CM

## 2019-05-08 DIAGNOSIS — C61 PROSTATE CANCER METASTATIC TO BONE: ICD-10-CM

## 2019-05-08 DIAGNOSIS — C79.51 PROSTATE CANCER METASTATIC TO BONE: ICD-10-CM

## 2019-05-08 PROCEDURE — 99214 PR OFFICE/OUTPT VISIT, EST, LEVL IV, 30-39 MIN: ICD-10-PCS | Mod: S$GLB,,, | Performed by: INTERNAL MEDICINE

## 2019-05-08 PROCEDURE — 3078F PR MOST RECENT DIASTOLIC BLOOD PRESSURE < 80 MM HG: ICD-10-PCS | Mod: CPTII,S$GLB,, | Performed by: INTERNAL MEDICINE

## 2019-05-08 PROCEDURE — 99999 PR PBB SHADOW E&M-EST. PATIENT-LVL V: ICD-10-PCS | Mod: PBBFAC,,, | Performed by: INTERNAL MEDICINE

## 2019-05-08 PROCEDURE — 99214 OFFICE O/P EST MOD 30 MIN: CPT | Mod: S$GLB,,, | Performed by: INTERNAL MEDICINE

## 2019-05-08 PROCEDURE — 99999 PR PBB SHADOW E&M-EST. PATIENT-LVL V: CPT | Mod: PBBFAC,,, | Performed by: INTERNAL MEDICINE

## 2019-05-08 PROCEDURE — 3074F PR MOST RECENT SYSTOLIC BLOOD PRESSURE < 130 MM HG: ICD-10-PCS | Mod: CPTII,S$GLB,, | Performed by: INTERNAL MEDICINE

## 2019-05-08 PROCEDURE — 3078F DIAST BP <80 MM HG: CPT | Mod: CPTII,S$GLB,, | Performed by: INTERNAL MEDICINE

## 2019-05-08 PROCEDURE — 1101F PT FALLS ASSESS-DOCD LE1/YR: CPT | Mod: CPTII,S$GLB,, | Performed by: INTERNAL MEDICINE

## 2019-05-08 PROCEDURE — 1101F PR PT FALLS ASSESS DOC 0-1 FALLS W/OUT INJ PAST YR: ICD-10-PCS | Mod: CPTII,S$GLB,, | Performed by: INTERNAL MEDICINE

## 2019-05-08 PROCEDURE — 3074F SYST BP LT 130 MM HG: CPT | Mod: CPTII,S$GLB,, | Performed by: INTERNAL MEDICINE

## 2019-05-08 RX ORDER — FLUOROURACIL 50 MG/ML
400 INJECTION, SOLUTION INTRAVENOUS
Status: CANCELLED | OUTPATIENT
Start: 2019-05-12

## 2019-05-08 RX ORDER — HEPARIN 100 UNIT/ML
500 SYRINGE INTRAVENOUS
Status: CANCELLED | OUTPATIENT
Start: 2019-05-16

## 2019-05-08 RX ORDER — SODIUM CHLORIDE 0.9 % (FLUSH) 0.9 %
10 SYRINGE (ML) INJECTION
Status: CANCELLED | OUTPATIENT
Start: 2019-05-16

## 2019-05-08 RX ORDER — EPINEPHRINE 0.3 MG/.3ML
0.3 INJECTION SUBCUTANEOUS ONCE AS NEEDED
Status: CANCELLED | OUTPATIENT
Start: 2019-05-12

## 2019-05-08 RX ORDER — HEPARIN 100 UNIT/ML
500 SYRINGE INTRAVENOUS
Status: CANCELLED | OUTPATIENT
Start: 2019-05-12

## 2019-05-08 RX ORDER — DIPHENHYDRAMINE HYDROCHLORIDE 50 MG/ML
50 INJECTION INTRAMUSCULAR; INTRAVENOUS ONCE AS NEEDED
Status: CANCELLED | OUTPATIENT
Start: 2019-05-12

## 2019-05-08 RX ORDER — SODIUM CHLORIDE 0.9 % (FLUSH) 0.9 %
10 SYRINGE (ML) INJECTION
Status: CANCELLED | OUTPATIENT
Start: 2019-05-12

## 2019-05-08 NOTE — PROGRESS NOTES
Subjective:       Patient ID: Shady Prakash Jr. is a 74 y.o. male.    Chief Complaint: Follow-up    HPI     Returns for follow-up of metastatic pancreatic cancer and metastatic prostate cancer  We had to adjust his chemotherapy with side effects to 5FU only  No fatigue  Better PO  No pain  Has significant dental issues  No further dizziness  No fevers, chills, or infections.  No abdominal pain. No N/V. No constipation or diarrhea.     Currently his treatment is focused on the metastatic pancreatic cancer  Recent scans with stable disease   MRIs for back pain revealed arthritis     Oncology History:  1. Metastatic prostate cancer  He is s/p TRUS/bx on 8/7/15 w/ high volume marta 9 (5+4), PSA 25.  He had negative metastatic workup at diagnosis.  However, at time of planned RALP on 9/10/15 he was noted to have BN invasion on cysto and therefore only PLND was done, which confirmed metastatic disease.  He was started on ADT 9/17/15 w/ firmagon followed by Eligard on 10/15/15.    Started Casodex 7/20/17 - scans at that time showed osseus metastatic disease  Started Docetaxel 10/6/17 with progression, then found to have below     - on follow-up scans a pancreatic lesion noted concerning for pancreatic cancer as it was an unlikely area for prostate cancer spread-  Biopsy confirmed adenocarcinoma and PSA was negative     Imaging as below:  Scan results:  6/20/18 Bone scan:  FINDINGS:  Two lesions in the left scapula, stable.  Elongated lesion in the right mid rib posteriorly, stable.  Prior exam demonstrated a large right iliac lesion, which no longer exhibits increased uptake.  Left pubic lesion, stable.  There is also now a small lesion in the left iliac bone. Two additional new small foci in the mid thoracic vertebra.  Both kidneys and the bladder appear unremarkable.  Impression:  Findings consistent with osseous metastatic disease as above, noting 3 new small lesions.     6/21/18 CT scan abdomen and  pelvis:  FINDINGS:  Lower chest: There has been interval increase in size in the focal consolidation which is pleural based in the posterior aspect of the right lower lobe.  Today's exam measures 5.1 cm maximum diameter.  Previous exam is measure approximately 3.3 cm.  This is associated with pleural thickening that extends laterally.  There is a small focus of cylinder all coal bronchiectasis in the medial left lower lobe.  The remaining aspects of the lung parenchyma that are visualized show no abnormalities.  The visualized heart and pericardium are normal  Liver: There are 3 hyperenhancing foci along the periphery of the right and left lobes of the liver which are stable from 2015.  The largest measures 1.1 cm in maximum diameter.  Although not characteristic on this exam, previous exam in 2015 shows findings most characteristic of hepatic hemangioma.  The remaining liver parenchyma as well as the hepatic and portal veins are patent.  Gallbladder: Normal.  Pancreas: There is been interval development of an ill-defined hypodense focus within the proximal body of the pancreas measuring 2.1 cm x 1.8 cm.  Spleen: Calcified hemangiomas present.  Otherwise normal.  Adrenal glands: Normal.  Genitourinary: Stable simple cyst in the lower pole of the right kidney measuring 1.6 cm.  The left kidney and bilateral ureters are normal.  The bladder is partially distended and shows wall thickening on the non dependent surface of the bladder.  This is not significantly changed in appearance.  Gastrointestinal: Stomach and small intestines are normal.  Colonic diverticulosis is present without evidence of diverticulitis.  Prostate: Enlarged and nodular causing mass effect on the bladder.  Miscellaneous: There is no intra-abdominal or pelvic free fluid, free air or lymphadenopathy.  The abdominal aorta tapers normally.  Bilateral fat containing inguinal hernias present.  Osseous and soft tissue structures: Sclerotic metastases  involving the right iliac bone and left superior pubic ramus are identified.  The small focus in the left iliac bone seen on yesterday's bone scan is not well seen on this exam.  The soft tissues are within normal limits.  Impression:  1. Interval increase in size in the pleural base, right lower lobe consolidation.  2. Hyperenhancing foci likely represent hemangiomas.  They have been stable since 2015.  Is on the 2015 exam where they demonstrate characteristics most compatible with hemangioma.  3. Bladder wall thickening is not significantly changed.  This may be related to outlet obstruction from the nodular prostate.  4. Interval development of ill-defined hypodense focus measuring 2.1 cm within the proximal body of the pancreas.  This may also be a focus of metastatic disease although is uncommon for the prostate to metastasized to the pancreas.  A dedicated pancreatic CT may be beneficial in further characterizing this lesion.     - on 7/25/18 he came to ER with worsening abdominal pain and concerns for appendicitis  Underwent Laparoscopy Converted to Laparotomy, Peritoneal Biopsy , Ileocecal with Dr. Stephenson  Biopsy concerning for metastatic pancreatic carcinoma     2. Metastatic pancreatic cancer  - started on [place]/Abraxane- note Ca19-9 never elevated  Response initially  Now with progression  FOLFOX started 12/20/18     Re imaging stable.    Review of Systems   Constitutional: Negative for activity change, appetite change, chills, fatigue, fever and unexpected weight change.   HENT: Positive for dental problem. Negative for congestion, hearing loss, mouth sores, nosebleeds, postnasal drip, sinus pressure, sore throat and trouble swallowing.    Eyes: Negative for visual disturbance.   Respiratory: Negative for cough (rare dry), chest tightness, shortness of breath and wheezing.    Cardiovascular: Negative for chest pain, palpitations and leg swelling.   Gastrointestinal: Negative for abdominal distention,  abdominal pain, blood in stool, constipation, diarrhea, nausea and vomiting.        No GERD   Endocrine:        Hot flashes   Genitourinary: Negative for decreased urine volume, difficulty urinating, frequency, hematuria and urgency.   Musculoskeletal: Negative for arthralgias (better), back pain, gait problem, joint swelling, neck pain and neck stiffness.   Skin: Negative for color change, pallor, rash and wound.   Neurological: Positive for numbness (feet). Negative for dizziness, weakness, light-headedness and headaches.   Hematological: Negative for adenopathy. Does not bruise/bleed easily.   Psychiatric/Behavioral: Negative for dysphoric mood and sleep disturbance. The patient is not nervous/anxious.        Objective:      Physical Exam   Constitutional: He is oriented to person, place, and time. He appears well-developed and well-nourished. No distress.   Presents with his wife  ECOG=0   HENT:   Head: Normocephalic and atraumatic.   Right Ear: External ear normal.   Left Ear: External ear normal.   Nose: Nose normal.   Mouth/Throat: Oropharynx is clear and moist. No oropharyngeal exudate.   Poor dentition  No current gingival bleeding   Eyes: Pupils are equal, round, and reactive to light. Conjunctivae and EOM are normal. Right eye exhibits no discharge. Left eye exhibits no discharge. No scleral icterus.   Neck: Normal range of motion. Neck supple. No tracheal deviation present. No thyromegaly present.   Cardiovascular: Normal rate, regular rhythm, normal heart sounds and intact distal pulses. Exam reveals no gallop and no friction rub.   No murmur heard.  Pulmonary/Chest: Effort normal and breath sounds normal. No respiratory distress. He has no wheezes. He has no rales. He exhibits no tenderness.   Abdominal: Soft. Bowel sounds are normal. He exhibits mass. He exhibits no distension. There is no tenderness. There is no rebound and no guarding.   Musculoskeletal: Normal range of motion. He exhibits no edema,  tenderness or deformity.   Lymphadenopathy:     He has no cervical adenopathy.   Neurological: He is alert and oriented to person, place, and time. He has normal reflexes. He displays normal reflexes. No cranial nerve deficit. He exhibits normal muscle tone. Coordination normal.   Skin: Skin is warm and dry. No rash noted. He is not diaphoretic. No erythema. No pallor.   Psychiatric: He has a normal mood and affect. His behavior is normal. Judgment and thought content normal.   Nursing note and vitals reviewed.   Labs- reviewed   Assessment:       1. Malignant neoplasm of body of pancreas    2. Prostate cancer metastatic to bone    3. Liver metastases        Plan:     1,3. Chemotherapy required adjustment due to significant cytopenias  We will rescan in late June  Knows disease is incurable   Currently disease has remained stable and he has an excellent QOL  2. No therapy as addressing above  Good response to prior    > 25 minutes total

## 2019-05-15 ENCOUNTER — INFUSION (OUTPATIENT)
Dept: INFUSION THERAPY | Facility: OTHER | Age: 74
End: 2019-05-15
Attending: INTERNAL MEDICINE
Payer: MEDICARE

## 2019-05-15 VITALS
HEIGHT: 68 IN | SYSTOLIC BLOOD PRESSURE: 115 MMHG | HEART RATE: 92 BPM | WEIGHT: 149.69 LBS | DIASTOLIC BLOOD PRESSURE: 70 MMHG | TEMPERATURE: 98 F | BODY MASS INDEX: 22.69 KG/M2 | OXYGEN SATURATION: 99 % | RESPIRATION RATE: 18 BRPM

## 2019-05-15 DIAGNOSIS — C79.51 PROSTATE CANCER METASTATIC TO BONE: ICD-10-CM

## 2019-05-15 DIAGNOSIS — C77.5 PROSTATE CANCER METASTATIC TO INTRAPELVIC LYMPH NODE: ICD-10-CM

## 2019-05-15 DIAGNOSIS — C61 PROSTATE CANCER METASTATIC TO BONE: ICD-10-CM

## 2019-05-15 DIAGNOSIS — C25.1 MALIGNANT NEOPLASM OF BODY OF PANCREAS: Primary | ICD-10-CM

## 2019-05-15 DIAGNOSIS — C61 PROSTATE CANCER METASTATIC TO INTRAPELVIC LYMPH NODE: ICD-10-CM

## 2019-05-15 PROCEDURE — 96367 TX/PROPH/DG ADDL SEQ IV INF: CPT

## 2019-05-15 PROCEDURE — 96416 CHEMO PROLONG INFUSE W/PUMP: CPT

## 2019-05-15 PROCEDURE — 25000003 PHARM REV CODE 250: Performed by: INTERNAL MEDICINE

## 2019-05-15 PROCEDURE — 96409 CHEMO IV PUSH SNGL DRUG: CPT

## 2019-05-15 PROCEDURE — 63600175 PHARM REV CODE 636 W HCPCS: Performed by: INTERNAL MEDICINE

## 2019-05-15 RX ORDER — EPINEPHRINE 0.3 MG/.3ML
0.3 INJECTION SUBCUTANEOUS ONCE AS NEEDED
Status: DISCONTINUED | OUTPATIENT
Start: 2019-05-15 | End: 2019-05-15 | Stop reason: HOSPADM

## 2019-05-15 RX ORDER — FLUOROURACIL 50 MG/ML
400 INJECTION, SOLUTION INTRAVENOUS
Status: COMPLETED | OUTPATIENT
Start: 2019-05-15 | End: 2019-05-15

## 2019-05-15 RX ORDER — HEPARIN 100 UNIT/ML
500 SYRINGE INTRAVENOUS
Status: DISCONTINUED | OUTPATIENT
Start: 2019-05-15 | End: 2019-05-15 | Stop reason: HOSPADM

## 2019-05-15 RX ORDER — DIPHENHYDRAMINE HYDROCHLORIDE 50 MG/ML
50 INJECTION INTRAMUSCULAR; INTRAVENOUS ONCE AS NEEDED
Status: DISCONTINUED | OUTPATIENT
Start: 2019-05-15 | End: 2019-05-15 | Stop reason: HOSPADM

## 2019-05-15 RX ORDER — SODIUM CHLORIDE 0.9 % (FLUSH) 0.9 %
10 SYRINGE (ML) INJECTION
Status: DISCONTINUED | OUTPATIENT
Start: 2019-05-15 | End: 2019-05-15 | Stop reason: HOSPADM

## 2019-05-15 RX ADMIN — FLUOROURACIL 710 MG: 50 INJECTION, SOLUTION INTRAVENOUS at 09:05

## 2019-05-15 RX ADMIN — FLUOROURACIL 4270 MG: 50 INJECTION, SOLUTION INTRAVENOUS at 09:05

## 2019-05-15 RX ADMIN — LEUCOVORIN CALCIUM 710 MG: 350 INJECTION, POWDER, LYOPHILIZED, FOR SOLUTION INTRAMUSCULAR; INTRAVENOUS at 09:05

## 2019-05-15 RX ADMIN — DEXAMETHASONE SODIUM PHOSPHATE: 4 INJECTION, SOLUTION INTRA-ARTICULAR; INTRALESIONAL; INTRAMUSCULAR; INTRAVENOUS; SOFT TISSUE at 08:05

## 2019-05-15 RX ADMIN — SODIUM CHLORIDE: 0.9 INJECTION, SOLUTION INTRAVENOUS at 08:05

## 2019-05-15 NOTE — PLAN OF CARE
Problem: Adult Inpatient Plan of Care  Goal: Plan of Care Review  Outcome: Ongoing (interventions implemented as appropriate)  Pt tolerated leucovorin/5FU push without issue. Pt connected to 5FU pump. VSS. AVS given. Pt d.c home with instructions to return 5/17/19 for pump d.c.

## 2019-05-17 ENCOUNTER — INFUSION (OUTPATIENT)
Dept: INFUSION THERAPY | Facility: OTHER | Age: 74
End: 2019-05-17
Attending: INTERNAL MEDICINE
Payer: MEDICARE

## 2019-05-17 VITALS
TEMPERATURE: 98 F | RESPIRATION RATE: 17 BRPM | HEART RATE: 75 BPM | SYSTOLIC BLOOD PRESSURE: 106 MMHG | OXYGEN SATURATION: 99 % | DIASTOLIC BLOOD PRESSURE: 63 MMHG

## 2019-05-17 DIAGNOSIS — C61 PROSTATE CANCER METASTATIC TO INTRAPELVIC LYMPH NODE: ICD-10-CM

## 2019-05-17 DIAGNOSIS — C61 PROSTATE CANCER METASTATIC TO BONE: ICD-10-CM

## 2019-05-17 DIAGNOSIS — C77.5 PROSTATE CANCER METASTATIC TO INTRAPELVIC LYMPH NODE: ICD-10-CM

## 2019-05-17 DIAGNOSIS — C25.1 MALIGNANT NEOPLASM OF BODY OF PANCREAS: Primary | ICD-10-CM

## 2019-05-17 DIAGNOSIS — C79.51 PROSTATE CANCER METASTATIC TO BONE: ICD-10-CM

## 2019-05-17 PROCEDURE — A4216 STERILE WATER/SALINE, 10 ML: HCPCS | Performed by: INTERNAL MEDICINE

## 2019-05-17 PROCEDURE — 63600175 PHARM REV CODE 636 W HCPCS: Performed by: INTERNAL MEDICINE

## 2019-05-17 PROCEDURE — 96521 REFILL/MAINT PORTABLE PUMP: CPT

## 2019-05-17 PROCEDURE — 25000003 PHARM REV CODE 250: Performed by: INTERNAL MEDICINE

## 2019-05-17 RX ORDER — HEPARIN 100 UNIT/ML
500 SYRINGE INTRAVENOUS
Status: DISCONTINUED | OUTPATIENT
Start: 2019-05-17 | End: 2019-05-17 | Stop reason: HOSPADM

## 2019-05-17 RX ORDER — SODIUM CHLORIDE 0.9 % (FLUSH) 0.9 %
10 SYRINGE (ML) INJECTION
Status: DISCONTINUED | OUTPATIENT
Start: 2019-05-17 | End: 2019-05-17 | Stop reason: HOSPADM

## 2019-05-17 RX ADMIN — HEPARIN 500 UNITS: 100 SYRINGE at 08:05

## 2019-05-17 RX ADMIN — Medication 10 ML: at 08:05

## 2019-05-17 NOTE — PLAN OF CARE
Problem: Adult Inpatient Plan of Care  Goal: Plan of Care Review  Outcome: Ongoing (interventions implemented as appropriate)  Pt here for pump d.c. Pt tolerated 5FU without issue. VSS. AVS given.

## 2019-05-22 ENCOUNTER — DOCUMENTATION ONLY (OUTPATIENT)
Dept: HEMATOLOGY/ONCOLOGY | Facility: CLINIC | Age: 74
End: 2019-05-22

## 2019-05-22 ENCOUNTER — LAB VISIT (OUTPATIENT)
Dept: LAB | Facility: OTHER | Age: 74
End: 2019-05-22
Attending: INTERNAL MEDICINE
Payer: MEDICARE

## 2019-05-22 ENCOUNTER — OFFICE VISIT (OUTPATIENT)
Dept: HEMATOLOGY/ONCOLOGY | Facility: CLINIC | Age: 74
End: 2019-05-22
Attending: INTERNAL MEDICINE
Payer: MEDICARE

## 2019-05-22 VITALS
HEIGHT: 69 IN | DIASTOLIC BLOOD PRESSURE: 71 MMHG | OXYGEN SATURATION: 99 % | HEART RATE: 74 BPM | WEIGHT: 153.25 LBS | RESPIRATION RATE: 16 BRPM | BODY MASS INDEX: 22.7 KG/M2 | TEMPERATURE: 98 F | SYSTOLIC BLOOD PRESSURE: 116 MMHG

## 2019-05-22 DIAGNOSIS — C25.1 MALIGNANT NEOPLASM OF BODY OF PANCREAS: ICD-10-CM

## 2019-05-22 DIAGNOSIS — K59.09 OTHER CONSTIPATION: ICD-10-CM

## 2019-05-22 DIAGNOSIS — C79.51 PROSTATE CANCER METASTATIC TO BONE: ICD-10-CM

## 2019-05-22 DIAGNOSIS — C79.51 PROSTATE CANCER METASTATIC TO BONE: Primary | ICD-10-CM

## 2019-05-22 DIAGNOSIS — C78.6 PERITONEAL METASTASES: ICD-10-CM

## 2019-05-22 DIAGNOSIS — C78.7 LIVER METASTASES: ICD-10-CM

## 2019-05-22 DIAGNOSIS — C61 PROSTATE CANCER METASTATIC TO BONE: Primary | ICD-10-CM

## 2019-05-22 DIAGNOSIS — C61 PROSTATE CANCER METASTATIC TO BONE: ICD-10-CM

## 2019-05-22 LAB
ALBUMIN SERPL BCP-MCNC: 3.7 G/DL (ref 3.5–5.2)
ALP SERPL-CCNC: 81 U/L (ref 55–135)
ALT SERPL W/O P-5'-P-CCNC: 24 U/L (ref 10–44)
ANION GAP SERPL CALC-SCNC: 8 MMOL/L (ref 8–16)
ANISOCYTOSIS BLD QL SMEAR: SLIGHT
AST SERPL-CCNC: 19 U/L (ref 10–40)
BASOPHILS # BLD AUTO: ABNORMAL K/UL (ref 0–0.2)
BASOPHILS NFR BLD: 1 % (ref 0–1.9)
BILIRUB SERPL-MCNC: 0.5 MG/DL (ref 0.1–1)
BUN SERPL-MCNC: 19 MG/DL (ref 8–23)
CALCIUM SERPL-MCNC: 9.4 MG/DL (ref 8.7–10.5)
CHLORIDE SERPL-SCNC: 105 MMOL/L (ref 95–110)
CO2 SERPL-SCNC: 26 MMOL/L (ref 23–29)
CREAT SERPL-MCNC: 1 MG/DL (ref 0.5–1.4)
DIFFERENTIAL METHOD: ABNORMAL
EOSINOPHIL # BLD AUTO: ABNORMAL K/UL (ref 0–0.5)
EOSINOPHIL NFR BLD: 9 % (ref 0–8)
ERYTHROCYTE [DISTWIDTH] IN BLOOD BY AUTOMATED COUNT: 15.1 % (ref 11.5–14.5)
EST. GFR  (AFRICAN AMERICAN): >60 ML/MIN/1.73 M^2
EST. GFR  (NON AFRICAN AMERICAN): >60 ML/MIN/1.73 M^2
GLUCOSE SERPL-MCNC: 178 MG/DL (ref 70–110)
HCT VFR BLD AUTO: 32 % (ref 40–54)
HGB BLD-MCNC: 10.2 G/DL (ref 14–18)
LYMPHOCYTES # BLD AUTO: ABNORMAL K/UL (ref 1–4.8)
LYMPHOCYTES NFR BLD: 32 % (ref 18–48)
MCH RBC QN AUTO: 31.9 PG (ref 27–31)
MCHC RBC AUTO-ENTMCNC: 31.9 G/DL (ref 32–36)
MCV RBC AUTO: 100 FL (ref 82–98)
MONOCYTES # BLD AUTO: ABNORMAL K/UL (ref 0.3–1)
MONOCYTES NFR BLD: 8 % (ref 4–15)
NEUTROPHILS NFR BLD: 50 % (ref 38–73)
PLATELET # BLD AUTO: 134 K/UL (ref 150–350)
PLATELET BLD QL SMEAR: ABNORMAL
PMV BLD AUTO: 9.7 FL (ref 9.2–12.9)
POTASSIUM SERPL-SCNC: 4.1 MMOL/L (ref 3.5–5.1)
PROT SERPL-MCNC: 6.8 G/DL (ref 6–8.4)
RBC # BLD AUTO: 3.2 M/UL (ref 4.6–6.2)
SODIUM SERPL-SCNC: 139 MMOL/L (ref 136–145)
WBC # BLD AUTO: 1.91 K/UL (ref 3.9–12.7)

## 2019-05-22 PROCEDURE — 99999 PR PBB SHADOW E&M-EST. PATIENT-LVL III: CPT | Mod: PBBFAC,,, | Performed by: INTERNAL MEDICINE

## 2019-05-22 PROCEDURE — 80053 COMPREHEN METABOLIC PANEL: CPT

## 2019-05-22 PROCEDURE — 99999 PR PBB SHADOW E&M-EST. PATIENT-LVL III: ICD-10-PCS | Mod: PBBFAC,,, | Performed by: INTERNAL MEDICINE

## 2019-05-22 PROCEDURE — 85007 BL SMEAR W/DIFF WBC COUNT: CPT

## 2019-05-22 PROCEDURE — 85027 COMPLETE CBC AUTOMATED: CPT

## 2019-05-22 PROCEDURE — 1101F PT FALLS ASSESS-DOCD LE1/YR: CPT | Mod: CPTII,S$GLB,, | Performed by: INTERNAL MEDICINE

## 2019-05-22 PROCEDURE — 99214 PR OFFICE/OUTPT VISIT, EST, LEVL IV, 30-39 MIN: ICD-10-PCS | Mod: S$GLB,,, | Performed by: INTERNAL MEDICINE

## 2019-05-22 PROCEDURE — 3078F PR MOST RECENT DIASTOLIC BLOOD PRESSURE < 80 MM HG: ICD-10-PCS | Mod: CPTII,S$GLB,, | Performed by: INTERNAL MEDICINE

## 2019-05-22 PROCEDURE — 1101F PR PT FALLS ASSESS DOC 0-1 FALLS W/OUT INJ PAST YR: ICD-10-PCS | Mod: CPTII,S$GLB,, | Performed by: INTERNAL MEDICINE

## 2019-05-22 PROCEDURE — 3074F PR MOST RECENT SYSTOLIC BLOOD PRESSURE < 130 MM HG: ICD-10-PCS | Mod: CPTII,S$GLB,, | Performed by: INTERNAL MEDICINE

## 2019-05-22 PROCEDURE — 99214 OFFICE O/P EST MOD 30 MIN: CPT | Mod: S$GLB,,, | Performed by: INTERNAL MEDICINE

## 2019-05-22 PROCEDURE — 3074F SYST BP LT 130 MM HG: CPT | Mod: CPTII,S$GLB,, | Performed by: INTERNAL MEDICINE

## 2019-05-22 PROCEDURE — 36415 COLL VENOUS BLD VENIPUNCTURE: CPT

## 2019-05-22 PROCEDURE — 3078F DIAST BP <80 MM HG: CPT | Mod: CPTII,S$GLB,, | Performed by: INTERNAL MEDICINE

## 2019-05-22 RX ORDER — LACTULOSE 10 G/15ML
10 SOLUTION ORAL 2 TIMES DAILY PRN
Qty: 473 ML | Refills: 1 | Status: SHIPPED | OUTPATIENT
Start: 2019-05-22

## 2019-05-22 NOTE — PROGRESS NOTES
Subjective:       Patient ID: Shady Prakash Jr. is a 74 y.o. male.    Chief Complaint: Follow-up    HPI     Returns for follow-up of metastatic pancreatic cancer and metastatic prostate cancer  We had to adjust his chemotherapy with side effects to 5FU only  No fatigue  Better PO  No pain  Has significant dental issues  No further dizziness  No fevers, chills, or infections.  No abdominal pain. No N/V. No constipation or diarrhea.     Currently his treatment is focused on the metastatic pancreatic cancer  Recent scans with stable disease   MRIs for back pain revealed arthritis     Oncology History:  1. Metastatic prostate cancer  He is s/p TRUS/bx on 8/7/15 w/ high volume marta 9 (5+4), PSA 25.  He had negative metastatic workup at diagnosis.  However, at time of planned RALP on 9/10/15 he was noted to have BN invasion on cysto and therefore only PLND was done, which confirmed metastatic disease.  He was started on ADT 9/17/15 w/ firmagon followed by Eligard on 10/15/15.    Started Casodex 7/20/17 - scans at that time showed osseus metastatic disease  Started Docetaxel 10/6/17 with progression, then found to have below     - on follow-up scans a pancreatic lesion noted concerning for pancreatic cancer as it was an unlikely area for prostate cancer spread-  Biopsy confirmed adenocarcinoma and PSA was negative     Imaging as below:  Scan results:  6/20/18 Bone scan:  FINDINGS:  Two lesions in the left scapula, stable.  Elongated lesion in the right mid rib posteriorly, stable.  Prior exam demonstrated a large right iliac lesion, which no longer exhibits increased uptake.  Left pubic lesion, stable.  There is also now a small lesion in the left iliac bone. Two additional new small foci in the mid thoracic vertebra.  Both kidneys and the bladder appear unremarkable.  Impression:  Findings consistent with osseous metastatic disease as above, noting 3 new small lesions.     6/21/18 CT scan abdomen and  pelvis:  FINDINGS:  Lower chest: There has been interval increase in size in the focal consolidation which is pleural based in the posterior aspect of the right lower lobe.  Today's exam measures 5.1 cm maximum diameter.  Previous exam is measure approximately 3.3 cm.  This is associated with pleural thickening that extends laterally.  There is a small focus of cylinder all coal bronchiectasis in the medial left lower lobe.  The remaining aspects of the lung parenchyma that are visualized show no abnormalities.  The visualized heart and pericardium are normal  Liver: There are 3 hyperenhancing foci along the periphery of the right and left lobes of the liver which are stable from 2015.  The largest measures 1.1 cm in maximum diameter.  Although not characteristic on this exam, previous exam in 2015 shows findings most characteristic of hepatic hemangioma.  The remaining liver parenchyma as well as the hepatic and portal veins are patent.  Gallbladder: Normal.  Pancreas: There is been interval development of an ill-defined hypodense focus within the proximal body of the pancreas measuring 2.1 cm x 1.8 cm.  Spleen: Calcified hemangiomas present.  Otherwise normal.  Adrenal glands: Normal.  Genitourinary: Stable simple cyst in the lower pole of the right kidney measuring 1.6 cm.  The left kidney and bilateral ureters are normal.  The bladder is partially distended and shows wall thickening on the non dependent surface of the bladder.  This is not significantly changed in appearance.  Gastrointestinal: Stomach and small intestines are normal.  Colonic diverticulosis is present without evidence of diverticulitis.  Prostate: Enlarged and nodular causing mass effect on the bladder.  Miscellaneous: There is no intra-abdominal or pelvic free fluid, free air or lymphadenopathy.  The abdominal aorta tapers normally.  Bilateral fat containing inguinal hernias present.  Osseous and soft tissue structures: Sclerotic metastases  involving the right iliac bone and left superior pubic ramus are identified.  The small focus in the left iliac bone seen on yesterday's bone scan is not well seen on this exam.  The soft tissues are within normal limits.  Impression:  1. Interval increase in size in the pleural base, right lower lobe consolidation.  2. Hyperenhancing foci likely represent hemangiomas.  They have been stable since 2015.  Is on the 2015 exam where they demonstrate characteristics most compatible with hemangioma.  3. Bladder wall thickening is not significantly changed.  This may be related to outlet obstruction from the nodular prostate.  4. Interval development of ill-defined hypodense focus measuring 2.1 cm within the proximal body of the pancreas.  This may also be a focus of metastatic disease although is uncommon for the prostate to metastasized to the pancreas.  A dedicated pancreatic CT may be beneficial in further characterizing this lesion.     - on 7/25/18 he came to ER with worsening abdominal pain and concerns for appendicitis  Underwent Laparoscopy Converted to Laparotomy, Peritoneal Biopsy , Ileocecal with Dr. Stephenson  Biopsy concerning for metastatic pancreatic carcinoma     2. Metastatic pancreatic cancer  - started on [place]/Abraxane- note Ca19-9 never elevated  Response initially  Now with progression  FOLFOX started 12/20/18     Re imaging stable.    Review of Systems   Constitutional: Negative for activity change, appetite change, chills, fatigue, fever and unexpected weight change.   HENT: Positive for dental problem. Negative for congestion, hearing loss, mouth sores, nosebleeds, postnasal drip, sinus pressure, sore throat and trouble swallowing.    Eyes: Negative for visual disturbance.   Respiratory: Negative for cough (rare dry), chest tightness, shortness of breath and wheezing.    Cardiovascular: Negative for chest pain, palpitations and leg swelling.   Gastrointestinal: Negative for abdominal distention,  abdominal pain, blood in stool, constipation, diarrhea, nausea and vomiting.        No GERD   Endocrine:        Hot flashes   Genitourinary: Negative for decreased urine volume, difficulty urinating, frequency, hematuria and urgency.   Musculoskeletal: Negative for arthralgias (better), back pain, gait problem, joint swelling, neck pain and neck stiffness.   Skin: Negative for color change, pallor, rash and wound.   Neurological: Positive for numbness (feet). Negative for dizziness, weakness, light-headedness and headaches.   Hematological: Negative for adenopathy. Does not bruise/bleed easily.   Psychiatric/Behavioral: Negative for dysphoric mood and sleep disturbance. The patient is not nervous/anxious.        Objective:      Physical Exam   Constitutional: He is oriented to person, place, and time. He appears well-developed and well-nourished. No distress.   Presents with his wife  ECOG=0   HENT:   Head: Normocephalic and atraumatic.   Right Ear: External ear normal.   Left Ear: External ear normal.   Nose: Nose normal.   Mouth/Throat: Oropharynx is clear and moist. No oropharyngeal exudate.   Poor dentition  No current gingival bleeding   Eyes: Pupils are equal, round, and reactive to light. Conjunctivae and EOM are normal. Right eye exhibits no discharge. Left eye exhibits no discharge. No scleral icterus.   Neck: Normal range of motion. Neck supple. No tracheal deviation present. No thyromegaly present.   Cardiovascular: Normal rate, regular rhythm, normal heart sounds and intact distal pulses. Exam reveals no gallop and no friction rub.   No murmur heard.  Pulmonary/Chest: Effort normal and breath sounds normal. No respiratory distress. He has no wheezes. He has no rales. He exhibits no tenderness.   Abdominal: Soft. Bowel sounds are normal. He exhibits mass. He exhibits no distension. There is no tenderness. There is no rebound and no guarding.   Musculoskeletal: Normal range of motion. He exhibits no edema,  tenderness or deformity.   Lymphadenopathy:     He has no cervical adenopathy.   Neurological: He is alert and oriented to person, place, and time. He has normal reflexes. He displays normal reflexes. No cranial nerve deficit. He exhibits normal muscle tone. Coordination normal.   Skin: Skin is warm and dry. No rash noted. He is not diaphoretic. No erythema. No pallor.   Psychiatric: He has a normal mood and affect. His behavior is normal. Judgment and thought content normal.   Nursing note and vitals reviewed.      Assessment:       1. Prostate cancer metastatic to bone    2. Malignant neoplasm of body of pancreas    3. Peritoneal metastases    4. Liver metastases    5. Other constipation        Plan:     1. Therapy held with below  PSA stable  2-4. Therapy adjustment  5FU only now  Counts too low for treatement  5. Discussed man

## 2019-05-23 DIAGNOSIS — J06.9 UPPER RESPIRATORY TRACT INFECTION, UNSPECIFIED TYPE: ICD-10-CM

## 2019-05-23 RX ORDER — AZITHROMYCIN 250 MG/1
250 TABLET, FILM COATED ORAL DAILY
Qty: 6 TABLET | Refills: 0 | Status: SHIPPED | OUTPATIENT
Start: 2019-05-23 | End: 2019-07-31

## 2019-05-28 DIAGNOSIS — C79.51 PROSTATE CANCER METASTATIC TO BONE: Primary | ICD-10-CM

## 2019-05-28 DIAGNOSIS — C61 PROSTATE CANCER METASTATIC TO BONE: Primary | ICD-10-CM

## 2019-05-29 ENCOUNTER — LAB VISIT (OUTPATIENT)
Dept: LAB | Facility: OTHER | Age: 74
End: 2019-05-29
Attending: INTERNAL MEDICINE
Payer: MEDICARE

## 2019-05-29 ENCOUNTER — OFFICE VISIT (OUTPATIENT)
Dept: HEMATOLOGY/ONCOLOGY | Facility: CLINIC | Age: 74
End: 2019-05-29
Attending: INTERNAL MEDICINE
Payer: MEDICARE

## 2019-05-29 ENCOUNTER — OFFICE VISIT (OUTPATIENT)
Dept: UROLOGY | Facility: CLINIC | Age: 74
End: 2019-05-29
Attending: UROLOGY
Payer: MEDICARE

## 2019-05-29 VITALS
OXYGEN SATURATION: 99 % | HEIGHT: 68 IN | RESPIRATION RATE: 18 BRPM | SYSTOLIC BLOOD PRESSURE: 113 MMHG | WEIGHT: 153.88 LBS | TEMPERATURE: 98 F | BODY MASS INDEX: 23.32 KG/M2 | DIASTOLIC BLOOD PRESSURE: 75 MMHG | HEART RATE: 106 BPM

## 2019-05-29 VITALS
HEART RATE: 94 BPM | BODY MASS INDEX: 22.7 KG/M2 | SYSTOLIC BLOOD PRESSURE: 120 MMHG | HEIGHT: 69 IN | WEIGHT: 153.25 LBS | DIASTOLIC BLOOD PRESSURE: 76 MMHG

## 2019-05-29 DIAGNOSIS — C25.1 MALIGNANT NEOPLASM OF BODY OF PANCREAS: ICD-10-CM

## 2019-05-29 DIAGNOSIS — C78.7 LIVER METASTASES: ICD-10-CM

## 2019-05-29 DIAGNOSIS — C78.6 PERITONEAL METASTASES: ICD-10-CM

## 2019-05-29 DIAGNOSIS — N35.814 OTHER ANTERIOR URETHRAL STRICTURE, MALE, ANTERIOR: ICD-10-CM

## 2019-05-29 DIAGNOSIS — C61 PROSTATE CANCER METASTATIC TO INTRAPELVIC LYMPH NODE: Primary | ICD-10-CM

## 2019-05-29 DIAGNOSIS — C77.5 PROSTATE CANCER METASTATIC TO INTRAPELVIC LYMPH NODE: Primary | ICD-10-CM

## 2019-05-29 DIAGNOSIS — C79.51 PROSTATE CANCER METASTATIC TO BONE: Primary | ICD-10-CM

## 2019-05-29 DIAGNOSIS — C61 PROSTATE CANCER METASTATIC TO BONE: Primary | ICD-10-CM

## 2019-05-29 LAB
ALBUMIN SERPL BCP-MCNC: 3.8 G/DL (ref 3.5–5.2)
ALP SERPL-CCNC: 81 U/L (ref 55–135)
ALT SERPL W/O P-5'-P-CCNC: 17 U/L (ref 10–44)
ANION GAP SERPL CALC-SCNC: 9 MMOL/L (ref 8–16)
AST SERPL-CCNC: 17 U/L (ref 10–40)
BASOPHILS # BLD AUTO: 0.01 K/UL (ref 0–0.2)
BASOPHILS NFR BLD: 0.3 % (ref 0–1.9)
BILIRUB SERPL-MCNC: 0.5 MG/DL (ref 0.1–1)
BUN SERPL-MCNC: 23 MG/DL (ref 8–23)
CALCIUM SERPL-MCNC: 9.8 MG/DL (ref 8.7–10.5)
CHLORIDE SERPL-SCNC: 104 MMOL/L (ref 95–110)
CO2 SERPL-SCNC: 26 MMOL/L (ref 23–29)
CREAT SERPL-MCNC: 1.2 MG/DL (ref 0.5–1.4)
DIFFERENTIAL METHOD: ABNORMAL
EOSINOPHIL # BLD AUTO: 0.1 K/UL (ref 0–0.5)
EOSINOPHIL NFR BLD: 2.5 % (ref 0–8)
ERYTHROCYTE [DISTWIDTH] IN BLOOD BY AUTOMATED COUNT: 15.2 % (ref 11.5–14.5)
EST. GFR  (AFRICAN AMERICAN): >60 ML/MIN/1.73 M^2
EST. GFR  (NON AFRICAN AMERICAN): >60 ML/MIN/1.73 M^2
GLUCOSE SERPL-MCNC: 190 MG/DL (ref 70–110)
HCT VFR BLD AUTO: 35.4 % (ref 40–54)
HGB BLD-MCNC: 11.3 G/DL (ref 14–18)
LYMPHOCYTES # BLD AUTO: 0.9 K/UL (ref 1–4.8)
LYMPHOCYTES NFR BLD: 28 % (ref 18–48)
MCH RBC QN AUTO: 31.7 PG (ref 27–31)
MCHC RBC AUTO-ENTMCNC: 31.9 G/DL (ref 32–36)
MCV RBC AUTO: 99 FL (ref 82–98)
MONOCYTES # BLD AUTO: 0.2 K/UL (ref 0.3–1)
MONOCYTES NFR BLD: 4.7 % (ref 4–15)
NEUTROPHILS # BLD AUTO: 2.1 K/UL (ref 1.8–7.7)
NEUTROPHILS NFR BLD: 64.5 % (ref 38–73)
PLATELET # BLD AUTO: 113 K/UL (ref 150–350)
PMV BLD AUTO: 9 FL (ref 9.2–12.9)
POTASSIUM SERPL-SCNC: 4.4 MMOL/L (ref 3.5–5.1)
PROT SERPL-MCNC: 6.9 G/DL (ref 6–8.4)
RBC # BLD AUTO: 3.57 M/UL (ref 4.6–6.2)
SODIUM SERPL-SCNC: 139 MMOL/L (ref 136–145)
WBC # BLD AUTO: 3.21 K/UL (ref 3.9–12.7)

## 2019-05-29 PROCEDURE — 3074F SYST BP LT 130 MM HG: CPT | Mod: CPTII,S$GLB,, | Performed by: INTERNAL MEDICINE

## 2019-05-29 PROCEDURE — 1101F PT FALLS ASSESS-DOCD LE1/YR: CPT | Mod: CPTII,S$GLB,, | Performed by: UROLOGY

## 2019-05-29 PROCEDURE — 99999 PR PBB SHADOW E&M-EST. PATIENT-LVL III: ICD-10-PCS | Mod: PBBFAC,,, | Performed by: INTERNAL MEDICINE

## 2019-05-29 PROCEDURE — 99213 PR OFFICE/OUTPT VISIT, EST, LEVL III, 20-29 MIN: ICD-10-PCS | Mod: S$GLB,,, | Performed by: INTERNAL MEDICINE

## 2019-05-29 PROCEDURE — 99213 OFFICE O/P EST LOW 20 MIN: CPT | Mod: S$GLB,,, | Performed by: INTERNAL MEDICINE

## 2019-05-29 PROCEDURE — 3078F DIAST BP <80 MM HG: CPT | Mod: CPTII,S$GLB,, | Performed by: INTERNAL MEDICINE

## 2019-05-29 PROCEDURE — 1101F PR PT FALLS ASSESS DOC 0-1 FALLS W/OUT INJ PAST YR: ICD-10-PCS | Mod: CPTII,S$GLB,, | Performed by: INTERNAL MEDICINE

## 2019-05-29 PROCEDURE — 3074F PR MOST RECENT SYSTOLIC BLOOD PRESSURE < 130 MM HG: ICD-10-PCS | Mod: CPTII,S$GLB,, | Performed by: UROLOGY

## 2019-05-29 PROCEDURE — 3078F PR MOST RECENT DIASTOLIC BLOOD PRESSURE < 80 MM HG: ICD-10-PCS | Mod: CPTII,S$GLB,, | Performed by: UROLOGY

## 2019-05-29 PROCEDURE — 80053 COMPREHEN METABOLIC PANEL: CPT

## 2019-05-29 PROCEDURE — 3074F SYST BP LT 130 MM HG: CPT | Mod: CPTII,S$GLB,, | Performed by: UROLOGY

## 2019-05-29 PROCEDURE — 1101F PT FALLS ASSESS-DOCD LE1/YR: CPT | Mod: CPTII,S$GLB,, | Performed by: INTERNAL MEDICINE

## 2019-05-29 PROCEDURE — 3078F PR MOST RECENT DIASTOLIC BLOOD PRESSURE < 80 MM HG: ICD-10-PCS | Mod: CPTII,S$GLB,, | Performed by: INTERNAL MEDICINE

## 2019-05-29 PROCEDURE — 85025 COMPLETE CBC W/AUTO DIFF WBC: CPT

## 2019-05-29 PROCEDURE — 3074F PR MOST RECENT SYSTOLIC BLOOD PRESSURE < 130 MM HG: ICD-10-PCS | Mod: CPTII,S$GLB,, | Performed by: INTERNAL MEDICINE

## 2019-05-29 PROCEDURE — 96402 CHEMO HORMON ANTINEOPL SQ/IM: CPT | Mod: S$GLB,,, | Performed by: UROLOGY

## 2019-05-29 PROCEDURE — 1101F PR PT FALLS ASSESS DOC 0-1 FALLS W/OUT INJ PAST YR: ICD-10-PCS | Mod: CPTII,S$GLB,, | Performed by: UROLOGY

## 2019-05-29 PROCEDURE — 3078F DIAST BP <80 MM HG: CPT | Mod: CPTII,S$GLB,, | Performed by: UROLOGY

## 2019-05-29 PROCEDURE — 99999 PR PBB SHADOW E&M-EST. PATIENT-LVL III: CPT | Mod: PBBFAC,,, | Performed by: INTERNAL MEDICINE

## 2019-05-29 PROCEDURE — 99214 PR OFFICE/OUTPT VISIT, EST, LEVL IV, 30-39 MIN: ICD-10-PCS | Mod: 25,S$GLB,, | Performed by: UROLOGY

## 2019-05-29 PROCEDURE — 36415 COLL VENOUS BLD VENIPUNCTURE: CPT

## 2019-05-29 PROCEDURE — 99214 OFFICE O/P EST MOD 30 MIN: CPT | Mod: 25,S$GLB,, | Performed by: UROLOGY

## 2019-05-29 PROCEDURE — 96402 PR CHEMOTHER HORMON ANTINEOPL SUB-Q/IM: ICD-10-PCS | Mod: S$GLB,,, | Performed by: UROLOGY

## 2019-05-29 NOTE — PROGRESS NOTES
"Subjective:      Shady Prakash Jr. is a 74 y.o. male who returns today regarding his metastatic prostate cancer.      He is here today for 6 mos FU and Eligard. Full history below.    Still undergoing further chemotherapy now for metastatic pancreatic cancer, though regimen reduced due to side effects. Overall says he is doing well.    He has no voiding c/o today    Prostate Cancer History  He is s/p TRUS/bx on 8/7/15 w/ high volume marta 9 (5+4), PSA 25.  He had negative metastatic workup.  At time of planned RALP on 9/10/15 he was noted to have BN invasion on cysto and therefore only PLND was done, which confirmed metastatic disease.  He was started on ADT 9/17/15 w/ firmagon followed by Eligard on 10/15/15.  Received last Eligard 10/17/17. Casodex added for rising PSA, which persisted. Completed pelvic radiation November 2017. Completed taxotere in 2018.     The following portions of the patient's history were reviewed and updated as appropriate: allergies, current medications, past family history, past medical history, past social history, past surgical history and problem list.    Review of Systems  A comprehensive multipoint review of systems was negative except as otherwise stated in the HPI.     Objective:   Vitals:   /76 (BP Location: Left arm, Patient Position: Sitting, BP Method: Large (Automatic))   Pulse 94   Ht 5' 9" (1.753 m)   Wt 69.5 kg (153 lb 3.5 oz)   BMI 22.63 kg/m²     Physical Exam   General: alert and oriented, no acute distress  Respiratory: Symmetric expansion, non-labored breathing  Neuro: no gross deficits  Psych: normal judgment and insight, normal mood/affect and non-anxious    Lab Review   Urinalysis demonstrates + for RBCs    Component PSA DIAGNOSTIC   Latest Ref Rng & Units 0.00 - 4.00 ng/mL   4/23/2019 1.4   11/27/2018 2.0   8/24/2018 6.7   6/8/2018 7.3   3/6/2018 2.7   1/8/2018 0.92   12/5/2017 1.3   9/27/2017 13.7 (H)   9/1/2017 9.4 (H)   7/3/2017 5.0 (H)   10/7/2016 " 0.89   4/8/2016 0.73   1/4/2016 1.5   10/9/2015 6.6 (H)   7/7/2015 25.2 (H)     Assessment and Plan:   Prostate cancer metastatic to intrapelvic lymph node and bones  -- 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 Melendez as scheduled   -- FU PSA per Dr. Melendez  -- FU 6 mos for next Eligard    Anterior urethral stricture, unspecified stricture type  -- S/p cysto dilation in OR at time of PLND in September 2015  -- Voiding well now - will monitor

## 2019-05-30 NOTE — PROGRESS NOTES
Subjective:       Patient ID: Shady Prakash Jr. is a 74 y.o. male.    Chief Complaint: Follow-up    HPI     Presents for follow up  Reports feels great  Aggravated because this AM because he found people shooting up on his porch this AM    Returns for follow-up of metastatic pancreatic cancer and metastatic prostate cancer  We had to adjust his chemotherapy with side effects to 5FU only- held last week with low counts- today he wishes to postpone again due to a family   No fatigue  Better PO  No pain  Has significant dental issues  No further dizziness  No fevers, chills, or infections.  No abdominal pain. No N/V. No constipation or diarrhea.     Currently his treatment is focused on the metastatic pancreatic cancer  Recent scans with stable disease   MRIs for back pain revealed arthritis     Oncology History:  1. Metastatic prostate cancer  He is s/p TRUS/bx on 8/7/15 w/ high volume marta 9 (5+4), PSA 25.  He had negative metastatic workup at diagnosis.  However, at time of planned RALP on 9/10/15 he was noted to have BN invasion on cysto and therefore only PLND was done, which confirmed metastatic disease.  He was started on ADT 9/17/15 w/ firmagon followed by Eligard on 10/15/15.    Started Casodex 17 - scans at that time showed osseus metastatic disease  Started Docetaxel 10/6/17 with progression, then found to have below     - on follow-up scans a pancreatic lesion noted concerning for pancreatic cancer as it was an unlikely area for prostate cancer spread-  Biopsy confirmed adenocarcinoma and PSA was negative     Imaging as below:  Scan results:  18 Bone scan:  FINDINGS:  Two lesions in the left scapula, stable.  Elongated lesion in the right mid rib posteriorly, stable.  Prior exam demonstrated a large right iliac lesion, which no longer exhibits increased uptake.  Left pubic lesion, stable.  There is also now a small lesion in the left iliac bone. Two additional new small foci in the mid  thoracic vertebra.  Both kidneys and the bladder appear unremarkable.  Impression:  Findings consistent with osseous metastatic disease as above, noting 3 new small lesions.     6/21/18 CT scan abdomen and pelvis:  FINDINGS:  Lower chest: There has been interval increase in size in the focal consolidation which is pleural based in the posterior aspect of the right lower lobe.  Today's exam measures 5.1 cm maximum diameter.  Previous exam is measure approximately 3.3 cm.  This is associated with pleural thickening that extends laterally.  There is a small focus of cylinder all coal bronchiectasis in the medial left lower lobe.  The remaining aspects of the lung parenchyma that are visualized show no abnormalities.  The visualized heart and pericardium are normal  Liver: There are 3 hyperenhancing foci along the periphery of the right and left lobes of the liver which are stable from 2015.  The largest measures 1.1 cm in maximum diameter.  Although not characteristic on this exam, previous exam in 2015 shows findings most characteristic of hepatic hemangioma.  The remaining liver parenchyma as well as the hepatic and portal veins are patent.  Gallbladder: Normal.  Pancreas: There is been interval development of an ill-defined hypodense focus within the proximal body of the pancreas measuring 2.1 cm x 1.8 cm.  Spleen: Calcified hemangiomas present.  Otherwise normal.  Adrenal glands: Normal.  Genitourinary: Stable simple cyst in the lower pole of the right kidney measuring 1.6 cm.  The left kidney and bilateral ureters are normal.  The bladder is partially distended and shows wall thickening on the non dependent surface of the bladder.  This is not significantly changed in appearance.  Gastrointestinal: Stomach and small intestines are normal.  Colonic diverticulosis is present without evidence of diverticulitis.  Prostate: Enlarged and nodular causing mass effect on the bladder.  Miscellaneous: There is no  intra-abdominal or pelvic free fluid, free air or lymphadenopathy.  The abdominal aorta tapers normally.  Bilateral fat containing inguinal hernias present.  Osseous and soft tissue structures: Sclerotic metastases involving the right iliac bone and left superior pubic ramus are identified.  The small focus in the left iliac bone seen on yesterday's bone scan is not well seen on this exam.  The soft tissues are within normal limits.  Impression:  1. Interval increase in size in the pleural base, right lower lobe consolidation.  2. Hyperenhancing foci likely represent hemangiomas.  They have been stable since 2015.  Is on the 2015 exam where they demonstrate characteristics most compatible with hemangioma.  3. Bladder wall thickening is not significantly changed.  This may be related to outlet obstruction from the nodular prostate.  4. Interval development of ill-defined hypodense focus measuring 2.1 cm within the proximal body of the pancreas.  This may also be a focus of metastatic disease although is uncommon for the prostate to metastasized to the pancreas.  A dedicated pancreatic CT may be beneficial in further characterizing this lesion.     - on 7/25/18 he came to ER with worsening abdominal pain and concerns for appendicitis  Underwent Laparoscopy Converted to Laparotomy, Peritoneal Biopsy , Ileocecal with Dr. Stephenson  Biopsy concerning for metastatic pancreatic carcinoma     2. Metastatic pancreatic cancer  - started on [place]/Abraxane- note Ca19-9 never elevated  Response initially  Now with progression  FOLFOX started 12/20/18     Re imaging stable.    Review of Systems   Constitutional: Negative for activity change, appetite change, chills, fatigue, fever and unexpected weight change.   HENT: Positive for dental problem. Negative for congestion, hearing loss, mouth sores, nosebleeds, postnasal drip, sinus pressure, sore throat and trouble swallowing.    Eyes: Negative for visual disturbance.   Respiratory:  Negative for cough (rare dry), chest tightness, shortness of breath and wheezing.    Cardiovascular: Negative for chest pain, palpitations and leg swelling.   Gastrointestinal: Negative for abdominal distention, abdominal pain, blood in stool, constipation, diarrhea, nausea and vomiting.        No GERD   Endocrine:        Hot flashes   Genitourinary: Negative for decreased urine volume, difficulty urinating, frequency, hematuria and urgency.   Musculoskeletal: Negative for arthralgias (better), back pain, gait problem, joint swelling, neck pain and neck stiffness.   Skin: Negative for color change, pallor, rash and wound.   Neurological: Positive for numbness (feet). Negative for dizziness, weakness, light-headedness and headaches.   Hematological: Negative for adenopathy. Does not bruise/bleed easily.   Psychiatric/Behavioral: Negative for dysphoric mood and sleep disturbance. The patient is not nervous/anxious.        Objective:      Physical Exam   Constitutional: He is oriented to person, place, and time. He appears well-developed and well-nourished. No distress.   Presents with his wife  ECOG=0   HENT:   Head: Normocephalic and atraumatic.   Right Ear: External ear normal.   Left Ear: External ear normal.   Nose: Nose normal.   Mouth/Throat: Oropharynx is clear and moist. No oropharyngeal exudate.   Poor dentition  No current gingival bleeding   Eyes: Pupils are equal, round, and reactive to light. Conjunctivae and EOM are normal. Right eye exhibits no discharge. Left eye exhibits no discharge. No scleral icterus.   Neck: Normal range of motion. Neck supple. No tracheal deviation present. No thyromegaly present.   Cardiovascular: Normal rate, regular rhythm, normal heart sounds and intact distal pulses. Exam reveals no gallop and no friction rub.   No murmur heard.  Pulmonary/Chest: Effort normal and breath sounds normal. No respiratory distress. He has no wheezes. He has no rales. He exhibits no tenderness.    Abdominal: Soft. Bowel sounds are normal. He exhibits mass. He exhibits no distension. There is no tenderness. There is no rebound and no guarding.   Musculoskeletal: Normal range of motion. He exhibits no edema, tenderness or deformity.   Lymphadenopathy:     He has no cervical adenopathy.   Neurological: He is alert and oriented to person, place, and time. He has normal reflexes. He displays normal reflexes. No cranial nerve deficit. He exhibits normal muscle tone. Coordination normal.   Skin: Skin is warm and dry. No rash noted. He is not diaphoretic. No erythema. No pallor.   Psychiatric: He has a normal mood and affect. His behavior is normal. Judgment and thought content normal.   Nursing note and vitals reviewed.   Labs- reviewed   Assessment:       1. Prostate cancer metastatic to intrapelvic lymph node    2. Liver metastases    3. Peritoneal metastases    4. Malignant neoplasm of body of pancreas        Plan:     1. Treatment held with below   2-4. On 5FU only now with cytopenias  Hold with   Restart next week    Knows to call for any issues

## 2019-06-03 RX ORDER — DIPHENHYDRAMINE HYDROCHLORIDE 50 MG/ML
50 INJECTION INTRAMUSCULAR; INTRAVENOUS ONCE AS NEEDED
Status: CANCELLED | OUTPATIENT
Start: 2019-06-03

## 2019-06-03 RX ORDER — HEPARIN 100 UNIT/ML
500 SYRINGE INTRAVENOUS
Status: CANCELLED | OUTPATIENT
Start: 2019-06-05

## 2019-06-03 RX ORDER — FLUOROURACIL 50 MG/ML
400 INJECTION, SOLUTION INTRAVENOUS
Status: CANCELLED | OUTPATIENT
Start: 2019-06-03

## 2019-06-03 RX ORDER — SODIUM CHLORIDE 0.9 % (FLUSH) 0.9 %
10 SYRINGE (ML) INJECTION
Status: CANCELLED | OUTPATIENT
Start: 2019-06-03

## 2019-06-03 RX ORDER — SODIUM CHLORIDE 0.9 % (FLUSH) 0.9 %
10 SYRINGE (ML) INJECTION
Status: CANCELLED | OUTPATIENT
Start: 2019-06-05

## 2019-06-03 RX ORDER — EPINEPHRINE 0.3 MG/.3ML
0.3 INJECTION SUBCUTANEOUS ONCE AS NEEDED
Status: CANCELLED | OUTPATIENT
Start: 2019-06-03

## 2019-06-03 RX ORDER — HEPARIN 100 UNIT/ML
500 SYRINGE INTRAVENOUS
Status: CANCELLED | OUTPATIENT
Start: 2019-06-03

## 2019-06-04 ENCOUNTER — INFUSION (OUTPATIENT)
Dept: INFUSION THERAPY | Facility: OTHER | Age: 74
End: 2019-06-04
Attending: INTERNAL MEDICINE
Payer: MEDICARE

## 2019-06-04 VITALS
SYSTOLIC BLOOD PRESSURE: 123 MMHG | DIASTOLIC BLOOD PRESSURE: 65 MMHG | HEIGHT: 68 IN | WEIGHT: 154.56 LBS | TEMPERATURE: 98 F | BODY MASS INDEX: 23.43 KG/M2 | RESPIRATION RATE: 18 BRPM | OXYGEN SATURATION: 99 % | HEART RATE: 98 BPM

## 2019-06-04 DIAGNOSIS — C61 PROSTATE CANCER METASTATIC TO INTRAPELVIC LYMPH NODE: ICD-10-CM

## 2019-06-04 DIAGNOSIS — C79.51 PROSTATE CANCER METASTATIC TO BONE: ICD-10-CM

## 2019-06-04 DIAGNOSIS — C61 PROSTATE CANCER METASTATIC TO BONE: ICD-10-CM

## 2019-06-04 DIAGNOSIS — C77.5 PROSTATE CANCER METASTATIC TO INTRAPELVIC LYMPH NODE: ICD-10-CM

## 2019-06-04 DIAGNOSIS — C25.1 MALIGNANT NEOPLASM OF BODY OF PANCREAS: Primary | ICD-10-CM

## 2019-06-04 PROCEDURE — 96367 TX/PROPH/DG ADDL SEQ IV INF: CPT

## 2019-06-04 PROCEDURE — 25000003 PHARM REV CODE 250: Performed by: INTERNAL MEDICINE

## 2019-06-04 PROCEDURE — 63600175 PHARM REV CODE 636 W HCPCS: Mod: JG | Performed by: INTERNAL MEDICINE

## 2019-06-04 PROCEDURE — 96409 CHEMO IV PUSH SNGL DRUG: CPT

## 2019-06-04 PROCEDURE — 96416 CHEMO PROLONG INFUSE W/PUMP: CPT

## 2019-06-04 RX ORDER — HEPARIN 100 UNIT/ML
500 SYRINGE INTRAVENOUS
Status: DISCONTINUED | OUTPATIENT
Start: 2019-06-04 | End: 2019-06-04 | Stop reason: HOSPADM

## 2019-06-04 RX ORDER — FLUOROURACIL 50 MG/ML
400 INJECTION, SOLUTION INTRAVENOUS
Status: COMPLETED | OUTPATIENT
Start: 2019-06-04 | End: 2019-06-04

## 2019-06-04 RX ORDER — SODIUM CHLORIDE 0.9 % (FLUSH) 0.9 %
10 SYRINGE (ML) INJECTION
Status: DISCONTINUED | OUTPATIENT
Start: 2019-06-04 | End: 2019-06-04 | Stop reason: HOSPADM

## 2019-06-04 RX ORDER — DIPHENHYDRAMINE HYDROCHLORIDE 50 MG/ML
50 INJECTION INTRAMUSCULAR; INTRAVENOUS ONCE AS NEEDED
Status: DISCONTINUED | OUTPATIENT
Start: 2019-06-04 | End: 2019-06-04 | Stop reason: HOSPADM

## 2019-06-04 RX ORDER — EPINEPHRINE 0.3 MG/.3ML
0.3 INJECTION SUBCUTANEOUS ONCE AS NEEDED
Status: DISCONTINUED | OUTPATIENT
Start: 2019-06-04 | End: 2019-06-04 | Stop reason: HOSPADM

## 2019-06-04 RX ADMIN — FLUOROURACIL 710 MG: 50 INJECTION, SOLUTION INTRAVENOUS at 10:06

## 2019-06-04 RX ADMIN — LEUCOVORIN CALCIUM 710 MG: 350 INJECTION, POWDER, LYOPHILIZED, FOR SOLUTION INTRAMUSCULAR; INTRAVENOUS at 09:06

## 2019-06-04 RX ADMIN — SODIUM CHLORIDE: 0.9 INJECTION, SOLUTION INTRAVENOUS at 09:06

## 2019-06-04 RX ADMIN — FLUOROURACIL 4270 MG: 50 INJECTION, SOLUTION INTRAVENOUS at 10:06

## 2019-06-04 RX ADMIN — PALONOSETRON: 0.25 INJECTION, SOLUTION INTRAVENOUS at 09:06

## 2019-06-04 NOTE — PLAN OF CARE
Problem: Adult Inpatient Plan of Care  Goal: Plan of Care Review  Outcome: Ongoing (interventions implemented as appropriate)  5-FU adminsitered, patient tolerated well. VSS, NAD. D/C'd home with spouse and 5-FU home infusion pump in place, due to finish ~8:00am Thursday, 6/6/19.

## 2019-06-06 ENCOUNTER — INFUSION (OUTPATIENT)
Dept: INFUSION THERAPY | Facility: OTHER | Age: 74
End: 2019-06-06
Attending: INTERNAL MEDICINE
Payer: MEDICARE

## 2019-06-06 VITALS
HEART RATE: 86 BPM | OXYGEN SATURATION: 97 % | RESPIRATION RATE: 17 BRPM | TEMPERATURE: 98 F | SYSTOLIC BLOOD PRESSURE: 115 MMHG | DIASTOLIC BLOOD PRESSURE: 70 MMHG

## 2019-06-06 DIAGNOSIS — C25.1 MALIGNANT NEOPLASM OF BODY OF PANCREAS: Primary | ICD-10-CM

## 2019-06-06 DIAGNOSIS — C77.5 PROSTATE CANCER METASTATIC TO INTRAPELVIC LYMPH NODE: ICD-10-CM

## 2019-06-06 DIAGNOSIS — C61 PROSTATE CANCER METASTATIC TO INTRAPELVIC LYMPH NODE: ICD-10-CM

## 2019-06-06 DIAGNOSIS — C61 PROSTATE CANCER METASTATIC TO BONE: ICD-10-CM

## 2019-06-06 DIAGNOSIS — C79.51 PROSTATE CANCER METASTATIC TO BONE: ICD-10-CM

## 2019-06-06 PROCEDURE — 96521 REFILL/MAINT PORTABLE PUMP: CPT

## 2019-06-06 PROCEDURE — A4216 STERILE WATER/SALINE, 10 ML: HCPCS | Performed by: INTERNAL MEDICINE

## 2019-06-06 PROCEDURE — 63600175 PHARM REV CODE 636 W HCPCS: Performed by: INTERNAL MEDICINE

## 2019-06-06 PROCEDURE — 25000003 PHARM REV CODE 250: Performed by: INTERNAL MEDICINE

## 2019-06-06 RX ORDER — SODIUM CHLORIDE 0.9 % (FLUSH) 0.9 %
10 SYRINGE (ML) INJECTION
Status: DISCONTINUED | OUTPATIENT
Start: 2019-06-06 | End: 2019-06-06 | Stop reason: HOSPADM

## 2019-06-06 RX ORDER — HEPARIN 100 UNIT/ML
500 SYRINGE INTRAVENOUS
Status: DISCONTINUED | OUTPATIENT
Start: 2019-06-06 | End: 2019-06-06 | Stop reason: HOSPADM

## 2019-06-06 RX ADMIN — HEPARIN 500 UNITS: 100 SYRINGE at 08:06

## 2019-06-06 RX ADMIN — Medication 10 ML: at 08:06

## 2019-06-06 NOTE — PLAN OF CARE
Problem: Adult Inpatient Plan of Care  Goal: Plan of Care Review  Outcome: Ongoing (interventions implemented as appropriate)  Pt here for pump d.c. Pt tolerated 5FU without issue. VSS. AVS given. Pt d.c home with instructions to return 6/17/19.

## 2019-06-12 ENCOUNTER — OFFICE VISIT (OUTPATIENT)
Dept: HEMATOLOGY/ONCOLOGY | Facility: CLINIC | Age: 74
End: 2019-06-12
Attending: INTERNAL MEDICINE
Payer: MEDICARE

## 2019-06-12 VITALS
DIASTOLIC BLOOD PRESSURE: 70 MMHG | SYSTOLIC BLOOD PRESSURE: 119 MMHG | OXYGEN SATURATION: 96 % | HEIGHT: 68 IN | TEMPERATURE: 97 F | RESPIRATION RATE: 16 BRPM | WEIGHT: 159.38 LBS | HEART RATE: 79 BPM | BODY MASS INDEX: 24.15 KG/M2

## 2019-06-12 DIAGNOSIS — C78.7 LIVER METASTASES: ICD-10-CM

## 2019-06-12 DIAGNOSIS — K13.79 MOUTH SORES: ICD-10-CM

## 2019-06-12 DIAGNOSIS — C78.6 PERITONEAL METASTASES: ICD-10-CM

## 2019-06-12 DIAGNOSIS — Z87.19 HISTORY OF DENTAL PROBLEMS: ICD-10-CM

## 2019-06-12 DIAGNOSIS — C77.5 PROSTATE CANCER METASTATIC TO INTRAPELVIC LYMPH NODE: ICD-10-CM

## 2019-06-12 DIAGNOSIS — D69.59 CHEMOTHERAPY-INDUCED THROMBOCYTOPENIA: ICD-10-CM

## 2019-06-12 DIAGNOSIS — C25.1 MALIGNANT NEOPLASM OF BODY OF PANCREAS: Primary | ICD-10-CM

## 2019-06-12 DIAGNOSIS — C61 PROSTATE CANCER METASTATIC TO INTRAPELVIC LYMPH NODE: ICD-10-CM

## 2019-06-12 DIAGNOSIS — T45.1X5A CHEMOTHERAPY-INDUCED THROMBOCYTOPENIA: ICD-10-CM

## 2019-06-12 PROCEDURE — 3078F DIAST BP <80 MM HG: CPT | Mod: CPTII,S$GLB,, | Performed by: INTERNAL MEDICINE

## 2019-06-12 PROCEDURE — 1101F PT FALLS ASSESS-DOCD LE1/YR: CPT | Mod: CPTII,S$GLB,, | Performed by: INTERNAL MEDICINE

## 2019-06-12 PROCEDURE — 1101F PR PT FALLS ASSESS DOC 0-1 FALLS W/OUT INJ PAST YR: ICD-10-PCS | Mod: CPTII,S$GLB,, | Performed by: INTERNAL MEDICINE

## 2019-06-12 PROCEDURE — 99214 OFFICE O/P EST MOD 30 MIN: CPT | Mod: S$GLB,,, | Performed by: INTERNAL MEDICINE

## 2019-06-12 PROCEDURE — 3078F PR MOST RECENT DIASTOLIC BLOOD PRESSURE < 80 MM HG: ICD-10-PCS | Mod: CPTII,S$GLB,, | Performed by: INTERNAL MEDICINE

## 2019-06-12 PROCEDURE — 99214 PR OFFICE/OUTPT VISIT, EST, LEVL IV, 30-39 MIN: ICD-10-PCS | Mod: S$GLB,,, | Performed by: INTERNAL MEDICINE

## 2019-06-12 PROCEDURE — 3074F SYST BP LT 130 MM HG: CPT | Mod: CPTII,S$GLB,, | Performed by: INTERNAL MEDICINE

## 2019-06-12 PROCEDURE — 99999 PR PBB SHADOW E&M-EST. PATIENT-LVL III: ICD-10-PCS | Mod: PBBFAC,,, | Performed by: INTERNAL MEDICINE

## 2019-06-12 PROCEDURE — 99999 PR PBB SHADOW E&M-EST. PATIENT-LVL III: CPT | Mod: PBBFAC,,, | Performed by: INTERNAL MEDICINE

## 2019-06-12 PROCEDURE — 3074F PR MOST RECENT SYSTOLIC BLOOD PRESSURE < 130 MM HG: ICD-10-PCS | Mod: CPTII,S$GLB,, | Performed by: INTERNAL MEDICINE

## 2019-06-12 RX ORDER — FLUOROURACIL 50 MG/ML
400 INJECTION, SOLUTION INTRAVENOUS
Status: CANCELLED | OUTPATIENT
Start: 2019-06-17

## 2019-06-12 RX ORDER — EPINEPHRINE 0.3 MG/.3ML
0.3 INJECTION SUBCUTANEOUS ONCE AS NEEDED
Status: CANCELLED | OUTPATIENT
Start: 2019-06-17

## 2019-06-12 RX ORDER — SODIUM CHLORIDE 0.9 % (FLUSH) 0.9 %
10 SYRINGE (ML) INJECTION
Status: CANCELLED | OUTPATIENT
Start: 2019-06-17

## 2019-06-12 RX ORDER — DIPHENHYDRAMINE HYDROCHLORIDE 50 MG/ML
50 INJECTION INTRAMUSCULAR; INTRAVENOUS ONCE AS NEEDED
Status: CANCELLED | OUTPATIENT
Start: 2019-06-17

## 2019-06-12 RX ORDER — HEPARIN 100 UNIT/ML
500 SYRINGE INTRAVENOUS
Status: CANCELLED | OUTPATIENT
Start: 2019-06-17

## 2019-06-12 NOTE — PROGRESS NOTES
Subjective:       Patient ID: Shady Prakash Jr. is a 74 y.o. male.    Chief Complaint: No chief complaint on file.    HPI     Presents for follow up  Reports feels well.  Reports great appetite although he has a sore mouth. No mouth sores. Has sore gums and teeth.     Returns for follow-up of metastatic pancreatic cancer and metastatic prostate cancer  We had to adjust his chemotherapy with side effects to 5FU only- held last week with low counts- today he wishes to postpone again due to a family   No fatigue  Better PO  No pain  Has significant dental issues  No further dizziness  No fevers, chills, or infections.  No abdominal pain. No N/V. No constipation or diarrhea.     Currently his treatment is focused on the metastatic pancreatic cancer  Recent scans with stable disease   MRIs for back pain revealed arthritis     Oncology History:  1. Metastatic prostate cancer  He is s/p TRUS/bx on 8/7/15 w/ high volume marta 9 (5+4), PSA 25.  He had negative metastatic workup at diagnosis.  However, at time of planned RALP on 9/10/15 he was noted to have BN invasion on cysto and therefore only PLND was done, which confirmed metastatic disease.  He was started on ADT 9/17/15 w/ firmagon followed by Eligard on 10/15/15.    Started Casodex 17 - scans at that time showed osseus metastatic disease  Started Docetaxel 10/6/17 with progression, then found to have below     - on follow-up scans a pancreatic lesion noted concerning for pancreatic cancer as it was an unlikely area for prostate cancer spread-  Biopsy confirmed adenocarcinoma and PSA was negative     Imaging as below:  Scan results:  18 Bone scan:  FINDINGS:  Two lesions in the left scapula, stable.  Elongated lesion in the right mid rib posteriorly, stable.  Prior exam demonstrated a large right iliac lesion, which no longer exhibits increased uptake.  Left pubic lesion, stable.  There is also now a small lesion in the left iliac bone. Two  additional new small foci in the mid thoracic vertebra.  Both kidneys and the bladder appear unremarkable.  Impression:  Findings consistent with osseous metastatic disease as above, noting 3 new small lesions.     6/21/18 CT scan abdomen and pelvis:  FINDINGS:  Lower chest: There has been interval increase in size in the focal consolidation which is pleural based in the posterior aspect of the right lower lobe.  Today's exam measures 5.1 cm maximum diameter.  Previous exam is measure approximately 3.3 cm.  This is associated with pleural thickening that extends laterally.  There is a small focus of cylinder all coal bronchiectasis in the medial left lower lobe.  The remaining aspects of the lung parenchyma that are visualized show no abnormalities.  The visualized heart and pericardium are normal  Liver: There are 3 hyperenhancing foci along the periphery of the right and left lobes of the liver which are stable from 2015.  The largest measures 1.1 cm in maximum diameter.  Although not characteristic on this exam, previous exam in 2015 shows findings most characteristic of hepatic hemangioma.  The remaining liver parenchyma as well as the hepatic and portal veins are patent.  Gallbladder: Normal.  Pancreas: There is been interval development of an ill-defined hypodense focus within the proximal body of the pancreas measuring 2.1 cm x 1.8 cm.  Spleen: Calcified hemangiomas present.  Otherwise normal.  Adrenal glands: Normal.  Genitourinary: Stable simple cyst in the lower pole of the right kidney measuring 1.6 cm.  The left kidney and bilateral ureters are normal.  The bladder is partially distended and shows wall thickening on the non dependent surface of the bladder.  This is not significantly changed in appearance.  Gastrointestinal: Stomach and small intestines are normal.  Colonic diverticulosis is present without evidence of diverticulitis.  Prostate: Enlarged and nodular causing mass effect on the  bladder.  Miscellaneous: There is no intra-abdominal or pelvic free fluid, free air or lymphadenopathy.  The abdominal aorta tapers normally.  Bilateral fat containing inguinal hernias present.  Osseous and soft tissue structures: Sclerotic metastases involving the right iliac bone and left superior pubic ramus are identified.  The small focus in the left iliac bone seen on yesterday's bone scan is not well seen on this exam.  The soft tissues are within normal limits.  Impression:  1. Interval increase in size in the pleural base, right lower lobe consolidation.  2. Hyperenhancing foci likely represent hemangiomas.  They have been stable since 2015.  Is on the 2015 exam where they demonstrate characteristics most compatible with hemangioma.  3. Bladder wall thickening is not significantly changed.  This may be related to outlet obstruction from the nodular prostate.  4. Interval development of ill-defined hypodense focus measuring 2.1 cm within the proximal body of the pancreas.  This may also be a focus of metastatic disease although is uncommon for the prostate to metastasized to the pancreas.  A dedicated pancreatic CT may be beneficial in further characterizing this lesion.     - on 7/25/18 he came to ER with worsening abdominal pain and concerns for appendicitis  Underwent Laparoscopy Converted to Laparotomy, Peritoneal Biopsy , Ileocecal with Dr. Stephenson  Biopsy concerning for metastatic pancreatic carcinoma     2. Metastatic pancreatic cancer  - started on [place]/Abraxane- note Ca19-9 never elevated  Response initially  Now with progression  FOLFOX started 12/20/18     Re imaging stable.    Review of Systems   Constitutional: Negative for activity change, appetite change, chills, fatigue, fever and unexpected weight change.   HENT: Positive for dental problem. Negative for congestion, hearing loss, mouth sores, nosebleeds, postnasal drip, sinus pressure, sore throat and trouble swallowing.    Eyes: Negative for  visual disturbance.   Respiratory: Negative for cough (rare dry), chest tightness, shortness of breath and wheezing.    Cardiovascular: Negative for chest pain, palpitations and leg swelling.   Gastrointestinal: Negative for abdominal distention, abdominal pain, blood in stool, constipation, diarrhea, nausea and vomiting.        No GERD   Endocrine:        Hot flashes   Genitourinary: Negative for decreased urine volume, difficulty urinating, frequency, hematuria and urgency.   Musculoskeletal: Negative for arthralgias (better), back pain, gait problem, joint swelling, neck pain and neck stiffness.   Skin: Negative for color change, pallor, rash and wound.   Neurological: Positive for numbness (feet). Negative for dizziness, weakness, light-headedness and headaches.   Hematological: Negative for adenopathy. Does not bruise/bleed easily.   Psychiatric/Behavioral: Negative for dysphoric mood and sleep disturbance. The patient is not nervous/anxious.        Objective:      Physical Exam   Constitutional: He is oriented to person, place, and time. He appears well-developed and well-nourished. No distress.   Presents with his wife  ECOG=0   HENT:   Head: Normocephalic and atraumatic.   Right Ear: External ear normal.   Left Ear: External ear normal.   Nose: Nose normal.   Mouth/Throat: Oropharynx is clear and moist. No oropharyngeal exudate.   Poor dentition  No current gingival bleeding   Eyes: Pupils are equal, round, and reactive to light. Conjunctivae and EOM are normal. Right eye exhibits no discharge. Left eye exhibits no discharge. No scleral icterus.   Neck: Normal range of motion. Neck supple. No tracheal deviation present. No thyromegaly present.   Cardiovascular: Normal rate, regular rhythm, normal heart sounds and intact distal pulses. Exam reveals no gallop and no friction rub.   No murmur heard.  Pulmonary/Chest: Effort normal and breath sounds normal. No respiratory distress. He has no wheezes. He has no  rales. He exhibits no tenderness.   Abdominal: Soft. Bowel sounds are normal. He exhibits mass. He exhibits no distension. There is no tenderness. There is no rebound and no guarding.   Musculoskeletal: Normal range of motion. He exhibits no edema, tenderness or deformity.   Lymphadenopathy:     He has no cervical adenopathy.   Neurological: He is alert and oriented to person, place, and time. He has normal reflexes. He displays normal reflexes. No cranial nerve deficit. He exhibits normal muscle tone. Coordination normal.   Skin: Skin is warm and dry. No rash noted. He is not diaphoretic. No erythema. No pallor.   Psychiatric: He has a normal mood and affect. His behavior is normal. Judgment and thought content normal.   Nursing note and vitals reviewed.   Labs- reviewed   Assessment:       1. Malignant neoplasm of body of pancreas    2. Liver metastases    3. Peritoneal metastases    4. Prostate cancer metastatic to intrapelvic lymph node    5. Chemotherapy-induced thrombocytopenia    6. Mouth sores    7. History of dental problems        Plan:     1-3. Continue 5FU only with side effects and continued response  4. Treatment held with above  5. Platelets > 100 and adequate for therapy  6. Prescription refilled  7. Needs [employer] [facility] [employer] referral      25 minute total visit duration.

## 2019-06-17 ENCOUNTER — INFUSION (OUTPATIENT)
Dept: INFUSION THERAPY | Facility: OTHER | Age: 74
End: 2019-06-17
Attending: INTERNAL MEDICINE
Payer: MEDICARE

## 2019-06-17 VITALS
BODY MASS INDEX: 24.76 KG/M2 | DIASTOLIC BLOOD PRESSURE: 67 MMHG | HEIGHT: 68 IN | HEART RATE: 87 BPM | TEMPERATURE: 98 F | SYSTOLIC BLOOD PRESSURE: 145 MMHG | RESPIRATION RATE: 18 BRPM | WEIGHT: 163.38 LBS | OXYGEN SATURATION: 98 %

## 2019-06-17 DIAGNOSIS — C79.51 PROSTATE CANCER METASTATIC TO BONE: ICD-10-CM

## 2019-06-17 DIAGNOSIS — C77.5 PROSTATE CANCER METASTATIC TO INTRAPELVIC LYMPH NODE: ICD-10-CM

## 2019-06-17 DIAGNOSIS — C25.1 MALIGNANT NEOPLASM OF BODY OF PANCREAS: Primary | ICD-10-CM

## 2019-06-17 DIAGNOSIS — C61 PROSTATE CANCER METASTATIC TO BONE: ICD-10-CM

## 2019-06-17 DIAGNOSIS — C61 PROSTATE CANCER METASTATIC TO INTRAPELVIC LYMPH NODE: ICD-10-CM

## 2019-06-17 PROCEDURE — 63600175 PHARM REV CODE 636 W HCPCS: Performed by: INTERNAL MEDICINE

## 2019-06-17 PROCEDURE — 25000003 PHARM REV CODE 250: Performed by: INTERNAL MEDICINE

## 2019-06-17 PROCEDURE — 96409 CHEMO IV PUSH SNGL DRUG: CPT

## 2019-06-17 PROCEDURE — 96367 TX/PROPH/DG ADDL SEQ IV INF: CPT

## 2019-06-17 PROCEDURE — 96416 CHEMO PROLONG INFUSE W/PUMP: CPT

## 2019-06-17 RX ORDER — EPINEPHRINE 0.3 MG/.3ML
0.3 INJECTION SUBCUTANEOUS ONCE AS NEEDED
Status: DISCONTINUED | OUTPATIENT
Start: 2019-06-17 | End: 2019-06-17 | Stop reason: HOSPADM

## 2019-06-17 RX ORDER — FLUOROURACIL 50 MG/ML
400 INJECTION, SOLUTION INTRAVENOUS
Status: COMPLETED | OUTPATIENT
Start: 2019-06-17 | End: 2019-06-17

## 2019-06-17 RX ORDER — SODIUM CHLORIDE 0.9 % (FLUSH) 0.9 %
10 SYRINGE (ML) INJECTION
Status: DISCONTINUED | OUTPATIENT
Start: 2019-06-17 | End: 2019-06-17 | Stop reason: HOSPADM

## 2019-06-17 RX ORDER — DIPHENHYDRAMINE HYDROCHLORIDE 50 MG/ML
50 INJECTION INTRAMUSCULAR; INTRAVENOUS ONCE AS NEEDED
Status: DISCONTINUED | OUTPATIENT
Start: 2019-06-17 | End: 2019-06-17 | Stop reason: HOSPADM

## 2019-06-17 RX ORDER — HEPARIN 100 UNIT/ML
500 SYRINGE INTRAVENOUS
Status: DISCONTINUED | OUTPATIENT
Start: 2019-06-17 | End: 2019-06-17 | Stop reason: HOSPADM

## 2019-06-17 RX ADMIN — LEUCOVORIN CALCIUM 710 MG: 350 INJECTION, POWDER, LYOPHILIZED, FOR SOLUTION INTRAMUSCULAR; INTRAVENOUS at 10:06

## 2019-06-17 RX ADMIN — FLUOROURACIL 710 MG: 50 INJECTION, SOLUTION INTRAVENOUS at 11:06

## 2019-06-17 RX ADMIN — SODIUM CHLORIDE: 0.9 INJECTION, SOLUTION INTRAVENOUS at 10:06

## 2019-06-17 RX ADMIN — FLUOROURACIL 4270 MG: 50 INJECTION, SOLUTION INTRAVENOUS at 11:06

## 2019-06-17 RX ADMIN — PALONOSETRON: 0.25 INJECTION, SOLUTION INTRAVENOUS at 10:06

## 2019-06-17 NOTE — PLAN OF CARE
Problem: Adult Inpatient Plan of Care  Goal: Plan of Care Review  Outcome: Ongoing (interventions implemented as appropriate)  Pt tolerated leucovorin/5FU push without issue. 5FU pump connected. VSS. AVS given. Pt d.c home in stable condition with instructions to return 6/19/19. In interim, pt knows to call clinic with any issues.

## 2019-06-19 ENCOUNTER — INFUSION (OUTPATIENT)
Dept: INFUSION THERAPY | Facility: OTHER | Age: 74
End: 2019-06-19
Attending: INTERNAL MEDICINE
Payer: MEDICARE

## 2019-06-19 VITALS
DIASTOLIC BLOOD PRESSURE: 78 MMHG | OXYGEN SATURATION: 99 % | TEMPERATURE: 98 F | RESPIRATION RATE: 18 BRPM | HEART RATE: 96 BPM | SYSTOLIC BLOOD PRESSURE: 134 MMHG

## 2019-06-19 DIAGNOSIS — C25.1 MALIGNANT NEOPLASM OF BODY OF PANCREAS: Primary | ICD-10-CM

## 2019-06-19 DIAGNOSIS — C61 PROSTATE CANCER METASTATIC TO INTRAPELVIC LYMPH NODE: ICD-10-CM

## 2019-06-19 DIAGNOSIS — C79.51 PROSTATE CANCER METASTATIC TO BONE: ICD-10-CM

## 2019-06-19 DIAGNOSIS — C61 PROSTATE CANCER METASTATIC TO BONE: ICD-10-CM

## 2019-06-19 DIAGNOSIS — C77.5 PROSTATE CANCER METASTATIC TO INTRAPELVIC LYMPH NODE: ICD-10-CM

## 2019-06-19 PROCEDURE — 63600175 PHARM REV CODE 636 W HCPCS: Performed by: INTERNAL MEDICINE

## 2019-06-19 PROCEDURE — 96521 REFILL/MAINT PORTABLE PUMP: CPT

## 2019-06-19 PROCEDURE — 25000003 PHARM REV CODE 250: Performed by: INTERNAL MEDICINE

## 2019-06-19 PROCEDURE — A4216 STERILE WATER/SALINE, 10 ML: HCPCS | Performed by: INTERNAL MEDICINE

## 2019-06-19 RX ORDER — HEPARIN 100 UNIT/ML
500 SYRINGE INTRAVENOUS
Status: DISCONTINUED | OUTPATIENT
Start: 2019-06-19 | End: 2019-06-19 | Stop reason: HOSPADM

## 2019-06-19 RX ORDER — SODIUM CHLORIDE 0.9 % (FLUSH) 0.9 %
10 SYRINGE (ML) INJECTION
Status: DISCONTINUED | OUTPATIENT
Start: 2019-06-19 | End: 2019-06-19 | Stop reason: HOSPADM

## 2019-06-19 RX ADMIN — HEPARIN 500 UNITS: 100 SYRINGE at 08:06

## 2019-06-19 RX ADMIN — Medication 10 ML: at 08:06

## 2019-06-19 NOTE — PLAN OF CARE
Problem: Adult Inpatient Plan of Care  Goal: Plan of Care Review  Outcome: Ongoing (interventions implemented as appropriate)  5-FU pump d/c'd, patient tolerated well. VSS, NAD. D/C'd home with wife.

## 2019-06-25 ENCOUNTER — DOCUMENTATION ONLY (OUTPATIENT)
Dept: HEMATOLOGY/ONCOLOGY | Facility: CLINIC | Age: 74
End: 2019-06-25

## 2019-06-25 ENCOUNTER — OFFICE VISIT (OUTPATIENT)
Dept: HEMATOLOGY/ONCOLOGY | Facility: CLINIC | Age: 74
End: 2019-06-25
Payer: MEDICARE

## 2019-06-25 ENCOUNTER — LAB VISIT (OUTPATIENT)
Dept: LAB | Facility: OTHER | Age: 74
End: 2019-06-25
Attending: INTERNAL MEDICINE
Payer: MEDICARE

## 2019-06-25 VITALS
HEIGHT: 68 IN | WEIGHT: 148.56 LBS | BODY MASS INDEX: 22.52 KG/M2 | HEART RATE: 71 BPM | TEMPERATURE: 98 F | RESPIRATION RATE: 16 BRPM | SYSTOLIC BLOOD PRESSURE: 147 MMHG | DIASTOLIC BLOOD PRESSURE: 67 MMHG | OXYGEN SATURATION: 99 %

## 2019-06-25 DIAGNOSIS — T45.1X5A ANTINEOPLASTIC CHEMOTHERAPY INDUCED ANEMIA: ICD-10-CM

## 2019-06-25 DIAGNOSIS — D70.1 CHEMOTHERAPY INDUCED NEUTROPENIA: ICD-10-CM

## 2019-06-25 DIAGNOSIS — T45.1X5A CHEMOTHERAPY INDUCED NEUTROPENIA: ICD-10-CM

## 2019-06-25 DIAGNOSIS — I10 ESSENTIAL HYPERTENSION: ICD-10-CM

## 2019-06-25 DIAGNOSIS — C61 PROSTATE CANCER METASTATIC TO INTRAPELVIC LYMPH NODE: ICD-10-CM

## 2019-06-25 DIAGNOSIS — C25.1 MALIGNANT NEOPLASM OF BODY OF PANCREAS: Primary | ICD-10-CM

## 2019-06-25 DIAGNOSIS — C61 PROSTATE CANCER METASTATIC TO BONE: ICD-10-CM

## 2019-06-25 DIAGNOSIS — C77.5 PROSTATE CANCER METASTATIC TO INTRAPELVIC LYMPH NODE: ICD-10-CM

## 2019-06-25 DIAGNOSIS — D64.81 ANTINEOPLASTIC CHEMOTHERAPY INDUCED ANEMIA: ICD-10-CM

## 2019-06-25 DIAGNOSIS — C25.1 MALIGNANT NEOPLASM OF BODY OF PANCREAS: ICD-10-CM

## 2019-06-25 DIAGNOSIS — C78.6 PERITONEAL METASTASES: ICD-10-CM

## 2019-06-25 DIAGNOSIS — C79.51 PROSTATE CANCER METASTATIC TO BONE: ICD-10-CM

## 2019-06-25 LAB
ALBUMIN SERPL BCP-MCNC: 3.7 G/DL (ref 3.5–5.2)
ALP SERPL-CCNC: 72 U/L (ref 55–135)
ALT SERPL W/O P-5'-P-CCNC: 12 U/L (ref 10–44)
ANION GAP SERPL CALC-SCNC: 11 MMOL/L (ref 8–16)
AST SERPL-CCNC: 16 U/L (ref 10–40)
BASOPHILS NFR BLD: 0 % (ref 0–1.9)
BILIRUB SERPL-MCNC: 0.6 MG/DL (ref 0.1–1)
BUN SERPL-MCNC: 25 MG/DL (ref 8–23)
CALCIUM SERPL-MCNC: 9.3 MG/DL (ref 8.7–10.5)
CHLORIDE SERPL-SCNC: 104 MMOL/L (ref 95–110)
CO2 SERPL-SCNC: 25 MMOL/L (ref 23–29)
CREAT SERPL-MCNC: 1.4 MG/DL (ref 0.5–1.4)
DIFFERENTIAL METHOD: ABNORMAL
EOSINOPHIL NFR BLD: 0 % (ref 0–8)
ERYTHROCYTE [DISTWIDTH] IN BLOOD BY AUTOMATED COUNT: 14.2 % (ref 11.5–14.5)
EST. GFR  (AFRICAN AMERICAN): 58 ML/MIN/1.73 M^2
EST. GFR  (NON AFRICAN AMERICAN): 50 ML/MIN/1.73 M^2
GLUCOSE SERPL-MCNC: 182 MG/DL (ref 70–110)
HCT VFR BLD AUTO: 32.4 % (ref 40–54)
HGB BLD-MCNC: 10.4 G/DL (ref 14–18)
LYMPHOCYTES NFR BLD: 52 % (ref 18–48)
MCH RBC QN AUTO: 30.6 PG (ref 27–31)
MCHC RBC AUTO-ENTMCNC: 32.1 G/DL (ref 32–36)
MCV RBC AUTO: 95 FL (ref 82–98)
MONOCYTES NFR BLD: 0 % (ref 4–15)
NEUTROPHILS NFR BLD: 48 % (ref 38–73)
PLATELET # BLD AUTO: 179 K/UL (ref 150–350)
PLATELET BLD QL SMEAR: ABNORMAL
PMV BLD AUTO: 9.1 FL (ref 9.2–12.9)
POTASSIUM SERPL-SCNC: 3.7 MMOL/L (ref 3.5–5.1)
PROT SERPL-MCNC: 6.7 G/DL (ref 6–8.4)
RBC # BLD AUTO: 3.4 M/UL (ref 4.6–6.2)
SODIUM SERPL-SCNC: 140 MMOL/L (ref 136–145)
WBC # BLD AUTO: 1.75 K/UL (ref 3.9–12.7)

## 2019-06-25 PROCEDURE — 1101F PR PT FALLS ASSESS DOC 0-1 FALLS W/OUT INJ PAST YR: ICD-10-PCS | Mod: CPTII,S$GLB,, | Performed by: INTERNAL MEDICINE

## 2019-06-25 PROCEDURE — 99214 OFFICE O/P EST MOD 30 MIN: CPT | Mod: S$GLB,,, | Performed by: INTERNAL MEDICINE

## 2019-06-25 PROCEDURE — 80053 COMPREHEN METABOLIC PANEL: CPT

## 2019-06-25 PROCEDURE — 99999 PR PBB SHADOW E&M-EST. PATIENT-LVL III: CPT | Mod: PBBFAC,,, | Performed by: INTERNAL MEDICINE

## 2019-06-25 PROCEDURE — 3077F PR MOST RECENT SYSTOLIC BLOOD PRESSURE >= 140 MM HG: ICD-10-PCS | Mod: CPTII,S$GLB,, | Performed by: INTERNAL MEDICINE

## 2019-06-25 PROCEDURE — 99214 PR OFFICE/OUTPT VISIT, EST, LEVL IV, 30-39 MIN: ICD-10-PCS | Mod: S$GLB,,, | Performed by: INTERNAL MEDICINE

## 2019-06-25 PROCEDURE — 1101F PT FALLS ASSESS-DOCD LE1/YR: CPT | Mod: CPTII,S$GLB,, | Performed by: INTERNAL MEDICINE

## 2019-06-25 PROCEDURE — 85007 BL SMEAR W/DIFF WBC COUNT: CPT

## 2019-06-25 PROCEDURE — 36415 COLL VENOUS BLD VENIPUNCTURE: CPT

## 2019-06-25 PROCEDURE — 85027 COMPLETE CBC AUTOMATED: CPT

## 2019-06-25 PROCEDURE — 3078F DIAST BP <80 MM HG: CPT | Mod: CPTII,S$GLB,, | Performed by: INTERNAL MEDICINE

## 2019-06-25 PROCEDURE — 3077F SYST BP >= 140 MM HG: CPT | Mod: CPTII,S$GLB,, | Performed by: INTERNAL MEDICINE

## 2019-06-25 PROCEDURE — 99999 PR PBB SHADOW E&M-EST. PATIENT-LVL III: ICD-10-PCS | Mod: PBBFAC,,, | Performed by: INTERNAL MEDICINE

## 2019-06-25 PROCEDURE — 3078F PR MOST RECENT DIASTOLIC BLOOD PRESSURE < 80 MM HG: ICD-10-PCS | Mod: CPTII,S$GLB,, | Performed by: INTERNAL MEDICINE

## 2019-06-25 NOTE — PROGRESS NOTES
Subjective:       Patient ID: Shady Prakash Jr. is a 74 y.o. male.     Chief Complaint: follow up for stage IV pancreatic cancer     HPI    Mr Prakash returns for follow up. He has been on 5FU maintenance and has been doing well. Last received it on 6/17, which is one week ago. Denies any side effects from chemotherapy. Still need to see a dentist. Feeling well. No fever, chills, abdominal pain, N/V or diarrhea.          Currently his treatment is focused on the metastatic pancreatic cancer  Recent scans with stable disease   MRIs for back pain revealed arthritis     Oncology History:  1. Metastatic prostate cancer  He is s/p TRUS/bx on 8/7/15 w/ high volume marta 9 (5+4), PSA 25.  He had negative metastatic workup at diagnosis.  However, at time of planned RALP on 9/10/15 he was noted to have BN invasion on cysto and therefore only PLND was done, which confirmed metastatic disease.  He was started on ADT 9/17/15 w/ firmagon followed by Eligard on 10/15/15.    Started Casodex 7/20/17 - scans at that time showed osseus metastatic disease  Started Docetaxel 10/6/17 with progression, then found to have below     - on follow-up scans a pancreatic lesion noted concerning for pancreatic cancer as it was an unlikely area for prostate cancer spread-  Biopsy confirmed adenocarcinoma and PSA was negative     Imaging as below:  Scan results:  6/20/18 Bone scan:  FINDINGS:  Two lesions in the left scapula, stable.  Elongated lesion in the right mid rib posteriorly, stable.  Prior exam demonstrated a large right iliac lesion, which no longer exhibits increased uptake.  Left pubic lesion, stable.  There is also now a small lesion in the left iliac bone. Two additional new small foci in the mid thoracic vertebra.  Both kidneys and the bladder appear unremarkable.  Impression:  Findings consistent with osseous metastatic disease as above, noting 3 new small lesions.     6/21/18 CT scan abdomen and pelvis:  FINDINGS:  Lower chest:  There has been interval increase in size in the focal consolidation which is pleural based in the posterior aspect of the right lower lobe.  Today's exam measures 5.1 cm maximum diameter.  Previous exam is measure approximately 3.3 cm.  This is associated with pleural thickening that extends laterally.  There is a small focus of cylinder all coal bronchiectasis in the medial left lower lobe.  The remaining aspects of the lung parenchyma that are visualized show no abnormalities.  The visualized heart and pericardium are normal  Liver: There are 3 hyperenhancing foci along the periphery of the right and left lobes of the liver which are stable from 2015.  The largest measures 1.1 cm in maximum diameter.  Although not characteristic on this exam, previous exam in 2015 shows findings most characteristic of hepatic hemangioma.  The remaining liver parenchyma as well as the hepatic and portal veins are patent.  Gallbladder: Normal.  Pancreas: There is been interval development of an ill-defined hypodense focus within the proximal body of the pancreas measuring 2.1 cm x 1.8 cm.  Spleen: Calcified hemangiomas present.  Otherwise normal.  Adrenal glands: Normal.  Genitourinary: Stable simple cyst in the lower pole of the right kidney measuring 1.6 cm.  The left kidney and bilateral ureters are normal.  The bladder is partially distended and shows wall thickening on the non dependent surface of the bladder.  This is not significantly changed in appearance.  Gastrointestinal: Stomach and small intestines are normal.  Colonic diverticulosis is present without evidence of diverticulitis.  Prostate: Enlarged and nodular causing mass effect on the bladder.  Miscellaneous: There is no intra-abdominal or pelvic free fluid, free air or lymphadenopathy.  The abdominal aorta tapers normally.  Bilateral fat containing inguinal hernias present.  Osseous and soft tissue structures: Sclerotic metastases involving the right iliac bone and  left superior pubic ramus are identified.  The small focus in the left iliac bone seen on yesterday's bone scan is not well seen on this exam.  The soft tissues are within normal limits.  Impression:  1. Interval increase in size in the pleural base, right lower lobe consolidation.  2. Hyperenhancing foci likely represent hemangiomas.  They have been stable since 2015.  Is on the 2015 exam where they demonstrate characteristics most compatible with hemangioma.  3. Bladder wall thickening is not significantly changed.  This may be related to outlet obstruction from the nodular prostate.  4. Interval development of ill-defined hypodense focus measuring 2.1 cm within the proximal body of the pancreas.  This may also be a focus of metastatic disease although is uncommon for the prostate to metastasized to the pancreas.  A dedicated pancreatic CT may be beneficial in further characterizing this lesion.     - on 7/25/18 he came to ER with worsening abdominal pain and concerns for appendicitis  Underwent Laparoscopy Converted to Laparotomy, Peritoneal Biopsy , Ileocecal with Dr. Stephenson  Biopsy concerning for metastatic pancreatic carcinoma     2. Metastatic pancreatic cancer  - started on [place]/Abraxane- note Ca19-9 never elevated  Response initially  Now with progression  FOLFOX started 12/20/18  Now on 5FU maintenance     Re imaging stable.     Review of Systems   Constitutional: Negative for activity change, appetite change, chills, fatigue, fever and unexpected weight change.   HENT: Positive for dental problem. Negative for congestion, hearing loss, mouth sores, nosebleeds, postnasal drip, sinus pressure, sore throat and trouble swallowing.    Eyes: Negative for visual disturbance.   Respiratory: Negative for cough (rare dry), chest tightness, shortness of breath and wheezing.    Cardiovascular: Negative for chest pain, palpitations and leg swelling.   Gastrointestinal: Negative for abdominal distention, abdominal  pain, blood in stool, constipation, diarrhea, nausea and vomiting.   Genitourinary: Negative for decreased urine volume, difficulty urinating, frequency, hematuria and urgency.   Musculoskeletal: Negative for arthralgias, back pain, gait problem, joint swelling, neck pain and neck stiffness.   Skin: Negative for color change, pallor, rash and wound.   Neurological: Positive for numbness (feet). Negative for dizziness, weakness, light-headedness and headaches.   Hematological: Negative for adenopathy. Does not bruise/bleed easily.   Psychiatric/Behavioral: Negative for dysphoric mood and sleep disturbance. The patient is not nervous/anxious.        Objective:   Physical Exam   Constitutional: He is oriented to person, place, and time. He appears well-developed and well-nourished. No distress.   Presents with his wife  ECOG=0   HENT:   Head: Normocephalic and atraumatic.   Right Ear: External ear normal.   Left Ear: External ear normal.   Nose: Nose normal.   Mouth/Throat: Oropharynx is clear and moist. No oropharyngeal exudate.   Poor dentition  Eyes: Pupils are equal, round, and reactive to light. Conjunctivae and EOM are normal. Right eye exhibits no discharge. Left eye exhibits no discharge. No scleral icterus.   Neck: Normal range of motion. Neck supple. No tracheal deviation present. No thyromegaly present.   Cardiovascular: Normal rate, regular rhythm, normal heart sounds and intact distal pulses. Exam reveals no gallop and no friction rub.   No murmur heard.  Pulmonary/Chest: Effort normal and breath sounds normal. No respiratory distress. He has no wheezes. He has no rales. He exhibits no tenderness.   Abdominal: Soft. Bowel sounds are normal. He exhibits mass. He exhibits no distension. There is no tenderness. There is no rebound and no guarding.   Musculoskeletal: Normal range of motion. He exhibits no edema, tenderness or deformity.   Lymphadenopathy:     He has no cervical adenopathy.   Neurological: He  is alert and oriented to person, place, and time. He has normal reflexes. He displays normal reflexes. No cranial nerve deficit. He exhibits normal muscle tone. Coordination normal.   Skin: Skin is warm and dry. No rash noted. He is not diaphoretic. No erythema. No pallor.   Psychiatric: He has a normal mood and affect. His behavior is normal. Judgment and thought content normal.   Nursing note and vitals reviewed.     Labs- reviewed   WBC 1.75, , Hb 10.4, plt count 179    Assessment:       1. Malignant neoplasm of body of pancreas    2. Peritoneal metastases    3. Prostate cancer metastatic to bone    4. Prostate cancer metastatic to intrapelvic lymph node    5. Chemotherapy induced neutropenia    6. Antineoplastic chemotherapy induced anemia    7. Essential hypertension        Plan:     1-2.   - doing well. Continue with maintenance 5FU  - return next Monday for maintenance 5FU  - return in 3 weeks to f/u with Dr Melendez. Due for restaging CT scan prior to return visit    3-4  - Treatment held with above    5.  - received 5FU last week. To be expected. No fever, chills. Monitor    6.  - mild. Monitor    7.  - BP good  - c/w current meds

## 2019-06-25 NOTE — Clinical Note
Please add lab (CBC, CMP) prior to his chemo appt on 7/1. Get CBC, CMP, CT C/A/P on 7/16, return to see Dr Melendez on 7/17, then get chemo (5FU), return on 7/19 to remove pump

## 2019-07-01 ENCOUNTER — INFUSION (OUTPATIENT)
Dept: INFUSION THERAPY | Facility: OTHER | Age: 74
End: 2019-07-01
Attending: INTERNAL MEDICINE
Payer: MEDICARE

## 2019-07-01 VITALS
WEIGHT: 156.75 LBS | TEMPERATURE: 98 F | RESPIRATION RATE: 17 BRPM | BODY MASS INDEX: 23.76 KG/M2 | OXYGEN SATURATION: 98 % | HEIGHT: 68 IN | SYSTOLIC BLOOD PRESSURE: 111 MMHG | DIASTOLIC BLOOD PRESSURE: 67 MMHG | HEART RATE: 91 BPM

## 2019-07-01 DIAGNOSIS — C79.51 PROSTATE CANCER METASTATIC TO BONE: ICD-10-CM

## 2019-07-01 DIAGNOSIS — C25.1 MALIGNANT NEOPLASM OF BODY OF PANCREAS: Primary | ICD-10-CM

## 2019-07-01 DIAGNOSIS — C61 PROSTATE CANCER METASTATIC TO BONE: ICD-10-CM

## 2019-07-01 DIAGNOSIS — C77.5 PROSTATE CANCER METASTATIC TO INTRAPELVIC LYMPH NODE: ICD-10-CM

## 2019-07-01 DIAGNOSIS — C61 PROSTATE CANCER METASTATIC TO INTRAPELVIC LYMPH NODE: ICD-10-CM

## 2019-07-01 PROCEDURE — 96367 TX/PROPH/DG ADDL SEQ IV INF: CPT

## 2019-07-01 PROCEDURE — 25000003 PHARM REV CODE 250: Performed by: INTERNAL MEDICINE

## 2019-07-01 PROCEDURE — 63600175 PHARM REV CODE 636 W HCPCS: Performed by: INTERNAL MEDICINE

## 2019-07-01 PROCEDURE — 96361 HYDRATE IV INFUSION ADD-ON: CPT

## 2019-07-01 PROCEDURE — 96409 CHEMO IV PUSH SNGL DRUG: CPT

## 2019-07-01 PROCEDURE — 96416 CHEMO PROLONG INFUSE W/PUMP: CPT

## 2019-07-01 RX ORDER — SODIUM CHLORIDE 0.9 % (FLUSH) 0.9 %
10 SYRINGE (ML) INJECTION
Status: CANCELLED | OUTPATIENT
Start: 2019-07-01

## 2019-07-01 RX ORDER — EPINEPHRINE 0.3 MG/.3ML
0.3 INJECTION SUBCUTANEOUS ONCE AS NEEDED
Status: CANCELLED | OUTPATIENT
Start: 2019-07-01

## 2019-07-01 RX ORDER — HEPARIN 100 UNIT/ML
500 SYRINGE INTRAVENOUS
Status: CANCELLED | OUTPATIENT
Start: 2019-07-03

## 2019-07-01 RX ORDER — SODIUM CHLORIDE 9 MG/ML
INJECTION, SOLUTION INTRAVENOUS CONTINUOUS
Status: CANCELLED
Start: 2019-07-01

## 2019-07-01 RX ORDER — FLUOROURACIL 50 MG/ML
400 INJECTION, SOLUTION INTRAVENOUS
Status: CANCELLED | OUTPATIENT
Start: 2019-07-01

## 2019-07-01 RX ORDER — FLUOROURACIL 50 MG/ML
400 INJECTION, SOLUTION INTRAVENOUS
Status: COMPLETED | OUTPATIENT
Start: 2019-07-01 | End: 2019-07-01

## 2019-07-01 RX ORDER — DIPHENHYDRAMINE HYDROCHLORIDE 50 MG/ML
50 INJECTION INTRAMUSCULAR; INTRAVENOUS ONCE AS NEEDED
Status: CANCELLED | OUTPATIENT
Start: 2019-07-01

## 2019-07-01 RX ORDER — SODIUM CHLORIDE 0.9 % (FLUSH) 0.9 %
10 SYRINGE (ML) INJECTION
Status: CANCELLED | OUTPATIENT
Start: 2019-07-03

## 2019-07-01 RX ORDER — HEPARIN 100 UNIT/ML
500 SYRINGE INTRAVENOUS
Status: CANCELLED | OUTPATIENT
Start: 2019-07-01

## 2019-07-01 RX ORDER — DIPHENHYDRAMINE HYDROCHLORIDE 50 MG/ML
50 INJECTION INTRAMUSCULAR; INTRAVENOUS ONCE AS NEEDED
Status: DISCONTINUED | OUTPATIENT
Start: 2019-07-01 | End: 2019-07-01 | Stop reason: HOSPADM

## 2019-07-01 RX ORDER — SODIUM CHLORIDE 0.9 % (FLUSH) 0.9 %
10 SYRINGE (ML) INJECTION
Status: DISCONTINUED | OUTPATIENT
Start: 2019-07-01 | End: 2019-07-01 | Stop reason: HOSPADM

## 2019-07-01 RX ORDER — HEPARIN 100 UNIT/ML
500 SYRINGE INTRAVENOUS
Status: DISCONTINUED | OUTPATIENT
Start: 2019-07-01 | End: 2019-07-01 | Stop reason: HOSPADM

## 2019-07-01 RX ORDER — SODIUM CHLORIDE 9 MG/ML
INJECTION, SOLUTION INTRAVENOUS CONTINUOUS
Status: ACTIVE | OUTPATIENT
Start: 2019-07-01 | End: 2019-07-01

## 2019-07-01 RX ORDER — EPINEPHRINE 0.3 MG/.3ML
0.3 INJECTION SUBCUTANEOUS ONCE AS NEEDED
Status: DISCONTINUED | OUTPATIENT
Start: 2019-07-01 | End: 2019-07-01 | Stop reason: HOSPADM

## 2019-07-01 RX ADMIN — FLUOROURACIL 710 MG: 50 INJECTION, SOLUTION INTRAVENOUS at 10:07

## 2019-07-01 RX ADMIN — SODIUM CHLORIDE: 0.9 INJECTION, SOLUTION INTRAVENOUS at 09:07

## 2019-07-01 RX ADMIN — FLUOROURACIL 4270 MG: 50 INJECTION, SOLUTION INTRAVENOUS at 10:07

## 2019-07-01 RX ADMIN — SODIUM CHLORIDE: 0.9 INJECTION, SOLUTION INTRAVENOUS at 08:07

## 2019-07-01 RX ADMIN — PALONOSETRON HYDROCHLORIDE: 0.25 INJECTION, SOLUTION INTRAVENOUS at 09:07

## 2019-07-01 RX ADMIN — LEUCOVORIN CALCIUM 710 MG: 350 INJECTION, POWDER, LYOPHILIZED, FOR SOLUTION INTRAMUSCULAR; INTRAVENOUS at 10:07

## 2019-07-01 NOTE — PLAN OF CARE
Problem: Adult Inpatient Plan of Care  Goal: Plan of Care Review  Outcome: Ongoing (interventions implemented as appropriate)  Pt tolerated leucovorin/IVF infusion without issue. 5FU pump hooked up. VSS. AVS given. Pt d.c home in stable condition with instructions to return 7/3/19. In interim, pt knows to call clinic with any issues.

## 2019-07-03 ENCOUNTER — INFUSION (OUTPATIENT)
Dept: INFUSION THERAPY | Facility: OTHER | Age: 74
End: 2019-07-03
Attending: INTERNAL MEDICINE
Payer: MEDICARE

## 2019-07-03 VITALS
SYSTOLIC BLOOD PRESSURE: 130 MMHG | OXYGEN SATURATION: 98 % | TEMPERATURE: 98 F | DIASTOLIC BLOOD PRESSURE: 76 MMHG | RESPIRATION RATE: 18 BRPM | HEART RATE: 82 BPM

## 2019-07-03 DIAGNOSIS — C61 PROSTATE CANCER METASTATIC TO BONE: ICD-10-CM

## 2019-07-03 DIAGNOSIS — C61 PROSTATE CANCER METASTATIC TO INTRAPELVIC LYMPH NODE: ICD-10-CM

## 2019-07-03 DIAGNOSIS — C77.5 PROSTATE CANCER METASTATIC TO INTRAPELVIC LYMPH NODE: ICD-10-CM

## 2019-07-03 DIAGNOSIS — C79.51 PROSTATE CANCER METASTATIC TO BONE: ICD-10-CM

## 2019-07-03 DIAGNOSIS — C25.1 MALIGNANT NEOPLASM OF BODY OF PANCREAS: Primary | ICD-10-CM

## 2019-07-03 PROCEDURE — A4216 STERILE WATER/SALINE, 10 ML: HCPCS | Performed by: INTERNAL MEDICINE

## 2019-07-03 PROCEDURE — 63600175 PHARM REV CODE 636 W HCPCS: Performed by: INTERNAL MEDICINE

## 2019-07-03 PROCEDURE — 96521 REFILL/MAINT PORTABLE PUMP: CPT

## 2019-07-03 PROCEDURE — 25000003 PHARM REV CODE 250: Performed by: INTERNAL MEDICINE

## 2019-07-03 RX ORDER — HEPARIN 100 UNIT/ML
500 SYRINGE INTRAVENOUS
Status: DISCONTINUED | OUTPATIENT
Start: 2019-07-03 | End: 2019-07-03 | Stop reason: HOSPADM

## 2019-07-03 RX ORDER — SODIUM CHLORIDE 0.9 % (FLUSH) 0.9 %
10 SYRINGE (ML) INJECTION
Status: DISCONTINUED | OUTPATIENT
Start: 2019-07-03 | End: 2019-07-03 | Stop reason: HOSPADM

## 2019-07-03 RX ADMIN — HEPARIN 500 UNITS: 100 SYRINGE at 08:07

## 2019-07-03 RX ADMIN — Medication 10 ML: at 08:07

## 2019-07-03 NOTE — PLAN OF CARE
Problem: Adult Inpatient Plan of Care  Goal: Patient-Specific Goal (Individualization)  Outcome: Ongoing (interventions implemented as appropriate)  Home infusion complete. Pt tolerated well. VSS. NAD. Port to right chest de-accessed after heparinized per protocol.  Pt verbalized understanding of discharge instructions before leaving with wife.

## 2019-07-08 ENCOUNTER — TELEPHONE (OUTPATIENT)
Dept: HEMATOLOGY/ONCOLOGY | Facility: CLINIC | Age: 74
End: 2019-07-08

## 2019-07-08 NOTE — TELEPHONE ENCOUNTER
"Returned call to pt.   Pt stated that he has been constipated r/t pain rxs.   Pt stated that when he has a BM he uses vaseline as bottom is "sore."   Pt denies any pain, rectal bleeding.   Pt denies any fever or any infectious complaints.   Pt requesting an antibiotic so he doesn't get an infection.   Pt stated has been taking Colace (finds doesn't help) as well as drinking adequate fluids.   Pt scheduled for f/u with Dr. Melendez on 7/17.   Informed pt would check with Dr. Melendez and see recs and f/u.   Pt verbalized understanding.           ----- Message from Akua Rubio sent at 7/8/2019  8:04 AM CDT -----  Contact: Pt  Pt is requesting a callback from doctor need to know if he can get an antibiotic sent to [facility] on [address]    Pt can be reached at [phone]    "

## 2019-07-08 NOTE — TELEPHONE ENCOUNTER
Call to pt and informed Dr. Melendez would like to bring in for a visit tomorrow.   Pt verbalized understanding of appt details.   Appt scheduled.     MD Mala Ring   Caller: Unspecified (Today,  8:15 AM)             Come in tomorrow AM

## 2019-07-09 ENCOUNTER — OFFICE VISIT (OUTPATIENT)
Dept: HEMATOLOGY/ONCOLOGY | Facility: CLINIC | Age: 74
End: 2019-07-09
Payer: MEDICARE

## 2019-07-09 ENCOUNTER — LAB VISIT (OUTPATIENT)
Dept: LAB | Facility: HOSPITAL | Age: 74
End: 2019-07-09
Attending: INTERNAL MEDICINE
Payer: MEDICARE

## 2019-07-09 VITALS
RESPIRATION RATE: 16 BRPM | SYSTOLIC BLOOD PRESSURE: 126 MMHG | OXYGEN SATURATION: 98 % | TEMPERATURE: 98 F | DIASTOLIC BLOOD PRESSURE: 76 MMHG | BODY MASS INDEX: 24.22 KG/M2 | HEART RATE: 88 BPM | HEIGHT: 68 IN | WEIGHT: 159.81 LBS

## 2019-07-09 DIAGNOSIS — K62.89 RECTAL PAIN: ICD-10-CM

## 2019-07-09 DIAGNOSIS — C79.9 METASTASIS FROM PANCREATIC CANCER: Primary | ICD-10-CM

## 2019-07-09 DIAGNOSIS — K59.03 DRUG-INDUCED CONSTIPATION: ICD-10-CM

## 2019-07-09 DIAGNOSIS — C25.9 METASTASIS FROM PANCREATIC CANCER: ICD-10-CM

## 2019-07-09 DIAGNOSIS — C79.9 METASTASIS FROM PANCREATIC CANCER: ICD-10-CM

## 2019-07-09 DIAGNOSIS — C25.9 METASTASIS FROM PANCREATIC CANCER: Primary | ICD-10-CM

## 2019-07-09 LAB
BASOPHILS # BLD AUTO: 0.04 K/UL (ref 0–0.2)
BASOPHILS NFR BLD: 1.4 % (ref 0–1.9)
DIFFERENTIAL METHOD: ABNORMAL
EOSINOPHIL # BLD AUTO: 0.1 K/UL (ref 0–0.5)
EOSINOPHIL NFR BLD: 4.7 % (ref 0–8)
ERYTHROCYTE [DISTWIDTH] IN BLOOD BY AUTOMATED COUNT: 14 % (ref 11.5–14.5)
HCT VFR BLD AUTO: 33.6 % (ref 40–54)
HGB BLD-MCNC: 10.3 G/DL (ref 14–18)
IMM GRANULOCYTES # BLD AUTO: 0.01 K/UL (ref 0–0.04)
IMM GRANULOCYTES NFR BLD AUTO: 0.4 % (ref 0–0.5)
LYMPHOCYTES # BLD AUTO: 0.7 K/UL (ref 1–4.8)
LYMPHOCYTES NFR BLD: 26.6 % (ref 18–48)
MCH RBC QN AUTO: 30.1 PG (ref 27–31)
MCHC RBC AUTO-ENTMCNC: 30.7 G/DL (ref 32–36)
MCV RBC AUTO: 98 FL (ref 82–98)
MONOCYTES # BLD AUTO: 0.2 K/UL (ref 0.3–1)
MONOCYTES NFR BLD: 8.3 % (ref 4–15)
NEUTROPHILS # BLD AUTO: 1.6 K/UL (ref 1.8–7.7)
NEUTROPHILS NFR BLD: 58.6 % (ref 38–73)
NRBC BLD-RTO: 0 /100 WBC
PLATELET # BLD AUTO: 114 K/UL (ref 150–350)
PMV BLD AUTO: 8.9 FL (ref 9.2–12.9)
RBC # BLD AUTO: 3.42 M/UL (ref 4.6–6.2)
WBC # BLD AUTO: 2.78 K/UL (ref 3.9–12.7)

## 2019-07-09 PROCEDURE — 99999 PR PBB SHADOW E&M-EST. PATIENT-LVL III: CPT | Mod: PBBFAC,,, | Performed by: INTERNAL MEDICINE

## 2019-07-09 PROCEDURE — 85025 COMPLETE CBC W/AUTO DIFF WBC: CPT

## 2019-07-09 PROCEDURE — 99213 PR OFFICE/OUTPT VISIT, EST, LEVL III, 20-29 MIN: ICD-10-PCS | Mod: S$GLB,,, | Performed by: INTERNAL MEDICINE

## 2019-07-09 PROCEDURE — 3078F DIAST BP <80 MM HG: CPT | Mod: CPTII,S$GLB,, | Performed by: INTERNAL MEDICINE

## 2019-07-09 PROCEDURE — 3078F PR MOST RECENT DIASTOLIC BLOOD PRESSURE < 80 MM HG: ICD-10-PCS | Mod: CPTII,S$GLB,, | Performed by: INTERNAL MEDICINE

## 2019-07-09 PROCEDURE — 3074F PR MOST RECENT SYSTOLIC BLOOD PRESSURE < 130 MM HG: ICD-10-PCS | Mod: CPTII,S$GLB,, | Performed by: INTERNAL MEDICINE

## 2019-07-09 PROCEDURE — 99213 OFFICE O/P EST LOW 20 MIN: CPT | Mod: S$GLB,,, | Performed by: INTERNAL MEDICINE

## 2019-07-09 PROCEDURE — 1101F PT FALLS ASSESS-DOCD LE1/YR: CPT | Mod: CPTII,S$GLB,, | Performed by: INTERNAL MEDICINE

## 2019-07-09 PROCEDURE — 1101F PR PT FALLS ASSESS DOC 0-1 FALLS W/OUT INJ PAST YR: ICD-10-PCS | Mod: CPTII,S$GLB,, | Performed by: INTERNAL MEDICINE

## 2019-07-09 PROCEDURE — 99999 PR PBB SHADOW E&M-EST. PATIENT-LVL III: ICD-10-PCS | Mod: PBBFAC,,, | Performed by: INTERNAL MEDICINE

## 2019-07-09 PROCEDURE — 3074F SYST BP LT 130 MM HG: CPT | Mod: CPTII,S$GLB,, | Performed by: INTERNAL MEDICINE

## 2019-07-09 PROCEDURE — 36415 COLL VENOUS BLD VENIPUNCTURE: CPT

## 2019-07-09 RX ORDER — SENNOSIDES 8.6 MG/1
1 TABLET ORAL 2 TIMES DAILY
Qty: 60 TABLET | Refills: 2 | Status: SHIPPED | OUTPATIENT
Start: 2019-07-09 | End: 2020-07-08

## 2019-07-09 RX ORDER — POLYETHYLENE GLYCOL 3350 17 G/17G
1 POWDER, FOR SOLUTION ORAL DAILY
Qty: 510 G | Refills: 0 | Status: SHIPPED | OUTPATIENT
Start: 2019-07-09

## 2019-07-09 NOTE — PROGRESS NOTES
Subjective:       Patient ID: Shady Prakash Jr. is a 74 y.o. male.    Chief Complaint: Rectal discomfort    HPI     Had been dealing with constipation- led to harder and bigger stools  Notes pain at rectal area and led to bleeding  No further bleeding  Scared him and worried for infection  No fevers or chills  Feels like he is anemic again  + neuropathy    Mr Prakash returns for a sick call. He has been on 5FU maintenance and has been doing well otherwise.  Currently his treatment is focused on the metastatic pancreatic cancer  Recent scans with stable disease   MRIs for back pain revealed arthritis     Oncology History:  1. Metastatic prostate cancer  He is s/p TRUS/bx on 8/7/15 w/ high volume marta 9 (5+4), PSA 25.  He had negative metastatic workup at diagnosis.  However, at time of planned RALP on 9/10/15 he was noted to have BN invasion on cysto and therefore only PLND was done, which confirmed metastatic disease.  He was started on ADT 9/17/15 w/ firmagon followed by Eligard on 10/15/15.    Started Casodex 7/20/17 - scans at that time showed osseus metastatic disease  Started Docetaxel 10/6/17 with progression, then found to have below     - on follow-up scans a pancreatic lesion noted concerning for pancreatic cancer as it was an unlikely area for prostate cancer spread-  Biopsy confirmed adenocarcinoma and PSA was negative     Imaging as below:  Scan results:  6/20/18 Bone scan:  FINDINGS:  Two lesions in the left scapula, stable.  Elongated lesion in the right mid rib posteriorly, stable.  Prior exam demonstrated a large right iliac lesion, which no longer exhibits increased uptake.  Left pubic lesion, stable.  There is also now a small lesion in the left iliac bone. Two additional new small foci in the mid thoracic vertebra.  Both kidneys and the bladder appear unremarkable.  Impression:  Findings consistent with osseous metastatic disease as above, noting 3 new small lesions.     6/21/18 CT scan abdomen  and pelvis:  FINDINGS:  Lower chest: There has been interval increase in size in the focal consolidation which is pleural based in the posterior aspect of the right lower lobe.  Today's exam measures 5.1 cm maximum diameter.  Previous exam is measure approximately 3.3 cm.  This is associated with pleural thickening that extends laterally.  There is a small focus of cylinder all coal bronchiectasis in the medial left lower lobe.  The remaining aspects of the lung parenchyma that are visualized show no abnormalities.  The visualized heart and pericardium are normal  Liver: There are 3 hyperenhancing foci along the periphery of the right and left lobes of the liver which are stable from 2015.  The largest measures 1.1 cm in maximum diameter.  Although not characteristic on this exam, previous exam in 2015 shows findings most characteristic of hepatic hemangioma.  The remaining liver parenchyma as well as the hepatic and portal veins are patent.  Gallbladder: Normal.  Pancreas: There is been interval development of an ill-defined hypodense focus within the proximal body of the pancreas measuring 2.1 cm x 1.8 cm.  Spleen: Calcified hemangiomas present.  Otherwise normal.  Adrenal glands: Normal.  Genitourinary: Stable simple cyst in the lower pole of the right kidney measuring 1.6 cm.  The left kidney and bilateral ureters are normal.  The bladder is partially distended and shows wall thickening on the non dependent surface of the bladder.  This is not significantly changed in appearance.  Gastrointestinal: Stomach and small intestines are normal.  Colonic diverticulosis is present without evidence of diverticulitis.  Prostate: Enlarged and nodular causing mass effect on the bladder.  Miscellaneous: There is no intra-abdominal or pelvic free fluid, free air or lymphadenopathy.  The abdominal aorta tapers normally.  Bilateral fat containing inguinal hernias present.  Osseous and soft tissue structures: Sclerotic  metastases involving the right iliac bone and left superior pubic ramus are identified.  The small focus in the left iliac bone seen on yesterday's bone scan is not well seen on this exam.  The soft tissues are within normal limits.  Impression:  1. Interval increase in size in the pleural base, right lower lobe consolidation.  2. Hyperenhancing foci likely represent hemangiomas.  They have been stable since 2015.  Is on the 2015 exam where they demonstrate characteristics most compatible with hemangioma.  3. Bladder wall thickening is not significantly changed.  This may be related to outlet obstruction from the nodular prostate.  4. Interval development of ill-defined hypodense focus measuring 2.1 cm within the proximal body of the pancreas.  This may also be a focus of metastatic disease although is uncommon for the prostate to metastasized to the pancreas.  A dedicated pancreatic CT may be beneficial in further characterizing this lesion.     - on 7/25/18 he came to ER with worsening abdominal pain and concerns for appendicitis  Underwent Laparoscopy Converted to Laparotomy, Peritoneal Biopsy , Ileocecal with Dr. Stephenson  Biopsy concerning for metastatic pancreatic carcinoma     2. Metastatic pancreatic cancer  - started on [place]/Abraxane- note Ca19-9 never elevated  Response initially  Now with progression  FOLFOX started 12/20/18  Now on 5FU maintenance     Re imaging stable.       Review of Systems  rectal discomfort improved  No further bleeding  See above  Objective:      Physical Exam   Constitutional: He appears well-developed and well-nourished. No distress.   ECOG=0  Presents with wife   Cardiovascular: Normal rate and regular rhythm. Exam reveals no friction rub.   No murmur heard.  Pulmonary/Chest: Effort normal and breath sounds normal. No respiratory distress. He has no rales. He exhibits no tenderness.   Abdominal: Soft. Bowel sounds are normal. He exhibits no distension and no mass. There is no  rebound and no guarding.   No organomegaly  Declined rectal   Musculoskeletal: Normal range of motion.   Skin: He is not diaphoretic.   Nursing note and vitals reviewed.      Labs- pending   Assessment:       1. Metastasis from pancreatic cancer    2. Drug-induced constipation    3. Rectal pain        Plan:     sick call  Declined rectal  Constipation resolved with enema  However, not taking bowel regimen  Advised to take Colace and Miralax   to assist with medication cost    15 minutes total      Distress Screening Results: Psychosocial Distress screening score of Distress Score: 0 noted and reviewed. No intervention indicated.

## 2019-07-16 ENCOUNTER — HOSPITAL ENCOUNTER (OUTPATIENT)
Dept: RADIOLOGY | Facility: OTHER | Age: 74
Discharge: HOME | End: 2019-07-16
Attending: INTERNAL MEDICINE
Payer: MEDICARE

## 2019-07-16 DIAGNOSIS — C25.1 MALIGNANT NEOPLASM OF BODY OF PANCREAS: ICD-10-CM

## 2019-07-16 PROCEDURE — 71250 CT CHEST ABDOMEN PELVIS WITHOUT CONTRAST(XPD): ICD-10-PCS | Mod: 26,,, | Performed by: RADIOLOGY

## 2019-07-16 PROCEDURE — 74176 CT ABD & PELVIS W/O CONTRAST: CPT | Mod: TC

## 2019-07-16 PROCEDURE — 74176 CT ABD & PELVIS W/O CONTRAST: CPT | Mod: 26,,, | Performed by: RADIOLOGY

## 2019-07-16 PROCEDURE — 74176 CT CHEST ABDOMEN PELVIS WITHOUT CONTRAST(XPD): ICD-10-PCS | Mod: 26,,, | Performed by: RADIOLOGY

## 2019-07-16 PROCEDURE — 71250 CT THORAX DX C-: CPT | Mod: TC

## 2019-07-16 PROCEDURE — 71250 CT THORAX DX C-: CPT | Mod: 26,,, | Performed by: RADIOLOGY

## 2019-07-17 ENCOUNTER — INFUSION (OUTPATIENT)
Dept: INFUSION THERAPY | Facility: OTHER | Age: 74
End: 2019-07-17
Attending: INTERNAL MEDICINE
Payer: MEDICARE

## 2019-07-17 ENCOUNTER — OFFICE VISIT (OUTPATIENT)
Dept: HEMATOLOGY/ONCOLOGY | Facility: CLINIC | Age: 74
End: 2019-07-17
Attending: INTERNAL MEDICINE
Payer: MEDICARE

## 2019-07-17 VITALS
RESPIRATION RATE: 17 BRPM | TEMPERATURE: 98 F | BODY MASS INDEX: 23.89 KG/M2 | HEART RATE: 83 BPM | WEIGHT: 157.63 LBS | OXYGEN SATURATION: 98 % | SYSTOLIC BLOOD PRESSURE: 111 MMHG | HEIGHT: 68 IN | DIASTOLIC BLOOD PRESSURE: 70 MMHG

## 2019-07-17 DIAGNOSIS — C25.1 MALIGNANT NEOPLASM OF BODY OF PANCREAS: Primary | ICD-10-CM

## 2019-07-17 DIAGNOSIS — T45.1X5A ANEMIA ASSOCIATED WITH CHEMOTHERAPY: ICD-10-CM

## 2019-07-17 DIAGNOSIS — C78.6 PERITONEAL METASTASES: ICD-10-CM

## 2019-07-17 DIAGNOSIS — D69.59 CHEMOTHERAPY-INDUCED THROMBOCYTOPENIA: ICD-10-CM

## 2019-07-17 DIAGNOSIS — C77.5 PROSTATE CANCER METASTATIC TO INTRAPELVIC LYMPH NODE: ICD-10-CM

## 2019-07-17 DIAGNOSIS — T45.1X5A CHEMOTHERAPY-INDUCED THROMBOCYTOPENIA: ICD-10-CM

## 2019-07-17 DIAGNOSIS — C78.7 LIVER METASTASES: ICD-10-CM

## 2019-07-17 DIAGNOSIS — C79.51 PROSTATE CANCER METASTATIC TO BONE: ICD-10-CM

## 2019-07-17 DIAGNOSIS — C25.1 MALIGNANT NEOPLASM OF BODY OF PANCREAS: ICD-10-CM

## 2019-07-17 DIAGNOSIS — C61 PROSTATE CANCER METASTATIC TO BONE: Primary | ICD-10-CM

## 2019-07-17 DIAGNOSIS — D64.81 ANEMIA ASSOCIATED WITH CHEMOTHERAPY: ICD-10-CM

## 2019-07-17 DIAGNOSIS — C61 PROSTATE CANCER METASTATIC TO INTRAPELVIC LYMPH NODE: ICD-10-CM

## 2019-07-17 DIAGNOSIS — C79.51 PROSTATE CANCER METASTATIC TO BONE: Primary | ICD-10-CM

## 2019-07-17 DIAGNOSIS — C61 PROSTATE CANCER METASTATIC TO BONE: ICD-10-CM

## 2019-07-17 PROCEDURE — 96416 CHEMO PROLONG INFUSE W/PUMP: CPT

## 2019-07-17 PROCEDURE — 3074F PR MOST RECENT SYSTOLIC BLOOD PRESSURE < 130 MM HG: ICD-10-PCS | Mod: CPTII,S$GLB,, | Performed by: INTERNAL MEDICINE

## 2019-07-17 PROCEDURE — 3074F SYST BP LT 130 MM HG: CPT | Mod: CPTII,S$GLB,, | Performed by: INTERNAL MEDICINE

## 2019-07-17 PROCEDURE — 99214 OFFICE O/P EST MOD 30 MIN: CPT | Mod: S$GLB,,, | Performed by: INTERNAL MEDICINE

## 2019-07-17 PROCEDURE — 99999 PR PBB SHADOW E&M-EST. PATIENT-LVL V: ICD-10-PCS | Mod: PBBFAC,,, | Performed by: INTERNAL MEDICINE

## 2019-07-17 PROCEDURE — 63600175 PHARM REV CODE 636 W HCPCS: Performed by: INTERNAL MEDICINE

## 2019-07-17 PROCEDURE — 1101F PT FALLS ASSESS-DOCD LE1/YR: CPT | Mod: CPTII,S$GLB,, | Performed by: INTERNAL MEDICINE

## 2019-07-17 PROCEDURE — 96409 CHEMO IV PUSH SNGL DRUG: CPT

## 2019-07-17 PROCEDURE — 1101F PR PT FALLS ASSESS DOC 0-1 FALLS W/OUT INJ PAST YR: ICD-10-PCS | Mod: CPTII,S$GLB,, | Performed by: INTERNAL MEDICINE

## 2019-07-17 PROCEDURE — 99214 PR OFFICE/OUTPT VISIT, EST, LEVL IV, 30-39 MIN: ICD-10-PCS | Mod: S$GLB,,, | Performed by: INTERNAL MEDICINE

## 2019-07-17 PROCEDURE — 3078F PR MOST RECENT DIASTOLIC BLOOD PRESSURE < 80 MM HG: ICD-10-PCS | Mod: CPTII,S$GLB,, | Performed by: INTERNAL MEDICINE

## 2019-07-17 PROCEDURE — 99999 PR PBB SHADOW E&M-EST. PATIENT-LVL V: CPT | Mod: PBBFAC,,, | Performed by: INTERNAL MEDICINE

## 2019-07-17 PROCEDURE — 3078F DIAST BP <80 MM HG: CPT | Mod: CPTII,S$GLB,, | Performed by: INTERNAL MEDICINE

## 2019-07-17 PROCEDURE — 96367 TX/PROPH/DG ADDL SEQ IV INF: CPT

## 2019-07-17 PROCEDURE — 25000003 PHARM REV CODE 250: Performed by: INTERNAL MEDICINE

## 2019-07-17 RX ORDER — SODIUM CHLORIDE 0.9 % (FLUSH) 0.9 %
10 SYRINGE (ML) INJECTION
Status: DISCONTINUED | OUTPATIENT
Start: 2019-07-17 | End: 2019-07-17 | Stop reason: HOSPADM

## 2019-07-17 RX ORDER — FLUOROURACIL 50 MG/ML
400 INJECTION, SOLUTION INTRAVENOUS
Status: CANCELLED | OUTPATIENT
Start: 2019-07-17

## 2019-07-17 RX ORDER — HEPARIN 100 UNIT/ML
500 SYRINGE INTRAVENOUS
Status: CANCELLED | OUTPATIENT
Start: 2019-07-18

## 2019-07-17 RX ORDER — SODIUM CHLORIDE 0.9 % (FLUSH) 0.9 %
10 SYRINGE (ML) INJECTION
Status: CANCELLED | OUTPATIENT
Start: 2019-07-18

## 2019-07-17 RX ORDER — EPINEPHRINE 0.3 MG/.3ML
0.3 INJECTION SUBCUTANEOUS ONCE AS NEEDED
Status: DISCONTINUED | OUTPATIENT
Start: 2019-07-17 | End: 2019-07-17 | Stop reason: HOSPADM

## 2019-07-17 RX ORDER — EPINEPHRINE 0.3 MG/.3ML
0.3 INJECTION SUBCUTANEOUS ONCE AS NEEDED
Status: CANCELLED | OUTPATIENT
Start: 2019-07-17

## 2019-07-17 RX ORDER — HEPARIN 100 UNIT/ML
500 SYRINGE INTRAVENOUS
Status: CANCELLED | OUTPATIENT
Start: 2019-07-17

## 2019-07-17 RX ORDER — SODIUM CHLORIDE 0.9 % (FLUSH) 0.9 %
10 SYRINGE (ML) INJECTION
Status: CANCELLED | OUTPATIENT
Start: 2019-07-17

## 2019-07-17 RX ORDER — DIPHENHYDRAMINE HYDROCHLORIDE 50 MG/ML
50 INJECTION INTRAMUSCULAR; INTRAVENOUS ONCE AS NEEDED
Status: DISCONTINUED | OUTPATIENT
Start: 2019-07-17 | End: 2019-07-17 | Stop reason: HOSPADM

## 2019-07-17 RX ORDER — DIPHENHYDRAMINE HYDROCHLORIDE 50 MG/ML
50 INJECTION INTRAMUSCULAR; INTRAVENOUS ONCE AS NEEDED
Status: CANCELLED | OUTPATIENT
Start: 2019-07-17

## 2019-07-17 RX ORDER — FLUOROURACIL 50 MG/ML
400 INJECTION, SOLUTION INTRAVENOUS
Status: COMPLETED | OUTPATIENT
Start: 2019-07-17 | End: 2019-07-17

## 2019-07-17 RX ORDER — HEPARIN 100 UNIT/ML
500 SYRINGE INTRAVENOUS
Status: DISCONTINUED | OUTPATIENT
Start: 2019-07-17 | End: 2019-07-17 | Stop reason: HOSPADM

## 2019-07-17 RX ADMIN — FLUOROURACIL 710 MG: 50 INJECTION, SOLUTION INTRAVENOUS at 11:07

## 2019-07-17 RX ADMIN — LEUCOVORIN CALCIUM 710 MG: 350 INJECTION, POWDER, LYOPHILIZED, FOR SOLUTION INTRAMUSCULAR; INTRAVENOUS at 10:07

## 2019-07-17 RX ADMIN — SODIUM CHLORIDE: 0.9 INJECTION, SOLUTION INTRAVENOUS at 10:07

## 2019-07-17 RX ADMIN — PALONOSETRON HYDROCHLORIDE: 0.25 INJECTION, SOLUTION INTRAVENOUS at 10:07

## 2019-07-17 RX ADMIN — FLUOROURACIL 4270 MG: 50 INJECTION, SOLUTION INTRAVENOUS at 12:07

## 2019-07-17 NOTE — PROGRESS NOTES
Subjective:       Patient ID: Shady Prakash Jr. is a 74 y.o. male.    Chief Complaint: No chief complaint on file.    HPI     Presents for follow up  Had scans in the interval (see below)  Reports eating well, weight stable  No pain issues including in abdomen  No nausea or vomiting  Energy level is good    Returns for follow-up of metastatic pancreatic cancer and metastatic prostate cancer  We had to adjust his chemotherapy with side effects to 5FU only- held last week with low counts- today he wishes to postpone again due to a family   No fatigue  Better PO  No pain  Has significant dental issues  No further dizziness  No fevers, chills, or infections.  No abdominal pain. No N/V. No constipation or diarrhea.     Currently his treatment is focused on the metastatic pancreatic cancer  Recent scans with stable disease   MRIs for back pain revealed arthritis     CT scan 19:  COMPARISON:  CT chest abdomen pelvis with and without contrast dated 2019 and CT chest abdomen pelvis without contrast dated 12/10/2018    FINDINGS:  Chest: Scattered right pleural calcification and small pleural effusion with areas of peripheral fibrotic scarring and pleural thickening appears similar.  Unchanged subjacent right lower lobe opacity with configuration most compatible with round atelectasis.  Scattered left micro nodules appears similar measuring up to 3-4 mm.  For example series 6, image 221 in the left upper lobe and series 6, image 325 in the left lower lobe.    No pericardial effusion.  Unenhanced structures at the base of the neck are unremarkable.  Partially calcified subcarinal and right hilar nodes as can be seen with prior granulomatous disease.  No new or worsening adenopathy.  Right chest MediPort catheter tip terminates at the atrial caval junction.  Trachea and mainstem bronchi are clear.    Abdomen and pelvis:    Multiple hypodensities within the liver, technically indeterminate though appear similar  over multiple serial exams.  Granulomatous changes of the spleen.  The gallbladder, adrenal glands, and right kidney appear normal.  Partially thick-walled bladder noting prostatic enlargement with protrusion into the base of the bladder.  Hypoattenuating pancreatic body lesion appears slightly larger measuring 2.8 x 2.4 cm.  Adjacent stranding along the lesser curvature of the stomach appears similar.  There is moderate left hydroureteronephrosis with abrupt tapering at the mid ureter level (for example series 601, image 5), new from prior.  Mild ureteral dilatation of the more distal ureter to the level of the bladder.  The GI tract is normal in caliber.  Postoperative mild changes in the right lower quadrant.  Trace amount of right lower intrapelvic free fluid.  Scattered atherosclerotic calcification of the abdominal aorta.    Allowing for variations in technique, scattered, small peritoneal implants are again noted, stable to less conspicuous in size.  For example right lower quadrant nodule is unchanged (series 3, image 64); however, index lesion more anteriorly in the left upper abdomen is not well seen.  Focal periumbilical soft tissue abnormality (series 3, image 168), also unchanged.    Multifocal osseous metastasis, not significantly changed.  Impression:  1.  History of prostate cancer.  Similar appearance of multifocal osseous metastasis.  Small peritoneal nodules appear similar to less conspicuous.  Trace intrapelvic free fluid.    2.  Prostatomegaly with sequela of chronic partial outlet obstruction.  Worsening left hydroureteronephrosiswithout obstructing calculus.  There is abrupt transition at the level of the mid ureter potentially representing underlying stricture or mass.  Urologic consult recommended.    3.  Apparent increased size of mid pancreatic body mass noting noncontrast technique.    4.  Stable chest.    RECIST SUMMARY:    Date of prior examination for comparison: 03/12/2019    Lesion  1: Pancreas.  2.8 cm.  Series 3, image 33.  Previously 2.1 cm.    Lesion 2: Peritoneal nodule, anterior abdomen.  Not well visualized on today's exam.  Previously 1.1 cm.    This report was flagged in Epic as abnormal.      Oncology History:  1. Metastatic prostate cancer  He is s/p TRUS/bx on 8/7/15 w/ high volume marta 9 (5+4), PSA 25.  He had negative metastatic workup at diagnosis.  However, at time of planned RALP on 9/10/15 he was noted to have BN invasion on cysto and therefore only PLND was done, which confirmed metastatic disease.  He was started on ADT 9/17/15 w/ firmagon followed by Eligard on 10/15/15.    Started Casodex 7/20/17 - scans at that time showed osseus metastatic disease  Started Docetaxel 10/6/17 with progression, then found to have below     - on follow-up scans a pancreatic lesion noted concerning for pancreatic cancer as it was an unlikely area for prostate cancer spread-  Biopsy confirmed adenocarcinoma and PSA was negative     Imaging as below:  Scan results:  6/20/18 Bone scan:  FINDINGS:  Two lesions in the left scapula, stable.  Elongated lesion in the right mid rib posteriorly, stable.  Prior exam demonstrated a large right iliac lesion, which no longer exhibits increased uptake.  Left pubic lesion, stable.  There is also now a small lesion in the left iliac bone. Two additional new small foci in the mid thoracic vertebra.  Both kidneys and the bladder appear unremarkable.  Impression:  Findings consistent with osseous metastatic disease as above, noting 3 new small lesions.     6/21/18 CT scan abdomen and pelvis:  FINDINGS:  Lower chest: There has been interval increase in size in the focal consolidation which is pleural based in the posterior aspect of the right lower lobe.  Today's exam measures 5.1 cm maximum diameter.  Previous exam is measure approximately 3.3 cm.  This is associated with pleural thickening that extends laterally.  There is a small focus of cylinder all coal  bronchiectasis in the medial left lower lobe.  The remaining aspects of the lung parenchyma that are visualized show no abnormalities.  The visualized heart and pericardium are normal  Liver: There are 3 hyperenhancing foci along the periphery of the right and left lobes of the liver which are stable from 2015.  The largest measures 1.1 cm in maximum diameter.  Although not characteristic on this exam, previous exam in 2015 shows findings most characteristic of hepatic hemangioma.  The remaining liver parenchyma as well as the hepatic and portal veins are patent.  Gallbladder: Normal.  Pancreas: There is been interval development of an ill-defined hypodense focus within the proximal body of the pancreas measuring 2.1 cm x 1.8 cm.  Spleen: Calcified hemangiomas present.  Otherwise normal.  Adrenal glands: Normal.  Genitourinary: Stable simple cyst in the lower pole of the right kidney measuring 1.6 cm.  The left kidney and bilateral ureters are normal.  The bladder is partially distended and shows wall thickening on the non dependent surface of the bladder.  This is not significantly changed in appearance.  Gastrointestinal: Stomach and small intestines are normal.  Colonic diverticulosis is present without evidence of diverticulitis.  Prostate: Enlarged and nodular causing mass effect on the bladder.  Miscellaneous: There is no intra-abdominal or pelvic free fluid, free air or lymphadenopathy.  The abdominal aorta tapers normally.  Bilateral fat containing inguinal hernias present.  Osseous and soft tissue structures: Sclerotic metastases involving the right iliac bone and left superior pubic ramus are identified.  The small focus in the left iliac bone seen on yesterday's bone scan is not well seen on this exam.  The soft tissues are within normal limits.  Impression:  1. Interval increase in size in the pleural base, right lower lobe consolidation.  2. Hyperenhancing foci likely represent hemangiomas.  They have  been stable since 2015.  Is on the 2015 exam where they demonstrate characteristics most compatible with hemangioma.  3. Bladder wall thickening is not significantly changed.  This may be related to outlet obstruction from the nodular prostate.  4. Interval development of ill-defined hypodense focus measuring 2.1 cm within the proximal body of the pancreas.  This may also be a focus of metastatic disease although is uncommon for the prostate to metastasized to the pancreas.  A dedicated pancreatic CT may be beneficial in further characterizing this lesion.     - on 7/25/18 he came to ER with worsening abdominal pain and concerns for appendicitis  Underwent Laparoscopy Converted to Laparotomy, Peritoneal Biopsy , Ileocecal with Dr. Stephenson  Biopsy concerning for metastatic pancreatic carcinoma     2. Metastatic pancreatic cancer  - started on [place]/Abraxane- note Ca19-9 never elevated  Response initially  Now with progression  FOLFOX started 12/20/18     Re imaging stable.    Review of Systems   Constitutional: Negative for activity change, appetite change, chills, fatigue, fever and unexpected weight change.   HENT: Positive for dental problem. Negative for congestion, hearing loss, mouth sores, nosebleeds, postnasal drip, sinus pressure, sore throat and trouble swallowing.    Eyes: Negative for visual disturbance.   Respiratory: Negative for cough (rare dry), chest tightness, shortness of breath and wheezing.    Cardiovascular: Negative for chest pain, palpitations and leg swelling.   Gastrointestinal: Negative for abdominal distention, abdominal pain, blood in stool, constipation, diarrhea, nausea and vomiting.        No GERD   Endocrine:        Hot flashes   Genitourinary: Negative for decreased urine volume, difficulty urinating, frequency, hematuria and urgency.   Musculoskeletal: Negative for arthralgias (better), back pain, gait problem, joint swelling, neck pain and neck stiffness.   Skin: Negative for color  change, pallor, rash and wound.   Neurological: Positive for numbness (feet, notes outer thighs since prior chemo). Negative for dizziness, weakness, light-headedness and headaches.   Hematological: Negative for adenopathy. Does not bruise/bleed easily.   Psychiatric/Behavioral: Negative for dysphoric mood and sleep disturbance. The patient is not nervous/anxious.        Objective:      Physical Exam   Constitutional: He is oriented to person, place, and time. He appears well-developed and well-nourished. No distress.   ECOG=0  Presents with wife   HENT:   Head: Normocephalic and atraumatic.   Mouth/Throat: Oropharynx is clear and moist. No oropharyngeal exudate.   Poor dentition   Eyes: Pupils are equal, round, and reactive to light. EOM are normal. No scleral icterus.   Neck: Normal range of motion. Neck supple. No tracheal deviation present. No thyromegaly present.   Cardiovascular: Normal rate and regular rhythm. Exam reveals no gallop and no friction rub.   No murmur heard.  Pulmonary/Chest: Effort normal and breath sounds normal. No respiratory distress. He has no wheezes. He has no rales. He exhibits no tenderness.   Abdominal: Soft. Bowel sounds are normal. He exhibits no distension and no mass. There is no rebound and no guarding.   No organomegaly  Declined rectal   Musculoskeletal: Normal range of motion. He exhibits no edema, tenderness or deformity.   Neurological: He is alert and oriented to person, place, and time. No cranial nerve deficit or sensory deficit. He exhibits normal muscle tone.   Skin: Skin is warm and dry. No rash noted. He is not diaphoretic. No erythema. No pallor.   Nursing note and vitals reviewed.   Labs- reviewed   Assessment:       1. Prostate cancer metastatic to bone    2. Peritoneal metastases    3. Liver metastases    4. Malignant neoplasm of body of pancreas    5. Chemotherapy-induced thrombocytopenia    6. Anemia associated with chemotherapy        Plan:     1. Stable  findings  Not able to be treated with below  2-4. Liver mets resolved still, peritoneal implants improved  Clinically improved  Pancreatic mass slightly larger but spoke with radiology and may be stable as contrast could not be used with CrCl  As other areas better and patient better and as options limited will continue 5FU  5-6. Parameters stable and appropriate for chemotherapy

## 2019-07-17 NOTE — PLAN OF CARE
Problem: Adult Inpatient Plan of Care  Goal: Plan of Care Review  Outcome: Ongoing (interventions implemented as appropriate)  Pt tolerated leucovorin/5-FU push and CADD pump hookup without issue. VSS. AVS given. Pt d.c home in stable condition with instructions to return 7/19/19. In interim, pt knows to call clinic with any issues.

## 2019-07-19 ENCOUNTER — INFUSION (OUTPATIENT)
Dept: INFUSION THERAPY | Facility: OTHER | Age: 74
End: 2019-07-19
Attending: INTERNAL MEDICINE
Payer: MEDICARE

## 2019-07-19 VITALS
OXYGEN SATURATION: 96 % | SYSTOLIC BLOOD PRESSURE: 101 MMHG | TEMPERATURE: 99 F | HEART RATE: 94 BPM | DIASTOLIC BLOOD PRESSURE: 65 MMHG | RESPIRATION RATE: 18 BRPM

## 2019-07-19 DIAGNOSIS — C77.5 PROSTATE CANCER METASTATIC TO INTRAPELVIC LYMPH NODE: ICD-10-CM

## 2019-07-19 DIAGNOSIS — C61 PROSTATE CANCER METASTATIC TO INTRAPELVIC LYMPH NODE: ICD-10-CM

## 2019-07-19 DIAGNOSIS — C79.51 PROSTATE CANCER METASTATIC TO BONE: ICD-10-CM

## 2019-07-19 DIAGNOSIS — C61 PROSTATE CANCER METASTATIC TO BONE: ICD-10-CM

## 2019-07-19 DIAGNOSIS — C25.1 MALIGNANT NEOPLASM OF BODY OF PANCREAS: Primary | ICD-10-CM

## 2019-07-19 PROCEDURE — 96521 REFILL/MAINT PORTABLE PUMP: CPT

## 2019-07-19 PROCEDURE — 63600175 PHARM REV CODE 636 W HCPCS: Performed by: INTERNAL MEDICINE

## 2019-07-19 PROCEDURE — 25000003 PHARM REV CODE 250: Performed by: INTERNAL MEDICINE

## 2019-07-19 PROCEDURE — A4216 STERILE WATER/SALINE, 10 ML: HCPCS | Performed by: INTERNAL MEDICINE

## 2019-07-19 RX ORDER — SODIUM CHLORIDE 0.9 % (FLUSH) 0.9 %
10 SYRINGE (ML) INJECTION
Status: DISCONTINUED | OUTPATIENT
Start: 2019-07-19 | End: 2019-07-19 | Stop reason: HOSPADM

## 2019-07-19 RX ORDER — HEPARIN 100 UNIT/ML
500 SYRINGE INTRAVENOUS
Status: DISCONTINUED | OUTPATIENT
Start: 2019-07-19 | End: 2019-07-19 | Stop reason: HOSPADM

## 2019-07-19 RX ADMIN — HEPARIN 500 UNITS: 100 SYRINGE at 09:07

## 2019-07-19 RX ADMIN — Medication 10 ML: at 09:07

## 2019-07-19 NOTE — PLAN OF CARE
Problem: Adult Inpatient Plan of Care  Goal: Patient-Specific Goal (Individualization)  Home infusion complete. Pt tolerated well. VSS. NAD. Home infusion pump DC'd and port to right chest de-accessed after heparinized per protocol.  AVS provided. Pt verbalized understanding of discharge instructions before leaving with wife.

## 2019-07-20 ENCOUNTER — HOSPITAL ENCOUNTER (EMERGENCY)
Facility: OTHER | Age: 74
Discharge: HOME | End: 2019-07-21
Attending: EMERGENCY MEDICINE
Payer: MEDICARE

## 2019-07-20 DIAGNOSIS — R07.9 CHEST PAIN OF UNKNOWN ETIOLOGY: Primary | ICD-10-CM

## 2019-07-20 DIAGNOSIS — R07.9 CHEST PAIN: ICD-10-CM

## 2019-07-20 PROCEDURE — 93005 ELECTROCARDIOGRAM TRACING: CPT

## 2019-07-20 PROCEDURE — 93010 ELECTROCARDIOGRAM REPORT: CPT | Mod: ,,, | Performed by: INTERNAL MEDICINE

## 2019-07-20 PROCEDURE — 99284 EMERGENCY DEPT VISIT MOD MDM: CPT

## 2019-07-20 PROCEDURE — 93010 EKG 12-LEAD: ICD-10-PCS | Mod: ,,, | Performed by: INTERNAL MEDICINE

## 2019-07-20 PROCEDURE — 25000003 PHARM REV CODE 250: Performed by: EMERGENCY MEDICINE

## 2019-07-20 RX ORDER — FAMOTIDINE 20 MG/1
20 TABLET, FILM COATED ORAL
Status: COMPLETED | OUTPATIENT
Start: 2019-07-20 | End: 2019-07-20

## 2019-07-20 RX ADMIN — LIDOCAINE HYDROCHLORIDE: 20 SOLUTION ORAL; TOPICAL at 09:07

## 2019-07-20 RX ADMIN — FAMOTIDINE 20 MG: 20 TABLET, FILM COATED ORAL at 09:07

## 2019-07-21 VITALS
RESPIRATION RATE: 18 BRPM | DIASTOLIC BLOOD PRESSURE: 70 MMHG | HEIGHT: 68 IN | WEIGHT: 159.19 LBS | OXYGEN SATURATION: 100 % | SYSTOLIC BLOOD PRESSURE: 123 MMHG | BODY MASS INDEX: 24.13 KG/M2 | TEMPERATURE: 98 F | HEART RATE: 78 BPM

## 2019-07-21 NOTE — ED PROVIDER NOTES
"Encounter Date: 7/20/2019    SCRIBE #1 NOTE: I, Cristina Stephens, am scribing for, and in the presence of, Dr. Oliveros.       History     Chief Complaint   Patient presents with    Chest Pain     x 1 hr while eating, denies N/V/D     Time seen by provider: 9:23 PM    This is a 74 y.o. male with hx of prostate cancer on chemotherapy, DM, and HTN who presents with complaint of pain across the chest for about 2.5 hours, progressively worsening. Pt states that pain started after eating. He describes the pain as a "pounding" sensation. He reports no identifying, alleviating, or exacerbating factors. Pt has taken oxycodone and gas-x about two hours ago with some relief. Pt does state "it lightened up now" upon arriving to ED, states that pain on the right side of the chest resolved. He rates the pain at a 5/10 in severity now. He has no hx of similar sx. He went fishing earlier today, states that he felt at baseline. He does report that he lifted a cooler onto a truck. He denies any associated sx, no cough, fever, chills, N/V/D, abdominal pain, palpitations, SOB, weakness, lightheadedness, or confusion. Of note, pt had chemotherapy yesterday- he denies any changes in his regimen. No recent medication changes.    The history is provided by the patient and the spouse.     Review of patient's allergies indicates:  No Known Allergies  Past Medical History:   Diagnosis Date    Allergy     Arthritis     Cancer     prostate    Diabetes mellitus     Hyperlipidemia     Hypertension     Prostate cancer      Past Surgical History:   Procedure Laterality Date    CYSTOSCOPY-FLEXIBLE N/A 9/10/2015    Performed [facility] at [facility] OR    EYE SURGERY Right     x9    [id number]-W-CATH N/A 12/19/2018    Performed by [facility] at [facility] OR [address] [place] [address]    Laparoscopy Converted to Laparotomy, Peritoneal Biopsy , Ileocecal Resection N/A 7/18/2018    Performed by Rolan Pierce MD at [facility] OR [address] [place] [address]    " "LYMPH NODE DISSECTION      ROBOT ASSISTED LAPAROSCOPIC LYMPHADENECTOMY-PELVIC N/A 9/10/2015    Performed by [facility] at [facility] OR    ULTRASOUND, ENDOSCOPIC, UPPER GI TRACT N/A 2018    Performed by [facility]iliam [facility]youb [facility] at [facility] ENDO (2ND FLR)    UPPER GASTROINTESTINAL ENDOSCOPY       Family History   Problem Relation Age of Onset    Hypertension Father     Hypertension Mother     Diabetes Sister     Heart disease Brother     Diabetes Maternal Aunt     Cancer Maternal Uncle     Cancer Maternal Grandfather     No Known Problems Paternal Grandmother     No Known Problems Paternal Grandfather     Prostate cancer Brother     Diabetes Sister     Diabetes Sister     Diabetes Sister     Heart disease Brother      Social History     Tobacco Use    Smoking status: Former Smoker     Packs/day: 4.00     Years: 60.00     Pack years: 240.00     Types: Cigarettes, Cigars     Start date: 1957     Last attempt to quit: 2016     Years since quittin.6    Smokeless tobacco: Never Used   Substance Use Topics    Alcohol use: No     Comment: pt stopped drinking 2018    Drug use: No     Types: "Crack" cocaine     Comment: Used to use cocaine in past      Review of Systems   Constitutional: Negative for chills and fever.   HENT: Negative for congestion, rhinorrhea and sore throat.    Respiratory: Negative for cough and shortness of breath.    Cardiovascular: Positive for chest pain. Negative for palpitations.   Gastrointestinal: Negative for abdominal pain, diarrhea, nausea and vomiting.   Endocrine: Negative for polyuria.   Genitourinary: Negative for decreased urine volume and dysuria.   Musculoskeletal: Negative for back pain.   Skin: Negative for rash.   Allergic/Immunologic: Negative for immunocompromised state.   Neurological: Negative for dizziness, weakness, light-headedness and headaches.   Hematological: Does not bruise/bleed easily.   Psychiatric/Behavioral: Negative for confusion. "       Physical Exam     Initial Vitals [07/20/19 2108]   BP Pulse Resp Temp SpO2   (!) 144/74 78 20 98.1 °F (36.7 °C) 99 %      MAP       --         Physical Exam    Nursing note and vitals reviewed.  Constitutional: He appears well-developed and well-nourished. He is not diaphoretic. No distress.   HENT:   Head: Normocephalic and atraumatic.   Right Ear: External ear normal.   Left Ear: External ear normal.   Eyes: Right eye exhibits no discharge. Left eye exhibits no discharge.   Neck: Normal range of motion. Neck supple. No JVD present.   Cardiovascular: Normal rate, regular rhythm, normal heart sounds and intact distal pulses. Exam reveals no gallop and no friction rub.    No murmur heard.  Pulmonary/Chest: Breath sounds normal. No respiratory distress. He has no wheezes. He has no rhonchi. He has no rales. He exhibits no tenderness.   No chest wall tenderness.    Abdominal: Soft. Bowel sounds are normal. He exhibits no distension. There is no tenderness. There is no rebound and no guarding.   Musculoskeletal: Normal range of motion. He exhibits no edema or tenderness.   Lymphadenopathy:     He has no cervical adenopathy.   Neurological: He is alert and oriented to person, place, and time. He has normal strength. No sensory deficit.   Skin: Skin is warm and dry. No rash noted. No erythema.   Psychiatric: He has a normal mood and affect. His behavior is normal. Judgment and thought content normal.         ED Course   Procedures  Labs Reviewed - No data to display  EKG Readings: (Independently Interpreted)   Sinus rhythm at a rate of 78. Normal intervals. Motion artifact. No ST/T wave changes compared to 9/2012.        Imaging Results          X-Ray Chest 1 View (In process)  Result time 07/20/19 21:55:47              X-Rays:   Independently Interpreted Readings:   Chest X-Ray: Blunting of costophrenic angle on the right. No PNX. Increased interstitial markings on the right with focal consolidation. Port visible  on right chest. Appears stable compared to chest x-ray in 2017.     Medical Decision Making:   Independently Interpreted Test(s):   I have ordered and independently interpreted X-rays - see prior notes.  I have ordered and independently interpreted EKG Reading(s) - see prior notes  Clinical Tests:   Radiological Study: Ordered and Reviewed  Medical Tests: Ordered and Reviewed    Additional MDM:   Comments: 74-year-old male with history of metastatic prostate and pancreatic cancer presents complaining of sudden onset of bilateral chest pain which she described as a throbbing sensation and began as he was eating.  He reports being at his baseline prior to this.  He denies any other associated symptoms. He reported taking Gas-X and Percocet prior to presenting to the emergency department and had already experienced significant improvement pain. EKG was obtained and without significant changes compared to previous.  Chest x-ray also showed no significant changes compared to previous.  He received Pepcid and a GI cocktail in the emergency department with resolution pain. Patient is discharged home in stable condition to follow up with his primary care doctor as needed.  He was also given precautions for return immediately for re-evaluation..          Scribe Attestation:   Scribe #1: I performed the above scribed service and the documentation accurately describes the services I performed. I attest to the accuracy of the note.    Attending Attestation:           Physician Attestation for Scribe:  Physician Attestation Statement for Scribe #1: I, Dr. Oliveros, reviewed documentation, as scribed by Cristina Stephens in my presence, and it is both accurate and complete.                    Clinical Impression:     1. Chest pain                                 Lakshmi Oliveros MD  07/21/19 0324

## 2019-07-24 DIAGNOSIS — G89.3 NEOPLASM RELATED PAIN: ICD-10-CM

## 2019-07-24 RX ORDER — OXYCODONE AND ACETAMINOPHEN 5; 325 MG/1; MG/1
1 TABLET ORAL EVERY 4 HOURS PRN
Qty: 90 TABLET | Refills: 0 | Status: SHIPPED | OUTPATIENT
Start: 2019-07-24

## 2019-07-24 NOTE — TELEPHONE ENCOUNTER
Returned call to pt wife.   Pt wife stated pt needs refill on pain rx.   Informed pt wife would have Dr. Melendez refill.   Pt wife verbalized understanding.         ----- Message from Kristen Villafana sent at 7/24/2019  4:19 PM CDT -----  Contact: Pt wife Loida   Pt called to speak with nurse have some questions  Callback#[phone]  Thank you  CHINA Villafana

## 2019-07-30 DIAGNOSIS — C25.1 MALIGNANT NEOPLASM OF BODY OF PANCREAS: Primary | ICD-10-CM

## 2019-07-31 ENCOUNTER — OFFICE VISIT (OUTPATIENT)
Dept: HEMATOLOGY/ONCOLOGY | Facility: CLINIC | Age: 74
End: 2019-07-31
Payer: MEDICARE

## 2019-07-31 ENCOUNTER — TELEPHONE (OUTPATIENT)
Dept: UROLOGY | Facility: CLINIC | Age: 74
End: 2019-07-31

## 2019-07-31 ENCOUNTER — INFUSION (OUTPATIENT)
Dept: INFUSION THERAPY | Facility: OTHER | Age: 74
End: 2019-07-31
Attending: INTERNAL MEDICINE
Payer: MEDICARE

## 2019-07-31 VITALS
HEIGHT: 68 IN | HEART RATE: 91 BPM | TEMPERATURE: 98 F | SYSTOLIC BLOOD PRESSURE: 101 MMHG | DIASTOLIC BLOOD PRESSURE: 72 MMHG | OXYGEN SATURATION: 99 % | WEIGHT: 151.25 LBS | BODY MASS INDEX: 22.92 KG/M2 | RESPIRATION RATE: 18 BRPM

## 2019-07-31 DIAGNOSIS — C77.5 PROSTATE CANCER METASTATIC TO INTRAPELVIC LYMPH NODE: ICD-10-CM

## 2019-07-31 DIAGNOSIS — C61 PROSTATE CANCER METASTATIC TO BONE: ICD-10-CM

## 2019-07-31 DIAGNOSIS — D61.810 ANTINEOPLASTIC CHEMOTHERAPY INDUCED PANCYTOPENIA: ICD-10-CM

## 2019-07-31 DIAGNOSIS — C79.51 PROSTATE CANCER METASTATIC TO BONE: ICD-10-CM

## 2019-07-31 DIAGNOSIS — C61 PROSTATE CANCER METASTATIC TO INTRAPELVIC LYMPH NODE: ICD-10-CM

## 2019-07-31 DIAGNOSIS — R63.0 POOR APPETITE: ICD-10-CM

## 2019-07-31 DIAGNOSIS — C25.1 MALIGNANT NEOPLASM OF BODY OF PANCREAS: Primary | ICD-10-CM

## 2019-07-31 DIAGNOSIS — C78.7 LIVER METASTASES: ICD-10-CM

## 2019-07-31 DIAGNOSIS — T45.1X5A ANTINEOPLASTIC CHEMOTHERAPY INDUCED PANCYTOPENIA: ICD-10-CM

## 2019-07-31 DIAGNOSIS — C78.6 PERITONEAL METASTASES: ICD-10-CM

## 2019-07-31 DIAGNOSIS — N13.30 HYDRONEPHROSIS OF LEFT KIDNEY: ICD-10-CM

## 2019-07-31 DIAGNOSIS — N17.9 AKI (ACUTE KIDNEY INJURY): ICD-10-CM

## 2019-07-31 PROCEDURE — 3078F DIAST BP <80 MM HG: CPT | Mod: CPTII,S$GLB,, | Performed by: INTERNAL MEDICINE

## 2019-07-31 PROCEDURE — 96416 CHEMO PROLONG INFUSE W/PUMP: CPT

## 2019-07-31 PROCEDURE — 99215 PR OFFICE/OUTPT VISIT, EST, LEVL V, 40-54 MIN: ICD-10-PCS | Mod: S$GLB,,, | Performed by: INTERNAL MEDICINE

## 2019-07-31 PROCEDURE — 99999 PR PBB SHADOW E&M-EST. PATIENT-LVL IV: CPT | Mod: PBBFAC,,, | Performed by: INTERNAL MEDICINE

## 2019-07-31 PROCEDURE — 99215 OFFICE O/P EST HI 40 MIN: CPT | Mod: S$GLB,,, | Performed by: INTERNAL MEDICINE

## 2019-07-31 PROCEDURE — 63600175 PHARM REV CODE 636 W HCPCS: Performed by: INTERNAL MEDICINE

## 2019-07-31 PROCEDURE — 96367 TX/PROPH/DG ADDL SEQ IV INF: CPT

## 2019-07-31 PROCEDURE — 3078F PR MOST RECENT DIASTOLIC BLOOD PRESSURE < 80 MM HG: ICD-10-PCS | Mod: CPTII,S$GLB,, | Performed by: INTERNAL MEDICINE

## 2019-07-31 PROCEDURE — 3074F PR MOST RECENT SYSTOLIC BLOOD PRESSURE < 130 MM HG: ICD-10-PCS | Mod: CPTII,S$GLB,, | Performed by: INTERNAL MEDICINE

## 2019-07-31 PROCEDURE — 3074F SYST BP LT 130 MM HG: CPT | Mod: CPTII,S$GLB,, | Performed by: INTERNAL MEDICINE

## 2019-07-31 PROCEDURE — 1101F PT FALLS ASSESS-DOCD LE1/YR: CPT | Mod: CPTII,S$GLB,, | Performed by: INTERNAL MEDICINE

## 2019-07-31 PROCEDURE — 96409 CHEMO IV PUSH SNGL DRUG: CPT

## 2019-07-31 PROCEDURE — 99999 PR PBB SHADOW E&M-EST. PATIENT-LVL IV: ICD-10-PCS | Mod: PBBFAC,,, | Performed by: INTERNAL MEDICINE

## 2019-07-31 PROCEDURE — 1101F PR PT FALLS ASSESS DOC 0-1 FALLS W/OUT INJ PAST YR: ICD-10-PCS | Mod: CPTII,S$GLB,, | Performed by: INTERNAL MEDICINE

## 2019-07-31 RX ORDER — DIPHENHYDRAMINE HYDROCHLORIDE 50 MG/ML
50 INJECTION INTRAMUSCULAR; INTRAVENOUS ONCE AS NEEDED
Status: DISCONTINUED | OUTPATIENT
Start: 2019-07-31 | End: 2019-07-31 | Stop reason: HOSPADM

## 2019-07-31 RX ORDER — FLUOROURACIL 50 MG/ML
400 INJECTION, SOLUTION INTRAVENOUS
Status: CANCELLED | OUTPATIENT
Start: 2019-07-31

## 2019-07-31 RX ORDER — HEPARIN 100 UNIT/ML
500 SYRINGE INTRAVENOUS
Status: DISCONTINUED | OUTPATIENT
Start: 2019-07-31 | End: 2019-07-31 | Stop reason: HOSPADM

## 2019-07-31 RX ORDER — SODIUM CHLORIDE 0.9 % (FLUSH) 0.9 %
10 SYRINGE (ML) INJECTION
Status: CANCELLED | OUTPATIENT
Start: 2019-07-31

## 2019-07-31 RX ORDER — HEPARIN 100 UNIT/ML
500 SYRINGE INTRAVENOUS
Status: CANCELLED | OUTPATIENT
Start: 2019-07-31

## 2019-07-31 RX ORDER — EPINEPHRINE 0.3 MG/.3ML
0.3 INJECTION SUBCUTANEOUS ONCE AS NEEDED
Status: CANCELLED | OUTPATIENT
Start: 2019-07-31

## 2019-07-31 RX ORDER — HEPARIN 100 UNIT/ML
500 SYRINGE INTRAVENOUS
Status: CANCELLED | OUTPATIENT
Start: 2019-08-02

## 2019-07-31 RX ORDER — EPINEPHRINE 0.3 MG/.3ML
0.3 INJECTION SUBCUTANEOUS ONCE AS NEEDED
Status: DISCONTINUED | OUTPATIENT
Start: 2019-07-31 | End: 2019-07-31 | Stop reason: HOSPADM

## 2019-07-31 RX ORDER — SODIUM CHLORIDE 0.9 % (FLUSH) 0.9 %
10 SYRINGE (ML) INJECTION
Status: DISCONTINUED | OUTPATIENT
Start: 2019-07-31 | End: 2019-07-31 | Stop reason: HOSPADM

## 2019-07-31 RX ORDER — SODIUM CHLORIDE 0.9 % (FLUSH) 0.9 %
10 SYRINGE (ML) INJECTION
Status: CANCELLED | OUTPATIENT
Start: 2019-08-02

## 2019-07-31 RX ORDER — FLUOROURACIL 50 MG/ML
400 INJECTION, SOLUTION INTRAVENOUS
Status: COMPLETED | OUTPATIENT
Start: 2019-07-31 | End: 2019-07-31

## 2019-07-31 RX ORDER — DIPHENHYDRAMINE HYDROCHLORIDE 50 MG/ML
50 INJECTION INTRAMUSCULAR; INTRAVENOUS ONCE AS NEEDED
Status: CANCELLED | OUTPATIENT
Start: 2019-07-31

## 2019-07-31 RX ADMIN — SODIUM CHLORIDE: 0.9 INJECTION, SOLUTION INTRAVENOUS at 03:07

## 2019-07-31 RX ADMIN — FLUOROURACIL 4270 MG: 50 INJECTION, SOLUTION INTRAVENOUS at 04:07

## 2019-07-31 RX ADMIN — PALONOSETRON HYDROCHLORIDE: 0.25 INJECTION, SOLUTION INTRAVENOUS at 03:07

## 2019-07-31 RX ADMIN — FLUOROURACIL 710 MG: 50 INJECTION, SOLUTION INTRAVENOUS at 04:07

## 2019-07-31 RX ADMIN — LEUCOVORIN CALCIUM 710 MG: 350 INJECTION, POWDER, LYOPHILIZED, FOR SUSPENSION INTRAMUSCULAR; INTRAVENOUS at 03:07

## 2019-07-31 NOTE — PROGRESS NOTES
Subjective:       Patient ID: Shady Prakash Jr. is a 74 y.o. male.     Chief Complaint: No chief complaint on file.     HPI   Mr Prakash presents for follow up. Feeling well. No complaints. Went to ED on 7/20 for bilateral chest pain after he ate. Pain improved with oxycodone and Gas-X. Cardiac workup in the ED was negative. He received pepcid and GI cocktail in the ED with resolution of pain. No other complaints. Energy level is good. Appetite low. Periactin does not work.           Currently his treatment is focused on the metastatic pancreatic cancer  Recent scans with stable disease   MRIs for back pain revealed arthritis     CT scan 7/16/19:  COMPARISON:  CT chest abdomen pelvis with and without contrast dated 03/12/2019 and CT chest abdomen pelvis without contrast dated 12/10/2018    FINDINGS:  Chest: Scattered right pleural calcification and small pleural effusion with areas of peripheral fibrotic scarring and pleural thickening appears similar.  Unchanged subjacent right lower lobe opacity with configuration most compatible with round atelectasis.  Scattered left micro nodules appears similar measuring up to 3-4 mm.  For example series 6, image 221 in the left upper lobe and series 6, image 325 in the left lower lobe.    No pericardial effusion.  Unenhanced structures at the base of the neck are unremarkable.  Partially calcified subcarinal and right hilar nodes as can be seen with prior granulomatous disease.  No new or worsening adenopathy.  Right chest MediPort catheter tip terminates at the atrial caval junction.  Trachea and mainstem bronchi are clear.    Abdomen and pelvis:    Multiple hypodensities within the liver, technically indeterminate though appear similar over multiple serial exams.  Granulomatous changes of the spleen.  The gallbladder, adrenal glands, and right kidney appear normal.  Partially thick-walled bladder noting prostatic enlargement with protrusion into the base of the bladder.   Hypoattenuating pancreatic body lesion appears slightly larger measuring 2.8 x 2.4 cm.  Adjacent stranding along the lesser curvature of the stomach appears similar.  There is moderate left hydroureteronephrosis with abrupt tapering at the mid ureter level (for example series 601, image 5), new from prior.  Mild ureteral dilatation of the more distal ureter to the level of the bladder.  The GI tract is normal in caliber.  Postoperative mild changes in the right lower quadrant.  Trace amount of right lower intrapelvic free fluid.  Scattered atherosclerotic calcification of the abdominal aorta.    Allowing for variations in technique, scattered, small peritoneal implants are again noted, stable to less conspicuous in size.  For example right lower quadrant nodule is unchanged (series 3, image 64); however, index lesion more anteriorly in the left upper abdomen is not well seen.  Focal periumbilical soft tissue abnormality (series 3, image 168), also unchanged.    Multifocal osseous metastasis, not significantly changed.  Impression:  1.  History of prostate cancer.  Similar appearance of multifocal osseous metastasis.  Small peritoneal nodules appear similar to less conspicuous.  Trace intrapelvic free fluid.    2.  Prostatomegaly with sequela of chronic partial outlet obstruction.  Worsening left hydroureteronephrosiswithout obstructing calculus.  There is abrupt transition at the level of the mid ureter potentially representing underlying stricture or mass.  Urologic consult recommended.    3.  Apparent increased size of mid pancreatic body mass noting noncontrast technique.    4.  Stable chest.    RECIST SUMMARY:    Date of prior examination for comparison: 03/12/2019    Lesion 1: Pancreas.  2.8 cm.  Series 3, image 33.  Previously 2.1 cm.    Lesion 2: Peritoneal nodule, anterior abdomen.  Not well visualized on today's exam.  Previously 1.1 cm.    This report was flagged in Epic as abnormal.        Oncology  History:  1. Metastatic prostate cancer  He is s/p TRUS/bx on 8/7/15 w/ high volume marta 9 (5+4), PSA 25.  He had negative metastatic workup at diagnosis.  However, at time of planned RALP on 9/10/15 he was noted to have BN invasion on cysto and therefore only PLND was done, which confirmed metastatic disease.  He was started on ADT 9/17/15 w/ firmagon followed by Eligard on 10/15/15.    Started Casodex 7/20/17 - scans at that time showed osseus metastatic disease  Started Docetaxel 10/6/17 with progression, then found to have below     - on follow-up scans a pancreatic lesion noted concerning for pancreatic cancer as it was an unlikely area for prostate cancer spread-  Biopsy confirmed adenocarcinoma and PSA was negative     Imaging as below:  Scan results:  6/20/18 Bone scan:  FINDINGS:  Two lesions in the left scapula, stable.  Elongated lesion in the right mid rib posteriorly, stable.  Prior exam demonstrated a large right iliac lesion, which no longer exhibits increased uptake.  Left pubic lesion, stable.  There is also now a small lesion in the left iliac bone. Two additional new small foci in the mid thoracic vertebra.  Both kidneys and the bladder appear unremarkable.  Impression:  Findings consistent with osseous metastatic disease as above, noting 3 new small lesions.     6/21/18 CT scan abdomen and pelvis:  FINDINGS:  Lower chest: There has been interval increase in size in the focal consolidation which is pleural based in the posterior aspect of the right lower lobe.  Today's exam measures 5.1 cm maximum diameter.  Previous exam is measure approximately 3.3 cm.  This is associated with pleural thickening that extends laterally.  There is a small focus of cylinder all coal bronchiectasis in the medial left lower lobe.  The remaining aspects of the lung parenchyma that are visualized show no abnormalities.  The visualized heart and pericardium are normal  Liver: There are 3 hyperenhancing foci along the  periphery of the right and left lobes of the liver which are stable from 2015.  The largest measures 1.1 cm in maximum diameter.  Although not characteristic on this exam, previous exam in 2015 shows findings most characteristic of hepatic hemangioma.  The remaining liver parenchyma as well as the hepatic and portal veins are patent.  Gallbladder: Normal.  Pancreas: There is been interval development of an ill-defined hypodense focus within the proximal body of the pancreas measuring 2.1 cm x 1.8 cm.  Spleen: Calcified hemangiomas present.  Otherwise normal.  Adrenal glands: Normal.  Genitourinary: Stable simple cyst in the lower pole of the right kidney measuring 1.6 cm.  The left kidney and bilateral ureters are normal.  The bladder is partially distended and shows wall thickening on the non dependent surface of the bladder.  This is not significantly changed in appearance.  Gastrointestinal: Stomach and small intestines are normal.  Colonic diverticulosis is present without evidence of diverticulitis.  Prostate: Enlarged and nodular causing mass effect on the bladder.  Miscellaneous: There is no intra-abdominal or pelvic free fluid, free air or lymphadenopathy.  The abdominal aorta tapers normally.  Bilateral fat containing inguinal hernias present.  Osseous and soft tissue structures: Sclerotic metastases involving the right iliac bone and left superior pubic ramus are identified.  The small focus in the left iliac bone seen on yesterday's bone scan is not well seen on this exam.  The soft tissues are within normal limits.  Impression:  1. Interval increase in size in the pleural base, right lower lobe consolidation.  2. Hyperenhancing foci likely represent hemangiomas.  They have been stable since 2015.  Is on the 2015 exam where they demonstrate characteristics most compatible with hemangioma.  3. Bladder wall thickening is not significantly changed.  This may be related to outlet obstruction from the nodular  prostate.  4. Interval development of ill-defined hypodense focus measuring 2.1 cm within the proximal body of the pancreas.  This may also be a focus of metastatic disease although is uncommon for the prostate to metastasized to the pancreas.  A dedicated pancreatic CT may be beneficial in further characterizing this lesion.     - on 7/25/18 he came to ER with worsening abdominal pain and concerns for appendicitis  Underwent Laparoscopy Converted to Laparotomy, Peritoneal Biopsy , Ileocecal with Dr. Stephenson  Biopsy concerning for metastatic pancreatic carcinoma     2. Metastatic pancreatic cancer  - started on [place]/Abraxane- note Ca19-9 never elevated  Response initially  Now with progression  FOLFOX started 12/20/18     Re imaging stable.     Review of Systems   Constitutional: Negative for activity change, appetite change, chills, fatigue, fever and unexpected weight change.   HENT: Positive for dental problem. Negative for congestion, hearing loss, mouth sores, nosebleeds, postnasal drip, sinus pressure, sore throat and trouble swallowing.    Eyes: Negative for visual disturbance.   Respiratory: Negative for cough chest tightness, shortness of breath and wheezing.    Cardiovascular: Negative for chest pain, palpitations and leg swelling.   Gastrointestinal: +decreased appetite. Negative for abdominal distention, abdominal pain, blood in stool, constipation, diarrhea, nausea and vomiting.   Endocrine:        Hot flashes   Genitourinary: Negative for decreased urine volume, difficulty urinating, frequency, hematuria and urgency.   Musculoskeletal: Negative for arthralgias, back pain, gait problem, joint swelling, neck pain and neck stiffness.   Skin: Negative for color change, pallor, rash and wound.   Neurological: Positive for numbness (feet, notes outer thighs since prior chemo). Negative for dizziness, weakness, light-headedness and headaches.   Hematological: Negative for adenopathy. Does not bruise/bleed  easily.   Psychiatric/Behavioral: Negative for dysphoric mood and sleep disturbance. The patient is not nervous/anxious.        Objective:   Physical Exam   Constitutional: He is oriented to person, place, and time. He appears well-developed and well-nourished. No distress.   ECOG=0  Presents with wife   HENT:   Head: Normocephalic and atraumatic.   Mouth/Throat: Oropharynx is clear and moist. No oropharyngeal exudate.   Poor dentition   Eyes: Pupils are equal, round, and reactive to light. EOM are normal. No scleral icterus.   Neck: Normal range of motion. Neck supple. No tracheal deviation present. No thyromegaly present.   Cardiovascular: Normal rate and regular rhythm. Exam reveals no gallop and no friction rub.   No murmur heard.  Pulmonary/Chest: Effort normal and breath sounds normal. No respiratory distress. He has no wheezes. He has no rales. He exhibits no tenderness.   Abdominal: Soft. Bowel sounds are normal. He exhibits no distension and no mass. There is no rebound and no guarding.   No organomegaly  Musculoskeletal: Normal range of motion. He exhibits no edema, tenderness or deformity.   Neurological: He is alert and oriented to person, place, and time. No cranial nerve deficit or sensory deficit. He exhibits normal muscle tone.   Skin: Skin is warm and dry. No rash noted. He is not diaphoretic. No erythema. No pallor.   Nursing note and vitals reviewed.   Labs- reviewed   Assessment:       1. Malignant neoplasm of body of pancreas    2. Peritoneal metastases    3. Liver metastases    4. Hydronephrosis of left kidney    5. ISA (acute kidney injury)    6. Prostate cancer metastatic to bone    7. Antineoplastic chemotherapy induced pancytopenia    8. Poor appetite        Plan:    1-3  - stable  - c/w maintenance 5FU    4.5  - ISA likely 2/2 hydronephrosis  - spoke with Dr Juarez. He is scheduled to see Dr Juarez tomorrow to see if stent can be placed. If not will need IR nephrostomy tube  placement    6.  - c/w ADT with Dr Juarez    7.  - mild. Counts adequate for chemo    8.  - refer to dietician      RTC in 2 weeks

## 2019-07-31 NOTE — PLAN OF CARE
Problem: Adult Inpatient Plan of Care  Goal: Patient-Specific Goal (Individualization)  Outcome: Ongoing (interventions implemented as appropriate)  5 FU infusion complete. Pt tolerated well. VSS. NAD. Home infusion pump started to right chest port. Pt to RTC on   08/2/2019 @ 1:30pm to DC pump   AVS provided. Pt verbalized understanding of discharge instructions before leaving with wife.

## 2019-07-31 NOTE — TELEPHONE ENCOUNTER
----- Message from Marlene Echavarria MD sent at 7/31/2019  2:05 PM CDT -----  Edy Shetty,     Mr Dwain is currently getting a low dose maintenance chemo for pancreatic cancer. On the most recent CT scan, he has a left hydroureteronephrosis with abrupt transition at the level of mid ureter. Are you able to see him to see if that can be stented? Please let me and Maday know. Thanks a lot.     Best regards,  Marlene

## 2019-07-31 NOTE — Clinical Note
Return to see Dr Melendez in 2 weeks with CBC, CMP, see Dr Melendez, then get chemo (5FU), return 2 days later to remove pump

## 2019-08-01 ENCOUNTER — OFFICE VISIT (OUTPATIENT)
Dept: UROLOGY | Facility: CLINIC | Age: 74
End: 2019-08-01
Attending: UROLOGY
Payer: MEDICARE

## 2019-08-01 VITALS
HEIGHT: 68 IN | SYSTOLIC BLOOD PRESSURE: 114 MMHG | BODY MASS INDEX: 22.13 KG/M2 | HEART RATE: 101 BPM | WEIGHT: 146 LBS | DIASTOLIC BLOOD PRESSURE: 75 MMHG

## 2019-08-01 DIAGNOSIS — C61 PROSTATE CANCER METASTATIC TO BONE: Primary | ICD-10-CM

## 2019-08-01 DIAGNOSIS — C79.51 PROSTATE CANCER METASTATIC TO BONE: Primary | ICD-10-CM

## 2019-08-01 DIAGNOSIS — N13.30 HYDRONEPHROSIS OF LEFT KIDNEY: ICD-10-CM

## 2019-08-01 DIAGNOSIS — N13.1 HYDRONEPHROSIS DUE TO OBSTRUCTION OF URETER: ICD-10-CM

## 2019-08-01 PROCEDURE — 1101F PR PT FALLS ASSESS DOC 0-1 FALLS W/OUT INJ PAST YR: ICD-10-PCS | Mod: CPTII,S$GLB,, | Performed by: UROLOGY

## 2019-08-01 PROCEDURE — 1101F PT FALLS ASSESS-DOCD LE1/YR: CPT | Mod: CPTII,S$GLB,, | Performed by: UROLOGY

## 2019-08-01 PROCEDURE — 87086 URINE CULTURE/COLONY COUNT: CPT

## 2019-08-01 PROCEDURE — 3078F PR MOST RECENT DIASTOLIC BLOOD PRESSURE < 80 MM HG: ICD-10-PCS | Mod: CPTII,S$GLB,, | Performed by: UROLOGY

## 2019-08-01 PROCEDURE — 3074F SYST BP LT 130 MM HG: CPT | Mod: CPTII,S$GLB,, | Performed by: UROLOGY

## 2019-08-01 PROCEDURE — 3078F DIAST BP <80 MM HG: CPT | Mod: CPTII,S$GLB,, | Performed by: UROLOGY

## 2019-08-01 PROCEDURE — 81002 POCT URINE DIPSTICK WITHOUT MICROSCOPE: ICD-10-PCS | Mod: S$GLB,,, | Performed by: UROLOGY

## 2019-08-01 PROCEDURE — 3074F PR MOST RECENT SYSTOLIC BLOOD PRESSURE < 130 MM HG: ICD-10-PCS | Mod: CPTII,S$GLB,, | Performed by: UROLOGY

## 2019-08-01 PROCEDURE — 81002 URINALYSIS NONAUTO W/O SCOPE: CPT | Mod: S$GLB,,, | Performed by: UROLOGY

## 2019-08-01 PROCEDURE — 99214 OFFICE O/P EST MOD 30 MIN: CPT | Mod: 25,S$GLB,, | Performed by: UROLOGY

## 2019-08-01 PROCEDURE — 99214 PR OFFICE/OUTPT VISIT, EST, LEVL IV, 30-39 MIN: ICD-10-PCS | Mod: 25,S$GLB,, | Performed by: UROLOGY

## 2019-08-01 RX ORDER — CIPROFLOXACIN 2 MG/ML
400 INJECTION, SOLUTION INTRAVENOUS
Status: CANCELLED | OUTPATIENT
Start: 2019-08-01

## 2019-08-01 NOTE — H&P (VIEW-ONLY)
"Subjective:      Shady Prakash Jr. is a 74 y.o. male who returns today regarding his metastatic prostate cancer.      He is here today due to new left-sided hydronephrosis.  He is currently receiving chemotherapy for pancreatic cancer and the hydronephrosis was noted on a surveillance CT scan earlier this week.  He has had a steady slow rise in creatinine over the past month.  He denies any left-sided flank pain.  He denies any urinary problems.    Previously last seen in May, at which time he received his 6 month Eligard injection.    Prostate Cancer History  He is s/p TRUS/bx on 8/7/15 w/ high volume marta 9 (5+4), PSA 25.  He had negative metastatic workup.  At time of planned RALP on 9/10/15 he was noted to have BN invasion on cysto and therefore only PLND was done, which confirmed metastatic disease.  He was started on ADT 9/17/15 w/ firmagon followed by Eligard on 10/15/15.  Received last Eligard 10/17/17. Casodex added for rising PSA, which persisted. Completed pelvic radiation November 2017. Completed taxotere in 2018.     The following portions of the patient's history were reviewed and updated as appropriate: allergies, current medications, past family history, past medical history, past social history, past surgical history and problem list.    Review of Systems  A comprehensive multipoint review of systems was negative except as otherwise stated in the HPI.     Objective:   Vitals:   /75 (BP Location: Left arm, Patient Position: Sitting, BP Method: Medium (Automatic))   Pulse 101   Ht 5' 8" (1.727 m)   Wt 66.2 kg (146 lb)   BMI 22.20 kg/m²     Physical Exam   General: alert and oriented, no acute distress  Respiratory: Symmetric expansion, non-labored breathing  Neuro: no gross deficits  Psych: normal judgment and insight, normal mood/affect and non-anxious    Lab Review   Urinalysis demonstrates + for RBCs    Component PSA DIAGNOSTIC   Latest Ref Rng & Units 0.00 - 4.00 ng/mL   4/23/2019 1.4 "   11/27/2018 2.0   8/24/2018 6.7   6/8/2018 7.3   3/6/2018 2.7   1/8/2018 0.92   12/5/2017 1.3   9/27/2017 13.7 (H)   9/1/2017 9.4 (H)   7/3/2017 5.0 (H)   10/7/2016 0.89   4/8/2016 0.73   1/4/2016 1.5   10/9/2015 6.6 (H)   7/7/2015 25.2 (H)     Imaging (images personally reviewed)  CT Non-contrast: Moderate left hydronephrosis and proximal hydroureter, no stones    Assessment and Plan:   Left Hydronephrosis  Acute Kidney Injury  -- Recommend cysto and left ureteral stent placement - he wishes to proceed  -- Will schedule next Tuesday [facility]    Prostate cancer metastatic to intrapelvic lymph node and bones  -- 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 Melendez as scheduled   -- FU PSA per Dr. Melendez    Anterior urethral stricture, unspecified stricture type  -- S/p cysto dilation in OR at time of PLND in September 2015  -- Voiding well now - will monitor

## 2019-08-02 ENCOUNTER — HOSPITAL ENCOUNTER (OUTPATIENT)
Dept: PREADMISSION TESTING | Facility: OTHER | Age: 74
Discharge: HOME | End: 2019-08-02
Attending: UROLOGY
Payer: MEDICARE

## 2019-08-02 ENCOUNTER — ANESTHESIA EVENT (OUTPATIENT)
Dept: SURGERY | Facility: OTHER | Age: 74
End: 2019-08-02
Payer: MEDICARE

## 2019-08-02 ENCOUNTER — INFUSION (OUTPATIENT)
Dept: INFUSION THERAPY | Facility: OTHER | Age: 74
End: 2019-08-02
Attending: INTERNAL MEDICINE
Payer: MEDICARE

## 2019-08-02 VITALS
TEMPERATURE: 98 F | HEART RATE: 88 BPM | SYSTOLIC BLOOD PRESSURE: 103 MMHG | WEIGHT: 146 LBS | DIASTOLIC BLOOD PRESSURE: 62 MMHG | HEIGHT: 68 IN | BODY MASS INDEX: 22.13 KG/M2 | OXYGEN SATURATION: 99 %

## 2019-08-02 VITALS
DIASTOLIC BLOOD PRESSURE: 62 MMHG | RESPIRATION RATE: 16 BRPM | TEMPERATURE: 99 F | HEART RATE: 94 BPM | SYSTOLIC BLOOD PRESSURE: 95 MMHG | OXYGEN SATURATION: 98 %

## 2019-08-02 DIAGNOSIS — C77.5 PROSTATE CANCER METASTATIC TO INTRAPELVIC LYMPH NODE: ICD-10-CM

## 2019-08-02 DIAGNOSIS — C61 PROSTATE CANCER METASTATIC TO INTRAPELVIC LYMPH NODE: ICD-10-CM

## 2019-08-02 DIAGNOSIS — C61 PROSTATE CANCER METASTATIC TO BONE: ICD-10-CM

## 2019-08-02 DIAGNOSIS — C25.1 MALIGNANT NEOPLASM OF BODY OF PANCREAS: Primary | ICD-10-CM

## 2019-08-02 DIAGNOSIS — C79.51 PROSTATE CANCER METASTATIC TO BONE: ICD-10-CM

## 2019-08-02 PROCEDURE — 63600175 PHARM REV CODE 636 W HCPCS: Performed by: INTERNAL MEDICINE

## 2019-08-02 PROCEDURE — 96521 REFILL/MAINT PORTABLE PUMP: CPT

## 2019-08-02 PROCEDURE — 25000003 PHARM REV CODE 250: Performed by: INTERNAL MEDICINE

## 2019-08-02 PROCEDURE — A4216 STERILE WATER/SALINE, 10 ML: HCPCS | Performed by: INTERNAL MEDICINE

## 2019-08-02 RX ORDER — CELECOXIB 200 MG/1
400 CAPSULE ORAL
Status: CANCELLED | OUTPATIENT
Start: 2019-08-02 | End: 2019-08-02

## 2019-08-02 RX ORDER — SODIUM CHLORIDE 0.9 % (FLUSH) 0.9 %
10 SYRINGE (ML) INJECTION
Status: DISCONTINUED | OUTPATIENT
Start: 2019-08-02 | End: 2019-08-02 | Stop reason: HOSPADM

## 2019-08-02 RX ORDER — LIDOCAINE HYDROCHLORIDE 10 MG/ML
0.5 INJECTION, SOLUTION EPIDURAL; INFILTRATION; INTRACAUDAL; PERINEURAL ONCE
Status: CANCELLED | OUTPATIENT
Start: 2019-08-02 | End: 2019-08-02

## 2019-08-02 RX ORDER — SODIUM CHLORIDE, SODIUM LACTATE, POTASSIUM CHLORIDE, CALCIUM CHLORIDE 600; 310; 30; 20 MG/100ML; MG/100ML; MG/100ML; MG/100ML
INJECTION, SOLUTION INTRAVENOUS CONTINUOUS
Status: CANCELLED | OUTPATIENT
Start: 2019-08-02

## 2019-08-02 RX ORDER — ALBUTEROL SULFATE 0.83 MG/ML
2.5 SOLUTION RESPIRATORY (INHALATION)
Status: CANCELLED | OUTPATIENT
Start: 2019-08-02 | End: 2019-08-02

## 2019-08-02 RX ORDER — PREGABALIN 75 MG/1
75 CAPSULE ORAL
Status: CANCELLED | OUTPATIENT
Start: 2019-08-02 | End: 2019-08-02

## 2019-08-02 RX ORDER — HEPARIN 100 UNIT/ML
500 SYRINGE INTRAVENOUS
Status: DISCONTINUED | OUTPATIENT
Start: 2019-08-02 | End: 2019-08-02 | Stop reason: HOSPADM

## 2019-08-02 RX ADMIN — SODIUM CHLORIDE, PRESERVATIVE FREE 10 ML: 5 INJECTION INTRAVENOUS at 02:08

## 2019-08-02 RX ADMIN — HEPARIN 500 UNITS: 100 SYRINGE at 02:08

## 2019-08-02 NOTE — ANESTHESIA PREPROCEDURE EVALUATION
08/02/2019  Alexander Prakash Jr. is a 74 y.o., male.    Anesthesia Evaluation    I have reviewed the Patient Summary Reports.    I have reviewed the Nursing Notes.   I have reviewed the Medications.     Review of Systems  Anesthesia Hx:  No problems with previous Anesthesia  History of prior surgery of interest to airway management or planning: Previous anesthesia: General Prev cysto  with general anesthesia.  Procedure performed at an [facility]. Denies Family Hx of Anesthesia complications.   Denies Personal Hx of Anesthesia complications.   Social:  Smoker, Former Smoker    Hematology/Oncology:        Current/Recent Cancer. (prostate) chemotherapy Oncology Comments: Pancreatic cancer on chemo now has port rt chest    EENT/Dental:EENT/Dental Normal   Cardiovascular:   Exercise tolerance: good Hypertension, well controlled hyperlipidemia ECG has been reviewed. NSR   Pulmonary:  Pulmonary Normal    Renal/:   BPH    Hepatic/GI:  Hepatic/GI Normal    Musculoskeletal:   Arthritis     Neurological:  Neurology Normal    Endocrine:   Diabetes, well controlled, type 2    Dermatological:  Skin Normal    Psych:  Psychiatric Normal           Physical Exam  General:  Cachexia    Airway/Jaw/Neck:  Airway Findings: Mouth Opening: Normal Tongue: Normal  General Airway Assessment: Adult  Mallampati: II      Dental:  Dental Findings: Periodontal disease, Severe         Mental Status:  Mental Status Findings:  Cooperative         Anesthesia Plan  Type of Anesthesia, risks & benefits discussed:  Anesthesia Type:  general  Patient's Preference:   Intra-op Monitoring Plan: standard ASA monitors  Intra-op Monitoring Plan Comments:   Post Op Pain Control Plan: per primary service following discharge from PACU and multimodal analgesia  Post Op Pain Control Plan Comments:   Induction:   IV  Beta Blocker:         Informed  Consent: Patient understands risks and agrees with Anesthesia plan.  Questions answered. Anesthesia consent signed with patient.  ASA Score: 3     Day of Surgery Review of History & Physical:    H&P update referred to the surgeon.     Anesthesia Plan Notes: Awful teeth,on chemo now for pancreatic ca        Ready For Surgery From Anesthesia Perspective.

## 2019-08-02 NOTE — PLAN OF CARE
Problem: Adult Inpatient Plan of Care  Goal: Plan of Care Review  Outcome: Ongoing (interventions implemented as appropriate)  Pt here for pump d/c, tolerated 5-FU without issue. VSS. AVS given. Pt d.c home in stable condition with instructions to return 8/14/19. In interim, pt knows to call clinic with any issues.

## 2019-08-02 NOTE — DISCHARGE INSTRUCTIONS
PRE-ADMIT TESTING -  [phone]    [address]  [place] [facility]          Your surgery has been scheduled at [facility]. We are pleased to have the opportunity to serve you. For Further Information please call [phone].    On the day of surgery please report to the Information Desk on the 1st floor.    · CONTACT YOUR PHYSICIAN'S OFFICE THE DAY PRIOR TO YOUR SURGERY TO OBTAIN YOUR ARRIVAL TIME.     · The evening before surgery do not eat anything after 9 p.m. ( this includes hard candy, chewing gum and mints).  You may only have GATORADE, POWERADE AND WATER  from 9 p.m. until you leave your home.   DO NOT DRINK ANY LIQUIDS ON THE WAY TO THE HOSPITAL.      SPECIAL MEDICATION INSTRUCTIONS: TAKE medications checked off by the Anesthesiologist on your Medication List.    Angiogram Patients: Take medications as instructed by your physician, including aspirin.     Surgery Patients:    If you take ASPIRIN - Your PHYSICIAN/SURGEON will need to inform you IF/OR when you need to stop taking aspirin prior to your surgery.     Do Not take any medications containing IBUPROFEN.  Do Not Wear any make-up or dark nail polish   (especially eye make-up) to surgery. If you come to surgery with makeup on you will be required to remove the makeup or nail polish.    Do not shave your surgical area at least 5 days prior to your surgery. The surgical prep will be performed at the hospital according to Infection Control regulations.    Leave all valuables at home.   Do Not wear any jewelry or watches, including any metal in body piercings. Jewelry must be removed prior to coming to the hospital.  There is a possibility that rings that are unable to be removed may be cut off if they are on the surgical extremity.    Contact Lens must be removed before surgery. Either do not wear the contact lens or bring a case and solution for storage.  Please bring a container for eyeglasses or dentures as required.  Bring  any paperwork your physician has provided, such as consent forms,  history and physicals, doctor's orders, etc.   Bring comfortable clothes that are loose fitting to wear upon discharge. Take into consideration the type of surgery being performed.  Maintain your diet as advised per your physician the day prior to surgery.      Adequate rest the night before surgery is advised.   Park in the Parking lot behind the hospital or in the [place] [facility] across the street from the parking lot. Parking is complimentary.  If you will be discharged the same day as your procedure, please arrange for a responsible adult to drive you home or to accompany you if traveling by taxi.   YOU WILL NOT BE PERMITTED TO DRIVE OR TO LEAVE THE HOSPITAL ALONE AFTER SURGERY.   It is strongly recommended that you arrange for someone to remain with you for the first 24 hrs following your surgery.    The Surgeon will speak to your family/visitor after your surgery regarding the outcome of your surgery and post op care.  The Surgeon may speak to you after your surgery, but there is a possibility you may not remember the details.  Please check with your family members regarding the conversation with the Surgeon.      Thank you for your cooperation.  The Staff of [facility].                Bathing Instructions with Hibiclens     Shower the evening before and morning of your procedure with Hibiclens:   Wash your face with water and your regular face wash/soap   Apply Hibiclens directly on your skin or on a wet washcloth and wash gently. When showering: Move away from the shower stream when applying Hibiclens to avoid rinsing off too soon.   Rinse thoroughly with warm water   Do not dilute Hibiclens         Dry off as usual, do not use any deodorant, powder, body lotions, perfume, after shave or cologne.

## 2019-08-03 LAB — BACTERIA UR CULT: NO GROWTH

## 2019-08-06 ENCOUNTER — ANESTHESIA (OUTPATIENT)
Dept: SURGERY | Facility: OTHER | Age: 74
End: 2019-08-06
Payer: MEDICARE

## 2019-08-06 ENCOUNTER — TELEPHONE (OUTPATIENT)
Dept: UROLOGY | Facility: CLINIC | Age: 74
End: 2019-08-06

## 2019-08-06 ENCOUNTER — HOSPITAL ENCOUNTER (OUTPATIENT)
Facility: OTHER | Age: 74
Discharge: HOME | End: 2019-08-06
Attending: UROLOGY | Admitting: UROLOGY
Payer: MEDICARE

## 2019-08-06 VITALS
HEIGHT: 68 IN | OXYGEN SATURATION: 100 % | TEMPERATURE: 98 F | HEART RATE: 83 BPM | BODY MASS INDEX: 22.13 KG/M2 | DIASTOLIC BLOOD PRESSURE: 81 MMHG | WEIGHT: 146 LBS | SYSTOLIC BLOOD PRESSURE: 135 MMHG | RESPIRATION RATE: 16 BRPM

## 2019-08-06 DIAGNOSIS — N13.30 HYDRONEPHROSIS OF LEFT KIDNEY: ICD-10-CM

## 2019-08-06 DIAGNOSIS — C79.51 PROSTATE CANCER METASTATIC TO BONE: ICD-10-CM

## 2019-08-06 DIAGNOSIS — C61 PROSTATE CANCER METASTATIC TO BONE: ICD-10-CM

## 2019-08-06 LAB — POCT GLUCOSE: 185 MG/DL (ref 70–110)

## 2019-08-06 PROCEDURE — C2617 STENT, NON-COR, TEM W/O DEL: HCPCS | Performed by: UROLOGY

## 2019-08-06 PROCEDURE — 76000 FLUOROSCOPY <1 HR PHYS/QHP: CPT | Mod: 26,59,, | Performed by: UROLOGY

## 2019-08-06 PROCEDURE — 52332 CYSTOSCOPY AND TREATMENT: CPT | Mod: LT,,, | Performed by: UROLOGY

## 2019-08-06 PROCEDURE — 63600175 PHARM REV CODE 636 W HCPCS: Performed by: UROLOGY

## 2019-08-06 PROCEDURE — 25000242 PHARM REV CODE 250 ALT 637 W/ HCPCS: Performed by: ANESTHESIOLOGY

## 2019-08-06 PROCEDURE — 36000706: Performed by: UROLOGY

## 2019-08-06 PROCEDURE — C1769 GUIDE WIRE: HCPCS | Performed by: UROLOGY

## 2019-08-06 PROCEDURE — 52332 PR CYSTOSCOPY,INSERT URETERAL STENT: ICD-10-PCS | Mod: LT,,, | Performed by: UROLOGY

## 2019-08-06 PROCEDURE — 25000003 PHARM REV CODE 250: Performed by: UROLOGY

## 2019-08-06 PROCEDURE — 76000 PR  FLUOROSCOPE EXAMINATION: ICD-10-PCS | Mod: 26,59,, | Performed by: UROLOGY

## 2019-08-06 PROCEDURE — 74420 PR  X-RAY RETROGRADE PYELOGRAM: ICD-10-PCS | Mod: 26,,, | Performed by: UROLOGY

## 2019-08-06 PROCEDURE — 63600175 PHARM REV CODE 636 W HCPCS: Performed by: NURSE ANESTHETIST, CERTIFIED REGISTERED

## 2019-08-06 PROCEDURE — 36000707: Performed by: UROLOGY

## 2019-08-06 PROCEDURE — 37000008 HC ANESTHESIA 1ST 15 MINUTES: Performed by: UROLOGY

## 2019-08-06 PROCEDURE — 37000009 HC ANESTHESIA EA ADD 15 MINS: Performed by: UROLOGY

## 2019-08-06 PROCEDURE — 25500020 PHARM REV CODE 255: Performed by: UROLOGY

## 2019-08-06 PROCEDURE — 63600175 PHARM REV CODE 636 W HCPCS: Performed by: ANESTHESIOLOGY

## 2019-08-06 PROCEDURE — 74420 UROGRAPHY RTRGR +-KUB: CPT | Mod: 26,,, | Performed by: UROLOGY

## 2019-08-06 PROCEDURE — 94761 N-INVAS EAR/PLS OXIMETRY MLT: CPT

## 2019-08-06 PROCEDURE — 71000033 HC RECOVERY, INTIAL HOUR: Performed by: UROLOGY

## 2019-08-06 PROCEDURE — 94640 AIRWAY INHALATION TREATMENT: CPT

## 2019-08-06 PROCEDURE — 71000015 HC POSTOP RECOV 1ST HR: Performed by: UROLOGY

## 2019-08-06 PROCEDURE — 25000003 PHARM REV CODE 250: Performed by: ANESTHESIOLOGY

## 2019-08-06 DEVICE — STENT URETERAL UNIV 6FR 26CM: Type: IMPLANTABLE DEVICE | Site: URETER | Status: FUNCTIONAL

## 2019-08-06 RX ORDER — LIDOCAINE HCL/PF 100 MG/5ML
SYRINGE (ML) INTRAVENOUS
Status: DISCONTINUED | OUTPATIENT
Start: 2019-08-06 | End: 2019-08-06

## 2019-08-06 RX ORDER — SULFAMETHOXAZOLE AND TRIMETHOPRIM 800; 160 MG/1; MG/1
1 TABLET ORAL 2 TIMES DAILY
Qty: 10 TABLET | Refills: 0 | Status: SHIPPED | OUTPATIENT
Start: 2019-08-06 | End: 2019-08-11

## 2019-08-06 RX ORDER — SODIUM CHLORIDE 0.9 % (FLUSH) 0.9 %
3 SYRINGE (ML) INJECTION
Status: DISCONTINUED | OUTPATIENT
Start: 2019-08-06 | End: 2019-08-06 | Stop reason: HOSPADM

## 2019-08-06 RX ORDER — ONDANSETRON 2 MG/ML
INJECTION INTRAMUSCULAR; INTRAVENOUS
Status: DISCONTINUED | OUTPATIENT
Start: 2019-08-06 | End: 2019-08-06

## 2019-08-06 RX ORDER — ONDANSETRON 2 MG/ML
4 INJECTION INTRAMUSCULAR; INTRAVENOUS DAILY PRN
Status: DISCONTINUED | OUTPATIENT
Start: 2019-08-06 | End: 2019-08-06 | Stop reason: HOSPADM

## 2019-08-06 RX ORDER — HYDROMORPHONE HYDROCHLORIDE 2 MG/ML
0.4 INJECTION, SOLUTION INTRAMUSCULAR; INTRAVENOUS; SUBCUTANEOUS EVERY 5 MIN PRN
Status: DISCONTINUED | OUTPATIENT
Start: 2019-08-06 | End: 2019-08-06 | Stop reason: HOSPADM

## 2019-08-06 RX ORDER — MEPERIDINE HYDROCHLORIDE 25 MG/ML
12.5 INJECTION INTRAMUSCULAR; INTRAVENOUS; SUBCUTANEOUS ONCE AS NEEDED
Status: DISCONTINUED | OUTPATIENT
Start: 2019-08-06 | End: 2019-08-06 | Stop reason: HOSPADM

## 2019-08-06 RX ORDER — CIPROFLOXACIN 2 MG/ML
400 INJECTION, SOLUTION INTRAVENOUS
Status: COMPLETED | OUTPATIENT
Start: 2019-08-06 | End: 2019-08-06

## 2019-08-06 RX ORDER — CELECOXIB 200 MG/1
400 CAPSULE ORAL
Status: COMPLETED | OUTPATIENT
Start: 2019-08-06 | End: 2019-08-06

## 2019-08-06 RX ORDER — PREGABALIN 75 MG/1
75 CAPSULE ORAL
Status: COMPLETED | OUTPATIENT
Start: 2019-08-06 | End: 2019-08-06

## 2019-08-06 RX ORDER — ATROPA BELLADONNA AND OPIUM 16.2; 6 MG/1; MG/1
SUPPOSITORY RECTAL
Status: DISCONTINUED | OUTPATIENT
Start: 2019-08-06 | End: 2019-08-06 | Stop reason: HOSPADM

## 2019-08-06 RX ORDER — PROPOFOL 10 MG/ML
VIAL (ML) INTRAVENOUS
Status: DISCONTINUED | OUTPATIENT
Start: 2019-08-06 | End: 2019-08-06

## 2019-08-06 RX ORDER — OXYCODONE HYDROCHLORIDE 5 MG/1
5 TABLET ORAL
Status: DISCONTINUED | OUTPATIENT
Start: 2019-08-06 | End: 2019-08-06 | Stop reason: HOSPADM

## 2019-08-06 RX ORDER — FENTANYL CITRATE 50 UG/ML
INJECTION, SOLUTION INTRAMUSCULAR; INTRAVENOUS
Status: DISCONTINUED | OUTPATIENT
Start: 2019-08-06 | End: 2019-08-06

## 2019-08-06 RX ORDER — PHENYLEPHRINE HYDROCHLORIDE 10 MG/ML
INJECTION INTRAVENOUS
Status: DISCONTINUED | OUTPATIENT
Start: 2019-08-06 | End: 2019-08-06

## 2019-08-06 RX ORDER — SODIUM CHLORIDE, SODIUM LACTATE, POTASSIUM CHLORIDE, CALCIUM CHLORIDE 600; 310; 30; 20 MG/100ML; MG/100ML; MG/100ML; MG/100ML
INJECTION, SOLUTION INTRAVENOUS CONTINUOUS
Status: DISCONTINUED | OUTPATIENT
Start: 2019-08-06 | End: 2019-08-06 | Stop reason: HOSPADM

## 2019-08-06 RX ORDER — HYDROCODONE BITARTRATE AND ACETAMINOPHEN 5; 325 MG/1; MG/1
1 TABLET ORAL EVERY 4 HOURS PRN
Status: DISCONTINUED | OUTPATIENT
Start: 2019-08-06 | End: 2019-08-06 | Stop reason: HOSPADM

## 2019-08-06 RX ORDER — ALBUTEROL SULFATE 0.83 MG/ML
2.5 SOLUTION RESPIRATORY (INHALATION)
Status: COMPLETED | OUTPATIENT
Start: 2019-08-06 | End: 2019-08-06

## 2019-08-06 RX ORDER — PHENAZOPYRIDINE HYDROCHLORIDE 100 MG/1
100 TABLET, FILM COATED ORAL 3 TIMES DAILY PRN
Qty: 30 TABLET | Refills: 0 | Status: SHIPPED | OUTPATIENT
Start: 2019-08-06 | End: 2019-08-16

## 2019-08-06 RX ORDER — LIDOCAINE HYDROCHLORIDE 10 MG/ML
0.5 INJECTION, SOLUTION EPIDURAL; INFILTRATION; INTRACAUDAL; PERINEURAL ONCE
Status: DISCONTINUED | OUTPATIENT
Start: 2019-08-06 | End: 2019-08-06 | Stop reason: HOSPADM

## 2019-08-06 RX ADMIN — PREGABALIN 75 MG: 75 CAPSULE ORAL at 12:08

## 2019-08-06 RX ADMIN — FENTANYL CITRATE 100 MCG: 50 INJECTION, SOLUTION INTRAMUSCULAR; INTRAVENOUS at 02:08

## 2019-08-06 RX ADMIN — CIPROFLOXACIN 400 MG: 2 INJECTION, SOLUTION INTRAVENOUS at 02:08

## 2019-08-06 RX ADMIN — SODIUM CHLORIDE, SODIUM LACTATE, POTASSIUM CHLORIDE, AND CALCIUM CHLORIDE: 600; 310; 30; 20 INJECTION, SOLUTION INTRAVENOUS at 01:08

## 2019-08-06 RX ADMIN — LIDOCAINE HYDROCHLORIDE 100 MG: 20 INJECTION, SOLUTION INTRAVENOUS at 02:08

## 2019-08-06 RX ADMIN — PROPOFOL 150 MG: 10 INJECTION, EMULSION INTRAVENOUS at 02:08

## 2019-08-06 RX ADMIN — ONDANSETRON 4 MG: 2 INJECTION INTRAMUSCULAR; INTRAVENOUS at 03:08

## 2019-08-06 RX ADMIN — CELECOXIB 400 MG: 200 CAPSULE ORAL at 12:08

## 2019-08-06 RX ADMIN — PHENYLEPHRINE HYDROCHLORIDE 100 MCG: 10 INJECTION INTRAVENOUS at 03:08

## 2019-08-06 RX ADMIN — ALBUTEROL SULFATE 2.5 MG: 2.5 SOLUTION RESPIRATORY (INHALATION) at 12:08

## 2019-08-06 NOTE — OP NOTE
[facility] Note    Date: 08/06/2019    Pre-Op Diagnosis: Hydronephrosis of the left kidney    Op Diagnosis: same    Procedure(s) Performed:    1. Cystoscopy with left ureteral JJ stent placement  2. Dilation of urethral stricture  3. Left retrograde pyelogram  4. Fluoroscopy < 1 h    Specimen(s): none    Staff Surgeon: Matteo Juarez MD    Assistant Surgeon: Puma Law MD    Anesthesia: General LMA anesthesia    Indications: Alexander Prakash Jr. is a 74 y.o. male with left hydronephrosis.    Findings:    - thin stricture in penile urethra, easily dilated to 24 Fr with Elvis dilators  - left retrograde pyelogram shows area of ureteral narrowing in the mid-ureter  - 6 Fr x 26 cm JJ ureteral stent without strings placed    Estimated Blood Loss: 0cc    Drains:  6 Romansh x 26 cm left JJ ureteral stent without strings    Procedure in Detail:  After risks, benefits and possible complications of the procedure were explained, the patient elected to undergo the procedure and informed consent was obtained. All questions were answered in the abbie-operative area. The patient was transferred to the cystoscopy suite and placed on the fluoroscopy table in the supine position.  SCDs were applied and working. Time out was performed, abbie-procedural antibiotics were given. Anesthesia was administered.  After adequate anesthesia the patient was placed in dorsal lithotomy position and prepped and draped in the usual sterile fashion.     A rigid cystoscope in a 22 Fr sheath was introduced into the patient's urethra. A stricture was noted in the penile urethra. A motion wire was advanced past the stricture into the bladder. Position of the wire in the bladder was confirmed with fluoroscopy. The cystoscope was then removed and the stricture was dilated with Elvis dilators, starting at 10 Fr and continuing to 24 Fr. The dilators were passed with ease. The cystoscope was then advanced alongside the wire into the bladder  with ease.      A motion wire was then advanced up the left ureteral orifice until an area of resistance was noted in the left mid ureter. A 5 Fr open ended ureteral catheter was advanced over the wire to this level and the wire was removed. A retrograde pyelogram was performed which demonstrated dilation of the proximal ureter with tortuosity and dilation of the renal pelvis. The wire was reinserted through the ureteral catheter and was able to be advanced into the renal pelvis. The catheter was removed.     We then passed a 6 Fr x 26 cm JJ ureteral stent without strings over the wire to the level of the renal pelvis under direct vision as well as flouroscopy. The guide wire was removed.  A 180 degree coil was observed in the renal pelvis as well as the bladder using fluoroscopy.  A 180 degree coil was also seen using direct visualization in the bladder.     The patient tolerated the procedure well and was transferred to the recovery room in stable condition.      Disposition: The patient will follow up with Dr. Juarez in 2 months.

## 2019-08-06 NOTE — DISCHARGE SUMMARY
[facility]  Discharge Note  Short Stay    Admit Date: 8/6/2019    Discharge Date and Time: 08/06/2019 3:31 PM      Attending Physician: Matteo Juarez MD     Discharge Provider: Puma Law    Diagnoses:  Active Hospital Problems    Diagnosis  POA    *Prostate cancer metastatic to bone [C61, C79.51]  Yes      Resolved Hospital Problems   No resolved problems to display.       Discharged Condition: good    Hospital Course: Patient was admitted for cystoscopy, dilation of urethral stricture, left retrograde pyelogram, left ureteral stent placement and tolerated the procedure well with no complications. The patient was discharged home in good condition on the same day.  He will follow up in 2 months.     Final Diagnoses: Same as principal problem.    Disposition: Home or Self Care    Follow up/Patient Instructions:    Medications:  Reconciled Home Medications:   Current Discharge Medication List      START taking these medications    Details   sulfamethoxazole-trimethoprim 800-160mg (BACTRIM DS) 800-160 mg Tab Take 1 tablet by mouth 2 (two) times daily. for 5 days  Qty: 10 tablet, Refills: 0         CONTINUE these medications which have NOT CHANGED    Details   calcium-vitamin D3 (OYSTER SHELL CALCIUM-VIT D3) 500 mg(1,250mg) -200 unit per tablet Take 1 tablet by mouth 2 (two) times daily.  Qty: 180 tablet, Refills: 3    Associated Diagnoses: Prostate cancer metastatic to intrapelvic lymph node      CONSTULOSE 10 gram/15 mL solution Take 15 mLs (10 g total) by mouth 2 (two) times daily as needed.  Qty: 473 mL, Refills: 1    Associated Diagnoses: Other constipation      cyproheptadine (PERIACTIN) 4 mg tablet Take 1 tablet (4 mg total) by mouth 3 (three) times daily.  Qty: 90 tablet, Refills: 2    Associated Diagnoses: Malignant neoplasm of body of pancreas; Anorexia      !! docusate sodium (COLACE) 100 MG capsule Take 1 capsule (100 mg total) by mouth 2 (two) times daily.  Qty: 30 capsule, Refills: 0     Comments: As needed for constipation      !! docusate sodium (COLACE) 50 MG capsule Take 1 capsule (50 mg total) by mouth 2 (two) times daily.  Refills: 0      dronabinol (MARINOL) 2.5 MG capsule Take 1 capsule (2.5 mg total) by mouth 2 (two) times daily before meals.  Qty: 60 capsule, Refills: 2    Associated Diagnoses: Prostate cancer metastatic to bone; Intractable vomiting with nausea, unspecified vomiting type      glimepiride (AMARYL) 2 MG tablet Take 2 mg by mouth 2 (two) times daily.       lidocaine-prilocaine (EMLA) cream Apply topically as needed over port site 30 minutes before appointment for chemotherapy  Qty: 30 g, Refills: 2    Associated Diagnoses: Malignant neoplasm of body of pancreas      lovastatin (MEVACOR) 20 MG tablet       Magic Mouthwash Swish and spit 15 mLs every 4 (four) hours as needed.  Qty: 480 mL, Refills: 0    Associated Diagnoses: Mouth sores      metformin (GLUCOPHAGE) 1000 MG tablet Take 1,000 mg by mouth 2 (two) times daily.       oxyCODONE-acetaminophen (PERCOCET) 5-325 mg per tablet Take 1 tablet by mouth every 4 (four) hours as needed.  Qty: 90 tablet, Refills: 0    Associated Diagnoses: Neoplasm related pain      polyethylene glycol (GLYCOLAX) 17 gram/dose powder Mix 4 capfuls (73g) into liquid and then take by mouth daily; Mix only 1 capful at a time into liquid.  Qty: 510 g, Refills: 0    Associated Diagnoses: Metastasis from pancreatic cancer      promethazine (PHENERGAN) 25 MG tablet Take 1 tablet (25 mg total) by mouth every 6 (six) hours as needed for Nausea.  Qty: 30 tablet, Refills: 0    Associated Diagnoses: Non-intractable cyclical vomiting with nausea      senna (SENOKOT) 8.6 mg tablet Take 1 tablet by mouth 2 (two) times daily.  Qty: 60 tablet, Refills: 2    Associated Diagnoses: Metastasis from pancreatic cancer      aspirin (ECOTRIN) 81 MG EC tablet Take 81 mg by mouth once daily. States not taken in over 1 month as of 9/2/15      diphth,pertus,acell,,tetanus  (BOOSTRIX) 2.5-8-5 Lf-mcg-Lf/0.5mL Susp Inject into the muscle.  Qty: 0.5 mL, Refills: 0      furosemide (LASIX) 20 MG tablet Take 1 tablet (20 mg total) by mouth daily as needed (leg swelling).  Qty: 30 tablet, Refills: 11    Associated Diagnoses: Bilateral leg edema      gabapentin (NEURONTIN) 300 MG capsule Take 1 capsule by mouth once a day for 2 days, then 1 capsule 2 times a day for 2 days, then 1 capsule 3 times daily thereafter.  Qty: 90 capsule, Refills: 2    Associated Diagnoses: Neuropathic pain      LIDOCAINE VISCOUS 2 % solution       lisinopril-hydrochlorothiazide (PRINZIDE,ZESTORETIC) 20-25 mg Tab Take 1 tablet by mouth once daily.       metoprolol tartrate (LOPRESSOR) 25 MG tablet       ondansetron (ZOFRAN-ODT) 4 MG TbDL Take 1 tablet (4 mg total) by mouth every 6 (six) hours as needed (nausea).  Qty: 12 tablet, Refills: 0      papaverine 30 mg/mL injection Add Phentolamine 1 mg/cc  Add PGE1 10 mcg/cc    SIG:  Bring to office for test dose in physician office  Qty: 5 mL, Refills: 0      potassium chloride SA (K-DUR,KLOR-CON) 20 MEQ tablet Take 1 tablet (20 mEq total) by mouth 2 (two) times daily.  Qty: 6 tablet, Refills: 0    Associated Diagnoses: Hypokalemia      sildenafil (REVATIO) 20 mg Tab Take 1 tablet (20 mg total) by mouth As instructed. Take 2-5 tablets as needed.  Qty: 40 tablet, Refills: 11      triamcinolone acetonide 0.025% (KENALOG) 0.025 % cream Apply topically 2 (two) times daily as needed For itching  Qty: 80 g, Refills: 0    Associated Diagnoses: Rash      valACYclovir (VALTREX) 500 MG tablet Take 1 tablet (500 mg total) by mouth 2 (two) times daily. for 5 days  Qty: 10 tablet, Refills: 0    Associated Diagnoses: Mouth sores       !! - Potential duplicate medications found. Please discuss with provider.        Discharge Procedure Orders   Diet general     Call MD for:  extreme fatigue     Call MD for:  persistent dizziness or light-headedness     Call MD for:  hives     Call MD for:   redness, tenderness, or signs of infection (pain, swelling, redness, odor or green/yellow discharge around incision site)     Call MD for:  difficulty breathing, headache or visual disturbances     Call MD for:  severe uncontrolled pain     Call MD for:  persistent nausea and vomiting     Call MD for:  temperature >100.4     Follow-up Information     Matteo Juarez MD In 2 months.    Specialty:  Urology  Contact information:  [address] [address]  [phone]                   Discharge Procedure Orders (must include Diet, Follow-up, Activity):   Discharge Procedure Orders (must include Diet, Follow-up, Activity)   Diet general     Call MD for:  extreme fatigue     Call MD for:  persistent dizziness or light-headedness     Call MD for:  hives     Call MD for:  redness, tenderness, or signs of infection (pain, swelling, redness, odor or green/yellow discharge around incision site)     Call MD for:  difficulty breathing, headache or visual disturbances     Call MD for:  severe uncontrolled pain     Call MD for:  persistent nausea and vomiting     Call MD for:  temperature >100.4

## 2019-08-06 NOTE — INTERVAL H&P NOTE
The patient has been examined and the H&P has been reviewed:    I concur with the findings and no changes have occurred since H&P was written.    Anesthesia/Surgery risks, benefits and alternative options discussed and understood by patient/family.          Active Hospital Problems    Diagnosis  POA    Prostate cancer metastatic to bone [C61, C79.51]  Yes      Resolved Hospital Problems   No resolved problems to display.

## 2019-08-06 NOTE — ANESTHESIA POSTPROCEDURE EVALUATION
Anesthesia Post Evaluation    Patient: Alexander Prakash Jr.    Procedure(s) Performed: Procedure(s) (LRB):  CYSTOSCOPY WITH STENT PLACEMENT (Left)    Final Anesthesia Type: general  Patient location during evaluation: PACU  Patient participation: Yes- Able to Participate  Level of consciousness: awake and alert  Post-procedure vital signs: reviewed and stable  Pain management: adequate  Airway patency: patent  PONV status at discharge: No PONV  Anesthetic complications: no      Cardiovascular status: blood pressure returned to baseline  Respiratory status: unassisted and spontaneous ventilation  Hydration status: euvolemic  Follow-up not needed.          Vitals Value Taken Time   /78 8/6/2019  4:08 PM   Temp 36.4 °C (97.5 °F) 8/6/2019  4:05 PM   Pulse 71 8/6/2019  4:16 PM   Resp 18 8/6/2019  4:05 PM   SpO2 100 % 8/6/2019  4:16 PM   Vitals shown include unvalidated device data.      Event Time     Out of Recovery 16:17:52          Pain/Patricia Score: Pain Rating Prior to Med Admin: 2 (8/6/2019 12:35 PM)  Patricia Score: 10 (8/6/2019  4:05 PM)

## 2019-08-06 NOTE — DISCHARGE INSTRUCTIONS
Anesthesia: After Your Surgery  Youve just had surgery. During surgery, you received medication called anesthesia to keep you comfortable and pain-free. After surgery, you may experience some pain or nausea. This is common. Here are some tips for feeling better and recovering after surgery.    Going home  Your doctor or nurse will show you how to take care of yourself when you go home. He or she will also answer your questions. Have an adult family member or friend drive you home. For the first 24 hours after your surgery:  · Do not drive or use heavy equipment.  · Do not make important decisions or sign legal documents.  · Avoid alcohol.  · Have someone stay with you, if needed. He or she can watch for problems and help keep you safe.  · Take your time getting up from a seated or lying position. You may experience dizziness for 24 hours  Be sure to keep all follow-up appointments with your doctor. And rest after your procedure for as long as your doctor tells you to.    Coping with pain  If you have pain after surgery, pain medication will help you feel better. Take it as directed, before pain becomes severe. Also, ask your doctor or pharmacist about other ways to control pain, such as with heat, ice, and relaxation. And follow any other instructions your surgeon or nurse gives you.    URINARY RETENTION  Should you experience a decrease in your urine output or are unable to urinate following surgery, this can be due to the medications given during surgery.  We recommend you going to the nearest Emergency Department.    Tips for taking pain medication  To get the best relief possible, remember these points:  · Pain medications can upset your stomach. Taking them with a little food may help.  · Most pain relievers taken by mouth need at least 20 to 30 minutes to take effect.  · Taking medication on a schedule can help you remember to take it. Try to time your medication so that you can take it before beginning an  activity, such as dressing, walking, or sitting down for dinner.  · Constipation is a common side effect of pain medications. Contact your doctor before taking any medications like laxatives or stool softeners to help relieve constipation. Also ask about any dietary restrictions, because drinking lots of fluids and eating foods like fruits and vegetables that are high in fiber can also help. Remember, dont take laxatives unless your surgeon has prescribed them.  · Mixing alcohol and pain medication can cause dizziness and slow your breathing. It can even be fatal. Dont drink alcohol while taking pain medication.  · Pain medication can slow your reflexes. Dont drive or operate machinery while taking pain medication.  If your health care provider tells you to take acetaminophen to help relieve your pain, ask him or her how much you are supposed to take each day. (Acetaminophen is the generic name for Tylenol and other brand-name pain relievers.) Acetaminophen or other pain relievers may interact with your prescription medicines or other over-the-counter (OTC) drugs. Some prescription medications contain acetaminophen along with other active ingredients. Using both prescription and OTC acetaminophen for pain can cause you to overdose. The FDA recommends that you read the labels on your OTC medications carefully. This will help you to clearly understand the list of active ingredients, dosing instructions, and any warnings. It may also help you avoid taking too much acetaminophen. If you have questions or don't understand the information, ask your pharmacist or health care provider to explain it to you before you take the OTC medication.    Managing nausea  Some people have an upset stomach after surgery. This is often due to anesthesia, pain, pain medications, or the stress of surgery. The following tips will help you manage nausea and get good nutrition as you recover. If you were on a special diet before surgery,  ask your doctor if you should follow it during recovery. These tips may help:  · Dont push yourself to eat. Your body will tell you when to eat and how much.  · Start off with clear liquids and soup. They are easier to digest.  · Progress to semi-solid foods (mashed potatoes, applesauce, and gelatin) as you feel ready.  · Slowly move to solid foods. Dont eat fatty, rich, or spicy foods at first.  · Dont force yourself to have three large meals a day. Instead, eat smaller amounts more often.  · Take pain medications with a small amount of solid food, such as crackers or toast to avoid nausea.      Call your surgeon if    · You feel too sleepy, dizzy, or groggy (medication may be too strong).  · You have side effects like nausea, vomiting, or skin changes (rash, itching, or hives).   © 6283-3017 [employer]. [address] [place] [address]. All rights reserved. This information is not intended as a substitute for professional medical care. Always follow your healthcare professional's instructions.          Ureteral Stents  A ureteral stent is a soft plastic tube with holes in it. Its temporarily inserted into a ureter to help drain urine into the bladder. One end goes in the kidney. The other end goes in the bladder. A coil on each end holds the stent in place. The stent cant be seen from outside the body. It shouldnt interfere with your normal routine. Your stent will be put in by a doctor trained in treating the urinary tract (a urologist) or another specialist. The procedure is done in a hospital or surgery center. Youll likely go home the same day.  When is a ureteral stent used?  A ureteral stent may be used:  · To bypass a blockage in a kidney or ureter.  · During kidney stone removal.  · To let a ureter heal after surgery.    Before the Procedure  Your healthcare provider will give you instructions to prepare for the procedure. X-rays or other imaging tests of your kidneys and  ureters may be done beforehand.  During the procedure  · You receive medicine to prevent pain and help you relax or sleep during the procedure. Once this takes effect, the procedure starts.  · The doctor inserts a cystoscope (lighted instrument) through the urethra and into the bladder. This shows the opening to the ureter.  · A thin wire is carefully threaded through the cystoscope, up the ureter, and into the kidney. The stent is inserted over the wire.  · A fluoroscope (special X-ray machine) is used to help position the stent. When the stent is in place, the wire and cystoscope are removed.  While you have a stent  · Some discomfort is normal. Certain movements may trigger pain or a feeling that you need to urinate. You may also feel mild soreness or pressure before or during urination. These symptoms will go away a few days after the stent is removed.  · Medicine to control pain or bladder spasms or to prevent infection may be prescribed. Take this as directed.  · Drink plenty of fluids to help flush out your urinary tract.  · Your urine may be slightly pink or red. This is due to bleeding caused by minor irritation from the stent. This may happen on and off while you have the stent.  · As with any synthetic device placed in the body, there is a risk of infection. The stent may have to be removed if this happens.   How long will you need a stent?  The stent is often taken out after the blockage in the ureter is treated or the ureter has healed. This may take 1 week to 2 weeks, or longer. If a stent is needed for a long time, it may need to be changed every few months.  When to call your healthcare provider  Contact your healthcare provider right away if:  · Your urine contains blood clots or you see a large amount of blood-tinged urine  · You have symptoms similar to those you had before the stent was placed  · You constantly leak urine  · You have a fever over 100.4°F (38°C), chills, nausea, or  vomiting  · Your pain is not relieved with medicine  · The end of the stent comes out of the urethra   Date Last Reviewed: 1/1/2017  © 8746-8565 [employer]. [address] [place] [address], [place] [address]. All rights reserved. This information is not intended as a substitute for professional medical care. Always follow your healthcare professional's instructions.

## 2019-08-06 NOTE — PLAN OF CARE
Alexander Prakash Jr. has met all discharge criteria from Phase II. Vital Signs are stable, ambulating  without difficulty. Discharge instructions given, patient verbalized understanding. Discharged from facility via wheelchair in stable condition.

## 2019-08-06 NOTE — TELEPHONE ENCOUNTER
----- Message from Puma Law MD sent at 8/6/2019  3:43 PM CDT -----  This patient just had a left ureteral stent placed. Can you set him up with an appointment in 2 months?    Thanks,  Puma Law

## 2019-08-06 NOTE — TRANSFER OF CARE
"Anesthesia Transfer of Care Note    Patient: Alexander Prakash Jr.    Procedure(s) Performed: Procedure(s) (LRB):  CYSTOSCOPY WITH STENT PLACEMENT (Left)    Patient location: PACU    Anesthesia Type: general    Transport from OR: Transported from OR on 2-3 L/min O2 by NC with adequate spontaneous ventilation    Post pain: adequate analgesia    Post assessment: no apparent anesthetic complications    Post vital signs: stable    Level of consciousness: sedated    Nausea/Vomiting: no nausea/vomiting    Complications: none    Transfer of care protocol was followed      Last vitals:   Visit Vitals  BP (!) 153/85 (BP Location: Left arm, Patient Position: Lying)   Pulse 77   Temp 36.7 °C (98.1 °F) (Oral)   Resp 16   Ht 5' 8" (1.727 m)   Wt 66.2 kg (146 lb)   SpO2 (!) 76%   BMI 22.20 kg/m²     "

## 2019-08-07 LAB
BILIRUB SERPL-MCNC: NORMAL MG/DL
BLOOD URINE, POC: NORMAL
COLOR, POC UA: YELLOW
GLUCOSE UR QL STRIP: 50
KETONES UR QL STRIP: NORMAL
LEUKOCYTE ESTERASE URINE, POC: NORMAL
NITRITE, POC UA: NORMAL
PH, POC UA: 5
PROTEIN, POC: NORMAL
SPECIFIC GRAVITY, POC UA: 1.01
UROBILINOGEN, POC UA: NORMAL

## 2019-08-14 ENCOUNTER — INFUSION (OUTPATIENT)
Dept: INFUSION THERAPY | Facility: OTHER | Age: 74
End: 2019-08-14
Attending: INTERNAL MEDICINE
Payer: MEDICARE

## 2019-08-14 ENCOUNTER — OFFICE VISIT (OUTPATIENT)
Dept: HEMATOLOGY/ONCOLOGY | Facility: CLINIC | Age: 74
End: 2019-08-14
Attending: INTERNAL MEDICINE
Payer: MEDICARE

## 2019-08-14 VITALS
RESPIRATION RATE: 16 BRPM | BODY MASS INDEX: 21.62 KG/M2 | HEIGHT: 68 IN | TEMPERATURE: 98 F | WEIGHT: 142.63 LBS | OXYGEN SATURATION: 99 % | DIASTOLIC BLOOD PRESSURE: 68 MMHG | HEART RATE: 92 BPM | SYSTOLIC BLOOD PRESSURE: 100 MMHG

## 2019-08-14 DIAGNOSIS — T45.1X5A ANEMIA ASSOCIATED WITH CHEMOTHERAPY: ICD-10-CM

## 2019-08-14 DIAGNOSIS — C25.1 MALIGNANT NEOPLASM OF BODY OF PANCREAS: ICD-10-CM

## 2019-08-14 DIAGNOSIS — Z51.11 ENCOUNTER FOR ANTINEOPLASTIC CHEMOTHERAPY: ICD-10-CM

## 2019-08-14 DIAGNOSIS — C61 PROSTATE CANCER METASTATIC TO BONE: ICD-10-CM

## 2019-08-14 DIAGNOSIS — C77.5 PROSTATE CANCER METASTATIC TO INTRAPELVIC LYMPH NODE: ICD-10-CM

## 2019-08-14 DIAGNOSIS — N17.9 AKI (ACUTE KIDNEY INJURY): Primary | ICD-10-CM

## 2019-08-14 DIAGNOSIS — C79.51 PROSTATE CANCER METASTATIC TO BONE: ICD-10-CM

## 2019-08-14 DIAGNOSIS — C25.1 MALIGNANT NEOPLASM OF BODY OF PANCREAS: Primary | ICD-10-CM

## 2019-08-14 DIAGNOSIS — N17.9 AKI (ACUTE KIDNEY INJURY): ICD-10-CM

## 2019-08-14 DIAGNOSIS — C78.7 LIVER METASTASES: ICD-10-CM

## 2019-08-14 DIAGNOSIS — C61 PROSTATE CANCER METASTATIC TO INTRAPELVIC LYMPH NODE: ICD-10-CM

## 2019-08-14 DIAGNOSIS — D64.81 ANEMIA ASSOCIATED WITH CHEMOTHERAPY: ICD-10-CM

## 2019-08-14 DIAGNOSIS — C78.6 PERITONEAL METASTASES: ICD-10-CM

## 2019-08-14 PROCEDURE — 96409 CHEMO IV PUSH SNGL DRUG: CPT

## 2019-08-14 PROCEDURE — 99214 OFFICE O/P EST MOD 30 MIN: CPT | Mod: S$GLB,,, | Performed by: INTERNAL MEDICINE

## 2019-08-14 PROCEDURE — 99214 PR OFFICE/OUTPT VISIT, EST, LEVL IV, 30-39 MIN: ICD-10-PCS | Mod: S$GLB,,, | Performed by: INTERNAL MEDICINE

## 2019-08-14 PROCEDURE — 96367 TX/PROPH/DG ADDL SEQ IV INF: CPT

## 2019-08-14 PROCEDURE — 3078F PR MOST RECENT DIASTOLIC BLOOD PRESSURE < 80 MM HG: ICD-10-PCS | Mod: CPTII,S$GLB,, | Performed by: INTERNAL MEDICINE

## 2019-08-14 PROCEDURE — 63600175 PHARM REV CODE 636 W HCPCS: Performed by: INTERNAL MEDICINE

## 2019-08-14 PROCEDURE — 3074F PR MOST RECENT SYSTOLIC BLOOD PRESSURE < 130 MM HG: ICD-10-PCS | Mod: CPTII,S$GLB,, | Performed by: INTERNAL MEDICINE

## 2019-08-14 PROCEDURE — 96361 HYDRATE IV INFUSION ADD-ON: CPT

## 2019-08-14 PROCEDURE — 3078F DIAST BP <80 MM HG: CPT | Mod: CPTII,S$GLB,, | Performed by: INTERNAL MEDICINE

## 2019-08-14 PROCEDURE — 1101F PR PT FALLS ASSESS DOC 0-1 FALLS W/OUT INJ PAST YR: ICD-10-PCS | Mod: CPTII,S$GLB,, | Performed by: INTERNAL MEDICINE

## 2019-08-14 PROCEDURE — 1101F PT FALLS ASSESS-DOCD LE1/YR: CPT | Mod: CPTII,S$GLB,, | Performed by: INTERNAL MEDICINE

## 2019-08-14 PROCEDURE — 3074F SYST BP LT 130 MM HG: CPT | Mod: CPTII,S$GLB,, | Performed by: INTERNAL MEDICINE

## 2019-08-14 PROCEDURE — 99999 PR PBB SHADOW E&M-EST. PATIENT-LVL V: ICD-10-PCS | Mod: PBBFAC,,, | Performed by: INTERNAL MEDICINE

## 2019-08-14 PROCEDURE — 96416 CHEMO PROLONG INFUSE W/PUMP: CPT

## 2019-08-14 PROCEDURE — 99999 PR PBB SHADOW E&M-EST. PATIENT-LVL V: CPT | Mod: PBBFAC,,, | Performed by: INTERNAL MEDICINE

## 2019-08-14 RX ORDER — DIPHENHYDRAMINE HYDROCHLORIDE 50 MG/ML
50 INJECTION INTRAMUSCULAR; INTRAVENOUS ONCE AS NEEDED
Status: DISCONTINUED | OUTPATIENT
Start: 2019-08-14 | End: 2019-08-14 | Stop reason: HOSPADM

## 2019-08-14 RX ORDER — FLUOROURACIL 50 MG/ML
400 INJECTION, SOLUTION INTRAVENOUS
Status: CANCELLED | OUTPATIENT
Start: 2019-08-14

## 2019-08-14 RX ORDER — SODIUM CHLORIDE 0.9 % (FLUSH) 0.9 %
10 SYRINGE (ML) INJECTION
Status: DISCONTINUED | OUTPATIENT
Start: 2019-08-14 | End: 2019-08-14 | Stop reason: HOSPADM

## 2019-08-14 RX ORDER — FLUOROURACIL 50 MG/ML
400 INJECTION, SOLUTION INTRAVENOUS
Status: COMPLETED | OUTPATIENT
Start: 2019-08-14 | End: 2019-08-14

## 2019-08-14 RX ORDER — SODIUM CHLORIDE 0.9 % (FLUSH) 0.9 %
10 SYRINGE (ML) INJECTION
Status: CANCELLED | OUTPATIENT
Start: 2019-08-14

## 2019-08-14 RX ORDER — HEPARIN 100 UNIT/ML
500 SYRINGE INTRAVENOUS
Status: DISCONTINUED | OUTPATIENT
Start: 2019-08-14 | End: 2019-08-14

## 2019-08-14 RX ORDER — HEPARIN 100 UNIT/ML
500 SYRINGE INTRAVENOUS
Status: DISCONTINUED | OUTPATIENT
Start: 2019-08-14 | End: 2019-08-14 | Stop reason: HOSPADM

## 2019-08-14 RX ORDER — DIPHENHYDRAMINE HYDROCHLORIDE 50 MG/ML
50 INJECTION INTRAMUSCULAR; INTRAVENOUS ONCE AS NEEDED
Status: CANCELLED | OUTPATIENT
Start: 2019-08-14

## 2019-08-14 RX ORDER — EPINEPHRINE 0.3 MG/.3ML
0.3 INJECTION SUBCUTANEOUS ONCE AS NEEDED
Status: DISCONTINUED | OUTPATIENT
Start: 2019-08-14 | End: 2019-08-14 | Stop reason: HOSPADM

## 2019-08-14 RX ORDER — SODIUM CHLORIDE 0.9 % (FLUSH) 0.9 %
10 SYRINGE (ML) INJECTION
Status: CANCELLED | OUTPATIENT
Start: 2019-08-17

## 2019-08-14 RX ORDER — HEPARIN 100 UNIT/ML
500 SYRINGE INTRAVENOUS
Status: CANCELLED | OUTPATIENT
Start: 2019-08-17

## 2019-08-14 RX ORDER — HEPARIN 100 UNIT/ML
500 SYRINGE INTRAVENOUS
Status: CANCELLED | OUTPATIENT
Start: 2019-08-14

## 2019-08-14 RX ORDER — EPINEPHRINE 0.3 MG/.3ML
0.3 INJECTION SUBCUTANEOUS ONCE AS NEEDED
Status: CANCELLED | OUTPATIENT
Start: 2019-08-14

## 2019-08-14 RX ADMIN — FLUOROURACIL 4270 MG: 50 INJECTION, SOLUTION INTRAVENOUS at 10:08

## 2019-08-14 RX ADMIN — PALONOSETRON HYDROCHLORIDE: 0.25 INJECTION, SOLUTION INTRAVENOUS at 09:08

## 2019-08-14 RX ADMIN — SODIUM CHLORIDE: 0.9 INJECTION, SOLUTION INTRAVENOUS at 09:08

## 2019-08-14 RX ADMIN — FLUOROURACIL 710 MG: 50 INJECTION, SOLUTION INTRAVENOUS at 10:08

## 2019-08-14 RX ADMIN — LEUCOVORIN CALCIUM 710 MG: 350 INJECTION, POWDER, LYOPHILIZED, FOR SOLUTION INTRAMUSCULAR; INTRAVENOUS at 10:08

## 2019-08-14 RX ADMIN — SODIUM CHLORIDE 1000 ML: 0.9 INJECTION, SOLUTION INTRAVENOUS at 09:08

## 2019-08-14 NOTE — PROGRESS NOTES
Subjective:       Patient ID: Alexander Prakash Jr. is a 74 y.o. male.    Chief Complaint: Follow-up    HPI     On 8/6/19 cystoscopy with Dr. Juarze:  Indications: Alexander Prakash Jr. is a 74 y.o. male with left hydronephrosis.  Findings:    - thin stricture in penile urethra, easily dilated to 24 Fr with Elvis dilators  - left retrograde pyelogram shows area of ureteral narrowing in the mid-ureter  - 6 Fr x 26 cm JJ ureteral stent without strings placed    Returns for follow up  Reports feeling well overall  Notes rare dizziness  Occasional pain where procedure recently done- good urination  No fevers or chills    Currently his treatment is focused on the metastatic pancreatic cancer  Recent scans with stable disease   MRIs for back pain revealed arthritis     CT scan 7/16/19:  COMPARISON:  CT chest abdomen pelvis with and without contrast dated 03/12/2019 and CT chest abdomen pelvis without contrast dated 12/10/2018    FINDINGS:  Chest: Scattered right pleural calcification and small pleural effusion with areas of peripheral fibrotic scarring and pleural thickening appears similar.  Unchanged subjacent right lower lobe opacity with configuration most compatible with round atelectasis.  Scattered left micro nodules appears similar measuring up to 3-4 mm.  For example series 6, image 221 in the left upper lobe and series 6, image 325 in the left lower lobe.    No pericardial effusion.  Unenhanced structures at the base of the neck are unremarkable.  Partially calcified subcarinal and right hilar nodes as can be seen with prior granulomatous disease.  No new or worsening adenopathy.  Right chest MediPort catheter tip terminates at the atrial caval junction.  Trachea and mainstem bronchi are clear.    Abdomen and pelvis:    Multiple hypodensities within the liver, technically indeterminate though appear similar over multiple serial exams.  Granulomatous changes of the spleen.  The gallbladder, adrenal glands, and right  kidney appear normal.  Partially thick-walled bladder noting prostatic enlargement with protrusion into the base of the bladder.  Hypoattenuating pancreatic body lesion appears slightly larger measuring 2.8 x 2.4 cm.  Adjacent stranding along the lesser curvature of the stomach appears similar.  There is moderate left hydroureteronephrosis with abrupt tapering at the mid ureter level (for example series 601, image 5), new from prior.  Mild ureteral dilatation of the more distal ureter to the level of the bladder.  The GI tract is normal in caliber.  Postoperative mild changes in the right lower quadrant.  Trace amount of right lower intrapelvic free fluid.  Scattered atherosclerotic calcification of the abdominal aorta.    Allowing for variations in technique, scattered, small peritoneal implants are again noted, stable to less conspicuous in size.  For example right lower quadrant nodule is unchanged (series 3, image 64); however, index lesion more anteriorly in the left upper abdomen is not well seen.  Focal periumbilical soft tissue abnormality (series 3, image 168), also unchanged.    Multifocal osseous metastasis, not significantly changed.  Impression:  1.  History of prostate cancer.  Similar appearance of multifocal osseous metastasis.  Small peritoneal nodules appear similar to less conspicuous.  Trace intrapelvic free fluid.    2.  Prostatomegaly with sequela of chronic partial outlet obstruction.  Worsening left hydroureteronephrosiswithout obstructing calculus.  There is abrupt transition at the level of the mid ureter potentially representing underlying stricture or mass.  Urologic consult recommended.    3.  Apparent increased size of mid pancreatic body mass noting noncontrast technique.    4.  Stable chest.    RECIST SUMMARY:    Date of prior examination for comparison: 03/12/2019    Lesion 1: Pancreas.  2.8 cm.  Series 3, image 33.  Previously 2.1 cm.    Lesion 2: Peritoneal nodule, anterior  abdomen.  Not well visualized on today's exam.  Previously 1.1 cm.    This report was flagged in Epic as abnormal.        Oncology History:  1. Metastatic prostate cancer  He is s/p TRUS/bx on 8/7/15 w/ high volume marta 9 (5+4), PSA 25.  He had negative metastatic workup at diagnosis.  However, at time of planned RALP on 9/10/15 he was noted to have BN invasion on cysto and therefore only PLND was done, which confirmed metastatic disease.  He was started on ADT 9/17/15 w/ firmagon followed by Eligard on 10/15/15.    Started Casodex 7/20/17 - scans at that time showed osseus metastatic disease  Started Docetaxel 10/6/17 with progression, then found to have below     - on follow-up scans a pancreatic lesion noted concerning for pancreatic cancer as it was an unlikely area for prostate cancer spread-  Biopsy confirmed adenocarcinoma and PSA was negative     Imaging as below:  Scan results:  6/20/18 Bone scan:  FINDINGS:  Two lesions in the left scapula, stable.  Elongated lesion in the right mid rib posteriorly, stable.  Prior exam demonstrated a large right iliac lesion, which no longer exhibits increased uptake.  Left pubic lesion, stable.  There is also now a small lesion in the left iliac bone. Two additional new small foci in the mid thoracic vertebra.  Both kidneys and the bladder appear unremarkable.  Impression:  Findings consistent with osseous metastatic disease as above, noting 3 new small lesions.     6/21/18 CT scan abdomen and pelvis:  FINDINGS:  Lower chest: There has been interval increase in size in the focal consolidation which is pleural based in the posterior aspect of the right lower lobe.  Today's exam measures 5.1 cm maximum diameter.  Previous exam is measure approximately 3.3 cm.  This is associated with pleural thickening that extends laterally.  There is a small focus of cylinder all coal bronchiectasis in the medial left lower lobe.  The remaining aspects of the lung parenchyma that are  visualized show no abnormalities.  The visualized heart and pericardium are normal  Liver: There are 3 hyperenhancing foci along the periphery of the right and left lobes of the liver which are stable from 2015.  The largest measures 1.1 cm in maximum diameter.  Although not characteristic on this exam, previous exam in 2015 shows findings most characteristic of hepatic hemangioma.  The remaining liver parenchyma as well as the hepatic and portal veins are patent.  Gallbladder: Normal.  Pancreas: There is been interval development of an ill-defined hypodense focus within the proximal body of the pancreas measuring 2.1 cm x 1.8 cm.  Spleen: Calcified hemangiomas present.  Otherwise normal.  Adrenal glands: Normal.  Genitourinary: Stable simple cyst in the lower pole of the right kidney measuring 1.6 cm.  The left kidney and bilateral ureters are normal.  The bladder is partially distended and shows wall thickening on the non dependent surface of the bladder.  This is not significantly changed in appearance.  Gastrointestinal: Stomach and small intestines are normal.  Colonic diverticulosis is present without evidence of diverticulitis.  Prostate: Enlarged and nodular causing mass effect on the bladder.  Miscellaneous: There is no intra-abdominal or pelvic free fluid, free air or lymphadenopathy.  The abdominal aorta tapers normally.  Bilateral fat containing inguinal hernias present.  Osseous and soft tissue structures: Sclerotic metastases involving the right iliac bone and left superior pubic ramus are identified.  The small focus in the left iliac bone seen on yesterday's bone scan is not well seen on this exam.  The soft tissues are within normal limits.  Impression:  1. Interval increase in size in the pleural base, right lower lobe consolidation.  2. Hyperenhancing foci likely represent hemangiomas.  They have been stable since 2015.  Is on the 2015 exam where they demonstrate characteristics most compatible  with hemangioma.  3. Bladder wall thickening is not significantly changed.  This may be related to outlet obstruction from the nodular prostate.  4. Interval development of ill-defined hypodense focus measuring 2.1 cm within the proximal body of the pancreas.  This may also be a focus of metastatic disease although is uncommon for the prostate to metastasized to the pancreas.  A dedicated pancreatic CT may be beneficial in further characterizing this lesion.     - on 7/25/18 he came to ER with worsening abdominal pain and concerns for appendicitis  Underwent Laparoscopy Converted to Laparotomy, Peritoneal Biopsy , Ileocecal with Dr. Stephenson  Biopsy concerning for metastatic pancreatic carcinoma     2. Metastatic pancreatic cancer  - started on [place]/Abraxane- note Ca19-9 never elevated  Response initially  Now with progression  FOLFOX started 12/20/18     Re imaging stable.    Review of Systems   Constitutional: Negative for activity change, appetite change, chills, fatigue, fever and unexpected weight change.   HENT: Positive for dental problem. Negative for congestion, hearing loss, mouth sores, nosebleeds, postnasal drip, sinus pressure, sore throat and trouble swallowing.    Eyes: Negative for visual disturbance.   Respiratory: Negative for cough (rare dry), chest tightness, shortness of breath and wheezing.    Cardiovascular: Negative for chest pain, palpitations and leg swelling.   Gastrointestinal: Positive for constipation (x 5 days). Negative for abdominal distention, abdominal pain, blood in stool, diarrhea, nausea and vomiting.        No GERD   Endocrine:        Hot flashes   Genitourinary: Negative for decreased urine volume, difficulty urinating, frequency, hematuria and urgency.   Musculoskeletal: Negative for arthralgias (better), back pain, gait problem, joint swelling, neck pain and neck stiffness.   Skin: Negative for color change, pallor, rash and wound.   Neurological: Positive for numbness (feet,  notes outer thighs since prior chemo). Negative for dizziness, weakness, light-headedness and headaches.   Hematological: Negative for adenopathy. Does not bruise/bleed easily.   Psychiatric/Behavioral: Negative for dysphoric mood and sleep disturbance. The patient is not nervous/anxious.        Objective:      Physical Exam   Constitutional: He is oriented to person, place, and time. He appears well-developed and well-nourished. No distress.   Presents with his wife   HENT:   Head: Normocephalic and atraumatic.   Mouth/Throat: Oropharynx is clear and moist. No oropharyngeal exudate.   Poor dentition   Eyes: Pupils are equal, round, and reactive to light. EOM are normal. No scleral icterus.   Neck: Normal range of motion. Neck supple. No tracheal deviation present. No thyromegaly present.   Cardiovascular: Normal rate, regular rhythm and normal heart sounds. Exam reveals no gallop and no friction rub.   No murmur heard.  Pulmonary/Chest: Effort normal and breath sounds normal. No respiratory distress. He has no wheezes. He has no rales. He exhibits no tenderness.   Abdominal: Soft. Bowel sounds are normal. He exhibits no distension and no mass. There is no rebound and no guarding.   No organomegaly  Declined rectal   Musculoskeletal: Normal range of motion. He exhibits no edema, tenderness or deformity.   Neurological: He is alert and oriented to person, place, and time. No cranial nerve deficit or sensory deficit. He exhibits normal muscle tone.   Skin: Skin is warm and dry. No rash noted. He is not diaphoretic. No erythema. No pallor.   Nursing note and vitals reviewed.    Labs- reviewed  Assessment:       1. Malignant neoplasm of body of pancreas    2. Liver metastases    3. Peritoneal metastases    4. Prostate cancer metastatic to bone    5. Prostate cancer metastatic to intrapelvic lymph node    6. Encounter for antineoplastic chemotherapy    7. Anemia associated with chemotherapy    8. ISA (acute kidney  injury)        Plan:     1-3. Continue 5FU only  Disease stable  Unable to tolerate additional chemotherapy  4-5. Therapy held with above  Disease also responding  6. See above  7. Parameters stable  7. IVFs today, post cysto with dilation  Recheck cr on Friday

## 2019-08-14 NOTE — PLAN OF CARE
Problem: Adult Inpatient Plan of Care  Goal: Plan of Care Review  Outcome: Ongoing (interventions implemented as appropriate)  5-FU and IVF administered, patient tolerated well. VSS, NAD. D/C'd home with wife - 5-FU home infusion pump in place, return appt 8:15am Friday 8/16/19.

## 2019-08-16 ENCOUNTER — TELEPHONE (OUTPATIENT)
Dept: HEMATOLOGY/ONCOLOGY | Facility: CLINIC | Age: 74
End: 2019-08-16

## 2019-08-16 ENCOUNTER — INFUSION (OUTPATIENT)
Dept: INFUSION THERAPY | Facility: OTHER | Age: 74
End: 2019-08-16
Attending: INTERNAL MEDICINE
Payer: MEDICARE

## 2019-08-16 VITALS
OXYGEN SATURATION: 95 % | DIASTOLIC BLOOD PRESSURE: 57 MMHG | SYSTOLIC BLOOD PRESSURE: 111 MMHG | TEMPERATURE: 99 F | RESPIRATION RATE: 18 BRPM | HEART RATE: 105 BPM

## 2019-08-16 DIAGNOSIS — C61 PROSTATE CANCER METASTATIC TO BONE: ICD-10-CM

## 2019-08-16 DIAGNOSIS — C79.51 PROSTATE CANCER METASTATIC TO BONE: ICD-10-CM

## 2019-08-16 DIAGNOSIS — C61 PROSTATE CANCER METASTATIC TO INTRAPELVIC LYMPH NODE: ICD-10-CM

## 2019-08-16 DIAGNOSIS — C77.5 PROSTATE CANCER METASTATIC TO INTRAPELVIC LYMPH NODE: ICD-10-CM

## 2019-08-16 DIAGNOSIS — C25.1 MALIGNANT NEOPLASM OF BODY OF PANCREAS: Primary | ICD-10-CM

## 2019-08-16 LAB
ALBUMIN SERPL BCP-MCNC: 4.1 G/DL (ref 3.5–5.2)
ALP SERPL-CCNC: 90 U/L (ref 55–135)
ALT SERPL W/O P-5'-P-CCNC: 12 U/L (ref 10–44)
ANION GAP SERPL CALC-SCNC: 14 MMOL/L (ref 8–16)
AST SERPL-CCNC: 17 U/L (ref 10–40)
BILIRUB SERPL-MCNC: 1 MG/DL (ref 0.1–1)
BUN SERPL-MCNC: 35 MG/DL (ref 8–23)
CALCIUM SERPL-MCNC: 10 MG/DL (ref 8.7–10.5)
CHLORIDE SERPL-SCNC: 102 MMOL/L (ref 95–110)
CO2 SERPL-SCNC: 21 MMOL/L (ref 23–29)
CREAT SERPL-MCNC: 1.8 MG/DL (ref 0.5–1.4)
EST. GFR  (AFRICAN AMERICAN): 43 ML/MIN/1.73 M^2
EST. GFR  (NON AFRICAN AMERICAN): 37 ML/MIN/1.73 M^2
GLUCOSE SERPL-MCNC: 172 MG/DL (ref 70–110)
POTASSIUM SERPL-SCNC: 4.1 MMOL/L (ref 3.5–5.1)
PROT SERPL-MCNC: 7 G/DL (ref 6–8.4)
SODIUM SERPL-SCNC: 137 MMOL/L (ref 136–145)

## 2019-08-16 PROCEDURE — 63600175 PHARM REV CODE 636 W HCPCS: Performed by: INTERNAL MEDICINE

## 2019-08-16 PROCEDURE — 96521 REFILL/MAINT PORTABLE PUMP: CPT

## 2019-08-16 PROCEDURE — A4216 STERILE WATER/SALINE, 10 ML: HCPCS | Performed by: INTERNAL MEDICINE

## 2019-08-16 PROCEDURE — 80053 COMPREHEN METABOLIC PANEL: CPT

## 2019-08-16 PROCEDURE — 36591 DRAW BLOOD OFF VENOUS DEVICE: CPT

## 2019-08-16 PROCEDURE — 25000003 PHARM REV CODE 250: Performed by: INTERNAL MEDICINE

## 2019-08-16 RX ORDER — HEPARIN 100 UNIT/ML
500 SYRINGE INTRAVENOUS
Status: DISCONTINUED | OUTPATIENT
Start: 2019-08-16 | End: 2019-08-16 | Stop reason: HOSPADM

## 2019-08-16 RX ORDER — SODIUM CHLORIDE 0.9 % (FLUSH) 0.9 %
10 SYRINGE (ML) INJECTION
Status: DISCONTINUED | OUTPATIENT
Start: 2019-08-16 | End: 2019-08-16 | Stop reason: HOSPADM

## 2019-08-16 RX ADMIN — Medication 10 ML: at 08:08

## 2019-08-16 RX ADMIN — HEPARIN 500 UNITS: 100 SYRINGE at 08:08

## 2019-08-16 NOTE — TELEPHONE ENCOUNTER
Called and spoke with Ms Rahman. Informed her that Dr Melendez reviewed Mr Dwain lab results and it show his Kidney function has improved and she encourage hydration.

## 2019-08-16 NOTE — PLAN OF CARE
Problem: Adult Inpatient Plan of Care  Goal: Plan of Care Review  Outcome: Ongoing (interventions implemented as appropriate)  Pump d/c'd and labs drawn from port, patient tolerated well. VSS, NAD. D/'d home with wife.

## 2019-08-16 NOTE — TELEPHONE ENCOUNTER
----- Message from Maday Melendez MD sent at 8/16/2019 12:35 PM CDT -----  Please let him know kidney function improving  Encourage hydration  THanks!

## 2019-08-20 ENCOUNTER — TELEPHONE (OUTPATIENT)
Dept: UROLOGY | Facility: CLINIC | Age: 74
End: 2019-08-20

## 2019-08-20 ENCOUNTER — DOCUMENTATION ONLY (OUTPATIENT)
Dept: HEMATOLOGY/ONCOLOGY | Facility: CLINIC | Age: 74
End: 2019-08-20

## 2019-08-20 DIAGNOSIS — K13.79 MOUTH SORES: ICD-10-CM

## 2019-08-20 NOTE — PROGRESS NOTES
Received request for vanilla boost for patient. Have ordered 3 cases of vanilla boost for $6.84 utilizing patient assistance. Will notify the patient when it is delivered for .

## 2019-08-20 NOTE — TELEPHONE ENCOUNTER
----- Message from Loreta Fermin sent at 8/20/2019 10:17 AM CDT -----  Contact: Laura ( wife )  Name of Who is Calling: Laura ( wife )       What is the request in detail: Laura ( wife ) is requesting a call from staff she states her  is having pains in his back and she wants to know if he needs to be seen .....Please contact to further discuss and advise.     Can the clinic reply by [facility]: yes     What [facility] Back [facility]: [phone]

## 2019-08-20 NOTE — TELEPHONE ENCOUNTER
Returned call to pt wife.   Pt wife stated that pt needs refill on mouthwash rx as pt has mouth sores and throat is sore.   Informed pt wife would have Dr. Melendez refill and if symptoms not relieved by tomorrow after using to please call and update office.   Pt wife verbalized understanding.         ----- Message from Leah Sebastian MA sent at 8/20/2019 10:25 AM CDT -----  Contact: self/[phone]  PT mother states the Pt mouth is swollen and she would like to know if he can send him in some medication for it.

## 2019-08-20 NOTE — TELEPHONE ENCOUNTER
Spoke with Mr. Prakash and his wife. She stated that per  he still has blood in his urine and when he's urinating he has back and side pain. Pt and wife would like to know if this is normal. Please advise.

## 2019-08-21 ENCOUNTER — HOSPITAL ENCOUNTER (EMERGENCY)
Facility: OTHER | Age: 74
Discharge: HOME | End: 2019-08-21
Attending: EMERGENCY MEDICINE
Payer: MEDICARE

## 2019-08-21 VITALS
HEIGHT: 68 IN | RESPIRATION RATE: 18 BRPM | HEART RATE: 74 BPM | TEMPERATURE: 99 F | OXYGEN SATURATION: 99 % | WEIGHT: 141 LBS | BODY MASS INDEX: 21.37 KG/M2 | SYSTOLIC BLOOD PRESSURE: 126 MMHG | DIASTOLIC BLOOD PRESSURE: 69 MMHG

## 2019-08-21 DIAGNOSIS — D70.9 NEUTROPENIA, UNSPECIFIED TYPE: ICD-10-CM

## 2019-08-21 DIAGNOSIS — R42 DIZZINESS: Primary | ICD-10-CM

## 2019-08-21 DIAGNOSIS — C80.1 CANCER: ICD-10-CM

## 2019-08-21 LAB
ALBUMIN SERPL BCP-MCNC: 4.1 G/DL (ref 3.5–5.2)
ALP SERPL-CCNC: 95 U/L (ref 55–135)
ALT SERPL W/O P-5'-P-CCNC: 11 U/L (ref 10–44)
ANION GAP SERPL CALC-SCNC: 14 MMOL/L (ref 8–16)
AST SERPL-CCNC: 21 U/L (ref 10–40)
BACTERIA #/AREA URNS HPF: ABNORMAL /HPF
BASOPHILS # BLD AUTO: 0.01 K/UL (ref 0–0.2)
BASOPHILS NFR BLD: 0.9 % (ref 0–1.9)
BILIRUB SERPL-MCNC: 1.4 MG/DL (ref 0.1–1)
BILIRUB UR QL STRIP: NEGATIVE
BUN SERPL-MCNC: 32 MG/DL (ref 8–23)
CALCIUM SERPL-MCNC: 10 MG/DL (ref 8.7–10.5)
CHLORIDE SERPL-SCNC: 99 MMOL/L (ref 95–110)
CLARITY UR: ABNORMAL
CO2 SERPL-SCNC: 21 MMOL/L (ref 23–29)
COLOR UR: YELLOW
CREAT SERPL-MCNC: 1.9 MG/DL (ref 0.5–1.4)
DIFFERENTIAL METHOD: ABNORMAL
EOSINOPHIL # BLD AUTO: 0.1 K/UL (ref 0–0.5)
EOSINOPHIL NFR BLD: 6.6 % (ref 0–8)
ERYTHROCYTE [DISTWIDTH] IN BLOOD BY AUTOMATED COUNT: 16.6 % (ref 11.5–14.5)
EST. GFR  (AFRICAN AMERICAN): 40 ML/MIN/1.73 M^2
EST. GFR  (NON AFRICAN AMERICAN): 34 ML/MIN/1.73 M^2
GLUCOSE SERPL-MCNC: 307 MG/DL (ref 70–110)
GLUCOSE UR QL STRIP: ABNORMAL
HCT VFR BLD AUTO: 32.1 % (ref 40–54)
HGB BLD-MCNC: 10.4 G/DL (ref 14–18)
HGB UR QL STRIP: ABNORMAL
IMM GRANULOCYTES # BLD AUTO: 0 K/UL (ref 0–0.04)
IMM GRANULOCYTES NFR BLD AUTO: 0 % (ref 0–0.5)
KETONES UR QL STRIP: NEGATIVE
LEUKOCYTE ESTERASE UR QL STRIP: NEGATIVE
LYMPHOCYTES # BLD AUTO: 0.6 K/UL (ref 1–4.8)
LYMPHOCYTES NFR BLD: 55.7 % (ref 18–48)
MAGNESIUM SERPL-MCNC: 2.2 MG/DL (ref 1.6–2.6)
MCH RBC QN AUTO: 30.8 PG (ref 27–31)
MCHC RBC AUTO-ENTMCNC: 32.4 G/DL (ref 32–36)
MCV RBC AUTO: 95 FL (ref 82–98)
MICROSCOPIC COMMENT: ABNORMAL
MONOCYTES # BLD AUTO: 0.1 K/UL (ref 0.3–1)
MONOCYTES NFR BLD: 7.5 % (ref 4–15)
NEUTROPHILS # BLD AUTO: 0.3 K/UL (ref 1.8–7.7)
NEUTROPHILS NFR BLD: 29.3 % (ref 38–73)
NITRITE UR QL STRIP: NEGATIVE
NRBC BLD-RTO: 0 /100 WBC
PH UR STRIP: 6 [PH] (ref 5–8)
PHOSPHATE SERPL-MCNC: 3.6 MG/DL (ref 2.7–4.5)
PLATELET # BLD AUTO: 128 K/UL (ref 150–350)
PMV BLD AUTO: 10.1 FL (ref 9.2–12.9)
POTASSIUM SERPL-SCNC: 5.4 MMOL/L (ref 3.5–5.1)
PROT SERPL-MCNC: 7.3 G/DL (ref 6–8.4)
PROT UR QL STRIP: ABNORMAL
RBC # BLD AUTO: 3.38 M/UL (ref 4.6–6.2)
RBC #/AREA URNS HPF: 21 /HPF (ref 0–4)
SODIUM SERPL-SCNC: 134 MMOL/L (ref 136–145)
SP GR UR STRIP: 1.02 (ref 1–1.03)
SQUAMOUS #/AREA URNS HPF: 2 /HPF
URN SPEC COLLECT METH UR: ABNORMAL
UROBILINOGEN UR STRIP-ACNC: NEGATIVE EU/DL
WBC # BLD AUTO: 1.06 K/UL (ref 3.9–12.7)
WBC #/AREA URNS HPF: 0 /HPF (ref 0–5)
YEAST URNS QL MICRO: ABNORMAL

## 2019-08-21 PROCEDURE — 85007 BL SMEAR W/DIFF WBC COUNT: CPT

## 2019-08-21 PROCEDURE — 93010 ELECTROCARDIOGRAM REPORT: CPT | Mod: ,,, | Performed by: INTERNAL MEDICINE

## 2019-08-21 PROCEDURE — 84100 ASSAY OF PHOSPHORUS: CPT

## 2019-08-21 PROCEDURE — 81000 URINALYSIS NONAUTO W/SCOPE: CPT

## 2019-08-21 PROCEDURE — 93010 EKG 12-LEAD: ICD-10-PCS | Mod: ,,, | Performed by: INTERNAL MEDICINE

## 2019-08-21 PROCEDURE — 93005 ELECTROCARDIOGRAM TRACING: CPT

## 2019-08-21 PROCEDURE — 96360 HYDRATION IV INFUSION INIT: CPT

## 2019-08-21 PROCEDURE — 36415 COLL VENOUS BLD VENIPUNCTURE: CPT

## 2019-08-21 PROCEDURE — 80053 COMPREHEN METABOLIC PANEL: CPT

## 2019-08-21 PROCEDURE — 99284 EMERGENCY DEPT VISIT MOD MDM: CPT | Mod: 25

## 2019-08-21 PROCEDURE — 85027 COMPLETE CBC AUTOMATED: CPT

## 2019-08-21 PROCEDURE — 63600175 PHARM REV CODE 636 W HCPCS: Performed by: EMERGENCY MEDICINE

## 2019-08-21 PROCEDURE — 83735 ASSAY OF MAGNESIUM: CPT

## 2019-08-21 RX ORDER — SODIUM CHLORIDE 9 MG/ML
INJECTION, SOLUTION INTRAVENOUS ONCE
Status: COMPLETED | OUTPATIENT
Start: 2019-08-21 | End: 2019-08-21

## 2019-08-21 RX ORDER — SODIUM CHLORIDE 9 MG/ML
INJECTION, SOLUTION INTRAVENOUS ONCE
Status: DISCONTINUED | OUTPATIENT
Start: 2019-08-21 | End: 2019-08-21

## 2019-08-21 RX ADMIN — SODIUM CHLORIDE: 0.9 INJECTION, SOLUTION INTRAVENOUS at 09:08

## 2019-08-22 ENCOUNTER — OFFICE VISIT (OUTPATIENT)
Dept: HEMATOLOGY/ONCOLOGY | Facility: CLINIC | Age: 74
End: 2019-08-22
Payer: MEDICARE

## 2019-08-22 ENCOUNTER — TELEPHONE (OUTPATIENT)
Dept: HEMATOLOGY/ONCOLOGY | Facility: CLINIC | Age: 74
End: 2019-08-22

## 2019-08-22 VITALS
HEART RATE: 50 BPM | DIASTOLIC BLOOD PRESSURE: 77 MMHG | TEMPERATURE: 98 F | SYSTOLIC BLOOD PRESSURE: 132 MMHG | BODY MASS INDEX: 21.39 KG/M2 | RESPIRATION RATE: 20 BRPM | WEIGHT: 140.63 LBS

## 2019-08-22 DIAGNOSIS — R63.0 ANOREXIA: Primary | ICD-10-CM

## 2019-08-22 DIAGNOSIS — C78.6 PERITONEAL METASTASES: ICD-10-CM

## 2019-08-22 DIAGNOSIS — C25.1 MALIGNANT NEOPLASM OF BODY OF PANCREAS: ICD-10-CM

## 2019-08-22 DIAGNOSIS — C79.51 PROSTATE CANCER METASTATIC TO BONE: ICD-10-CM

## 2019-08-22 DIAGNOSIS — C78.7 LIVER METASTASES: ICD-10-CM

## 2019-08-22 DIAGNOSIS — T45.1X5A CHEMOTHERAPY INDUCED NEUTROPENIA: ICD-10-CM

## 2019-08-22 DIAGNOSIS — N34.2 INFECTIVE URETHRITIS: Primary | ICD-10-CM

## 2019-08-22 DIAGNOSIS — C61 PROSTATE CANCER METASTATIC TO BONE: ICD-10-CM

## 2019-08-22 DIAGNOSIS — D70.1 CHEMOTHERAPY INDUCED NEUTROPENIA: ICD-10-CM

## 2019-08-22 PROCEDURE — 99999 PR PBB SHADOW E&M-EST. PATIENT-LVL V: ICD-10-PCS | Mod: PBBFAC,,, | Performed by: INTERNAL MEDICINE

## 2019-08-22 PROCEDURE — 1101F PT FALLS ASSESS-DOCD LE1/YR: CPT | Mod: CPTII,S$GLB,, | Performed by: INTERNAL MEDICINE

## 2019-08-22 PROCEDURE — 3075F SYST BP GE 130 - 139MM HG: CPT | Mod: CPTII,S$GLB,, | Performed by: INTERNAL MEDICINE

## 2019-08-22 PROCEDURE — 3075F PR MOST RECENT SYSTOLIC BLOOD PRESS GE 130-139MM HG: ICD-10-PCS | Mod: CPTII,S$GLB,, | Performed by: INTERNAL MEDICINE

## 2019-08-22 PROCEDURE — 1101F PR PT FALLS ASSESS DOC 0-1 FALLS W/OUT INJ PAST YR: ICD-10-PCS | Mod: CPTII,S$GLB,, | Performed by: INTERNAL MEDICINE

## 2019-08-22 PROCEDURE — 99999 PR PBB SHADOW E&M-EST. PATIENT-LVL V: CPT | Mod: PBBFAC,,, | Performed by: INTERNAL MEDICINE

## 2019-08-22 PROCEDURE — 3078F PR MOST RECENT DIASTOLIC BLOOD PRESSURE < 80 MM HG: ICD-10-PCS | Mod: CPTII,S$GLB,, | Performed by: INTERNAL MEDICINE

## 2019-08-22 PROCEDURE — 3078F DIAST BP <80 MM HG: CPT | Mod: CPTII,S$GLB,, | Performed by: INTERNAL MEDICINE

## 2019-08-22 PROCEDURE — 99214 OFFICE O/P EST MOD 30 MIN: CPT | Mod: S$GLB,,, | Performed by: INTERNAL MEDICINE

## 2019-08-22 PROCEDURE — 99214 PR OFFICE/OUTPT VISIT, EST, LEVL IV, 30-39 MIN: ICD-10-PCS | Mod: S$GLB,,, | Performed by: INTERNAL MEDICINE

## 2019-08-22 RX ORDER — MEGESTROL ACETATE 40 MG/ML
400 SUSPENSION ORAL DAILY
Qty: 240 ML | Refills: 2 | Status: SHIPPED | OUTPATIENT
Start: 2019-08-22 | End: 2020-08-21

## 2019-08-22 RX ORDER — CIPROFLOXACIN 250 MG/1
250 TABLET, FILM COATED ORAL 2 TIMES DAILY
Qty: 14 TABLET | Refills: 0 | Status: SHIPPED | OUTPATIENT
Start: 2019-08-22

## 2019-08-22 NOTE — ED PROVIDER NOTES
"Encounter Date: 8/21/2019    SCRIBE #1 NOTE: I, Salvador Lloydial, am scribing for, and in the presence of, Dr. Andersen.       History     Chief Complaint   Patient presents with    Dizziness     Intermittent dizziness for the past two days that worsend today. Pt also reports a decrease in appetite. Pt undergoing chemo for prostate and pancreatic cancer     Time seen by provider: 7:37 PM    This is a 74 y.o. male with a history of DM, HTN, HLD, metastatic prostate canter, and pancreatic cancer who presents with complaint of intermittent dizziness that began approximately three days ago. He describes his dizziness as an unsteadiness on his feet, "feeling like I'm walking on inner tubes". He is also experiencing lightheadedness, numbness in his fingers and feet, decreased appetite, and trouble swallowing. He reports that he is currently undergoing chemotherapy and his next treatment is next week. The patient denies fever, chills, sore throat, chest pain, shortness of breath, abdominal pain, nausea, vomiting, and dysuria. He denies smoking, drinking, and illicit drug use.     The history is provided by the patient and the spouse.     Review of patient's allergies indicates:  No Known Allergies  Past Medical History:   Diagnosis Date    Allergy     Arthritis     Cancer     prostate - metastatic    Diabetes mellitus     Hyperlipidemia     Hypertension     Pancreatic cancer     Port-A-Cath in place     Prostate cancer      Past Surgical History:   Procedure Laterality Date    CYSTOSCOPY WITH STENT PLACEMENT Left 8/6/2019    [facility]    CYSTOSCOPY-FLEXIBLE N/A 9/10/2015    [facility] OR    EYE SURGERY Right     x9    [id number]-[id number]-S-CATH N/A 12/19/2018    [facility]formed [facility] OR 2ND FLR    Laparoscopy Converted to Laparotomy, Peritoneal Biopsy , Ileocecal Resection N/A 7/18/2018    Performed by Rolan Pierce MD at " "[facility] OR 2ND FLR    LYMPH NODE DISSECTION      ROBOT ASSISTED LAPAROSCOPIC LYMPHADENECTOMY-PELVIC N/A 9/10/2015    [facility] OR    ULTRASOUND, ENDOSCOPIC, UPPER GI TRACT N/A 2018    Performed by Wili[facility]youb [facility] at [facility] ENDO (2ND FLR)    UPPER GASTROINTESTINAL ENDOSCOPY       Family History   Problem Relation Age of Onset    Hypertension Father     Hypertension Mother     Diabetes Sister     Heart disease Brother     Diabetes Maternal Aunt     Cancer Maternal Uncle     Cancer Maternal Grandfather     No Known Problems Paternal Grandmother     No Known Problems Paternal Grandfather     Prostate cancer Brother     Diabetes Sister     Diabetes Sister     Diabetes Sister     Heart disease Brother      Social History     Tobacco Use    Smoking status: Former Smoker     Packs/day: 4.00     Years: 60.00     Pack years: 240.00     Types: Cigarettes, Cigars     Start date: 1957     Last attempt to quit: 2016     Years since quittin.7    Smokeless tobacco: Never Used   Substance Use Topics    Alcohol use: No     Comment: pt stopped drinking 2018    Drug use: No     Types: "Crack" cocaine     Comment: occasional marijuana     Review of Systems   Constitutional: Positive for appetite change (decreased). Negative for chills and fever.   HENT: Positive for trouble swallowing. Negative for congestion and sore throat.    Eyes: Negative for visual disturbance.   Respiratory: Negative for cough and shortness of breath.    Cardiovascular: Negative for chest pain and palpitations.   Gastrointestinal: Negative for abdominal pain, diarrhea and vomiting.   Genitourinary: Negative for decreased urine volume, dysuria and frequency.   Musculoskeletal: Negative for joint swelling, neck pain and neck stiffness.   Skin: Negative for rash and wound.   Neurological: Positive for dizziness (unsteady) and light-headedness. Negative for weakness, numbness and headaches. "   Psychiatric/Behavioral: Negative for behavioral problems and confusion.       Physical Exam     Initial Vitals [08/21/19 1827]   BP Pulse Resp Temp SpO2   101/62 94 18 98.9 °F (37.2 °C) 100 %      MAP       --         Physical Exam    Nursing note and vitals reviewed.  Constitutional: He appears well-developed and well-nourished. He is not diaphoretic. No distress.   HENT:   Head: Normocephalic and atraumatic.   Poor dentition.   Eyes: EOM are normal. Pupils are equal, round, and reactive to light. Right eye exhibits no discharge. Left eye exhibits no discharge.   Neck: Normal range of motion.   Cardiovascular: Normal rate, regular rhythm, normal heart sounds and intact distal pulses. Exam reveals no gallop and no friction rub.    No murmur heard.  Pulmonary/Chest: Breath sounds normal. No respiratory distress. He has no wheezes. He has no rhonchi. He has no rales.   Abdominal: Soft. There is no tenderness. There is no rebound and no guarding.   Musculoskeletal: Normal range of motion. He exhibits no edema or tenderness.   Neurological: He is alert and oriented to person, place, and time. He has normal strength. He displays normal reflexes. No cranial nerve deficit or sensory deficit. GCS score is 15. GCS eye subscore is 4. GCS verbal subscore is 5. GCS motor subscore is 6.   Normal heel to shin. Normal finger to nose. Normal regular gait, but difficulty with tandem gait and toe-walking. No nystagmus.   Skin: Skin is warm and dry. No rash and no abscess noted. No erythema. No pallor.   Psychiatric: He has a normal mood and affect. His behavior is normal. Judgment and thought content normal.         ED Course   Procedures  Labs Reviewed   CBC W/ AUTO DIFFERENTIAL - Abnormal; Notable for the following components:       Result Value    WBC 1.06 (*)     RBC 3.38 (*)     Hemoglobin 10.4 (*)     Hematocrit 32.1 (*)     RDW 16.6 (*)     Platelets 128 (*)     Gran # (ANC) 0.3 (*)     Lymph # 0.6 (*)     Mono # 0.1 (*)      Gran% 29.3 (*)     Lymph% 55.7 (*)     All other components within normal limits    Narrative:     WBC    critical result(s) called and verbal readback obtained from   Rocío Carmona Rn., 08/21/2019 20:46   COMPREHENSIVE METABOLIC PANEL - Abnormal; Notable for the following components:    Sodium 134 (*)     Potassium 5.4 (*)     CO2 21 (*)     Glucose 307 (*)     BUN, Bld 32 (*)     Creatinine 1.9 (*)     Total Bilirubin 1.4 (*)     eGFR if  40 (*)     eGFR if non  34 (*)     All other components within normal limits   URINALYSIS, REFLEX TO URINE CULTURE - Abnormal; Notable for the following components:    Appearance, UA Hazy (*)     Protein, UA Trace (*)     Glucose, UA 3+ (*)     Occult Blood UA 3+ (*)     All other components within normal limits    Narrative:     Preferred Collection Type->Urine, Clean Catch   URINALYSIS MICROSCOPIC - Abnormal; Notable for the following components:    RBC, UA 21 (*)     All other components within normal limits    Narrative:     Preferred Collection Type->Urine, Clean Catch   MAGNESIUM   PHOSPHORUS          Imaging Results          CT Head Without Contrast (In process)                  Medical Decision Making:   Independently Interpreted Test(s):   I have ordered and independently interpreted EKG Reading(s) - see prior notes  Clinical Tests:   Lab Tests: Ordered and Reviewed  Radiological Study: Ordered and Reviewed  Medical Tests: Ordered and Reviewed  ED Management:  Emergent evaluation of 74 year old male with dizziness described as dysequilibrium, currently on chemotherapy for metastatic cancer. VSS, AF. I was concerned for possible brain/cerebellar metastasis. CT head pending at shift change - will be followed by Dr. Ruff.  Labs showed neutropenia, but afebrile and sign or symptom of current infection. Will plan for discharge home with email to heme/onc for close follow up and MRI as outpatient.            Scribe Attestation:   Scribe #1: I  performed the above scribed service and the documentation accurately describes the services I performed. I attest to the accuracy of the note.    Attending Attestation:           Physician Attestation for Scribe:  Physician Attestation Statement for Scribe #1: I, Dr. Andersen, reviewed documentation, as scribed by Salvador Camejo  in my presence, and it is both accurate and complete.                    Clinical Impression:     1. Dizziness    2. Neutropenia, unspecified type    3. Cancer                                   Dinora Andersen MD  08/21/19 [id number]

## 2019-08-22 NOTE — TELEPHONE ENCOUNTER
"Returned call to pt wife.   Pt wife stated that pt was brought to ED last night for "dizziness."  Pt had labs done and was given fluids and d/c'd home. Per pt states feeling okay today with no fevers.   Pt wife stated that MD in ED informed that pt's counts were low and wanted Dr. Melendez's recommendations.   Informed pt wife would notify Dr. Melendez and f/u (Pt currently scheduled for f/u on 8/28).   Pt wife verbalized understanding.       Message fwd.         ----- Message from Emely Sewell sent at 8/22/2019  9:23 AM CDT -----  Contact: Danielle (wife) @ [phone]  Calling to speak with someone in Dr. Melendez's office, says patient went to the ER last night, was given information regarding his blood cells that needs to be discussed with Dr. Melendez. Please call.    "

## 2019-08-22 NOTE — PROGRESS NOTES
Subjective:       Patient ID: Alexander Prakash Jr. is a 74 y.o. male.    Chief Complaint: Follow-up    HPI     Sick call  Went to ER last night  + sore throat  Hoarseness  Dizziness  Denies fevers or chills  Denies other infectious complaints    Currently his treatment is focused on the metastatic pancreatic cancer  Most recent scans with stable disease   MRIs for back pain revealed arthritis     CT scan 7/16/19:  COMPARISON:  CT chest abdomen pelvis with and without contrast dated 03/12/2019 and CT chest abdomen pelvis without contrast dated 12/10/2018    FINDINGS:  Chest: Scattered right pleural calcification and small pleural effusion with areas of peripheral fibrotic scarring and pleural thickening appears similar.  Unchanged subjacent right lower lobe opacity with configuration most compatible with round atelectasis.  Scattered left micro nodules appears similar measuring up to 3-4 mm.  For example series 6, image 221 in the left upper lobe and series 6, image 325 in the left lower lobe.    No pericardial effusion.  Unenhanced structures at the base of the neck are unremarkable.  Partially calcified subcarinal and right hilar nodes as can be seen with prior granulomatous disease.  No new or worsening adenopathy.  Right chest MediPort catheter tip terminates at the atrial caval junction.  Trachea and mainstem bronchi are clear.    Abdomen and pelvis:    Multiple hypodensities within the liver, technically indeterminate though appear similar over multiple serial exams.  Granulomatous changes of the spleen.  The gallbladder, adrenal glands, and right kidney appear normal.  Partially thick-walled bladder noting prostatic enlargement with protrusion into the base of the bladder.  Hypoattenuating pancreatic body lesion appears slightly larger measuring 2.8 x 2.4 cm.  Adjacent stranding along the lesser curvature of the stomach appears similar.  There is moderate left hydroureteronephrosis with abrupt tapering at the mid  ureter level (for example series 601, image 5), new from prior.  Mild ureteral dilatation of the more distal ureter to the level of the bladder.  The GI tract is normal in caliber.  Postoperative mild changes in the right lower quadrant.  Trace amount of right lower intrapelvic free fluid.  Scattered atherosclerotic calcification of the abdominal aorta.    Allowing for variations in technique, scattered, small peritoneal implants are again noted, stable to less conspicuous in size.  For example right lower quadrant nodule is unchanged (series 3, image 64); however, index lesion more anteriorly in the left upper abdomen is not well seen.  Focal periumbilical soft tissue abnormality (series 3, image 168), also unchanged.    Multifocal osseous metastasis, not significantly changed.  Impression:  1.  History of prostate cancer.  Similar appearance of multifocal osseous metastasis.  Small peritoneal nodules appear similar to less conspicuous.  Trace intrapelvic free fluid.    2.  Prostatomegaly with sequela of chronic partial outlet obstruction.  Worsening left hydroureteronephrosiswithout obstructing calculus.  There is abrupt transition at the level of the mid ureter potentially representing underlying stricture or mass.  Urologic consult recommended.    3.  Apparent increased size of mid pancreatic body mass noting noncontrast technique.    4.  Stable chest.    RECIST SUMMARY:    Date of prior examination for comparison: 03/12/2019    Lesion 1: Pancreas.  2.8 cm.  Series 3, image 33.  Previously 2.1 cm.    Lesion 2: Peritoneal nodule, anterior abdomen.  Not well visualized on today's exam.  Previously 1.1 cm.    This report was flagged in Epic as abnormal.        Oncology History:  1. Metastatic prostate cancer  He is s/p TRUS/bx on 8/7/15 w/ high volume marta 9 (5+4), PSA 25.  He had negative metastatic workup at diagnosis.  However, at time of planned RALP on 9/10/15 he was noted to have BN invasion on cysto and  therefore only PLND was done, which confirmed metastatic disease.  He was started on ADT 9/17/15 w/ firmagon followed by Eligard on 10/15/15.    Started Casodex 7/20/17 - scans at that time showed osseus metastatic disease  Started Docetaxel 10/6/17 with progression, then found to have below     - on follow-up scans a pancreatic lesion noted concerning for pancreatic cancer as it was an unlikely area for prostate cancer spread-  Biopsy confirmed adenocarcinoma and PSA was negative     Imaging as below:  Scan results:  6/20/18 Bone scan:  FINDINGS:  Two lesions in the left scapula, stable.  Elongated lesion in the right mid rib posteriorly, stable.  Prior exam demonstrated a large right iliac lesion, which no longer exhibits increased uptake.  Left pubic lesion, stable.  There is also now a small lesion in the left iliac bone. Two additional new small foci in the mid thoracic vertebra.  Both kidneys and the bladder appear unremarkable.  Impression:  Findings consistent with osseous metastatic disease as above, noting 3 new small lesions.     6/21/18 CT scan abdomen and pelvis:  FINDINGS:  Lower chest: There has been interval increase in size in the focal consolidation which is pleural based in the posterior aspect of the right lower lobe.  Today's exam measures 5.1 cm maximum diameter.  Previous exam is measure approximately 3.3 cm.  This is associated with pleural thickening that extends laterally.  There is a small focus of cylinder all coal bronchiectasis in the medial left lower lobe.  The remaining aspects of the lung parenchyma that are visualized show no abnormalities.  The visualized heart and pericardium are normal  Liver: There are 3 hyperenhancing foci along the periphery of the right and left lobes of the liver which are stable from 2015.  The largest measures 1.1 cm in maximum diameter.  Although not characteristic on this exam, previous exam in 2015 shows findings most characteristic of hepatic  hemangioma.  The remaining liver parenchyma as well as the hepatic and portal veins are patent.  Gallbladder: Normal.  Pancreas: There is been interval development of an ill-defined hypodense focus within the proximal body of the pancreas measuring 2.1 cm x 1.8 cm.  Spleen: Calcified hemangiomas present.  Otherwise normal.  Adrenal glands: Normal.  Genitourinary: Stable simple cyst in the lower pole of the right kidney measuring 1.6 cm.  The left kidney and bilateral ureters are normal.  The bladder is partially distended and shows wall thickening on the non dependent surface of the bladder.  This is not significantly changed in appearance.  Gastrointestinal: Stomach and small intestines are normal.  Colonic diverticulosis is present without evidence of diverticulitis.  Prostate: Enlarged and nodular causing mass effect on the bladder.  Miscellaneous: There is no intra-abdominal or pelvic free fluid, free air or lymphadenopathy.  The abdominal aorta tapers normally.  Bilateral fat containing inguinal hernias present.  Osseous and soft tissue structures: Sclerotic metastases involving the right iliac bone and left superior pubic ramus are identified.  The small focus in the left iliac bone seen on yesterday's bone scan is not well seen on this exam.  The soft tissues are within normal limits.  Impression:  1. Interval increase in size in the pleural base, right lower lobe consolidation.  2. Hyperenhancing foci likely represent hemangiomas.  They have been stable since 2015.  Is on the 2015 exam where they demonstrate characteristics most compatible with hemangioma.  3. Bladder wall thickening is not significantly changed.  This may be related to outlet obstruction from the nodular prostate.  4. Interval development of ill-defined hypodense focus measuring 2.1 cm within the proximal body of the pancreas.  This may also be a focus of metastatic disease although is uncommon for the prostate to metastasized to the  pancreas.  A dedicated pancreatic CT may be beneficial in further characterizing this lesion.     - on 7/25/18 he came to ER with worsening abdominal pain and concerns for appendicitis  Underwent Laparoscopy Converted to Laparotomy, Peritoneal Biopsy , Ileocecal with Dr. Stephenson  Biopsy concerning for metastatic pancreatic carcinoma     2. Metastatic pancreatic cancer  - started on Calaveras/Abraxane- note Ca19-9 never elevated  Response initially  Now with progression  FOLFOX started 12/20/18     On 8/6/19 cystoscopy with Dr. Juarez:  Indications: Alexander Prakash Jr. is a 74 y.o. male with left hydronephrosis.  Findings:    - thin stricture in penile urethra, easily dilated to 24 Fr with Elvis dilators  - left retrograde pyelogram shows area of ureteral narrowing in the mid-ureter  - 6 Fr x 26 cm JJ ureteral stent without strings placed    Review of Systems   Constitutional: Negative for activity change, appetite change, chills, fatigue, fever and unexpected weight change.   HENT: Positive for dental problem. Negative for congestion, hearing loss, mouth sores, nosebleeds, postnasal drip, sinus pressure, sore throat and trouble swallowing.    Eyes: Negative for visual disturbance.   Respiratory: Negative for cough (rare dry), chest tightness, shortness of breath and wheezing.    Cardiovascular: Negative for chest pain, palpitations and leg swelling.   Gastrointestinal: Positive for constipation (x 5 days). Negative for abdominal distention, abdominal pain, blood in stool, diarrhea, nausea and vomiting.        No GERD   Endocrine:        Hot flashes   Genitourinary: Negative for decreased urine volume, difficulty urinating, frequency, hematuria and urgency.   Musculoskeletal: Negative for arthralgias (better), back pain, gait problem, joint swelling, neck pain and neck stiffness.   Skin: Negative for color change, pallor, rash and wound.   Neurological: Positive for numbness (feet, notes outer thighs since prior chemo).  Negative for dizziness, weakness, light-headedness and headaches.   Hematological: Negative for adenopathy. Does not bruise/bleed easily.   Psychiatric/Behavioral: Negative for dysphoric mood and sleep disturbance. The patient is not nervous/anxious.        Objective:      Physical Exam   Constitutional: He is oriented to person, place, and time. No distress.   Presents with his wife  Appears fatigued   HENT:   Head: Normocephalic and atraumatic.   Mouth/Throat: Oropharynx is clear and moist. No oropharyngeal exudate.   Poor dentition   Eyes: Pupils are equal, round, and reactive to light. EOM are normal. No scleral icterus.   Neck: Normal range of motion. Neck supple. No tracheal deviation present. No thyromegaly present.   Cardiovascular: Normal rate, regular rhythm and normal heart sounds. Exam reveals no gallop and no friction rub.   No murmur heard.  Pulmonary/Chest: Effort normal and breath sounds normal. No respiratory distress. He has no wheezes. He has no rales. He exhibits no tenderness.   Abdominal: Soft. Bowel sounds are normal. He exhibits no distension and no mass. There is no rebound and no guarding.   No organomegaly  Declined rectal   Musculoskeletal: Normal range of motion. He exhibits no edema, tenderness or deformity.   Neurological: He is alert and oriented to person, place, and time. No cranial nerve deficit or sensory deficit. He exhibits normal muscle tone.   Skin: Skin is warm and dry. No rash noted. He is not diaphoretic. No erythema. No pallor.   Nursing note and vitals reviewed.    Labs- reviewed  Assessment:       1. Infective urethritis    2. Malignant neoplasm of body of pancreas    3. Peritoneal metastases    4. Prostate cancer metastatic to bone    5. Liver metastases        Plan:     1. Antibiotic sent  2,3,5. Post 5 FU  Counts down which is why he feels poorly  neupogen tomorrow  Neutropenic precautions reviewed  4. Treatment held with above    Prognosis again discussed

## 2019-08-23 ENCOUNTER — INFUSION (OUTPATIENT)
Dept: INFUSION THERAPY | Facility: OTHER | Age: 74
End: 2019-08-23
Attending: INTERNAL MEDICINE
Payer: MEDICARE

## 2019-08-23 VITALS
TEMPERATURE: 98 F | HEART RATE: 99 BPM | OXYGEN SATURATION: 100 % | DIASTOLIC BLOOD PRESSURE: 65 MMHG | SYSTOLIC BLOOD PRESSURE: 99 MMHG | RESPIRATION RATE: 18 BRPM

## 2019-08-23 DIAGNOSIS — T45.1X5A CHEMOTHERAPY INDUCED NEUTROPENIA: Primary | ICD-10-CM

## 2019-08-23 DIAGNOSIS — D70.1 CHEMOTHERAPY INDUCED NEUTROPENIA: Primary | ICD-10-CM

## 2019-08-23 PROCEDURE — 96372 THER/PROPH/DIAG INJ SC/IM: CPT

## 2019-08-23 PROCEDURE — 63600175 PHARM REV CODE 636 W HCPCS: Mod: JG | Performed by: INTERNAL MEDICINE

## 2019-08-23 RX ADMIN — FILGRASTIM 480 MCG: 480 INJECTION, SOLUTION INTRAVENOUS; SUBCUTANEOUS at 12:08

## 2019-08-23 NOTE — PLAN OF CARE
Problem: Adult Inpatient Plan of Care  Goal: Plan of Care Review  Outcome: Ongoing (interventions implemented as appropriate)  Neupogen injection to right arm complete. Pt tolerated well. VSS. NAD.   AVS provided. Pt verbalized understanding of discharge instructions before leaving with wife.

## 2019-08-28 ENCOUNTER — DOCUMENTATION ONLY (OUTPATIENT)
Dept: HEMATOLOGY/ONCOLOGY | Facility: CLINIC | Age: 74
End: 2019-08-28

## 2019-08-28 ENCOUNTER — INFUSION (OUTPATIENT)
Dept: INFUSION THERAPY | Facility: OTHER | Age: 74
End: 2019-08-28
Attending: INTERNAL MEDICINE
Payer: MEDICARE

## 2019-08-28 ENCOUNTER — OFFICE VISIT (OUTPATIENT)
Dept: HEMATOLOGY/ONCOLOGY | Facility: CLINIC | Age: 74
End: 2019-08-28
Attending: INTERNAL MEDICINE
Payer: MEDICARE

## 2019-08-28 VITALS
DIASTOLIC BLOOD PRESSURE: 57 MMHG | WEIGHT: 136 LBS | BODY MASS INDEX: 20.61 KG/M2 | HEART RATE: 114 BPM | TEMPERATURE: 97 F | RESPIRATION RATE: 17 BRPM | OXYGEN SATURATION: 100 % | SYSTOLIC BLOOD PRESSURE: 79 MMHG | HEIGHT: 68 IN

## 2019-08-28 VITALS — HEART RATE: 83 BPM | SYSTOLIC BLOOD PRESSURE: 131 MMHG | DIASTOLIC BLOOD PRESSURE: 73 MMHG | RESPIRATION RATE: 18 BRPM

## 2019-08-28 DIAGNOSIS — C61 PROSTATE CANCER METASTATIC TO INTRAPELVIC LYMPH NODE: ICD-10-CM

## 2019-08-28 DIAGNOSIS — C61 PROSTATE CANCER METASTATIC TO BONE: ICD-10-CM

## 2019-08-28 DIAGNOSIS — C79.51 PROSTATE CANCER METASTATIC TO BONE: ICD-10-CM

## 2019-08-28 DIAGNOSIS — C25.1 MALIGNANT NEOPLASM OF BODY OF PANCREAS: Primary | ICD-10-CM

## 2019-08-28 DIAGNOSIS — C79.9 METASTASIS FROM PANCREATIC CANCER: Primary | ICD-10-CM

## 2019-08-28 DIAGNOSIS — C25.9 METASTASIS FROM PANCREATIC CANCER: Primary | ICD-10-CM

## 2019-08-28 DIAGNOSIS — C77.5 PROSTATE CANCER METASTATIC TO INTRAPELVIC LYMPH NODE: ICD-10-CM

## 2019-08-28 PROCEDURE — 99999 PR PBB SHADOW E&M-EST. PATIENT-LVL III: ICD-10-PCS | Mod: PBBFAC,,, | Performed by: INTERNAL MEDICINE

## 2019-08-28 PROCEDURE — 3074F PR MOST RECENT SYSTOLIC BLOOD PRESSURE < 130 MM HG: ICD-10-PCS | Mod: CPTII,S$GLB,, | Performed by: INTERNAL MEDICINE

## 2019-08-28 PROCEDURE — 3074F SYST BP LT 130 MM HG: CPT | Mod: CPTII,S$GLB,, | Performed by: INTERNAL MEDICINE

## 2019-08-28 PROCEDURE — 99214 PR OFFICE/OUTPT VISIT, EST, LEVL IV, 30-39 MIN: ICD-10-PCS | Mod: S$GLB,,, | Performed by: INTERNAL MEDICINE

## 2019-08-28 PROCEDURE — 1101F PR PT FALLS ASSESS DOC 0-1 FALLS W/OUT INJ PAST YR: ICD-10-PCS | Mod: CPTII,S$GLB,, | Performed by: INTERNAL MEDICINE

## 2019-08-28 PROCEDURE — 3078F PR MOST RECENT DIASTOLIC BLOOD PRESSURE < 80 MM HG: ICD-10-PCS | Mod: CPTII,S$GLB,, | Performed by: INTERNAL MEDICINE

## 2019-08-28 PROCEDURE — 96360 HYDRATION IV INFUSION INIT: CPT

## 2019-08-28 PROCEDURE — 63600175 PHARM REV CODE 636 W HCPCS: Performed by: INTERNAL MEDICINE

## 2019-08-28 PROCEDURE — 3078F DIAST BP <80 MM HG: CPT | Mod: CPTII,S$GLB,, | Performed by: INTERNAL MEDICINE

## 2019-08-28 PROCEDURE — 1101F PT FALLS ASSESS-DOCD LE1/YR: CPT | Mod: CPTII,S$GLB,, | Performed by: INTERNAL MEDICINE

## 2019-08-28 PROCEDURE — 96361 HYDRATE IV INFUSION ADD-ON: CPT

## 2019-08-28 PROCEDURE — 99214 OFFICE O/P EST MOD 30 MIN: CPT | Mod: S$GLB,,, | Performed by: INTERNAL MEDICINE

## 2019-08-28 PROCEDURE — 99999 PR PBB SHADOW E&M-EST. PATIENT-LVL III: CPT | Mod: PBBFAC,,, | Performed by: INTERNAL MEDICINE

## 2019-08-28 RX ORDER — HEPARIN 100 UNIT/ML
500 SYRINGE INTRAVENOUS
Status: COMPLETED | OUTPATIENT
Start: 2019-08-28 | End: 2019-08-28

## 2019-08-28 RX ADMIN — HEPARIN 500 UNITS: 100 SYRINGE at 12:08

## 2019-08-28 RX ADMIN — SODIUM CHLORIDE 2000 ML: 0.9 INJECTION, SOLUTION INTRAVENOUS at 12:08

## 2019-08-28 NOTE — PROGRESS NOTES
Received referral from the clinic the patient was in need of hospice services. Reached out to patient and explained a little more to him about home hospice services. He is agreeable. He does not have a preference on the provider. Referral sent to [facility] for services.

## 2019-08-28 NOTE — LETTER
August 28, 2019    Alexander Prakash Jr.  [address] [address]             [facility] [address] [facility] [address]  [address], [address]  [facility] [address]  Phone: [phone] To Whom it May Concern:    Mr. Qunin Wilosn, the son of my patient Shady Prakash, will be needing time off to assist his dad as he enters hospice care for a terminal cancer.    I wanted to write a letter updating you on his personal situation. He will be assisting with caregiving and we appreciate your support of him during this time.    Sincerely,      Maday Melendez

## 2019-08-28 NOTE — LETTER
August 28, 2019    Alexander Prakash Jr.  [address] [facility] [address]             [facility] [address] [facility] [address]  [address], [address]  [facility] [address]  Phone: [phone] To Whom it May Concern:    Mr. Shady Wilson, the son of my patient Shady Prakash, will be needing time off to assist his dad as he enters hospice care for a terminal cancer.    I wanted to write a letter updating you on his personal situation. He will be assisting with caregiving and we appreciate your support of him during this time.    Sincerely,      Maday Melendez

## 2019-08-28 NOTE — PLAN OF CARE
Problem: Adult Inpatient Plan of Care  Goal: Plan of Care Review  Outcome: Ongoing (interventions implemented as appropriate)  2L IVF infusions complete. Pt tolerated well. VSS. NAD. Port to right chest de-accessed after heparinized per protocol.  AVS provided. Pt verbalized understanding of discharge instructions before leaving with wife.

## 2019-08-28 NOTE — PROGRESS NOTES
Subjective:       Patient ID: Alexander Prakash Jr. is a 74 y.o. male.    Chief Complaint: Follow-up    HPI     Long talk with patient  He is aware he is dying and states he has been telling his family and they have been struggling    Review of Systems    Objective:      Physical Exam    Assessment:       No diagnosis found.    Plan:       ***

## 2019-08-28 NOTE — LETTER
August 28, 2019    Alexander Prakash Jr.  [address] [address]             [facility] [address]  [address], [address]  [facility] [address]  Phone: [phone] To Whom it may concern:  Attention: Jayy Orozco, Carolin Pozo, Trav Francisco    Re: Sarah Ferreira's father, Shady Prakash, is now in the terminal stage of his cancer and will be entering Hospice care.    His daughter will need to take some time to travel and be able to spend time with him.    Please reach out for any questions and we appreciate your understanding.        Sincerely,      Maday Melendez MD

## 2019-08-29 ENCOUNTER — TELEPHONE (OUTPATIENT)
Dept: HEMATOLOGY/ONCOLOGY | Facility: CLINIC | Age: 74
End: 2019-08-29

## 2019-08-29 NOTE — TELEPHONE ENCOUNTER
Returned call to pt daughter.   Pt daughter stated that her sister did not receive email from Dr. Melendez as discussed yesterday.   Pt daughter stated that email is [email or web address]  Informed pt daughter would see if Dr. Melendez could resend.   Pt daughter verbalized understanding.       Message fwd.       ----- Message from Kristen Villafana sent at 8/29/2019 10:19 AM CDT -----  Contact: Pt  Pt daughter called to speak with nurse arana about a letter that Dr Melendez was sending thru a email about pt daughter needs for her job to take off and needs this asap   Callback#[phone]  Thank You  CHINA Villafana

## 2019-08-29 NOTE — PROGRESS NOTES
Long talk with patient  He is aware he is dying and states he has been telling his family and they have been struggling  We reviewed clinical evidence for disease progression and inability to continue to safely treat him  We have utilized all appropriate therapies and he is having difficulty regardless tolerating with issues of cytopenias and worsening organ function    30 minute discussion and he wishes to pursue Hospice and focus on quality of life  IVFs today with hypotension     notified

## 2019-08-29 NOTE — TELEPHONE ENCOUNTER
Returned call to pt daughter.   Pt daughter stated that her sister will need a letter as well stating that pt is enrolled in hospice.   Letter prepared- pt daughter will  this afternoon.   Pt daughter verbalized understanding.       ----- Message from Kristen Villafana sent at 8/29/2019  9:21 AM CDT -----  Contact: Pt daughter  Vanessa   Pt daughter  Vanessa called to speak with nurse Mala  states that she needs another letter   Callback#[phone]  Thank You  CHINA Villafana

## 2019-09-03 ENCOUNTER — TELEPHONE (OUTPATIENT)
Dept: HEMATOLOGY/ONCOLOGY | Facility: CLINIC | Age: 74
End: 2019-09-03

## 2019-09-03 DIAGNOSIS — C61 PROSTATE CANCER METASTATIC TO INTRAPELVIC LYMPH NODE: Primary | ICD-10-CM

## 2019-09-03 DIAGNOSIS — C77.5 PROSTATE CANCER METASTATIC TO INTRAPELVIC LYMPH NODE: Primary | ICD-10-CM

## 2019-09-03 NOTE — TELEPHONE ENCOUNTER
Returned call to pt.   Pt stated that he is on hospice but would like to be set up to have his port removed.   Informed pt would have  set up.   Pt verbalized understanding.       Message fwd.         ----- Message from Gladys Saenz sent at 9/3/2019 11:43 AM CDT -----  Contact: PTs Wife Laura  Wanting a callback regarding concerns, wants to discuss with nurse    Callback: [phone]

## 2019-09-07 NOTE — PROGRESS NOTES
Patient still has not urinated since his mohr has been pulled a little before ten jayson,,, SN did a Bladder scan  And  The largest concentration of urin in the bladder was a little over 200 ml;s,,,, urinal at bedside,, patient to try again later,,,,,   [FreeTextEntry1] : Colonoscopy  7 years ago demonstrated multiple polyps

## 2019-09-27 ENCOUNTER — TELEPHONE (OUTPATIENT)
Dept: NEPHROLOGY | Facility: CLINIC | Age: 74
End: 2019-09-27

## 2019-10-07 ENCOUNTER — OFFICE VISIT (OUTPATIENT)
Dept: UROLOGY | Facility: CLINIC | Age: 74
End: 2019-10-07
Attending: UROLOGY
Payer: MEDICARE

## 2019-10-07 VITALS
BODY MASS INDEX: 20.61 KG/M2 | HEIGHT: 68 IN | WEIGHT: 136 LBS | DIASTOLIC BLOOD PRESSURE: 65 MMHG | SYSTOLIC BLOOD PRESSURE: 95 MMHG | HEART RATE: 88 BPM

## 2019-10-07 DIAGNOSIS — C79.51 PROSTATE CANCER METASTATIC TO BONE: Primary | ICD-10-CM

## 2019-10-07 DIAGNOSIS — C61 PROSTATE CANCER METASTATIC TO BONE: Primary | ICD-10-CM

## 2019-10-07 PROCEDURE — 3078F PR MOST RECENT DIASTOLIC BLOOD PRESSURE < 80 MM HG: ICD-10-PCS | Mod: CPTII,S$GLB,, | Performed by: UROLOGY

## 2019-10-07 PROCEDURE — 99214 PR OFFICE/OUTPT VISIT, EST, LEVL IV, 30-39 MIN: ICD-10-PCS | Mod: GW,S$GLB,, | Performed by: UROLOGY

## 2019-10-07 PROCEDURE — 3074F SYST BP LT 130 MM HG: CPT | Mod: CPTII,S$GLB,, | Performed by: UROLOGY

## 2019-10-07 PROCEDURE — 1101F PT FALLS ASSESS-DOCD LE1/YR: CPT | Mod: CPTII,S$GLB,, | Performed by: UROLOGY

## 2019-10-07 PROCEDURE — 99214 OFFICE O/P EST MOD 30 MIN: CPT | Mod: GW,S$GLB,, | Performed by: UROLOGY

## 2019-10-07 PROCEDURE — 3074F PR MOST RECENT SYSTOLIC BLOOD PRESSURE < 130 MM HG: ICD-10-PCS | Mod: CPTII,S$GLB,, | Performed by: UROLOGY

## 2019-10-07 PROCEDURE — 3078F DIAST BP <80 MM HG: CPT | Mod: CPTII,S$GLB,, | Performed by: UROLOGY

## 2019-10-07 PROCEDURE — 1101F PR PT FALLS ASSESS DOC 0-1 FALLS W/OUT INJ PAST YR: ICD-10-PCS | Mod: CPTII,S$GLB,, | Performed by: UROLOGY

## 2019-10-07 NOTE — PROGRESS NOTES
"Subjective:      Alexander Prakash Jr. is a 74 y.o. male who returns today regarding his metastatic prostate cancer.      Underwent left ureteral stent placement for new-onset left-sided hydronephrosis on 8/6/19.    Was receiving chemotherapy for pancreatic cancer and the hydronephrosis was noted on a surveillance CT scan. Now on home hospice with not further treatment planned.    He denies any left-sided flank pain.  He denies any urinary problems.    He received his 6 month Eligard injection in May.    Prostate Cancer History  He is s/p TRUS/bx on 8/7/15 w/ high volume marta 9 (5+4), PSA 25.  He had negative metastatic workup.  At time of planned RALP on 9/10/15 he was noted to have BN invasion on cysto and therefore only PLND was done, which confirmed metastatic disease.  He was started on ADT 9/17/15 w/ firmagon followed by Eligard on 10/15/15.  Received last Eligard 10/17/17. Casodex added for rising PSA, which persisted. Completed pelvic radiation November 2017. Completed taxotere in 2018.     The following portions of the patient's history were reviewed and updated as appropriate: allergies, current medications, past family history, past medical history, past social history, past surgical history and problem list.    Review of Systems  A comprehensive multipoint review of systems was negative except as otherwise stated in the HPI.     Objective:   Vitals:   BP 95/65 (BP Location: Left arm, Patient Position: Sitting, BP Method: Large (Automatic))   Pulse 88   Ht 5' 8" (1.727 m)   Wt 61.7 kg (136 lb)   BMI 20.68 kg/m²     Physical Exam   General: alert and oriented, no acute distress  Respiratory: Symmetric expansion, non-labored breathing  Neuro: no gross deficits  Psych: normal judgment and insight, normal mood/affect and non-anxious    Lab Review   Urinalysis demonstrates + for RBCs    Component PSA DIAGNOSTIC   Latest Ref Rng & Units 0.00 - 4.00 ng/mL   4/23/2019 1.4   11/27/2018 2.0   8/24/2018 6.7 "   6/8/2018 7.3   3/6/2018 2.7   1/8/2018 0.92   12/5/2017 1.3   9/27/2017 13.7 (H)   9/1/2017 9.4 (H)   7/3/2017 5.0 (H)   10/7/2016 0.89   4/8/2016 0.73   1/4/2016 1.5   10/9/2015 6.6 (H)   7/7/2015 25.2 (H)     Imaging (images personally reviewed)  CT Non-contrast: Moderate left hydronephrosis and proximal hydroureter, no stones    Assessment and Plan:   Left Hydronephrosis  Acute Kidney Injury  -- Will withhold stent exchange at this time given limited life expectancy.  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 At this time, patient wishes to proceed with scheduled Eligard in 12/2019.  -- FU in 12/2019. Will re-discuss with patient at that time regarding Eligard shot.    Anterior urethral stricture, unspecified stricture type  -- S/p cysto dilation in OR at time of PLND in September 2015 and time of stent placement in 8/2019  -- Voiding well now - will monitor

## 2019-12-09 ENCOUNTER — TELEPHONE (OUTPATIENT)
Dept: UROLOGY | Facility: CLINIC | Age: 74
End: 2019-12-09

## 2019-12-09 ENCOUNTER — OFFICE VISIT (OUTPATIENT)
Dept: UROLOGY | Facility: CLINIC | Age: 74
End: 2019-12-09
Attending: UROLOGY
Payer: MEDICARE

## 2019-12-09 VITALS
DIASTOLIC BLOOD PRESSURE: 68 MMHG | BODY MASS INDEX: 20.16 KG/M2 | WEIGHT: 133.06 LBS | HEART RATE: 111 BPM | HEIGHT: 68 IN | SYSTOLIC BLOOD PRESSURE: 104 MMHG

## 2019-12-09 DIAGNOSIS — C61 PROSTATE CANCER METASTATIC TO BONE: Primary | ICD-10-CM

## 2019-12-09 DIAGNOSIS — C61 PROSTATE CANCER: ICD-10-CM

## 2019-12-09 DIAGNOSIS — C79.51 PROSTATE CANCER METASTATIC TO BONE: Primary | ICD-10-CM

## 2019-12-09 DIAGNOSIS — N13.30 HYDRONEPHROSIS OF LEFT KIDNEY: ICD-10-CM

## 2019-12-09 LAB
BILIRUB SERPL-MCNC: ABNORMAL MG/DL
BLOOD URINE, POC: 250
COLOR, POC UA: ABNORMAL
GLUCOSE UR QL STRIP: ABNORMAL
KETONES UR QL STRIP: ABNORMAL
LEUKOCYTE ESTERASE URINE, POC: ABNORMAL
NITRITE, POC UA: ABNORMAL
PH, POC UA: 5
PROTEIN, POC: ABNORMAL
SPECIFIC GRAVITY, POC UA: 1.02
UROBILINOGEN, POC UA: ABNORMAL

## 2019-12-09 PROCEDURE — 1101F PT FALLS ASSESS-DOCD LE1/YR: CPT | Mod: CPTII,S$GLB,, | Performed by: UROLOGY

## 2019-12-09 PROCEDURE — 1126F PR PAIN SEVERITY QUANTIFIED, NO PAIN PRESENT: ICD-10-PCS | Mod: S$GLB,,, | Performed by: UROLOGY

## 2019-12-09 PROCEDURE — 99214 PR OFFICE/OUTPT VISIT, EST, LEVL IV, 30-39 MIN: ICD-10-PCS | Mod: 25,GW,S$GLB, | Performed by: UROLOGY

## 2019-12-09 PROCEDURE — 3074F SYST BP LT 130 MM HG: CPT | Mod: CPTII,S$GLB,, | Performed by: UROLOGY

## 2019-12-09 PROCEDURE — 81002 POCT URINE DIPSTICK WITHOUT MICROSCOPE: ICD-10-PCS | Mod: GW,S$GLB,, | Performed by: UROLOGY

## 2019-12-09 PROCEDURE — 1159F MED LIST DOCD IN RCRD: CPT | Mod: S$GLB,,, | Performed by: UROLOGY

## 2019-12-09 PROCEDURE — 3074F PR MOST RECENT SYSTOLIC BLOOD PRESSURE < 130 MM HG: ICD-10-PCS | Mod: CPTII,S$GLB,, | Performed by: UROLOGY

## 2019-12-09 PROCEDURE — 3078F DIAST BP <80 MM HG: CPT | Mod: CPTII,S$GLB,, | Performed by: UROLOGY

## 2019-12-09 PROCEDURE — 81002 URINALYSIS NONAUTO W/O SCOPE: CPT | Mod: GW,S$GLB,, | Performed by: UROLOGY

## 2019-12-09 PROCEDURE — 1159F PR MEDICATION LIST DOCUMENTED IN MEDICAL RECORD: ICD-10-PCS | Mod: S$GLB,,, | Performed by: UROLOGY

## 2019-12-09 PROCEDURE — 1126F AMNT PAIN NOTED NONE PRSNT: CPT | Mod: S$GLB,,, | Performed by: UROLOGY

## 2019-12-09 PROCEDURE — 99214 OFFICE O/P EST MOD 30 MIN: CPT | Mod: 25,GW,S$GLB, | Performed by: UROLOGY

## 2019-12-09 PROCEDURE — 3078F PR MOST RECENT DIASTOLIC BLOOD PRESSURE < 80 MM HG: ICD-10-PCS | Mod: CPTII,S$GLB,, | Performed by: UROLOGY

## 2019-12-09 PROCEDURE — 1101F PR PT FALLS ASSESS DOC 0-1 FALLS W/OUT INJ PAST YR: ICD-10-PCS | Mod: CPTII,S$GLB,, | Performed by: UROLOGY

## 2019-12-09 NOTE — TELEPHONE ENCOUNTER
----- Message from Kath Han sent at 12/9/2019  1:18 PM CST -----  Contact: Brittaney (daughter in law)  Name of Who is Calling : Brittaney (daughter in law)    Brittaney (daughter in law) is requesting a call from staff in regards to results  .....Please contact to further discuss and advise.    Can the clinic reply by [facility] : No    What Number to Call Back :  [phone]

## 2019-12-09 NOTE — PROGRESS NOTES
"Subjective:      Alexander Prakash Jr. is a 74 y.o. male who returns today regarding his metastatic prostate cancer.      Underwent left ureteral stent placement for new-onset left-sided hydronephrosis on 8/6/19.    Was receiving chemotherapy for pancreatic cancer and the hydronephrosis was noted on a surveillance CT scan. Now on home hospice with no further treatment planned.    He denies any left-sided flank pain but does report gross hematuria.    He received his 6 month Eligard injection in May.    Prostate Cancer History  He is s/p TRUS/bx on 8/7/15 w/ high volume marta 9 (5+4), PSA 25.  He had negative metastatic workup.  At time of planned RALP on 9/10/15 he was noted to have BN invasion on cysto and therefore only PLND was done, which confirmed metastatic disease.  He was started on ADT 9/17/15 w/ firmagon followed by Eligard on 10/15/15.  Received last Eligard 10/17/17. Casodex added for rising PSA, which persisted. Completed pelvic radiation November 2017. Completed taxotere in 2018.     The following portions of the patient's history were reviewed and updated as appropriate: allergies, current medications, past family history, past medical history, past social history, past surgical history and problem list.    Review of Systems  A comprehensive multipoint review of systems was negative except as otherwise stated in the HPI.     Objective:   Vitals:   /68 (BP Location: Right arm, Patient Position: Sitting)   Pulse (!) 111   Ht 5' 8" (1.727 m)   Wt 60.3 kg (133 lb 0.8 oz)   BMI 20.23 kg/m²     Physical Exam   General: alert and oriented, no acute distress  Respiratory: Symmetric expansion, non-labored breathing  Neuro: no gross deficits  Psych: normal judgment and insight, normal mood/affect and non-anxious    Lab Review   Urinalysis demonstrates + for RBCs    Component PSA DIAGNOSTIC   Latest Ref Rng & Units 0.00 - 4.00 ng/mL   4/23/2019 1.4   11/27/2018 2.0   8/24/2018 6.7   6/8/2018 7.3 "   3/6/2018 2.7   1/8/2018 0.92   12/5/2017 1.3   9/27/2017 13.7 (H)   9/1/2017 9.4 (H)   7/3/2017 5.0 (H)   10/7/2016 0.89   4/8/2016 0.73   1/4/2016 1.5   10/9/2015 6.6 (H)   7/7/2015 25.2 (H)     Imaging (images personally reviewed)  CT Non-contrast: Moderate left hydronephrosis and proximal hydroureter, no stones    Assessment and Plan:   Left Hydronephrosis  Acute Kidney Injury  -- Will again withhold stent exchange at this time given limited life expectancy.  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 At this time, patient wishes defer injection scheduled for today.    Anterior urethral stricture, unspecified stricture type  -- S/p cysto dilation in OR at time of PLND in September 2015 and time of stent placement in 8/2019  -- Voiding well now - will monitor    FU 3 mos

## 2019-12-11 ENCOUNTER — TELEPHONE (OUTPATIENT)
Dept: UROLOGY | Facility: CLINIC | Age: 74
End: 2019-12-11

## 2019-12-11 NOTE — TELEPHONE ENCOUNTER
----- Message from Agustina Alegria MA sent at 12/10/2019  2:13 PM CST -----  Chan Felipe Staff  Caller: Bonnie Alfonso (Today,  8:55 AM)         Name of Who is Calling: Bonnie Alfonso       What is the request in detail:Bonnie Alfonso is requesting a call back regards to Urine test results and see if the pt needs meds  ............. Please contact to further discuss and advise       Can the clinic reply by [facility]: No     [facility]: [phone]

## 2019-12-11 NOTE — TELEPHONE ENCOUNTER
----- Message from Kinga Hernandez sent at 12/11/2019 10:41 AM CST -----  Contact: jeff BARNES   Name of Who is Calling:Jeff BARNES [facility]       What is the request in detail:Jeff Wooten [facility]  ] is requesting a call back in regards to patient Urinalysis   Please contact to further discuss and advise      Can the clinic reply by [facility]: no     What Number [facility] Back [facility]:  jeff wooten [facility]  [phone]

## 2019-12-28 ENCOUNTER — NURSE TRIAGE (OUTPATIENT)
Dept: ADMINISTRATIVE | Facility: CLINIC | Age: 74
End: 2019-12-28

## 2019-12-28 NOTE — TELEPHONE ENCOUNTER
Daughter calling stating pt is in hospice but asking if he can go to ED for uncontrolled pain. She states her mother is currently speaking with hospice nurse. I advised that if they believe he is having pain that needs to be addressed, ED is an option. She verbalized understanding.    Reason for Disposition   General information question, no triage required and triager able to answer question    Protocols used: INFORMATION ONLY CALL-A-AH

## 2021-05-23 NOTE — PLAN OF CARE
Patient does not appear to be in any acute distress/shows no evidence of clinical instability at this time. Provider has reviewed discharge instructions with the patient. The patient verbalized understanding instructions as well as need for follow up for any further symptoms.     Discharge papers given, education provided, and any questions answered. Patient discharged by provider. Patient provided with strainer and reports ride is here. Problem: Patient Care Overview  Goal: Plan of Care Review  Outcome: Ongoing (interventions implemented as appropriate)  Pt tolerated Abraxane/Gemzar treatment today. No signs of reaction, VSS. AVS given.

## 2021-06-13 NOTE — SUBJECTIVE & OBJECTIVE
Interval History:   Patient seen and examined, no acute events overnight  Denies N/V  +F/BM  Pain well controlled with PCA  Has been ambulating  Intermittent tachycardia    Medications:  Continuous Infusions:   hydromorphone in 0.9 % NaCl 6 mg/30 ml      lactated ringers 75 mL/hr at 07/22/18 1704     Scheduled Meds:   enoxaparin  40 mg Subcutaneous Daily    tamsulosin  0.4 mg Oral Daily     PRN Meds:acetaminophen, dextrose 50%, diphenhydrAMINE, glucagon (human recombinant), insulin aspart U-100, naloxone, ondansetron, promethazine (PHENERGAN) IVPB, ramelteon, sodium chloride 0.9%, sodium chloride 0.9%     Review of patient's allergies indicates:  No Known Allergies  Objective:     Vital Signs (Most Recent):  Temp: 99.1 °F (37.3 °C) (07/24/18 0807)  Pulse: 104 (07/24/18 0807)  Resp: 18 (07/24/18 0807)  BP: (!) 140/70 (07/24/18 0807)  SpO2: (!) 94 % (07/24/18 0807) Vital Signs (24h Range):  Temp:  [97.6 °F (36.4 °C)-99.1 °F (37.3 °C)] 99.1 °F (37.3 °C)  Pulse:  [] 104  Resp:  [14-18] 18  SpO2:  [94 %-100 %] 94 %  BP: (112-140)/(63-74) 140/70     Weight: 82 kg (180 lb 12.4 oz)  Body mass index is 27.49 kg/m².    Intake/Output - Last 3 Shifts       07/22 0700 - 07/23 0659 07/23 0700 - 07/24 0659 07/24 0700 - 07/25 0659    P.O. 720 360     I.V. (mL/kg) 1087.5 (13.3) 2512.5 (30.6)     Total Intake(mL/kg) 1807.5 (22) 2872.5 (35)     Urine (mL/kg/hr) 425 (0.2) 890 (0.5)     Total Output 425 890      Net +1382.5 +1982.5             Stool Occurrence  1 x           Physical Exam   Constitutional: He is oriented to person, place, and time. He appears well-developed and well-nourished. No distress.   Cardiovascular: Intact distal pulses.    Pulmonary/Chest: Effort normal. No respiratory distress.   Abdominal:   Soft, ND, appropriate TTP  Incision - clean, dry and intact    Neurological: He is alert and oriented to person, place, and time.       Significant Labs:  CBC:   Recent Labs  Lab 07/24/18  0456   WBC 6.40   RBC  [FreeTextEntry1] : 65 y/o M w/ left leg asymptomatic varicose veins\par Discussed with him the natural hx of venous disease, as well as symptoms that venous disease causes. He has no such symptoms currently. We discussed that he may benefit from compression therapy, but this is not a strict recommendation as he does not have edema. No further workup needed currently.\par I did discuss with him the compression therapy could be beneficial in halting progression of disease, and preventing becoming symptomatic.  He understands, and we will obtain the prescribed compression garments and utilizing as he can.\par We will follow up with me if he ever develops any symptoms that we discussed. 3.17*   HGB 8.5*   HCT 28.0*   *   MCV 88   MCH 26.8*   MCHC 30.4*     BMP:   Recent Labs  Lab 07/24/18  0456   *   *   K 4.0   CL 99   CO2 26   BUN 7*   CREATININE 0.7   CALCIUM 7.8*   MG 1.9     CMP:   Recent Labs  Lab 07/24/18  0456   *   CALCIUM 7.8*   ALBUMIN 2.0*   PROT 5.6*   *   K 4.0   CO2 26   CL 99   BUN 7*   CREATININE 0.7   ALKPHOS 95   ALT 17   AST 15   BILITOT 1.6*     LFTs:   Recent Labs  Lab 07/24/18  0456   ALT 17   AST 15   ALKPHOS 95   BILITOT 1.6*   PROT 5.6*   ALBUMIN 2.0*

## 2022-07-09 NOTE — ED NOTES
Pt lying down, respirations even/unlabored. NAD noted. Pt denies any needs at this time. Side rails upx2, call bell within reach. Will continue to monitor.    No
